# Patient Record
Sex: FEMALE | Race: WHITE | Employment: PART TIME | URBAN - METROPOLITAN AREA
[De-identification: names, ages, dates, MRNs, and addresses within clinical notes are randomized per-mention and may not be internally consistent; named-entity substitution may affect disease eponyms.]

---

## 2017-01-19 ENCOUNTER — TELEPHONE (OUTPATIENT)
Dept: CARDIOLOGY | Age: 42
End: 2017-01-19

## 2017-02-16 RX ORDER — ENALAPRIL MALEATE 2.5 MG/1
TABLET ORAL
Qty: 30 TABLET | Refills: 5 | Status: SHIPPED | OUTPATIENT
Start: 2017-02-16 | End: 2018-09-06 | Stop reason: SDUPTHER

## 2017-03-24 ENCOUNTER — TELEPHONE (OUTPATIENT)
Dept: CARDIOLOGY | Age: 42
End: 2017-03-24

## 2017-04-11 ENCOUNTER — OFFICE VISIT (OUTPATIENT)
Dept: CARDIOLOGY | Age: 42
End: 2017-04-11

## 2017-04-11 ENCOUNTER — HOSPITAL ENCOUNTER (OUTPATIENT)
Age: 42
Setting detail: OBSERVATION
Discharge: HOME | DRG: 141 | End: 2017-04-13
Attending: INTERNAL MEDICINE | Admitting: INTERNAL MEDICINE
Payer: MEDICAID

## 2017-04-11 ENCOUNTER — APPOINTMENT (OUTPATIENT)
Dept: GENERAL RADIOLOGY | Age: 42
DRG: 141 | End: 2017-04-11
Payer: MEDICAID

## 2017-04-11 ENCOUNTER — TELEPHONE (OUTPATIENT)
Dept: CARDIOLOGY | Age: 42
End: 2017-04-11

## 2017-04-11 VITALS
WEIGHT: 153.6 LBS | DIASTOLIC BLOOD PRESSURE: 80 MMHG | OXYGEN SATURATION: 98 % | BODY MASS INDEX: 24.68 KG/M2 | HEIGHT: 66 IN | SYSTOLIC BLOOD PRESSURE: 120 MMHG | HEART RATE: 82 BPM

## 2017-04-11 DIAGNOSIS — E11.65 TYPE 2 DIABETES MELLITUS WITH HYPERGLYCEMIA, UNSPECIFIED LONG TERM INSULIN USE STATUS: ICD-10-CM

## 2017-04-11 DIAGNOSIS — E78.2 MIXED HYPERLIPIDEMIA: ICD-10-CM

## 2017-04-11 DIAGNOSIS — I20.9 ANGINA, CLASS IV (HCC): ICD-10-CM

## 2017-04-11 DIAGNOSIS — R06.02 SHORTNESS OF BREATH: ICD-10-CM

## 2017-04-11 DIAGNOSIS — I25.118 CORONARY ARTERY DISEASE OF NATIVE ARTERY OF NATIVE HEART WITH STABLE ANGINA PECTORIS (HCC): Chronic | ICD-10-CM

## 2017-04-11 DIAGNOSIS — R07.2 PRECORDIAL PAIN: Primary | ICD-10-CM

## 2017-04-11 DIAGNOSIS — R07.9 CHEST PAIN, UNSPECIFIED TYPE: Primary | ICD-10-CM

## 2017-04-11 DIAGNOSIS — I10 ESSENTIAL HYPERTENSION: ICD-10-CM

## 2017-04-11 LAB
ALBUMIN SERPL-MCNC: 4.1 G/DL (ref 3.9–4.9)
ALP BLD-CCNC: 85 U/L (ref 40–130)
ALT SERPL-CCNC: 12 U/L (ref 0–33)
ANION GAP SERPL CALCULATED.3IONS-SCNC: 11 MEQ/L (ref 7–13)
AST SERPL-CCNC: 12 U/L (ref 0–35)
BASOPHILS ABSOLUTE: 0.1 K/UL (ref 0–0.2)
BASOPHILS RELATIVE PERCENT: 0.9 %
BILIRUB SERPL-MCNC: 0.1 MG/DL (ref 0–1.2)
BUN BLDV-MCNC: 8 MG/DL (ref 6–20)
CALCIUM SERPL-MCNC: 9.2 MG/DL (ref 8.6–10.2)
CHLORIDE BLD-SCNC: 97 MEQ/L (ref 98–107)
CO2: 25 MEQ/L (ref 22–29)
CREAT SERPL-MCNC: 0.51 MG/DL (ref 0.5–0.9)
EOSINOPHILS ABSOLUTE: 0.2 K/UL (ref 0–0.7)
EOSINOPHILS RELATIVE PERCENT: 3 %
GFR AFRICAN AMERICAN: >60
GFR NON-AFRICAN AMERICAN: >60
GLOBULIN: 2.7 G/DL (ref 2.3–3.5)
GLUCOSE BLD-MCNC: 148 MG/DL (ref 60–115)
GLUCOSE BLD-MCNC: 357 MG/DL (ref 74–109)
HCT VFR BLD CALC: 47.6 % (ref 37–47)
HEMOGLOBIN: 16.2 G/DL (ref 12–16)
LYMPHOCYTES ABSOLUTE: 2.6 K/UL (ref 1–4.8)
LYMPHOCYTES RELATIVE PERCENT: 43.6 %
MAGNESIUM: 2.1 MG/DL (ref 1.7–2.3)
MCH RBC QN AUTO: 32.1 PG (ref 27–31.3)
MCHC RBC AUTO-ENTMCNC: 34 % (ref 33–37)
MCV RBC AUTO: 94.6 FL (ref 82–100)
MONOCYTES ABSOLUTE: 0.3 K/UL (ref 0.2–0.8)
MONOCYTES RELATIVE PERCENT: 5.5 %
NEUTROPHILS ABSOLUTE: 2.8 K/UL (ref 1.4–6.5)
NEUTROPHILS RELATIVE PERCENT: 47 %
PDW BLD-RTO: 12.8 % (ref 11.5–14.5)
PERFORMED ON: ABNORMAL
PLATELET # BLD: 218 K/UL (ref 130–400)
POTASSIUM SERPL-SCNC: 3.6 MEQ/L (ref 3.5–5.1)
RBC # BLD: 5.03 M/UL (ref 4.2–5.4)
SODIUM BLD-SCNC: 133 MEQ/L (ref 132–144)
TOTAL CK: 120 U/L (ref 0–170)
TOTAL PROTEIN: 6.8 G/DL (ref 6.4–8.1)
TROPONIN: <0.01 NG/ML (ref 0–0.01)
TSH SERPL DL<=0.05 MIU/L-ACNC: 1.53 UIU/ML (ref 0.27–4.2)
WBC # BLD: 5.9 K/UL (ref 4.8–10.8)

## 2017-04-11 PROCEDURE — 36415 COLL VENOUS BLD VENIPUNCTURE: CPT

## 2017-04-11 PROCEDURE — 96376 TX/PRO/DX INJ SAME DRUG ADON: CPT

## 2017-04-11 PROCEDURE — 93005 ELECTROCARDIOGRAM TRACING: CPT

## 2017-04-11 PROCEDURE — 80053 COMPREHEN METABOLIC PANEL: CPT

## 2017-04-11 PROCEDURE — 2580000003 HC RX 258: Performed by: PHYSICIAN ASSISTANT

## 2017-04-11 PROCEDURE — 85025 COMPLETE CBC W/AUTO DIFF WBC: CPT

## 2017-04-11 PROCEDURE — 96375 TX/PRO/DX INJ NEW DRUG ADDON: CPT

## 2017-04-11 PROCEDURE — 84484 ASSAY OF TROPONIN QUANT: CPT

## 2017-04-11 PROCEDURE — 84443 ASSAY THYROID STIM HORMONE: CPT

## 2017-04-11 PROCEDURE — 6360000002 HC RX W HCPCS: Performed by: INTERNAL MEDICINE

## 2017-04-11 PROCEDURE — G0378 HOSPITAL OBSERVATION PER HR: HCPCS

## 2017-04-11 PROCEDURE — 99214 OFFICE O/P EST MOD 30 MIN: CPT | Performed by: INTERNAL MEDICINE

## 2017-04-11 PROCEDURE — 71020 XR CHEST STANDARD TWO VW: CPT

## 2017-04-11 PROCEDURE — 83735 ASSAY OF MAGNESIUM: CPT

## 2017-04-11 PROCEDURE — 6370000000 HC RX 637 (ALT 250 FOR IP): Performed by: INTERNAL MEDICINE

## 2017-04-11 PROCEDURE — 6360000002 HC RX W HCPCS: Performed by: PHYSICIAN ASSISTANT

## 2017-04-11 PROCEDURE — 96374 THER/PROPH/DIAG INJ IV PUSH: CPT

## 2017-04-11 PROCEDURE — 99285 EMERGENCY DEPT VISIT HI MDM: CPT

## 2017-04-11 PROCEDURE — 6370000000 HC RX 637 (ALT 250 FOR IP): Performed by: PHYSICIAN ASSISTANT

## 2017-04-11 PROCEDURE — 82550 ASSAY OF CK (CPK): CPT

## 2017-04-11 RX ORDER — 0.9 % SODIUM CHLORIDE 0.9 %
500 INTRAVENOUS SOLUTION INTRAVENOUS ONCE
Status: DISCONTINUED | OUTPATIENT
Start: 2017-04-11 | End: 2017-04-13 | Stop reason: HOSPADM

## 2017-04-11 RX ORDER — BUPROPION HYDROCHLORIDE 150 MG/1
150 TABLET, EXTENDED RELEASE ORAL 2 TIMES DAILY
Qty: 60 TABLET | Refills: 5 | Status: ON HOLD | OUTPATIENT
Start: 2017-04-11 | End: 2018-05-24 | Stop reason: HOSPADM

## 2017-04-11 RX ORDER — SODIUM CHLORIDE 0.9 % (FLUSH) 0.9 %
3 SYRINGE (ML) INJECTION EVERY 8 HOURS
Status: DISCONTINUED | OUTPATIENT
Start: 2017-04-11 | End: 2017-04-13

## 2017-04-11 RX ORDER — CLONAZEPAM 0.5 MG/1
0.5 TABLET ORAL 3 TIMES DAILY PRN
COMMUNITY
End: 2018-12-04 | Stop reason: ALTCHOICE

## 2017-04-11 RX ORDER — SODIUM CHLORIDE 0.9 % (FLUSH) 0.9 %
10 SYRINGE (ML) INJECTION EVERY 12 HOURS SCHEDULED
Status: DISCONTINUED | OUTPATIENT
Start: 2017-04-11 | End: 2017-04-13 | Stop reason: HOSPADM

## 2017-04-11 RX ORDER — ALPRAZOLAM 0.25 MG/1
0.25 TABLET ORAL 3 TIMES DAILY PRN
Status: DISCONTINUED | OUTPATIENT
Start: 2017-04-11 | End: 2017-04-13 | Stop reason: HOSPADM

## 2017-04-11 RX ORDER — DEXTROSE MONOHYDRATE 25 G/50ML
12.5 INJECTION, SOLUTION INTRAVENOUS PRN
Status: DISCONTINUED | OUTPATIENT
Start: 2017-04-11 | End: 2017-04-13 | Stop reason: HOSPADM

## 2017-04-11 RX ORDER — NITROGLYCERIN 0.4 MG/1
TABLET SUBLINGUAL
Qty: 25 TABLET | Refills: 2 | Status: SHIPPED | OUTPATIENT
Start: 2017-04-11 | End: 2018-09-06 | Stop reason: SDUPTHER

## 2017-04-11 RX ORDER — DEXTROSE MONOHYDRATE 50 MG/ML
100 INJECTION, SOLUTION INTRAVENOUS PRN
Status: DISCONTINUED | OUTPATIENT
Start: 2017-04-11 | End: 2017-04-13 | Stop reason: HOSPADM

## 2017-04-11 RX ORDER — ONDANSETRON 2 MG/ML
4 INJECTION INTRAMUSCULAR; INTRAVENOUS ONCE
Status: COMPLETED | OUTPATIENT
Start: 2017-04-11 | End: 2017-04-11

## 2017-04-11 RX ORDER — NICOTINE POLACRILEX 4 MG
15 LOZENGE BUCCAL PRN
Status: DISCONTINUED | OUTPATIENT
Start: 2017-04-11 | End: 2017-04-13 | Stop reason: HOSPADM

## 2017-04-11 RX ORDER — CARBAMAZEPINE 200 MG/1
200 TABLET ORAL 2 TIMES DAILY
COMMUNITY
End: 2017-09-20 | Stop reason: DRUGHIGH

## 2017-04-11 RX ORDER — BUSPIRONE HYDROCHLORIDE 10 MG/1
10 TABLET ORAL 2 TIMES DAILY
Status: ON HOLD | COMMUNITY
End: 2018-05-24 | Stop reason: HOSPADM

## 2017-04-11 RX ORDER — ACETAMINOPHEN 325 MG/1
650 TABLET ORAL EVERY 4 HOURS PRN
Status: DISCONTINUED | OUTPATIENT
Start: 2017-04-11 | End: 2017-04-13 | Stop reason: HOSPADM

## 2017-04-11 RX ORDER — MORPHINE SULFATE 2 MG/ML
2 INJECTION, SOLUTION INTRAMUSCULAR; INTRAVENOUS
Status: DISCONTINUED | OUTPATIENT
Start: 2017-04-11 | End: 2017-04-13 | Stop reason: HOSPADM

## 2017-04-11 RX ORDER — SODIUM CHLORIDE 0.9 % (FLUSH) 0.9 %
10 SYRINGE (ML) INJECTION PRN
Status: DISCONTINUED | OUTPATIENT
Start: 2017-04-11 | End: 2017-04-13 | Stop reason: HOSPADM

## 2017-04-11 RX ADMIN — INSULIN HUMAN 6 UNITS: 100 INJECTION, SOLUTION PARENTERAL at 20:18

## 2017-04-11 RX ADMIN — ALPRAZOLAM 0.25 MG: 0.25 TABLET ORAL at 22:33

## 2017-04-11 RX ADMIN — MORPHINE SULFATE 2 MG: 2 INJECTION, SOLUTION INTRAMUSCULAR; INTRAVENOUS at 22:33

## 2017-04-11 RX ADMIN — ONDANSETRON 4 MG: 2 INJECTION, SOLUTION INTRAMUSCULAR; INTRAVENOUS at 19:16

## 2017-04-11 RX ADMIN — MORPHINE SULFATE 2 MG: 2 INJECTION, SOLUTION INTRAMUSCULAR; INTRAVENOUS at 19:16

## 2017-04-11 RX ADMIN — SODIUM CHLORIDE, PRESERVATIVE FREE 10 ML: 5 INJECTION INTRAVENOUS at 19:20

## 2017-04-11 ASSESSMENT — ENCOUNTER SYMPTOMS
ABDOMINAL DISTENTION: 0
VOICE CHANGE: 0
APNEA: 0
NAUSEA: 0
VOMITING: 0
ANAL BLEEDING: 0
EYE DISCHARGE: 0
PHOTOPHOBIA: 0

## 2017-04-11 ASSESSMENT — PAIN DESCRIPTION - DESCRIPTORS: DESCRIPTORS: BURNING

## 2017-04-11 ASSESSMENT — PAIN SCALES - GENERAL
PAINLEVEL_OUTOF10: 7
PAINLEVEL_OUTOF10: 4
PAINLEVEL_OUTOF10: 7
PAINLEVEL_OUTOF10: 7

## 2017-04-11 ASSESSMENT — PAIN DESCRIPTION - PAIN TYPE: TYPE: CHRONIC PAIN

## 2017-04-12 LAB
GLUCOSE BLD-MCNC: 281 MG/DL (ref 60–115)
GLUCOSE BLD-MCNC: 305 MG/DL (ref 60–115)
GLUCOSE BLD-MCNC: 346 MG/DL (ref 60–115)
GLUCOSE BLD-MCNC: 367 MG/DL (ref 60–115)
PERFORMED ON: ABNORMAL
TROPONIN: <0.01 NG/ML (ref 0–0.01)

## 2017-04-12 PROCEDURE — 36415 COLL VENOUS BLD VENIPUNCTURE: CPT

## 2017-04-12 PROCEDURE — 84484 ASSAY OF TROPONIN QUANT: CPT

## 2017-04-12 PROCEDURE — G0378 HOSPITAL OBSERVATION PER HR: HCPCS

## 2017-04-12 PROCEDURE — 6370000000 HC RX 637 (ALT 250 FOR IP): Performed by: NURSE PRACTITIONER

## 2017-04-12 PROCEDURE — 94640 AIRWAY INHALATION TREATMENT: CPT

## 2017-04-12 PROCEDURE — 99233 SBSQ HOSP IP/OBS HIGH 50: CPT | Performed by: INTERNAL MEDICINE

## 2017-04-12 PROCEDURE — 6360000002 HC RX W HCPCS: Performed by: INTERNAL MEDICINE

## 2017-04-12 PROCEDURE — 6370000000 HC RX 637 (ALT 250 FOR IP): Performed by: INTERNAL MEDICINE

## 2017-04-12 PROCEDURE — 2060000000 HC ICU INTERMEDIATE R&B

## 2017-04-12 PROCEDURE — 6360000002 HC RX W HCPCS: Performed by: NURSE PRACTITIONER

## 2017-04-12 PROCEDURE — 94664 DEMO&/EVAL PT USE INHALER: CPT

## 2017-04-12 PROCEDURE — 2580000003 HC RX 258: Performed by: INTERNAL MEDICINE

## 2017-04-12 PROCEDURE — 96376 TX/PRO/DX INJ SAME DRUG ADON: CPT

## 2017-04-12 RX ORDER — CARBAMAZEPINE 200 MG/1
200 TABLET ORAL 2 TIMES DAILY
Status: DISCONTINUED | OUTPATIENT
Start: 2017-04-12 | End: 2017-04-13 | Stop reason: HOSPADM

## 2017-04-12 RX ORDER — GLIPIZIDE 10 MG/1
10 TABLET ORAL
Status: DISCONTINUED | OUTPATIENT
Start: 2017-04-13 | End: 2017-04-13 | Stop reason: HOSPADM

## 2017-04-12 RX ORDER — RANOLAZINE 500 MG/1
1000 TABLET, EXTENDED RELEASE ORAL 2 TIMES DAILY
Status: DISCONTINUED | OUTPATIENT
Start: 2017-04-12 | End: 2017-04-13 | Stop reason: HOSPADM

## 2017-04-12 RX ORDER — BUSPIRONE HYDROCHLORIDE 10 MG/1
10 TABLET ORAL 2 TIMES DAILY
Status: DISCONTINUED | OUTPATIENT
Start: 2017-04-12 | End: 2017-04-13 | Stop reason: HOSPADM

## 2017-04-12 RX ORDER — CARVEDILOL 6.25 MG/1
6.25 TABLET ORAL 2 TIMES DAILY
Status: DISCONTINUED | OUTPATIENT
Start: 2017-04-12 | End: 2017-04-13 | Stop reason: HOSPADM

## 2017-04-12 RX ORDER — METHYLPREDNISOLONE SODIUM SUCCINATE 125 MG/2ML
125 INJECTION, POWDER, LYOPHILIZED, FOR SOLUTION INTRAMUSCULAR; INTRAVENOUS ONCE
Status: COMPLETED | OUTPATIENT
Start: 2017-04-12 | End: 2017-04-12

## 2017-04-12 RX ORDER — IPRATROPIUM BROMIDE AND ALBUTEROL SULFATE 2.5; .5 MG/3ML; MG/3ML
1 SOLUTION RESPIRATORY (INHALATION) EVERY 4 HOURS PRN
Status: DISCONTINUED | OUTPATIENT
Start: 2017-04-12 | End: 2017-04-13 | Stop reason: HOSPADM

## 2017-04-12 RX ORDER — IPRATROPIUM BROMIDE AND ALBUTEROL SULFATE 2.5; .5 MG/3ML; MG/3ML
1 SOLUTION RESPIRATORY (INHALATION)
Status: DISCONTINUED | OUTPATIENT
Start: 2017-04-12 | End: 2017-04-12

## 2017-04-12 RX ORDER — PROMETHAZINE HYDROCHLORIDE AND CODEINE PHOSPHATE 6.25; 1 MG/5ML; MG/5ML
5 SYRUP ORAL EVERY 4 HOURS PRN
Status: DISCONTINUED | OUTPATIENT
Start: 2017-04-12 | End: 2017-04-13 | Stop reason: HOSPADM

## 2017-04-12 RX ORDER — CLONAZEPAM 0.5 MG/1
0.5 TABLET ORAL 3 TIMES DAILY PRN
Status: DISCONTINUED | OUTPATIENT
Start: 2017-04-12 | End: 2017-04-13 | Stop reason: HOSPADM

## 2017-04-12 RX ORDER — ENALAPRIL MALEATE 2.5 MG/1
2.5 TABLET ORAL DAILY
Status: DISCONTINUED | OUTPATIENT
Start: 2017-04-12 | End: 2017-04-13 | Stop reason: HOSPADM

## 2017-04-12 RX ORDER — PRASUGREL 10 MG/1
10 TABLET, FILM COATED ORAL DAILY
Status: DISCONTINUED | OUTPATIENT
Start: 2017-04-12 | End: 2017-04-13 | Stop reason: HOSPADM

## 2017-04-12 RX ORDER — BUPROPION HYDROCHLORIDE 150 MG/1
150 TABLET, EXTENDED RELEASE ORAL 2 TIMES DAILY
Status: DISCONTINUED | OUTPATIENT
Start: 2017-04-12 | End: 2017-04-13 | Stop reason: HOSPADM

## 2017-04-12 RX ORDER — ASPIRIN 81 MG/1
162 TABLET ORAL DAILY
Status: DISCONTINUED | OUTPATIENT
Start: 2017-04-12 | End: 2017-04-13 | Stop reason: HOSPADM

## 2017-04-12 RX ORDER — NITROGLYCERIN 0.4 MG/1
0.4 TABLET SUBLINGUAL EVERY 5 MIN PRN
Status: DISCONTINUED | OUTPATIENT
Start: 2017-04-12 | End: 2017-04-13 | Stop reason: HOSPADM

## 2017-04-12 RX ORDER — PRAVASTATIN SODIUM 40 MG
40 TABLET ORAL NIGHTLY
Status: DISCONTINUED | OUTPATIENT
Start: 2017-04-12 | End: 2017-04-13 | Stop reason: HOSPADM

## 2017-04-12 RX ADMIN — SODIUM CHLORIDE, PRESERVATIVE FREE 10 ML: 5 INJECTION INTRAVENOUS at 09:33

## 2017-04-12 RX ADMIN — MORPHINE SULFATE 2 MG: 2 INJECTION, SOLUTION INTRAMUSCULAR; INTRAVENOUS at 10:30

## 2017-04-12 RX ADMIN — CARVEDILOL 6.25 MG: 6.25 TABLET, FILM COATED ORAL at 21:54

## 2017-04-12 RX ADMIN — ASPIRIN 162 MG: 81 TABLET, COATED ORAL at 09:32

## 2017-04-12 RX ADMIN — INSULIN LISPRO 6 UNITS: 100 INJECTION, SOLUTION INTRAVENOUS; SUBCUTANEOUS at 17:44

## 2017-04-12 RX ADMIN — INSULIN LISPRO 8 UNITS: 100 INJECTION, SOLUTION INTRAVENOUS; SUBCUTANEOUS at 09:47

## 2017-04-12 RX ADMIN — INSULIN LISPRO 5 UNITS: 100 INJECTION, SOLUTION INTRAVENOUS; SUBCUTANEOUS at 21:55

## 2017-04-12 RX ADMIN — RANOLAZINE 1000 MG: 500 TABLET, FILM COATED, EXTENDED RELEASE ORAL at 09:32

## 2017-04-12 RX ADMIN — BUPROPION HYDROCHLORIDE 150 MG: 150 TABLET, EXTENDED RELEASE ORAL at 21:54

## 2017-04-12 RX ADMIN — ALPRAZOLAM 0.25 MG: 0.25 TABLET ORAL at 14:24

## 2017-04-12 RX ADMIN — MORPHINE SULFATE 2 MG: 2 INJECTION, SOLUTION INTRAMUSCULAR; INTRAVENOUS at 18:54

## 2017-04-12 RX ADMIN — ALPRAZOLAM 0.25 MG: 0.25 TABLET ORAL at 18:54

## 2017-04-12 RX ADMIN — CLONAZEPAM 0.5 MG: 0.5 TABLET ORAL at 14:24

## 2017-04-12 RX ADMIN — METHYLPREDNISOLONE SODIUM SUCCINATE 125 MG: 125 INJECTION, POWDER, FOR SOLUTION INTRAMUSCULAR; INTRAVENOUS at 09:33

## 2017-04-12 RX ADMIN — SODIUM CHLORIDE, PRESERVATIVE FREE 10 ML: 5 INJECTION INTRAVENOUS at 21:59

## 2017-04-12 RX ADMIN — CARBAMAZEPINE 200 MG: 200 TABLET ORAL at 21:54

## 2017-04-12 RX ADMIN — CLONAZEPAM 0.5 MG: 0.5 TABLET ORAL at 18:54

## 2017-04-12 RX ADMIN — PRAVASTATIN SODIUM 40 MG: 40 TABLET ORAL at 21:53

## 2017-04-12 RX ADMIN — PRASUGREL HYDROCHLORIDE 10 MG: 10 TABLET, FILM COATED ORAL at 09:33

## 2017-04-12 RX ADMIN — ENALAPRIL MALEATE 2.5 MG: 2.5 TABLET ORAL at 09:32

## 2017-04-12 RX ADMIN — ALPRAZOLAM 0.25 MG: 0.25 TABLET ORAL at 22:04

## 2017-04-12 RX ADMIN — MORPHINE SULFATE 2 MG: 2 INJECTION, SOLUTION INTRAMUSCULAR; INTRAVENOUS at 06:52

## 2017-04-12 RX ADMIN — MORPHINE SULFATE 2 MG: 2 INJECTION, SOLUTION INTRAMUSCULAR; INTRAVENOUS at 22:04

## 2017-04-12 RX ADMIN — INSULIN LISPRO 8 UNITS: 100 INJECTION, SOLUTION INTRAVENOUS; SUBCUTANEOUS at 13:39

## 2017-04-12 RX ADMIN — BUSPIRONE HYDROCHLORIDE 10 MG: 10 TABLET ORAL at 09:32

## 2017-04-12 RX ADMIN — MORPHINE SULFATE 2 MG: 2 INJECTION, SOLUTION INTRAMUSCULAR; INTRAVENOUS at 15:51

## 2017-04-12 RX ADMIN — BUSPIRONE HYDROCHLORIDE 10 MG: 10 TABLET ORAL at 21:53

## 2017-04-12 RX ADMIN — CARVEDILOL 6.25 MG: 6.25 TABLET, FILM COATED ORAL at 09:32

## 2017-04-12 RX ADMIN — CARBAMAZEPINE 200 MG: 200 TABLET ORAL at 09:45

## 2017-04-12 RX ADMIN — IPRATROPIUM BROMIDE AND ALBUTEROL SULFATE 1 AMPULE: 2.5; .5 SOLUTION RESPIRATORY (INHALATION) at 11:18

## 2017-04-12 RX ADMIN — RANOLAZINE 1000 MG: 500 TABLET, FILM COATED, EXTENDED RELEASE ORAL at 21:54

## 2017-04-12 ASSESSMENT — PAIN DESCRIPTION - DESCRIPTORS: DESCRIPTORS: BURNING

## 2017-04-12 ASSESSMENT — ENCOUNTER SYMPTOMS
COUGH: 1
EYES NEGATIVE: 1
WHEEZING: 1
SHORTNESS OF BREATH: 1
GASTROINTESTINAL NEGATIVE: 1
STRIDOR: 0
ALLERGIC/IMMUNOLOGIC NEGATIVE: 1
CHEST TIGHTNESS: 1
APNEA: 0

## 2017-04-12 ASSESSMENT — PAIN SCALES - GENERAL
PAINLEVEL_OUTOF10: 3
PAINLEVEL_OUTOF10: 4
PAINLEVEL_OUTOF10: 7
PAINLEVEL_OUTOF10: 7
PAINLEVEL_OUTOF10: 4
PAINLEVEL_OUTOF10: 8
PAINLEVEL_OUTOF10: 10
PAINLEVEL_OUTOF10: 4
PAINLEVEL_OUTOF10: 3
PAINLEVEL_OUTOF10: 7

## 2017-04-12 ASSESSMENT — PAIN DESCRIPTION - LOCATION
LOCATION: CHEST
LOCATION: CHEST;JAW

## 2017-04-12 ASSESSMENT — PAIN DESCRIPTION - FREQUENCY
FREQUENCY: INTERMITTENT
FREQUENCY: INTERMITTENT

## 2017-04-12 ASSESSMENT — PAIN DESCRIPTION - ONSET
ONSET: ON-GOING
ONSET: ON-GOING

## 2017-04-12 ASSESSMENT — PAIN DESCRIPTION - ORIENTATION
ORIENTATION: LEFT;MID
ORIENTATION: LEFT

## 2017-04-12 ASSESSMENT — PAIN DESCRIPTION - PAIN TYPE
TYPE: ACUTE PAIN
TYPE: ACUTE PAIN

## 2017-04-12 ASSESSMENT — PAIN DESCRIPTION - PROGRESSION
CLINICAL_PROGRESSION: GRADUALLY WORSENING
CLINICAL_PROGRESSION: GRADUALLY WORSENING

## 2017-04-13 VITALS
HEART RATE: 60 BPM | OXYGEN SATURATION: 99 % | TEMPERATURE: 98.1 F | SYSTOLIC BLOOD PRESSURE: 104 MMHG | WEIGHT: 154 LBS | DIASTOLIC BLOOD PRESSURE: 59 MMHG | HEIGHT: 66 IN | RESPIRATION RATE: 16 BRPM | BODY MASS INDEX: 24.75 KG/M2

## 2017-04-13 LAB
GLUCOSE BLD-MCNC: 200 MG/DL (ref 60–115)
PERFORMED ON: ABNORMAL
TROPONIN: <0.01 NG/ML (ref 0–0.01)
TROPONIN: <0.01 NG/ML (ref 0–0.01)

## 2017-04-13 PROCEDURE — 93005 ELECTROCARDIOGRAM TRACING: CPT

## 2017-04-13 PROCEDURE — 6370000000 HC RX 637 (ALT 250 FOR IP): Performed by: INTERNAL MEDICINE

## 2017-04-13 PROCEDURE — 2580000003 HC RX 258: Performed by: INTERNAL MEDICINE

## 2017-04-13 PROCEDURE — 99238 HOSP IP/OBS DSCHRG MGMT 30/<: CPT | Performed by: NURSE PRACTITIONER

## 2017-04-13 PROCEDURE — G0378 HOSPITAL OBSERVATION PER HR: HCPCS

## 2017-04-13 PROCEDURE — 6370000000 HC RX 637 (ALT 250 FOR IP): Performed by: NURSE PRACTITIONER

## 2017-04-13 PROCEDURE — 36415 COLL VENOUS BLD VENIPUNCTURE: CPT

## 2017-04-13 PROCEDURE — 94640 AIRWAY INHALATION TREATMENT: CPT

## 2017-04-13 PROCEDURE — 84484 ASSAY OF TROPONIN QUANT: CPT

## 2017-04-13 RX ORDER — SODIUM CHLORIDE 0.9 % (FLUSH) 0.9 %
10 SYRINGE (ML) INJECTION EVERY 12 HOURS SCHEDULED
Status: CANCELLED | OUTPATIENT
Start: 2017-04-13

## 2017-04-13 RX ORDER — SODIUM CHLORIDE 0.9 % (FLUSH) 0.9 %
10 SYRINGE (ML) INJECTION PRN
Status: CANCELLED | OUTPATIENT
Start: 2017-04-13

## 2017-04-13 RX ORDER — SODIUM CHLORIDE 9 MG/ML
INJECTION, SOLUTION INTRAVENOUS CONTINUOUS
Status: CANCELLED | OUTPATIENT
Start: 2017-04-14

## 2017-04-13 RX ORDER — NICOTINE 21 MG/24HR
1 PATCH, TRANSDERMAL 24 HOURS TRANSDERMAL DAILY
Status: DISCONTINUED | OUTPATIENT
Start: 2017-04-13 | End: 2017-04-13 | Stop reason: HOSPADM

## 2017-04-13 RX ADMIN — CARBAMAZEPINE 200 MG: 200 TABLET ORAL at 09:37

## 2017-04-13 RX ADMIN — RANOLAZINE 1000 MG: 500 TABLET, FILM COATED, EXTENDED RELEASE ORAL at 09:37

## 2017-04-13 RX ADMIN — ASPIRIN 162 MG: 81 TABLET, COATED ORAL at 09:37

## 2017-04-13 RX ADMIN — PRASUGREL HYDROCHLORIDE 10 MG: 10 TABLET, FILM COATED ORAL at 09:37

## 2017-04-13 RX ADMIN — GLIPIZIDE 10 MG: 10 TABLET ORAL at 06:42

## 2017-04-13 RX ADMIN — ENALAPRIL MALEATE 2.5 MG: 2.5 TABLET ORAL at 09:36

## 2017-04-13 RX ADMIN — IPRATROPIUM BROMIDE AND ALBUTEROL SULFATE 1 AMPULE: 2.5; .5 SOLUTION RESPIRATORY (INHALATION) at 08:18

## 2017-04-13 RX ADMIN — SODIUM CHLORIDE, PRESERVATIVE FREE 10 ML: 5 INJECTION INTRAVENOUS at 09:40

## 2017-04-13 RX ADMIN — BUSPIRONE HYDROCHLORIDE 10 MG: 10 TABLET ORAL at 09:37

## 2017-04-13 RX ADMIN — CARVEDILOL 6.25 MG: 6.25 TABLET, FILM COATED ORAL at 09:38

## 2017-04-13 ASSESSMENT — ENCOUNTER SYMPTOMS
ALLERGIC/IMMUNOLOGIC NEGATIVE: 1
SHORTNESS OF BREATH: 1
EYES NEGATIVE: 1
APNEA: 0
WHEEZING: 1
STRIDOR: 0
COUGH: 0
GASTROINTESTINAL NEGATIVE: 1
CHEST TIGHTNESS: 0

## 2017-04-13 ASSESSMENT — PAIN SCALES - GENERAL: PAINLEVEL_OUTOF10: 0

## 2017-04-17 LAB
EKG ATRIAL RATE: 70 BPM
EKG ATRIAL RATE: 82 BPM
EKG P AXIS: 49 DEGREES
EKG P AXIS: 64 DEGREES
EKG P-R INTERVAL: 178 MS
EKG P-R INTERVAL: 198 MS
EKG Q-T INTERVAL: 400 MS
EKG Q-T INTERVAL: 440 MS
EKG QRS DURATION: 100 MS
EKG QRS DURATION: 94 MS
EKG QTC CALCULATION (BAZETT): 467 MS
EKG QTC CALCULATION (BAZETT): 475 MS
EKG R AXIS: -11 DEGREES
EKG R AXIS: 20 DEGREES
EKG T AXIS: 42 DEGREES
EKG T AXIS: 46 DEGREES
EKG VENTRICULAR RATE: 70 BPM
EKG VENTRICULAR RATE: 82 BPM

## 2017-04-17 RX ORDER — PRAVASTATIN SODIUM 40 MG
TABLET ORAL
Qty: 90 TABLET | Refills: 3 | Status: ON HOLD | OUTPATIENT
Start: 2017-04-17 | End: 2018-05-24 | Stop reason: HOSPADM

## 2017-04-17 RX ORDER — CARVEDILOL 6.25 MG/1
TABLET ORAL
Qty: 180 TABLET | Refills: 3 | Status: ON HOLD | OUTPATIENT
Start: 2017-04-17 | End: 2018-05-24 | Stop reason: HOSPADM

## 2017-05-11 RX ORDER — PRASUGREL HCL 10 MG
TABLET ORAL
Qty: 30 TABLET | Refills: 5 | Status: SHIPPED | OUTPATIENT
Start: 2017-05-11 | End: 2018-09-06 | Stop reason: SDUPTHER

## 2017-08-01 DIAGNOSIS — E11.00 TYPE 2 DIABETES MELLITUS WITH HYPEROSMOLARITY WITHOUT COMA, WITHOUT LONG-TERM CURRENT USE OF INSULIN (HCC): ICD-10-CM

## 2017-08-01 RX ORDER — GLIPIZIDE 10 MG/1
TABLET ORAL
Qty: 60 TABLET | OUTPATIENT
Start: 2017-08-01

## 2017-09-15 LAB
CHOLESTEROL, TOTAL: 175 MG/DL
CHOLESTEROL/HDL RATIO: 3.8
HDLC SERPL-MCNC: 46 MG/DL (ref 35–70)
LDL CHOLESTEROL CALCULATED: 100 MG/DL (ref 0–160)
TRIGL SERPL-MCNC: 145 MG/DL
VLDLC SERPL CALC-MCNC: 29 MG/DL

## 2017-09-20 RX ORDER — CARBAMAZEPINE 300 MG/1
300 CAPSULE, EXTENDED RELEASE ORAL 2 TIMES DAILY
COMMUNITY
End: 2018-09-06 | Stop reason: SDUPTHER

## 2017-09-20 RX ORDER — ISOSORBIDE MONONITRATE 60 MG/1
60 TABLET, EXTENDED RELEASE ORAL DAILY
COMMUNITY
End: 2018-09-06 | Stop reason: SDUPTHER

## 2017-09-20 RX ORDER — MIRTAZAPINE 15 MG/1
15 TABLET, FILM COATED ORAL NIGHTLY
Status: ON HOLD | COMMUNITY
End: 2018-05-23

## 2018-05-22 LAB
TROPONIN: <0.02 NG/ML (ref 0–0.04)
TROPONIN: <0.02 NG/ML (ref 0–0.04)

## 2018-05-23 ENCOUNTER — APPOINTMENT (OUTPATIENT)
Dept: GENERAL RADIOLOGY | Age: 43
DRG: 175 | End: 2018-05-23
Payer: MEDICAID

## 2018-05-23 ENCOUNTER — APPOINTMENT (OUTPATIENT)
Dept: CARDIAC CATH/INVASIVE PROCEDURES | Age: 43
DRG: 175 | End: 2018-05-23
Payer: MEDICAID

## 2018-05-23 ENCOUNTER — HOSPITAL ENCOUNTER (INPATIENT)
Age: 43
LOS: 1 days | Discharge: HOME | DRG: 175 | End: 2018-05-24
Attending: EMERGENCY MEDICINE | Admitting: INTERNAL MEDICINE
Payer: MEDICAID

## 2018-05-23 DIAGNOSIS — I20.0 UNSTABLE ANGINA (HCC): Primary | ICD-10-CM

## 2018-05-23 LAB
ALBUMIN SERPL-MCNC: 4.2 G/DL (ref 3.9–4.9)
ALP BLD-CCNC: 71 U/L (ref 40–130)
ALT SERPL-CCNC: 18 U/L (ref 0–33)
ANION GAP SERPL CALCULATED.3IONS-SCNC: 14 MEQ/L (ref 7–13)
AST SERPL-CCNC: 18 U/L (ref 0–35)
BASOPHILS ABSOLUTE: 0.1 K/UL (ref 0–0.2)
BASOPHILS RELATIVE PERCENT: 0.8 %
BILIRUB SERPL-MCNC: 0.4 MG/DL (ref 0–1.2)
BUN BLDV-MCNC: 9 MG/DL (ref 6–20)
CALCIUM SERPL-MCNC: 9.3 MG/DL (ref 8.6–10.2)
CHLORIDE BLD-SCNC: 98 MEQ/L (ref 98–107)
CHOLESTEROL, TOTAL: 161 MG/DL (ref 0–199)
CO2: 24 MEQ/L (ref 22–29)
CREAT SERPL-MCNC: 0.5 MG/DL (ref 0.5–0.9)
EOSINOPHILS ABSOLUTE: 0.1 K/UL (ref 0–0.7)
EOSINOPHILS RELATIVE PERCENT: 1.7 %
GFR AFRICAN AMERICAN: >60
GFR NON-AFRICAN AMERICAN: >60
GLOBULIN: 2 G/DL (ref 2.3–3.5)
GLUCOSE BLD-MCNC: 293 MG/DL (ref 74–109)
HCT VFR BLD CALC: 44.7 % (ref 37–47)
HCT VFR BLD CALC: 48.1 % (ref 37–47)
HDLC SERPL-MCNC: 59 MG/DL (ref 40–59)
HEMOGLOBIN: 15.4 G/DL (ref 12–16)
HEMOGLOBIN: 16.4 G/DL (ref 12–16)
INR BLD: 1
INR BLD: 1
LDL CHOLESTEROL CALCULATED: 83 MG/DL (ref 0–129)
LYMPHOCYTES ABSOLUTE: 1.6 K/UL (ref 1–4.8)
LYMPHOCYTES RELATIVE PERCENT: 23 %
MAGNESIUM: 2.1 MG/DL (ref 1.7–2.3)
MCH RBC QN AUTO: 32.6 PG (ref 27–31.3)
MCH RBC QN AUTO: 32.8 PG (ref 27–31.3)
MCHC RBC AUTO-ENTMCNC: 34.1 % (ref 33–37)
MCHC RBC AUTO-ENTMCNC: 34.4 % (ref 33–37)
MCV RBC AUTO: 95.3 FL (ref 82–100)
MCV RBC AUTO: 95.7 FL (ref 82–100)
MONOCYTES ABSOLUTE: 0.5 K/UL (ref 0.2–0.8)
MONOCYTES RELATIVE PERCENT: 7.6 %
NEUTROPHILS ABSOLUTE: 4.6 K/UL (ref 1.4–6.5)
NEUTROPHILS RELATIVE PERCENT: 66.9 %
PDW BLD-RTO: 12.6 % (ref 11.5–14.5)
PDW BLD-RTO: 12.8 % (ref 11.5–14.5)
PLATELET # BLD: 223 K/UL (ref 130–400)
PLATELET # BLD: 236 K/UL (ref 130–400)
POTASSIUM SERPL-SCNC: 4.4 MEQ/L (ref 3.5–5.1)
PRO-BNP: 68 PG/ML
PROTHROMBIN TIME: 10.4 SEC (ref 9.6–12.3)
PROTHROMBIN TIME: 10.7 SEC (ref 9.6–12.3)
RBC # BLD: 4.69 M/UL (ref 4.2–5.4)
RBC # BLD: 5.03 M/UL (ref 4.2–5.4)
SODIUM BLD-SCNC: 136 MEQ/L (ref 132–144)
TOTAL PROTEIN: 6.2 G/DL (ref 6.4–8.1)
TRIGL SERPL-MCNC: 94 MG/DL (ref 0–200)
TROPONIN: <0.01 NG/ML (ref 0–0.01)
TROPONIN: <0.01 NG/ML (ref 0–0.01)
WBC # BLD: 6.9 K/UL (ref 4.8–10.8)
WBC # BLD: 7.9 K/UL (ref 4.8–10.8)

## 2018-05-23 PROCEDURE — 92920 PRQ TRLUML C ANGIOP 1ART&/BR: CPT | Performed by: INTERNAL MEDICINE

## 2018-05-23 PROCEDURE — 2580000003 HC RX 258: Performed by: INTERNAL MEDICINE

## 2018-05-23 PROCEDURE — 99285 EMERGENCY DEPT VISIT HI MDM: CPT

## 2018-05-23 PROCEDURE — 84484 ASSAY OF TROPONIN QUANT: CPT

## 2018-05-23 PROCEDURE — 6370000000 HC RX 637 (ALT 250 FOR IP): Performed by: INTERNAL MEDICINE

## 2018-05-23 PROCEDURE — C1769 GUIDE WIRE: HCPCS

## 2018-05-23 PROCEDURE — 4A023N7 MEASUREMENT OF CARDIAC SAMPLING AND PRESSURE, LEFT HEART, PERCUTANEOUS APPROACH: ICD-10-PCS | Performed by: INTERNAL MEDICINE

## 2018-05-23 PROCEDURE — C1887 CATHETER, GUIDING: HCPCS

## 2018-05-23 PROCEDURE — 36415 COLL VENOUS BLD VENIPUNCTURE: CPT

## 2018-05-23 PROCEDURE — 2709999900 HC NON-CHARGEABLE SUPPLY

## 2018-05-23 PROCEDURE — 96374 THER/PROPH/DIAG INJ IV PUSH: CPT

## 2018-05-23 PROCEDURE — 2500000003 HC RX 250 WO HCPCS

## 2018-05-23 PROCEDURE — 2580000003 HC RX 258: Performed by: EMERGENCY MEDICINE

## 2018-05-23 PROCEDURE — B2111ZZ FLUOROSCOPY OF MULTIPLE CORONARY ARTERIES USING LOW OSMOLAR CONTRAST: ICD-10-PCS | Performed by: INTERNAL MEDICINE

## 2018-05-23 PROCEDURE — 80053 COMPREHEN METABOLIC PANEL: CPT

## 2018-05-23 PROCEDURE — 2580000003 HC RX 258

## 2018-05-23 PROCEDURE — 93458 L HRT ARTERY/VENTRICLE ANGIO: CPT | Performed by: INTERNAL MEDICINE

## 2018-05-23 PROCEDURE — 93005 ELECTROCARDIOGRAM TRACING: CPT

## 2018-05-23 PROCEDURE — 6360000002 HC RX W HCPCS: Performed by: INTERNAL MEDICINE

## 2018-05-23 PROCEDURE — C1894 INTRO/SHEATH, NON-LASER: HCPCS

## 2018-05-23 PROCEDURE — 80061 LIPID PANEL: CPT

## 2018-05-23 PROCEDURE — 83880 ASSAY OF NATRIURETIC PEPTIDE: CPT

## 2018-05-23 PROCEDURE — G0378 HOSPITAL OBSERVATION PER HR: HCPCS

## 2018-05-23 PROCEDURE — 99223 1ST HOSP IP/OBS HIGH 75: CPT | Performed by: INTERNAL MEDICINE

## 2018-05-23 PROCEDURE — 85027 COMPLETE CBC AUTOMATED: CPT

## 2018-05-23 PROCEDURE — C1760 CLOSURE DEV, VASC: HCPCS

## 2018-05-23 PROCEDURE — 2060000000 HC ICU INTERMEDIATE R&B

## 2018-05-23 PROCEDURE — 02703ZZ DILATION OF CORONARY ARTERY, ONE ARTERY, PERCUTANEOUS APPROACH: ICD-10-PCS | Performed by: INTERNAL MEDICINE

## 2018-05-23 PROCEDURE — 6370000000 HC RX 637 (ALT 250 FOR IP): Performed by: EMERGENCY MEDICINE

## 2018-05-23 PROCEDURE — 71045 X-RAY EXAM CHEST 1 VIEW: CPT

## 2018-05-23 PROCEDURE — 2720000001 HC MISC SURG SUPPLY STERILE $51-500

## 2018-05-23 PROCEDURE — 85610 PROTHROMBIN TIME: CPT

## 2018-05-23 PROCEDURE — B2151ZZ FLUOROSCOPY OF LEFT HEART USING LOW OSMOLAR CONTRAST: ICD-10-PCS | Performed by: INTERNAL MEDICINE

## 2018-05-23 PROCEDURE — C1725 CATH, TRANSLUMIN NON-LASER: HCPCS

## 2018-05-23 PROCEDURE — 83735 ASSAY OF MAGNESIUM: CPT

## 2018-05-23 PROCEDURE — 85025 COMPLETE CBC W/AUTO DIFF WBC: CPT

## 2018-05-23 PROCEDURE — 6360000002 HC RX W HCPCS

## 2018-05-23 RX ORDER — SODIUM CHLORIDE 9 MG/ML
INJECTION, SOLUTION INTRAVENOUS CONTINUOUS
Status: DISCONTINUED | OUTPATIENT
Start: 2018-05-23 | End: 2018-05-24

## 2018-05-23 RX ORDER — CARVEDILOL 6.25 MG/1
6.25 TABLET ORAL ONCE
Status: COMPLETED | OUTPATIENT
Start: 2018-05-23 | End: 2018-05-23

## 2018-05-23 RX ORDER — ASPIRIN 81 MG/1
81 TABLET, CHEWABLE ORAL DAILY
Status: DISCONTINUED | OUTPATIENT
Start: 2018-05-24 | End: 2018-05-24 | Stop reason: HOSPADM

## 2018-05-23 RX ORDER — ACETAMINOPHEN 325 MG/1
650 TABLET ORAL EVERY 4 HOURS PRN
Status: DISCONTINUED | OUTPATIENT
Start: 2018-05-23 | End: 2018-05-24 | Stop reason: HOSPADM

## 2018-05-23 RX ORDER — ACETAMINOPHEN 325 MG/1
650 TABLET ORAL EVERY 4 HOURS PRN
Status: DISCONTINUED | OUTPATIENT
Start: 2018-05-23 | End: 2018-05-23 | Stop reason: SDUPTHER

## 2018-05-23 RX ORDER — ALPRAZOLAM 0.5 MG/1
0.5 TABLET ORAL
Status: COMPLETED | OUTPATIENT
Start: 2018-05-23 | End: 2018-05-23

## 2018-05-23 RX ORDER — SODIUM CHLORIDE 0.9 % (FLUSH) 0.9 %
10 SYRINGE (ML) INJECTION EVERY 12 HOURS SCHEDULED
Status: DISCONTINUED | OUTPATIENT
Start: 2018-05-23 | End: 2018-05-23 | Stop reason: SDUPTHER

## 2018-05-23 RX ORDER — NITROGLYCERIN 0.4 MG/1
0.4 TABLET SUBLINGUAL EVERY 5 MIN PRN
Status: DISCONTINUED | OUTPATIENT
Start: 2018-05-23 | End: 2018-05-24 | Stop reason: HOSPADM

## 2018-05-23 RX ORDER — ASPIRIN 81 MG/1
81 TABLET, CHEWABLE ORAL DAILY
Status: DISCONTINUED | OUTPATIENT
Start: 2018-05-24 | End: 2018-05-23 | Stop reason: SDUPTHER

## 2018-05-23 RX ORDER — SODIUM CHLORIDE 0.9 % (FLUSH) 0.9 %
10 SYRINGE (ML) INJECTION EVERY 12 HOURS SCHEDULED
Status: DISCONTINUED | OUTPATIENT
Start: 2018-05-23 | End: 2018-05-24

## 2018-05-23 RX ORDER — ATORVASTATIN CALCIUM 40 MG/1
20 TABLET, FILM COATED ORAL NIGHTLY
Status: DISCONTINUED | OUTPATIENT
Start: 2018-05-23 | End: 2018-05-24

## 2018-05-23 RX ORDER — NICOTINE 21 MG/24HR
1 PATCH, TRANSDERMAL 24 HOURS TRANSDERMAL DAILY
Status: DISCONTINUED | OUTPATIENT
Start: 2018-05-23 | End: 2018-05-24 | Stop reason: HOSPADM

## 2018-05-23 RX ORDER — SODIUM CHLORIDE 0.9 % (FLUSH) 0.9 %
10 SYRINGE (ML) INJECTION PRN
Status: DISCONTINUED | OUTPATIENT
Start: 2018-05-23 | End: 2018-05-24

## 2018-05-23 RX ORDER — DIPHENHYDRAMINE HCL 25 MG
50 TABLET ORAL ONCE
Status: DISCONTINUED | OUTPATIENT
Start: 2018-05-23 | End: 2018-05-24 | Stop reason: HOSPADM

## 2018-05-23 RX ORDER — ONDANSETRON 2 MG/ML
4 INJECTION INTRAMUSCULAR; INTRAVENOUS EVERY 6 HOURS PRN
Status: DISCONTINUED | OUTPATIENT
Start: 2018-05-23 | End: 2018-05-24 | Stop reason: HOSPADM

## 2018-05-23 RX ORDER — PRASUGREL 10 MG/1
10 TABLET, FILM COATED ORAL ONCE
Status: COMPLETED | OUTPATIENT
Start: 2018-05-23 | End: 2018-05-23

## 2018-05-23 RX ORDER — ISOSORBIDE MONONITRATE 60 MG/1
60 TABLET, EXTENDED RELEASE ORAL DAILY
Status: DISCONTINUED | OUTPATIENT
Start: 2018-05-23 | End: 2018-05-24 | Stop reason: HOSPADM

## 2018-05-23 RX ORDER — CLOPIDOGREL BISULFATE 75 MG/1
75 TABLET ORAL DAILY
Status: DISCONTINUED | OUTPATIENT
Start: 2018-05-23 | End: 2018-05-23

## 2018-05-23 RX ORDER — NITROGLYCERIN 0.4 MG/1
0.4 TABLET SUBLINGUAL EVERY 5 MIN PRN
Status: DISCONTINUED | OUTPATIENT
Start: 2018-05-23 | End: 2018-05-24 | Stop reason: SDUPTHER

## 2018-05-23 RX ORDER — SODIUM CHLORIDE 0.9 % (FLUSH) 0.9 %
10 SYRINGE (ML) INJECTION PRN
Status: DISCONTINUED | OUTPATIENT
Start: 2018-05-23 | End: 2018-05-23 | Stop reason: SDUPTHER

## 2018-05-23 RX ORDER — ASPIRIN 81 MG/1
324 TABLET, CHEWABLE ORAL ONCE
Status: COMPLETED | OUTPATIENT
Start: 2018-05-23 | End: 2018-05-23

## 2018-05-23 RX ADMIN — PRASUGREL HYDROCHLORIDE 10 MG: 10 TABLET, FILM COATED ORAL at 08:27

## 2018-05-23 RX ADMIN — Medication 10 ML: at 08:20

## 2018-05-23 RX ADMIN — CARVEDILOL 6.25 MG: 6.25 TABLET, FILM COATED ORAL at 08:27

## 2018-05-23 RX ADMIN — ASPIRIN 81 MG 324 MG: 81 TABLET ORAL at 08:27

## 2018-05-23 RX ADMIN — HYDROMORPHONE HYDROCHLORIDE 0.5 MG: 1 INJECTION, SOLUTION INTRAMUSCULAR; INTRAVENOUS; SUBCUTANEOUS at 21:57

## 2018-05-23 RX ADMIN — NITROGLYCERIN 0.4 MG: 0.4 TABLET SUBLINGUAL at 08:31

## 2018-05-23 RX ADMIN — ISOSORBIDE MONONITRATE 60 MG: 60 TABLET, EXTENDED RELEASE ORAL at 08:27

## 2018-05-23 RX ADMIN — SODIUM CHLORIDE: 9 INJECTION, SOLUTION INTRAVENOUS at 19:15

## 2018-05-23 RX ADMIN — ALPRAZOLAM 0.5 MG: 0.5 TABLET ORAL at 15:31

## 2018-05-23 RX ADMIN — ALPRAZOLAM 0.5 MG: 0.5 TABLET ORAL at 20:03

## 2018-05-23 RX ADMIN — NITROGLYCERIN 0.4 MG: 0.4 TABLET SUBLINGUAL at 08:10

## 2018-05-23 RX ADMIN — SODIUM CHLORIDE: 9 INJECTION, SOLUTION INTRAVENOUS at 14:56

## 2018-05-23 ASSESSMENT — PAIN SCALES - GENERAL
PAINLEVEL_OUTOF10: 0
PAINLEVEL_OUTOF10: 5
PAINLEVEL_OUTOF10: 7
PAINLEVEL_OUTOF10: 8

## 2018-05-23 ASSESSMENT — ENCOUNTER SYMPTOMS
CHEST TIGHTNESS: 0
NAUSEA: 0
VOMITING: 0
ABDOMINAL PAIN: 0
SORE THROAT: 0
WHEEZING: 0
EYE PAIN: 0
HEMOPTYSIS: 0
GASTROINTESTINAL NEGATIVE: 1
EYES NEGATIVE: 1
SHORTNESS OF BREATH: 0
SHORTNESS OF BREATH: 1
ORTHOPNEA: 0

## 2018-05-23 ASSESSMENT — PAIN DESCRIPTION - ORIENTATION
ORIENTATION: MID
ORIENTATION: MID

## 2018-05-23 ASSESSMENT — PAIN DESCRIPTION - DIRECTION: RADIATING_TOWARDS: LUE

## 2018-05-23 ASSESSMENT — PAIN DESCRIPTION - PAIN TYPE
TYPE: ACUTE PAIN
TYPE: ACUTE PAIN

## 2018-05-23 ASSESSMENT — PAIN DESCRIPTION - LOCATION
LOCATION: CHEST
LOCATION: CHEST

## 2018-05-23 ASSESSMENT — PAIN DESCRIPTION - DESCRIPTORS
DESCRIPTORS: BURNING;SHARP;STABBING
DESCRIPTORS: BURNING;STABBING;SHARP

## 2018-05-24 VITALS
DIASTOLIC BLOOD PRESSURE: 92 MMHG | HEIGHT: 66 IN | BODY MASS INDEX: 22.18 KG/M2 | TEMPERATURE: 97.3 F | HEART RATE: 80 BPM | OXYGEN SATURATION: 97 % | WEIGHT: 138 LBS | RESPIRATION RATE: 16 BRPM | SYSTOLIC BLOOD PRESSURE: 138 MMHG

## 2018-05-24 LAB
ANION GAP SERPL CALCULATED.3IONS-SCNC: 13 MEQ/L (ref 7–13)
BUN BLDV-MCNC: 7 MG/DL (ref 6–20)
CALCIUM SERPL-MCNC: 8.8 MG/DL (ref 8.6–10.2)
CHLORIDE BLD-SCNC: 103 MEQ/L (ref 98–107)
CHOLESTEROL, TOTAL: 155 MG/DL (ref 0–199)
CO2: 22 MEQ/L (ref 22–29)
CREAT SERPL-MCNC: 0.36 MG/DL (ref 0.5–0.9)
EKG ATRIAL RATE: 77 BPM
EKG P AXIS: 68 DEGREES
EKG P-R INTERVAL: 176 MS
EKG Q-T INTERVAL: 408 MS
EKG QRS DURATION: 102 MS
EKG QTC CALCULATION (BAZETT): 461 MS
EKG R AXIS: 14 DEGREES
EKG T AXIS: 49 DEGREES
EKG VENTRICULAR RATE: 77 BPM
GFR AFRICAN AMERICAN: >60
GFR NON-AFRICAN AMERICAN: >60
GLUCOSE BLD-MCNC: 239 MG/DL (ref 74–109)
HCT VFR BLD CALC: 45.7 % (ref 37–47)
HDLC SERPL-MCNC: 48 MG/DL (ref 40–59)
HEMOGLOBIN: 15.6 G/DL (ref 12–16)
INR BLD: 1
LDL CHOLESTEROL CALCULATED: 77 MG/DL (ref 0–129)
MAGNESIUM: 1.9 MG/DL (ref 1.7–2.3)
MCH RBC QN AUTO: 32.5 PG (ref 27–31.3)
MCHC RBC AUTO-ENTMCNC: 34.1 % (ref 33–37)
MCV RBC AUTO: 95.2 FL (ref 82–100)
PDW BLD-RTO: 12.8 % (ref 11.5–14.5)
PLATELET # BLD: 229 K/UL (ref 130–400)
POTASSIUM REFLEX MAGNESIUM: 4.2 MEQ/L (ref 3.5–5.1)
PROTHROMBIN TIME: 10.9 SEC (ref 9.6–12.3)
RBC # BLD: 4.8 M/UL (ref 4.2–5.4)
SODIUM BLD-SCNC: 138 MEQ/L (ref 132–144)
TRIGL SERPL-MCNC: 150 MG/DL (ref 0–200)
WBC # BLD: 6.9 K/UL (ref 4.8–10.8)

## 2018-05-24 PROCEDURE — G0378 HOSPITAL OBSERVATION PER HR: HCPCS

## 2018-05-24 PROCEDURE — 80048 BASIC METABOLIC PNL TOTAL CA: CPT

## 2018-05-24 PROCEDURE — 80061 LIPID PANEL: CPT

## 2018-05-24 PROCEDURE — 36415 COLL VENOUS BLD VENIPUNCTURE: CPT

## 2018-05-24 PROCEDURE — 6370000000 HC RX 637 (ALT 250 FOR IP): Performed by: EMERGENCY MEDICINE

## 2018-05-24 PROCEDURE — 83735 ASSAY OF MAGNESIUM: CPT

## 2018-05-24 PROCEDURE — 85027 COMPLETE CBC AUTOMATED: CPT

## 2018-05-24 PROCEDURE — 96376 TX/PRO/DX INJ SAME DRUG ADON: CPT

## 2018-05-24 PROCEDURE — 6360000002 HC RX W HCPCS

## 2018-05-24 PROCEDURE — 99238 HOSP IP/OBS DSCHRG MGMT 30/<: CPT | Performed by: INTERNAL MEDICINE

## 2018-05-24 PROCEDURE — 85610 PROTHROMBIN TIME: CPT

## 2018-05-24 PROCEDURE — 93005 ELECTROCARDIOGRAM TRACING: CPT

## 2018-05-24 PROCEDURE — 6370000000 HC RX 637 (ALT 250 FOR IP): Performed by: INTERNAL MEDICINE

## 2018-05-24 RX ORDER — LABETALOL HYDROCHLORIDE 5 MG/ML
10 INJECTION, SOLUTION INTRAVENOUS EVERY 30 MIN PRN
Status: DISCONTINUED | OUTPATIENT
Start: 2018-05-24 | End: 2018-05-24 | Stop reason: HOSPADM

## 2018-05-24 RX ORDER — NITROGLYCERIN 0.4 MG/1
0.4 TABLET SUBLINGUAL EVERY 5 MIN PRN
Status: DISCONTINUED | OUTPATIENT
Start: 2018-05-24 | End: 2018-05-24 | Stop reason: SDUPTHER

## 2018-05-24 RX ORDER — SODIUM CHLORIDE 9 MG/ML
INJECTION, SOLUTION INTRAVENOUS CONTINUOUS
Status: DISCONTINUED | OUTPATIENT
Start: 2018-05-24 | End: 2018-05-24 | Stop reason: ALTCHOICE

## 2018-05-24 RX ORDER — PRASUGREL 10 MG/1
10 TABLET, FILM COATED ORAL DAILY
Status: DISCONTINUED | OUTPATIENT
Start: 2018-05-24 | End: 2018-05-24 | Stop reason: SDUPTHER

## 2018-05-24 RX ORDER — GLIPIZIDE 10 MG/1
10 TABLET ORAL
Status: DISCONTINUED | OUTPATIENT
Start: 2018-05-24 | End: 2018-05-24 | Stop reason: HOSPADM

## 2018-05-24 RX ORDER — ATORVASTATIN CALCIUM 40 MG/1
40 TABLET, FILM COATED ORAL NIGHTLY
Status: DISCONTINUED | OUTPATIENT
Start: 2018-05-24 | End: 2018-05-24 | Stop reason: HOSPADM

## 2018-05-24 RX ORDER — ONDANSETRON 2 MG/ML
4 INJECTION INTRAMUSCULAR; INTRAVENOUS EVERY 6 HOURS PRN
Status: DISCONTINUED | OUTPATIENT
Start: 2018-05-24 | End: 2018-05-24 | Stop reason: SDUPTHER

## 2018-05-24 RX ORDER — ENALAPRIL MALEATE 2.5 MG/1
2.5 TABLET ORAL DAILY
Status: DISCONTINUED | OUTPATIENT
Start: 2018-05-24 | End: 2018-05-24 | Stop reason: HOSPADM

## 2018-05-24 RX ORDER — BUPROPION HYDROCHLORIDE 150 MG/1
150 TABLET, EXTENDED RELEASE ORAL 2 TIMES DAILY
Status: DISCONTINUED | OUTPATIENT
Start: 2018-05-24 | End: 2018-05-24 | Stop reason: HOSPADM

## 2018-05-24 RX ORDER — BUSPIRONE HYDROCHLORIDE 10 MG/1
10 TABLET ORAL 2 TIMES DAILY
Status: DISCONTINUED | OUTPATIENT
Start: 2018-05-24 | End: 2018-05-24 | Stop reason: HOSPADM

## 2018-05-24 RX ORDER — CARVEDILOL 6.25 MG/1
6.25 TABLET ORAL 2 TIMES DAILY
Status: DISCONTINUED | OUTPATIENT
Start: 2018-05-24 | End: 2018-05-24 | Stop reason: HOSPADM

## 2018-05-24 RX ORDER — HYDRALAZINE HYDROCHLORIDE 20 MG/ML
10 INJECTION INTRAMUSCULAR; INTRAVENOUS EVERY 10 MIN PRN
Status: DISCONTINUED | OUTPATIENT
Start: 2018-05-24 | End: 2018-05-24 | Stop reason: HOSPADM

## 2018-05-24 RX ORDER — CLONAZEPAM 0.5 MG/1
0.5 TABLET ORAL 3 TIMES DAILY
Status: DISCONTINUED | OUTPATIENT
Start: 2018-05-24 | End: 2018-05-24 | Stop reason: HOSPADM

## 2018-05-24 RX ORDER — PRASUGREL 10 MG/1
10 TABLET, FILM COATED ORAL DAILY
Status: DISCONTINUED | OUTPATIENT
Start: 2018-05-24 | End: 2018-05-24 | Stop reason: HOSPADM

## 2018-05-24 RX ORDER — CARBAMAZEPINE 300 MG/1
300 CAPSULE, EXTENDED RELEASE ORAL 2 TIMES DAILY
Status: DISCONTINUED | OUTPATIENT
Start: 2018-05-24 | End: 2018-05-24 | Stop reason: HOSPADM

## 2018-05-24 RX ORDER — ATORVASTATIN CALCIUM 40 MG/1
40 TABLET, FILM COATED ORAL NIGHTLY
Qty: 30 TABLET | Refills: 3 | Status: SHIPPED | OUTPATIENT
Start: 2018-05-24 | End: 2018-09-06 | Stop reason: SDUPTHER

## 2018-05-24 RX ORDER — ACETAMINOPHEN 325 MG/1
650 TABLET ORAL EVERY 4 HOURS PRN
Status: DISCONTINUED | OUTPATIENT
Start: 2018-05-24 | End: 2018-05-24 | Stop reason: SDUPTHER

## 2018-05-24 RX ORDER — RANOLAZINE 500 MG/1
1000 TABLET, EXTENDED RELEASE ORAL 2 TIMES DAILY
Status: DISCONTINUED | OUTPATIENT
Start: 2018-05-24 | End: 2018-05-24 | Stop reason: HOSPADM

## 2018-05-24 RX ORDER — ASPIRIN 81 MG/1
162 TABLET ORAL DAILY
Status: DISCONTINUED | OUTPATIENT
Start: 2018-05-24 | End: 2018-05-24 | Stop reason: HOSPADM

## 2018-05-24 RX ADMIN — ENALAPRIL MALEATE 2.5 MG: 2.5 TABLET ORAL at 08:32

## 2018-05-24 RX ADMIN — CARVEDILOL 6.25 MG: 6.25 TABLET, FILM COATED ORAL at 08:32

## 2018-05-24 RX ADMIN — PRASUGREL HYDROCHLORIDE 10 MG: 10 TABLET, FILM COATED ORAL at 08:32

## 2018-05-24 RX ADMIN — HYDROMORPHONE HYDROCHLORIDE 0.5 MG: 1 INJECTION, SOLUTION INTRAMUSCULAR; INTRAVENOUS; SUBCUTANEOUS at 06:50

## 2018-05-24 RX ADMIN — ISOSORBIDE MONONITRATE 60 MG: 60 TABLET, EXTENDED RELEASE ORAL at 08:32

## 2018-05-24 RX ADMIN — ASPIRIN 81 MG 81 MG: 81 TABLET ORAL at 08:32

## 2018-05-24 RX ADMIN — Medication 0.5 MG: at 06:50

## 2018-05-24 ASSESSMENT — PAIN SCALES - GENERAL
PAINLEVEL_OUTOF10: 8
PAINLEVEL_OUTOF10: 0
PAINLEVEL_OUTOF10: 8

## 2018-05-24 ASSESSMENT — PAIN DESCRIPTION - PAIN TYPE: TYPE: SURGICAL PAIN

## 2018-05-24 ASSESSMENT — PAIN DESCRIPTION - LOCATION: LOCATION: GROIN

## 2018-05-25 ENCOUNTER — CARE COORDINATION (OUTPATIENT)
Dept: CASE MANAGEMENT | Age: 43
End: 2018-05-25

## 2018-09-06 ENCOUNTER — OFFICE VISIT (OUTPATIENT)
Dept: CARDIOLOGY CLINIC | Age: 43
End: 2018-09-06
Payer: MEDICAID

## 2018-09-06 VITALS
BODY MASS INDEX: 23.91 KG/M2 | TEMPERATURE: 97.3 F | HEART RATE: 87 BPM | OXYGEN SATURATION: 96 % | HEIGHT: 66 IN | RESPIRATION RATE: 22 BRPM | SYSTOLIC BLOOD PRESSURE: 126 MMHG | WEIGHT: 148.8 LBS | DIASTOLIC BLOOD PRESSURE: 82 MMHG

## 2018-09-06 DIAGNOSIS — R07.2 PRECORDIAL PAIN: ICD-10-CM

## 2018-09-06 DIAGNOSIS — I25.10 CORONARY ARTERY DISEASE INVOLVING NATIVE CORONARY ARTERY OF NATIVE HEART WITHOUT ANGINA PECTORIS: Primary | Chronic | ICD-10-CM

## 2018-09-06 DIAGNOSIS — Z86.39 HISTORY OF DIABETES MELLITUS: ICD-10-CM

## 2018-09-06 DIAGNOSIS — I10 ESSENTIAL HYPERTENSION: ICD-10-CM

## 2018-09-06 DIAGNOSIS — E78.2 MIXED HYPERLIPIDEMIA: ICD-10-CM

## 2018-09-06 PROCEDURE — 99214 OFFICE O/P EST MOD 30 MIN: CPT | Performed by: INTERNAL MEDICINE

## 2018-09-06 RX ORDER — PRASUGREL 10 MG/1
TABLET, FILM COATED ORAL
Qty: 30 TABLET | Refills: 5 | Status: SHIPPED | OUTPATIENT
Start: 2018-09-06 | End: 2018-10-30 | Stop reason: SDUPTHER

## 2018-09-06 RX ORDER — CLONAZEPAM 0.5 MG/1
0.5 TABLET ORAL 3 TIMES DAILY PRN
Qty: 60 TABLET | Status: CANCELLED | OUTPATIENT
Start: 2018-09-06

## 2018-09-06 RX ORDER — ENALAPRIL MALEATE 2.5 MG/1
TABLET ORAL
Qty: 30 TABLET | Refills: 5 | Status: ON HOLD | OUTPATIENT
Start: 2018-09-06 | End: 2018-12-10

## 2018-09-06 RX ORDER — CARBAMAZEPINE 300 MG/1
300 CAPSULE, EXTENDED RELEASE ORAL 2 TIMES DAILY
Qty: 60 CAPSULE | Refills: 5 | Status: SHIPPED | OUTPATIENT
Start: 2018-09-06 | End: 2021-02-23 | Stop reason: SDUPTHER

## 2018-09-06 RX ORDER — CARVEDILOL 6.25 MG/1
TABLET ORAL
Qty: 180 TABLET | Refills: 1 | Status: SHIPPED | OUTPATIENT
Start: 2018-09-06 | End: 2019-12-05 | Stop reason: SDUPTHER

## 2018-09-06 RX ORDER — GLIPIZIDE 10 MG/1
TABLET ORAL
Qty: 60 TABLET | Refills: 6 | Status: SHIPPED | OUTPATIENT
Start: 2018-09-06 | End: 2022-08-11 | Stop reason: ALTCHOICE

## 2018-09-06 RX ORDER — ISOSORBIDE MONONITRATE 60 MG/1
60 TABLET, EXTENDED RELEASE ORAL DAILY
Qty: 30 TABLET | Refills: 5 | Status: SHIPPED | OUTPATIENT
Start: 2018-09-06 | End: 2019-12-05 | Stop reason: SDUPTHER

## 2018-09-06 RX ORDER — RANOLAZINE 1000 MG/1
1000 TABLET, EXTENDED RELEASE ORAL 2 TIMES DAILY
Qty: 60 TABLET | Refills: 5 | Status: SHIPPED | OUTPATIENT
Start: 2018-09-06 | End: 2019-12-05 | Stop reason: SDUPTHER

## 2018-09-06 RX ORDER — NITROGLYCERIN 0.4 MG/1
TABLET SUBLINGUAL
Qty: 25 TABLET | Refills: 2 | Status: SHIPPED | OUTPATIENT
Start: 2018-09-06 | End: 2020-11-06 | Stop reason: SDUPTHER

## 2018-09-06 RX ORDER — ATORVASTATIN CALCIUM 40 MG/1
40 TABLET, FILM COATED ORAL NIGHTLY
Qty: 30 TABLET | Refills: 3 | Status: SHIPPED | OUTPATIENT
Start: 2018-09-06 | End: 2019-12-05 | Stop reason: SDUPTHER

## 2018-09-06 NOTE — PROGRESS NOTES
claudication. Still has occasional CP/discomfort. No bleeding issues. Has not used SL nitro since discharged. soemtimes takes 5 SL nitros a day. Patient on previous cardiac cath with significant PLVB lesion nearing need for PCI. 5-13-14: as above, s/p LHC with moderate distal RCA instent restenosis of 50%, 50% PLVB, 30-40% mid RCA stenosis, normal LVF. Had to take one nitro this morning which helped, but doing better overall. Compliant with meds. Pt denies chest pain, dyspnea, dyspnea on exertion, change in exercise capacity, fatigue,  nausea, vomiting, diarrhea, constipation, motor weakness, insomnia, weight loss, syncope, dizziness, lightheadedness, palpitations, PND, orthopnea, or claudication. Statins increased by Dr. Curt Vizcaino. Was smoking 2 ppd now down to a PPD. 1-6-15: as above, states she's having worsening angina over the past couple of weeks. States she's been eating nitro like its candy. Went through a whole bottle of nitro in one week. Nitro completely takes her pain away immeditely. She stearns get tired, LH and dizzy with episodes. Denies N/V or SOB. Symptoms are worse with activity and better with rest. complian with meds, continues to smoke 1ppd. Has had multiple previous hospitalizations since last visit. Feels like there is something is wrong and having similar symptoms to previous PCI. 6-11-15: as above, s/p LHC with patent LAD and RCA stents, normal LVF. S/p recurrent hospitalization for CP. C.E were negative. Did take nitro which relieved her CP immediately. States CP went into her jaw, got diaphoretic. States symptoms occurred at rest. Continues to smoke 0.5ppd. Had run out of her plavix for 3 days but resumed now. Did have a few more episodes since being discharged from hospital.       4-12-16: as above, s/p hospitalization for angina, was treated medically. Does have URI type symptoms.  Pt denies  dyspnea, dyspnea on exertion, change in exercise capacity, fatigue,  nausea, vomiting, normal, no pedal edema, no clubbing or cyanosis  Skin - normal coloration and turgor, no rashes, no suspicious skin lesions noted    EKG: NSR, poor R wave progression. No ST elevation or depression. ASSESSMENT:     Diagnosis Orders   1. Coronary artery disease involving native coronary artery of native heart without angina pectoris     2. Precordial pain     3. Essential hypertension     4. Mixed hyperlipidemia     5. History of diabetes mellitus  Ambulatory referral to Endocrinology         PLAN:     Patient will need to continue to follow up with you for their general medical care and return to see me in the office in 6 months    As always, aggressive risk factor modification is strongly recommended. We should adhere to the [address] guidelines for HTN management and the NCEP ATP III guidelines for LDL-C management. The nature of cardiac risk has been fully discussed with this patient. I have made her aware of her LDL target goal given her cardiovascular risk analysis. I have discussed the appropriate diet. The need for lifelong compliance in order to reduce risk is stressed. A regular exercise program is recommended to help achieve and maintain normal body weight, fitness and improve lipid balance. Continue medications at current doses. Follow up with PCP. Smoking cessation was strongly recommended    Patient was advised and encouraged to check blood pressure at home or at a pharmacy, maintain a logbook, and also call us back if blood pressure are above the target ranges or if it is low. Patient clearly understands and agrees to the instructions. We will need to continue to monitor muscle and liver enzymes, BUN, CR, and electrolytes. The proper use and anticipated side effects of nitroglycerine has been carefully explained.   If a single episode of chest pain is not relieved by one tablet, the patient will try another within 5 minutes; and if this doesn't relieve the pain, the patient is with associted SOB, Nausea, diaphoresis, and ashy looking per friend. Had a look of impending doom. Has been using numerous SL nitro's with relief. + smoking. Patient complains of SARA jalloh. 9-23-13: as above, was in hospital last week for CP. Patient had negative EKG and negative C.E. Continues to smoke, chantix not working per patient. Compliant with meds. BS are still not under very good control. Pt denies dyspnea, dyspnea on exertion, change in exercise capacity, fatigue,  nausea, vomiting, diarrhea, constipation, motor weakness, insomnia, weight loss, syncope, dizziness, lightheadedness, palpitations, PND, orthopnea, or claudication. Still has occasional CP/discomfort. No bleeding issues. Has not used SL nitro since discharged. soemtimes takes 5 SL nitros a day. Patient on previous cardiac cath with significant PLVB lesion nearing need for PCI. 5-13-14: as above, s/p LHC with moderate distal RCA instent restenosis of 50%, 50% PLVB, 30-40% mid RCA stenosis, normal LVF. Had to take one nitro this morning which helped, but doing better overall. Compliant with meds. Pt denies chest pain, dyspnea, dyspnea on exertion, change in exercise capacity, fatigue,  nausea, vomiting, diarrhea, constipation, motor weakness, insomnia, weight loss, syncope, dizziness, lightheadedness, palpitations, PND, orthopnea, or claudication. Statins increased by Dr. Vero Olivarez. Was smoking 2 ppd now down to a PPD. 1-6-15: as above, states she's having worsening angina over the past couple of weeks. States she's been eating nitro like its candy. Went through a whole bottle of nitro in one week. Nitro completely takes her pain away immeditely. She stearns get tired, LH and dizzy with episodes. Denies N/V or SOB. Symptoms are worse with activity and better with rest. complian with meds, continues to smoke 1ppd. Has had multiple previous hospitalizations since last visit.  Feels like there is something is wrong and having similar symptoms to meds. BP is good. She feels great today. No bleeding issues. EKG was ok in ER. Pt denies chest pain, dyspnea, dyspnea on exertion, change in exercise capacity, fatigue,  nausea, vomiting, diarrhea, constipation, motor weakness, insomnia, weight loss, syncope, dizziness, lightheadedness, palpitations, PND, orthopnea. No smoking. 10-18-16: continues to have chest pressure and heaviness. Has been utilizing nitro almost on a daily basis. States its horrible and almost went to hospital a few times over the past few weeks. It did radiate to left arm pit a few times, got nauseous and left finger tingling. Does get SOB with mild activity, just with sweeping the house sometimes. Wakes her up at night, occures at rest. Symptoms are accelerating in nature. Compliant with meds. Smokes 3 cig a day. No bleeding issues. Pt denies   vomiting, diarrhea, constipation, motor weakness, insomnia, weight loss, syncope, dizziness, lightheadedness, palpitations, PND, orthopnea, or claudication. BP and HR are good. BS continues to be uncontrolled. In the 200's.     12-8-16: s/p s/p PCI of PDA with ZACHARY, patent mid LAD stent with 30-40% restenosis, patent ostial OM1 stent, 50% mid RCA, normal LVF EF of 55%. Right LE angio was normal to knees. Pt denies chest pain, dyspnea, dyspnea on exertion, change in exercise capacity, fatigue,  nausea, vomiting, diarrhea, constipation, motor weakness, insomnia, weight loss, syncope, dizziness, lightheadedness, palpitations, PND, orthopnea. Did have a right UE venous dupplex US with thrombosis of cephalic vein. Continues to smoke. Taking all her meds. No nitro use. BP and hr are good. CAD is stable. No LE discoloration or ulcers. No LE edema. No CHF type symptoms.  Lipid profile is normal.       Patient Active Problem List   Diagnosis    Asthma    DM (diabetes mellitus) ([address]    COPD (chronic obstructive pulmonary disease) (HCC)    Dyspnea    TMJ (dislocation of temporomandibular joint)    management. The nature of cardiac risk has been fully discussed with this patient. I have made her aware of her LDL target goal given her cardiovascular risk analysis. I have discussed the appropriate diet. The need for lifelong compliance in order to reduce risk is stressed. A regular exercise program is recommended to help achieve and maintain normal body weight, fitness and improve lipid balance. Continue medications at current doses. DAPT for at least 6 more months. Follow up with PCP. Refer to Dr. Man Buchanan for DM. Med refill. Check EKG next office visit. Smoking cessation was strongly recommended      Patient was advised and encouraged to check blood pressure at home or at a pharmacy, maintain a logbook, and also call us back if blood pressure are above the target ranges or if it is low. Patient clearly understands and agrees to the instructions. We will need to continue to monitor muscle and liver enzymes, BUN, CR, and electrolytes. The proper use and anticipated side effects of nitroglycerine has been carefully explained. If a single episode of chest pain is not relieved by one tablet, the patient will try another within 5 minutes; and if this doesn't relieve the pain, the patient is instructed to call 911 for transportation to an emergency department. Details of medical condition explained and patient was warned about adverse consequences of uncontrolled medical conditions and possible side effects of prescribed medications. Patient was advised to go to the ER if he starts experiencing adverse effects of the medications. patient was instructed to call us back or go to nearby emergency room immediately if symptoms get worse or do not improve. Thank you for allowing me to participate in the care of your patient, please don't hesitate to contact me if you have any further questions.

## 2018-09-11 ENCOUNTER — TELEPHONE (OUTPATIENT)
Dept: CARDIOLOGY CLINIC | Age: 43
End: 2018-09-11

## 2018-09-17 ENCOUNTER — CLINICAL DOCUMENTATION (OUTPATIENT)
Dept: CARDIOLOGY CLINIC | Age: 43
End: 2018-09-17

## 2018-09-25 ENCOUNTER — CLINICAL DOCUMENTATION (OUTPATIENT)
Dept: CARDIOLOGY CLINIC | Age: 43
End: 2018-09-25

## 2018-10-05 ENCOUNTER — TELEPHONE (OUTPATIENT)
Dept: CARDIOLOGY CLINIC | Age: 43
End: 2018-10-05

## 2018-10-17 ENCOUNTER — TELEPHONE (OUTPATIENT)
Dept: CARDIOLOGY CLINIC | Age: 43
End: 2018-10-17

## 2018-10-29 ENCOUNTER — CLINICAL DOCUMENTATION (OUTPATIENT)
Dept: CARDIOLOGY CLINIC | Age: 43
End: 2018-10-29

## 2018-10-30 DIAGNOSIS — Z82.49 FAMILY HISTORY OF PREMATURE CAD: Chronic | ICD-10-CM

## 2018-10-30 DIAGNOSIS — I25.10 CORONARY ARTERY DISEASE INVOLVING NATIVE CORONARY ARTERY OF NATIVE HEART WITHOUT ANGINA PECTORIS: Primary | Chronic | ICD-10-CM

## 2018-10-30 RX ORDER — PRASUGREL 10 MG/1
TABLET, FILM COATED ORAL
Qty: 30 TABLET | Refills: 5 | Status: SHIPPED | OUTPATIENT
Start: 2018-10-30 | End: 2019-12-05 | Stop reason: ALTCHOICE

## 2018-12-04 ENCOUNTER — OFFICE VISIT (OUTPATIENT)
Dept: CARDIOLOGY CLINIC | Age: 43
End: 2018-12-04
Payer: MEDICAID

## 2018-12-04 VITALS
RESPIRATION RATE: 16 BRPM | DIASTOLIC BLOOD PRESSURE: 84 MMHG | OXYGEN SATURATION: 99 % | BODY MASS INDEX: 23.91 KG/M2 | WEIGHT: 148.8 LBS | HEART RATE: 69 BPM | SYSTOLIC BLOOD PRESSURE: 128 MMHG | HEIGHT: 66 IN

## 2018-12-04 DIAGNOSIS — I20.9 ANGINA, CLASS IV (HCC): Primary | ICD-10-CM

## 2018-12-04 PROCEDURE — 99214 OFFICE O/P EST MOD 30 MIN: CPT | Performed by: INTERNAL MEDICINE

## 2018-12-04 PROCEDURE — G8427 DOCREV CUR MEDS BY ELIG CLIN: HCPCS | Performed by: INTERNAL MEDICINE

## 2018-12-04 PROCEDURE — 4004F PT TOBACCO SCREEN RCVD TLK: CPT | Performed by: INTERNAL MEDICINE

## 2018-12-04 PROCEDURE — 93000 ELECTROCARDIOGRAM COMPLETE: CPT | Performed by: INTERNAL MEDICINE

## 2018-12-04 PROCEDURE — G8484 FLU IMMUNIZE NO ADMIN: HCPCS | Performed by: INTERNAL MEDICINE

## 2018-12-04 PROCEDURE — G8598 ASA/ANTIPLAT THER USED: HCPCS | Performed by: INTERNAL MEDICINE

## 2018-12-04 PROCEDURE — G8420 CALC BMI NORM PARAMETERS: HCPCS | Performed by: INTERNAL MEDICINE

## 2018-12-04 NOTE — PROGRESS NOTES
Chief Complaint   Patient presents with    3 Month Follow-Up    Chest Pain     RADIATING TO LEFT AXILLARY-RELIEVED WITH NITRO X 1     Patient presents for follow up medical evaluation. Patient is s/p negative 2Decho and treadmill stress test. Pt denies change in exercise capacity, fatigue, nausea, vomiting, diarrhea, constipation, motor weakness, insomnia, weight loss, syncope, dizziness, lightheadedness, palpitations, PND, orthopnea, or claudication. Patient with some episodes of chest pain, dyspnea, dyspnea on exertion still. Continues to   Smoke, diet could be better controlled. Compliant with meds. 13: as above, patient presents with worsening midsternal CP and SOB. States her CP lasts more than 20 minutes at rest, worse with exertion. Pain radiates to left shoulder. Does not feel like GERD type symptoms. Almost took one of her dad's nitro. Both Mother and father recently  from large MI and CVA. Father with hx of 34 stents with [facility]. Continues to smoke. Under a lot of stress and anxiety. Worried that she may have a heart and die. Patient fatigues very easily. 13: as above, s/p cardiac cath and S/P ZACHARY to mid LAD and distal RCA. Has not been compliant with ASA but takes plavix daily. Has been having numerous midsternal and midepigastric chest pains since cath. Had an episode of sever CP a week ago with associted SOB, Nausea, diaphoresis, and ashy looking per friend. Had a look of impending doom. Has been using numerous SL nitro's with relief. + smoking. Patient complains of LE dicomfort. 13: as above, was in hospital last week for CP. Patient had negative EKG and negative C.E. Continues to smoke, chantix not working per patient. Compliant with meds. BS are still not under very good control.  Pt denies dyspnea, dyspnea on exertion, change in exercise capacity, fatigue,  nausea, vomiting, diarrhea, constipation, motor weakness, insomnia, weight loss, syncope, dizziness, lightheadedness, palpitations, PND, orthopnea, or claudication. Still has occasional CP/discomfort. No bleeding issues. Has not used SL nitro since discharged. soemtimes takes 5 SL nitros a day. Patient on previous cardiac cath with significant PLVB lesion nearing need for PCI. 5-13-14: as above, s/p LHC with moderate distal RCA instent restenosis of 50%, 50% PLVB, 30-40% mid RCA stenosis, normal LVF. Had to take one nitro this morning which helped, but doing better overall. Compliant with meds. Pt denies chest pain, dyspnea, dyspnea on exertion, change in exercise capacity, fatigue,  nausea, vomiting, diarrhea, constipation, motor weakness, insomnia, weight loss, syncope, dizziness, lightheadedness, palpitations, PND, orthopnea, or claudication. Statins increased by Dr. Rema Martini. Was smoking 2 ppd now down to a PPD. 1-6-15: as above, states she's having worsening angina over the past couple of weeks. States she's been eating nitro like its candy. Went through a whole bottle of nitro in one week. Nitro completely takes her pain away immeditely. She stearns get tired, LH and dizzy with episodes. Denies N/V or SOB. Symptoms are worse with activity and better with rest. complian with meds, continues to smoke 1ppd. Has had multiple previous hospitalizations since last visit. Feels like there is something is wrong and having similar symptoms to previous PCI. 6-11-15: as above, s/p LHC with patent LAD and RCA stents, normal LVF. S/p recurrent hospitalization for CP. C.E were negative. Did take nitro which relieved her CP immediately. States CP went into her jaw, got diaphoretic. States symptoms occurred at rest. Continues to smoke 0.5ppd. Had run out of her plavix for 3 days but resumed now. Did have a few more episodes since being discharged from hospital.       4-12-16: as above, s/p hospitalization for angina, was treated medically. Does have URI type symptoms.  Pt denies  dyspnea, dyspnea on exertion, change in exercise capacity, [ketorolac tromethamine] and Tramadol    Review of Systems:  General ROS: negative  Psychological ROS: negative  Hematological and Lymphatic ROS: No history of blood clots or bleeding disorder. Respiratory ROS: no cough, shortness of breath, or wheezing  Cardiovascular ROS: no chest pain or dyspnea on exertion  Gastrointestinal ROS: no abdominal pain, change in bowel habits, or black or bloody stools  Genito-Urinary ROS: no dysuria, trouble voiding, or hematuria  Musculoskeletal ROS: negative  Neurological ROS: no TIA or stroke symptoms  Dermatological ROS: negative    VITALS:  Blood pressure 128/84, pulse 69, resp. rate 16, height 5' 6\" (1.676 m), weight 148 lb 12.8 oz (67.5 kg), SpO2 99 %, not currently breastfeeding. Body mass index is 24.02 kg/m². Physical Examination:  General appearance - alert, well appearing, and in no distress  Mental status - alert, oriented to person, place, and time  Neck - Neck is supple, no JVD or carotid bruits. No thyromegaly or adenopathy. Chest - clear to auscultation, no wheezes, rales or rhonchi, symmetric air entry  Heart - normal rate, regular rhythm, normal S1, S2, no murmurs, rubs, clicks or gallops  Abdomen - soft, nontender, nondistended, no masses or organomegaly  Neurological - alert, oriented, normal speech, no focal findings or movement disorder noted  Extremities - peripheral pulses normal, no pedal edema, no clubbing or cyanosis  Skin - normal coloration and turgor, no rashes, no suspicious skin lesions noted    EKG: NSR, poor R wave progression. No ST elevation or depression. ASSESSMENT:     Diagnosis Orders   1. Angina, class IV ([address]  EKG 12 lead         PLAN:     Patient will need to continue to follow up with you for their general medical care and return to see me in the office in 6 months    As always, aggressive risk factor modification is strongly recommended.  We should adhere to the [address] VII guidelines for HTN management and the NCEP ATP III

## 2018-12-10 ENCOUNTER — HOSPITAL ENCOUNTER (OUTPATIENT)
Dept: CARDIAC CATH/INVASIVE PROCEDURES | Age: 43
Discharge: HOME | End: 2018-12-10
Attending: INTERNAL MEDICINE | Admitting: INTERNAL MEDICINE
Payer: MEDICAID

## 2018-12-10 VITALS
SYSTOLIC BLOOD PRESSURE: 112 MMHG | DIASTOLIC BLOOD PRESSURE: 69 MMHG | HEIGHT: 66 IN | HEART RATE: 86 BPM | RESPIRATION RATE: 15 BRPM | TEMPERATURE: 97.6 F | BODY MASS INDEX: 23.78 KG/M2 | OXYGEN SATURATION: 93 % | WEIGHT: 148 LBS

## 2018-12-10 LAB
ALBUMIN SERPL-MCNC: 4.6 G/DL (ref 3.9–4.9)
ALP BLD-CCNC: 73 U/L (ref 40–130)
ALT SERPL-CCNC: 12 U/L (ref 0–33)
ANION GAP SERPL CALCULATED.3IONS-SCNC: 12 MEQ/L (ref 7–13)
APTT: 24.5 SEC (ref 21.6–35.4)
AST SERPL-CCNC: 13 U/L (ref 0–35)
BILIRUB SERPL-MCNC: 0.3 MG/DL (ref 0–1.2)
BUN BLDV-MCNC: 9 MG/DL (ref 6–20)
CALCIUM SERPL-MCNC: 9.2 MG/DL (ref 8.6–10.2)
CHLORIDE BLD-SCNC: 101 MEQ/L (ref 98–107)
CO2: 25 MEQ/L (ref 22–29)
CREAT SERPL-MCNC: 0.47 MG/DL (ref 0.5–0.9)
GFR AFRICAN AMERICAN: >60
GFR NON-AFRICAN AMERICAN: >60
GLOBULIN: 3.1 G/DL (ref 2.3–3.5)
GLUCOSE BLD-MCNC: 174 MG/DL (ref 74–109)
HCT VFR BLD CALC: 49.3 % (ref 37–47)
HEMOGLOBIN: 17 G/DL (ref 12–16)
INR BLD: 1
MCH RBC QN AUTO: 33.1 PG (ref 27–31.3)
MCHC RBC AUTO-ENTMCNC: 34.6 % (ref 33–37)
MCV RBC AUTO: 95.7 FL (ref 82–100)
PDW BLD-RTO: 13.1 % (ref 11.5–14.5)
PERFORMED ON: ABNORMAL
PLATELET # BLD: 257 K/UL (ref 130–400)
POC ACTIVATED CLOTTING TIME KAOLIN: 230 SEC (ref 82–152)
POC SAMPLE TYPE: ABNORMAL
POTASSIUM SERPL-SCNC: 3.9 MEQ/L (ref 3.5–5.1)
PROTHROMBIN TIME: 10.2 SEC (ref 9–11.5)
RBC # BLD: 5.15 M/UL (ref 4.2–5.4)
SODIUM BLD-SCNC: 138 MEQ/L (ref 132–144)
TOTAL PROTEIN: 7.7 G/DL (ref 6.4–8.1)
WBC # BLD: 6.2 K/UL (ref 4.8–10.8)

## 2018-12-10 PROCEDURE — 85027 COMPLETE CBC AUTOMATED: CPT

## 2018-12-10 PROCEDURE — C1725 CATH, TRANSLUMIN NON-LASER: HCPCS

## 2018-12-10 PROCEDURE — 92928 PRQ TCAT PLMT NTRAC ST 1 LES: CPT | Performed by: INTERNAL MEDICINE

## 2018-12-10 PROCEDURE — C1894 INTRO/SHEATH, NON-LASER: HCPCS

## 2018-12-10 PROCEDURE — 6360000002 HC RX W HCPCS

## 2018-12-10 PROCEDURE — 2709999900 HC NON-CHARGEABLE SUPPLY

## 2018-12-10 PROCEDURE — 2580000003 HC RX 258

## 2018-12-10 PROCEDURE — 85347 COAGULATION TIME ACTIVATED: CPT

## 2018-12-10 PROCEDURE — 85730 THROMBOPLASTIN TIME PARTIAL: CPT

## 2018-12-10 PROCEDURE — 85610 PROTHROMBIN TIME: CPT

## 2018-12-10 PROCEDURE — 92920 PRQ TRLUML C ANGIOP 1ART&/BR: CPT | Performed by: INTERNAL MEDICINE

## 2018-12-10 PROCEDURE — 2580000003 HC RX 258: Performed by: INTERNAL MEDICINE

## 2018-12-10 PROCEDURE — 93458 L HRT ARTERY/VENTRICLE ANGIO: CPT | Performed by: INTERNAL MEDICINE

## 2018-12-10 PROCEDURE — C1887 CATHETER, GUIDING: HCPCS

## 2018-12-10 PROCEDURE — C1769 GUIDE WIRE: HCPCS

## 2018-12-10 PROCEDURE — 80053 COMPREHEN METABOLIC PANEL: CPT

## 2018-12-10 PROCEDURE — 2500000003 HC RX 250 WO HCPCS

## 2018-12-10 PROCEDURE — C1760 CLOSURE DEV, VASC: HCPCS

## 2018-12-10 PROCEDURE — 6370000000 HC RX 637 (ALT 250 FOR IP): Performed by: INTERNAL MEDICINE

## 2018-12-10 RX ORDER — ALPRAZOLAM 0.5 MG/1
0.5 TABLET ORAL
Status: DISCONTINUED | OUTPATIENT
Start: 2018-12-10 | End: 2018-12-10 | Stop reason: HOSPADM

## 2018-12-10 RX ORDER — SODIUM CHLORIDE 9 MG/ML
INJECTION, SOLUTION INTRAVENOUS CONTINUOUS
Status: DISCONTINUED | OUTPATIENT
Start: 2018-12-10 | End: 2018-12-10 | Stop reason: HOSPADM

## 2018-12-10 RX ORDER — ACETAMINOPHEN 325 MG/1
650 TABLET ORAL EVERY 4 HOURS PRN
Status: CANCELLED | OUTPATIENT
Start: 2018-12-10

## 2018-12-10 RX ORDER — HYDRALAZINE HYDROCHLORIDE 20 MG/ML
10 INJECTION INTRAMUSCULAR; INTRAVENOUS EVERY 10 MIN PRN
Status: CANCELLED | OUTPATIENT
Start: 2018-12-10

## 2018-12-10 RX ORDER — ONDANSETRON 2 MG/ML
4 INJECTION INTRAMUSCULAR; INTRAVENOUS EVERY 6 HOURS PRN
Status: CANCELLED | OUTPATIENT
Start: 2018-12-10

## 2018-12-10 RX ORDER — DIAZEPAM 5 MG/1
5 TABLET ORAL
Status: COMPLETED | OUTPATIENT
Start: 2018-12-10 | End: 2018-12-10

## 2018-12-10 RX ORDER — SODIUM CHLORIDE 0.9 % (FLUSH) 0.9 %
10 SYRINGE (ML) INJECTION EVERY 12 HOURS SCHEDULED
Status: DISCONTINUED | OUTPATIENT
Start: 2018-12-10 | End: 2018-12-10 | Stop reason: HOSPADM

## 2018-12-10 RX ORDER — LABETALOL HYDROCHLORIDE 5 MG/ML
10 INJECTION, SOLUTION INTRAVENOUS EVERY 30 MIN PRN
Status: CANCELLED | OUTPATIENT
Start: 2018-12-10

## 2018-12-10 RX ORDER — SODIUM CHLORIDE 0.9 % (FLUSH) 0.9 %
10 SYRINGE (ML) INJECTION PRN
Status: DISCONTINUED | OUTPATIENT
Start: 2018-12-10 | End: 2018-12-10 | Stop reason: HOSPADM

## 2018-12-10 RX ORDER — ASPIRIN 81 MG/1
81 TABLET ORAL ONCE
Status: DISCONTINUED | OUTPATIENT
Start: 2018-12-10 | End: 2018-12-10 | Stop reason: HOSPADM

## 2018-12-10 RX ORDER — SODIUM CHLORIDE 9 MG/ML
INJECTION, SOLUTION INTRAVENOUS CONTINUOUS
Status: CANCELLED | OUTPATIENT
Start: 2018-12-10

## 2018-12-10 RX ORDER — ONDANSETRON 2 MG/ML
4 INJECTION INTRAMUSCULAR; INTRAVENOUS EVERY 6 HOURS PRN
Status: DISCONTINUED | OUTPATIENT
Start: 2018-12-10 | End: 2018-12-10 | Stop reason: HOSPADM

## 2018-12-10 RX ADMIN — SODIUM CHLORIDE: 9 INJECTION, SOLUTION INTRAVENOUS at 10:57

## 2018-12-10 RX ADMIN — DIAZEPAM 5 MG: 5 TABLET ORAL at 10:58

## 2018-12-10 NOTE — PROGRESS NOTES
Pt arrived to cath lab holding area at this time rt deandra no bleeding or hematoma noted dressing dry and intact

## 2019-12-05 ENCOUNTER — OFFICE VISIT (OUTPATIENT)
Dept: CARDIOLOGY CLINIC | Age: 44
End: 2019-12-05
Payer: COMMERCIAL

## 2019-12-05 VITALS
SYSTOLIC BLOOD PRESSURE: 120 MMHG | DIASTOLIC BLOOD PRESSURE: 70 MMHG | HEART RATE: 94 BPM | BODY MASS INDEX: 24.08 KG/M2 | WEIGHT: 149.2 LBS

## 2019-12-05 DIAGNOSIS — I25.10 CORONARY ARTERY DISEASE INVOLVING NATIVE CORONARY ARTERY OF NATIVE HEART WITHOUT ANGINA PECTORIS: Primary | ICD-10-CM

## 2019-12-05 DIAGNOSIS — E78.2 MIXED HYPERLIPIDEMIA: ICD-10-CM

## 2019-12-05 DIAGNOSIS — I20.9 ANGINA, CLASS III (HCC): ICD-10-CM

## 2019-12-05 DIAGNOSIS — R07.2 PRECORDIAL PAIN: ICD-10-CM

## 2019-12-05 DIAGNOSIS — I10 ESSENTIAL HYPERTENSION: ICD-10-CM

## 2019-12-05 DIAGNOSIS — Z86.39 HISTORY OF DIABETES MELLITUS: ICD-10-CM

## 2019-12-05 PROCEDURE — G8484 FLU IMMUNIZE NO ADMIN: HCPCS | Performed by: INTERNAL MEDICINE

## 2019-12-05 PROCEDURE — G8598 ASA/ANTIPLAT THER USED: HCPCS | Performed by: INTERNAL MEDICINE

## 2019-12-05 PROCEDURE — G8427 DOCREV CUR MEDS BY ELIG CLIN: HCPCS | Performed by: INTERNAL MEDICINE

## 2019-12-05 PROCEDURE — G8420 CALC BMI NORM PARAMETERS: HCPCS | Performed by: INTERNAL MEDICINE

## 2019-12-05 PROCEDURE — 93000 ELECTROCARDIOGRAM COMPLETE: CPT | Performed by: INTERNAL MEDICINE

## 2019-12-05 PROCEDURE — 4004F PT TOBACCO SCREEN RCVD TLK: CPT | Performed by: INTERNAL MEDICINE

## 2019-12-05 PROCEDURE — 99214 OFFICE O/P EST MOD 30 MIN: CPT | Performed by: INTERNAL MEDICINE

## 2019-12-05 RX ORDER — CARVEDILOL 6.25 MG/1
TABLET ORAL
Qty: 180 TABLET | Refills: 1 | Status: SHIPPED | OUTPATIENT
Start: 2019-12-05 | End: 2019-12-16 | Stop reason: SDUPTHER

## 2019-12-05 RX ORDER — RANOLAZINE 1000 MG/1
1000 TABLET, EXTENDED RELEASE ORAL 2 TIMES DAILY
Qty: 60 TABLET | Refills: 5 | Status: SHIPPED | OUTPATIENT
Start: 2019-12-05 | End: 2019-12-16 | Stop reason: SDUPTHER

## 2019-12-05 RX ORDER — ATORVASTATIN CALCIUM 40 MG/1
40 TABLET, FILM COATED ORAL NIGHTLY
Qty: 30 TABLET | Refills: 3 | Status: SHIPPED | OUTPATIENT
Start: 2019-12-05 | End: 2019-12-16 | Stop reason: SDUPTHER

## 2019-12-05 RX ORDER — ISOSORBIDE MONONITRATE 60 MG/1
60 TABLET, EXTENDED RELEASE ORAL DAILY
Qty: 30 TABLET | Refills: 5 | Status: SHIPPED | OUTPATIENT
Start: 2019-12-05 | End: 2019-12-16 | Stop reason: SDUPTHER

## 2019-12-09 ENCOUNTER — HOSPITAL ENCOUNTER (OUTPATIENT)
Dept: CARDIAC CATH/INVASIVE PROCEDURES | Age: 44
Discharge: HOME | End: 2019-12-09
Attending: INTERNAL MEDICINE | Admitting: INTERNAL MEDICINE
Payer: COMMERCIAL

## 2019-12-09 LAB
ANION GAP SERPL CALCULATED.3IONS-SCNC: 15 MEQ/L (ref 9–15)
BUN BLDV-MCNC: 10 MG/DL (ref 6–20)
CALCIUM SERPL-MCNC: 7.2 MG/DL (ref 8.5–9.9)
CHLORIDE BLD-SCNC: 109 MEQ/L (ref 95–107)
CO2: 18 MEQ/L (ref 20–31)
CREAT SERPL-MCNC: 0.34 MG/DL (ref 0.5–0.9)
GFR AFRICAN AMERICAN: >60
GFR NON-AFRICAN AMERICAN: >60
GLUCOSE BLD-MCNC: 269 MG/DL (ref 70–99)
HCT VFR BLD CALC: 49.5 % (ref 37–47)
HEMOGLOBIN: 16.7 G/DL (ref 12–16)
INR BLD: 1
MCH RBC QN AUTO: 32.4 PG (ref 27–31.3)
MCHC RBC AUTO-ENTMCNC: 33.6 % (ref 33–37)
MCV RBC AUTO: 96.3 FL (ref 82–100)
PDW BLD-RTO: 13 % (ref 11.5–14.5)
PLATELET # BLD: 243 K/UL (ref 130–400)
POTASSIUM SERPL-SCNC: 3.6 MEQ/L (ref 3.4–4.9)
PROTHROMBIN TIME: 13.1 SEC (ref 12.3–14.9)
RBC # BLD: 5.14 M/UL (ref 4.2–5.4)
REASON FOR REJECTION: NORMAL
REJECTED TEST: NORMAL
SODIUM BLD-SCNC: 142 MEQ/L (ref 135–144)
WBC # BLD: 5.5 K/UL (ref 4.8–10.8)

## 2019-12-09 PROCEDURE — 85027 COMPLETE CBC AUTOMATED: CPT

## 2019-12-09 PROCEDURE — 2500000003 HC RX 250 WO HCPCS

## 2019-12-09 PROCEDURE — 2709999900 HC NON-CHARGEABLE SUPPLY

## 2019-12-09 PROCEDURE — 36415 COLL VENOUS BLD VENIPUNCTURE: CPT

## 2019-12-09 PROCEDURE — 93458 L HRT ARTERY/VENTRICLE ANGIO: CPT | Performed by: INTERNAL MEDICINE

## 2019-12-09 PROCEDURE — C1769 GUIDE WIRE: HCPCS

## 2019-12-09 PROCEDURE — C1894 INTRO/SHEATH, NON-LASER: HCPCS

## 2019-12-09 PROCEDURE — 80048 BASIC METABOLIC PNL TOTAL CA: CPT

## 2019-12-09 PROCEDURE — 2580000003 HC RX 258

## 2019-12-09 PROCEDURE — 6360000002 HC RX W HCPCS

## 2019-12-09 PROCEDURE — 85610 PROTHROMBIN TIME: CPT

## 2019-12-09 PROCEDURE — 6360000004 HC RX CONTRAST MEDICATION: Performed by: INTERNAL MEDICINE

## 2019-12-09 RX ORDER — DIAZEPAM 5 MG/1
5 TABLET ORAL EVERY 6 HOURS PRN
Status: DISCONTINUED | OUTPATIENT
Start: 2019-12-09 | End: 2019-12-09 | Stop reason: HOSPADM

## 2019-12-09 RX ORDER — SODIUM CHLORIDE 0.9 % (FLUSH) 0.9 %
10 SYRINGE (ML) INJECTION EVERY 12 HOURS SCHEDULED
Status: DISCONTINUED | OUTPATIENT
Start: 2019-12-09 | End: 2019-12-09 | Stop reason: HOSPADM

## 2019-12-09 RX ORDER — SODIUM CHLORIDE 9 MG/ML
INJECTION, SOLUTION INTRAVENOUS CONTINUOUS
Status: DISCONTINUED | OUTPATIENT
Start: 2019-12-09 | End: 2019-12-09 | Stop reason: HOSPADM

## 2019-12-09 RX ORDER — FENTANYL CITRATE 50 UG/ML
25 INJECTION, SOLUTION INTRAMUSCULAR; INTRAVENOUS
Status: DISCONTINUED | OUTPATIENT
Start: 2019-12-09 | End: 2019-12-09 | Stop reason: HOSPADM

## 2019-12-09 RX ORDER — ONDANSETRON 2 MG/ML
4 INJECTION INTRAMUSCULAR; INTRAVENOUS EVERY 6 HOURS PRN
Status: DISCONTINUED | OUTPATIENT
Start: 2019-12-09 | End: 2019-12-09 | Stop reason: HOSPADM

## 2019-12-09 RX ORDER — LABETALOL 20 MG/4 ML (5 MG/ML) INTRAVENOUS SYRINGE
10 EVERY 30 MIN PRN
Status: DISCONTINUED | OUTPATIENT
Start: 2019-12-09 | End: 2019-12-09 | Stop reason: HOSPADM

## 2019-12-09 RX ORDER — ACETAMINOPHEN 325 MG/1
650 TABLET ORAL EVERY 4 HOURS PRN
Status: DISCONTINUED | OUTPATIENT
Start: 2019-12-09 | End: 2019-12-09 | Stop reason: HOSPADM

## 2019-12-09 RX ORDER — SODIUM CHLORIDE 0.9 % (FLUSH) 0.9 %
10 SYRINGE (ML) INJECTION PRN
Status: DISCONTINUED | OUTPATIENT
Start: 2019-12-09 | End: 2019-12-09 | Stop reason: HOSPADM

## 2019-12-09 RX ORDER — HYDRALAZINE HYDROCHLORIDE 20 MG/ML
10 INJECTION INTRAMUSCULAR; INTRAVENOUS EVERY 10 MIN PRN
Status: DISCONTINUED | OUTPATIENT
Start: 2019-12-09 | End: 2019-12-09 | Stop reason: HOSPADM

## 2019-12-09 RX ADMIN — IOPAMIDOL 42 ML: 612 INJECTION, SOLUTION INTRAVENOUS at 11:27

## 2019-12-16 RX ORDER — ATORVASTATIN CALCIUM 40 MG/1
40 TABLET, FILM COATED ORAL NIGHTLY
Qty: 90 TABLET | Refills: 3 | Status: SHIPPED | OUTPATIENT
Start: 2019-12-16 | End: 2022-10-25 | Stop reason: SDUPTHER

## 2019-12-16 RX ORDER — RANOLAZINE 1000 MG/1
1000 TABLET, EXTENDED RELEASE ORAL 2 TIMES DAILY
Qty: 180 TABLET | Refills: 3 | Status: SHIPPED | OUTPATIENT
Start: 2019-12-16 | End: 2022-05-25 | Stop reason: SDUPTHER

## 2019-12-16 RX ORDER — CARVEDILOL 6.25 MG/1
TABLET ORAL
Qty: 180 TABLET | Refills: 3 | Status: SHIPPED | OUTPATIENT
Start: 2019-12-16 | End: 2022-05-25 | Stop reason: SDUPTHER

## 2019-12-16 RX ORDER — ISOSORBIDE MONONITRATE 60 MG/1
60 TABLET, EXTENDED RELEASE ORAL DAILY
Qty: 90 TABLET | Refills: 3 | Status: SHIPPED | OUTPATIENT
Start: 2019-12-16 | End: 2022-05-25 | Stop reason: SDUPTHER

## 2019-12-26 ENCOUNTER — OFFICE VISIT (OUTPATIENT)
Dept: CARDIOLOGY CLINIC | Age: 44
End: 2019-12-26
Payer: COMMERCIAL

## 2019-12-26 VITALS
DIASTOLIC BLOOD PRESSURE: 80 MMHG | BODY MASS INDEX: 23.73 KG/M2 | HEART RATE: 111 BPM | WEIGHT: 147 LBS | SYSTOLIC BLOOD PRESSURE: 110 MMHG | OXYGEN SATURATION: 98 %

## 2019-12-26 DIAGNOSIS — I25.118 CORONARY ARTERY DISEASE OF NATIVE ARTERY OF NATIVE HEART WITH STABLE ANGINA PECTORIS (HCC): ICD-10-CM

## 2019-12-26 DIAGNOSIS — E78.2 MIXED HYPERLIPIDEMIA: ICD-10-CM

## 2019-12-26 DIAGNOSIS — R06.02 SHORTNESS OF BREATH: ICD-10-CM

## 2019-12-26 DIAGNOSIS — I20.9 ANGINA, CLASS IV (HCC): ICD-10-CM

## 2019-12-26 DIAGNOSIS — I10 ESSENTIAL HYPERTENSION: ICD-10-CM

## 2019-12-26 DIAGNOSIS — R07.2 PRECORDIAL PAIN: ICD-10-CM

## 2019-12-26 DIAGNOSIS — I25.10 CORONARY ARTERY DISEASE INVOLVING NATIVE CORONARY ARTERY OF NATIVE HEART WITHOUT ANGINA PECTORIS: Primary | Chronic | ICD-10-CM

## 2019-12-26 DIAGNOSIS — I20.9 ANGINA, CLASS III (HCC): ICD-10-CM

## 2019-12-26 PROCEDURE — 4004F PT TOBACCO SCREEN RCVD TLK: CPT | Performed by: INTERNAL MEDICINE

## 2019-12-26 PROCEDURE — G8420 CALC BMI NORM PARAMETERS: HCPCS | Performed by: INTERNAL MEDICINE

## 2019-12-26 PROCEDURE — G8427 DOCREV CUR MEDS BY ELIG CLIN: HCPCS | Performed by: INTERNAL MEDICINE

## 2019-12-26 PROCEDURE — 99214 OFFICE O/P EST MOD 30 MIN: CPT | Performed by: INTERNAL MEDICINE

## 2019-12-26 PROCEDURE — G8598 ASA/ANTIPLAT THER USED: HCPCS | Performed by: INTERNAL MEDICINE

## 2019-12-26 PROCEDURE — G8484 FLU IMMUNIZE NO ADMIN: HCPCS | Performed by: INTERNAL MEDICINE

## 2019-12-26 RX ORDER — ISOSORBIDE MONONITRATE 30 MG/1
30 TABLET, EXTENDED RELEASE ORAL NIGHTLY
Qty: 30 TABLET | Refills: 5 | Status: ON HOLD | OUTPATIENT
Start: 2019-12-26 | End: 2022-02-07

## 2019-12-26 RX ORDER — MULTIVIT-MIN/IRON/FOLIC ACID/K 18-600-40
4000 CAPSULE ORAL DAILY
COMMUNITY
Start: 2019-12-18

## 2020-03-31 ENCOUNTER — TELEMEDICINE (OUTPATIENT)
Dept: CARDIOLOGY CLINIC | Age: 45
End: 2020-03-31
Payer: COMMERCIAL

## 2020-03-31 PROBLEM — I20.0 UNSTABLE ANGINA (HCC): Status: RESOLVED | Noted: 2018-05-23 | Resolved: 2020-03-31

## 2020-03-31 PROBLEM — Z95.1 HX OF CABG: Status: ACTIVE | Noted: 2020-03-31

## 2020-03-31 PROCEDURE — 4004F PT TOBACCO SCREEN RCVD TLK: CPT | Performed by: INTERNAL MEDICINE

## 2020-03-31 PROCEDURE — G8420 CALC BMI NORM PARAMETERS: HCPCS | Performed by: INTERNAL MEDICINE

## 2020-03-31 PROCEDURE — 99213 OFFICE O/P EST LOW 20 MIN: CPT | Performed by: INTERNAL MEDICINE

## 2020-03-31 PROCEDURE — G8484 FLU IMMUNIZE NO ADMIN: HCPCS | Performed by: INTERNAL MEDICINE

## 2020-03-31 PROCEDURE — G8428 CUR MEDS NOT DOCUMENT: HCPCS | Performed by: INTERNAL MEDICINE

## 2020-03-31 ASSESSMENT — ENCOUNTER SYMPTOMS
SHORTNESS OF BREATH: 0
NAUSEA: 0
ABDOMINAL PAIN: 0
ALLERGIC/IMMUNOLOGIC NEGATIVE: 1
VOMITING: 0
EYES NEGATIVE: 1
WHEEZING: 0
GASTROINTESTINAL NEGATIVE: 1

## 2020-03-31 NOTE — PROGRESS NOTES
3/31/2020    TELEHEALTH EVALUATION -- Audio/Visual (During EPRWG-72 public health emergency)    Due to [place] outbreak, patient's office visit was converted to a virtual visit. Patient was contacted and agreed to proceed with a virtual visit via Doxy. me  The risks and benefits of converting to a virtual visit were discussed in light of the current infectious disease epidemic. Patient also understood that insurance coverage and co-pays are up to their individual insurance plans. HPI:    Kwaku Blood (:  1975) has requested an audio/video evaluation for the following concern(s): s/p CABG, hx of CAD    S/p CABGx3  at [facility] with DR. Sahni. Did have some recurrent CP 2 weeks ago. Took nitro which helped. Felt similar to previous CP episodes. Was cleared by CVTS. Sternotomy healing well but sore. Pt denies  dyspnea, dyspnea on exertion, change in exercise capacity, fatigue,  nausea, vomiting, diarrhea, constipation, motor weakness, insomnia, weight loss, syncope, dizziness, lightheadedness, palpitations, PND, orthopnea, or claudication. She quit smoking. BS is better, following with dr. Alan Sevilla. Compliant with meds. On DAPT, no bleeding issues. No LE edema. Review of Systems   Constitutional: Negative. Negative for chills and fever. HENT: Negative. Eyes: Negative. Respiratory: Negative for shortness of breath and wheezing. Cardiovascular: Positive for chest pain. Negative for palpitations and leg swelling. Gastrointestinal: Negative. Negative for abdominal pain, nausea and vomiting. Endocrine: Negative. Genitourinary: Negative. Musculoskeletal: Negative. Skin: Negative. Negative for rash. Allergic/Immunologic: Negative. Neurological: Negative for dizziness, weakness and headaches. Hematological: Negative. Psychiatric/Behavioral: Negative. Prior to Visit Medications    Medication Sig Taking?  Authorizing Provider   SITagliptin (JANUVIA) 100 MG tablet Take 100 mg by mouth daily  Historical Provider, MD   Cholecalciferol (VITAMIN D) 50 MCG (2000 UT) CAPS capsule Take 4,000 Units by mouth daily  Historical Provider, MD   isosorbide mononitrate (IMDUR) 30 MG extended release tablet Take 1 tablet by mouth nightly  Francisco New DO   atorvastatin (LIPITOR) 40 MG tablet Take 1 tablet by mouth nightly  Francisco New DO   carvedilol (COREG) 6.25 MG tablet TAKE 1 TABLET BY MOUTH 2 TIMES DAILY  Francisco New DO   isosorbide mononitrate (IMDUR) 60 MG extended release tablet Take 1 tablet by mouth daily  Francisco New DO   ranolazine (RANEXA) 1000 MG extended release tablet Take 1 tablet by mouth 2 times daily  Francisco New DO   ticagrelor (BRILINTA) 90 MG TABS tablet Take 1 tablet by mouth 2 times daily  Francisco New DO   nitroGLYCERIN (NITROSTAT) 0.4 MG SL tablet PLACE 1 TAB UNDER THE TONGUE EVERY 5MIN IF NEEDED FOR CHEST PAIN (MAY REPEAT TWICE)  Francisco New DO   metFORMIN (GLUCOPHAGE) 500 MG tablet Take 1 tablet by mouth daily (with breakfast)  Francisco New DO   glipiZIDE (GLUCOTROL) 10 MG tablet TAKE 1 TABLET BY MOUTH 2 TIMES DAILY (BEFORE MEALS)  Francisco New DO   carBAMazepine (CARBATROL) 300 MG extended release capsule Take 1 capsule by mouth 2 times daily  Francisco New DO   aspirin 81 MG EC tablet Take 162 mg by mouth daily  Historical Provider, MD       Social History     Tobacco Use    Smoking status: Light Tobacco Smoker     Packs/day: 1.00     Types: Cigarettes     Start date: 1/1/1994    Smokeless tobacco: Never Used   Substance Use Topics    Alcohol use: No    Drug use: No        Allergies   Allergen Reactions    Toradol [Ketorolac Tromethamine]     Tramadol    ,   Past Medical History:   Diagnosis Date    Asthma     Bipolar disease, manic ([facility]     CAD (coronary artery disease)     [facility]     Depression     Diabetes ([facility]     DM (diabetes mellitus) ([facility]     Dyspnea     Hx of CABG 3/31/2020  Hyperlipidemia     Hypertension     Suicide attempt by multiple drug overdose 2009    TMJ (dislocation of temporomandibular joint)    ,   Past Surgical History:   Procedure Laterality Date    APPENDECTOMY  2010    CARDIAC CATHETERIZATION  7/11/2013    CARDIAC CATHETERIZATION  10/16/2013    DR EDGAR James CORONARY ARTERY BYPASS GRAFT  01/2020    TUBAL LIGATION     ,   Family History   Problem Relation Age of Onset    Heart Disease Mother     High Blood Pressure Mother     Diabetes Mother     High Blood Pressure Father     Heart Disease Father     Kidney Disease Father        PHYSICAL EXAMINATION:  [ INSTRUCTIONS:  \"[x]\" Indicates a positive item  \"[]\" Indicates a negative item  -- DELETE ALL ITEMS NOT EXAMINED]  [x] Alert  [x] Oriented to person/place/time    [x] No apparent distress  [] Toxic appearing    [] Face flushed appearing [x] Sclera clear  [] Lips are cyanotic      [x] Breathing appears normal  [] Appears tachypneic      [] Rash on visible skin    [] Cranial Nerves II-XII grossly intact    [] Motor grossly intact in visible upper extremities    [] Motor grossly intact in visible lower extremities    [x] Normal Mood  [] Anxious appearing    [] Depressed appearing  [] Confused appearing      [] Poor short term memory  [] Poor long term memory    [] OTHER:      Due to this being a TeleHealth encounter, evaluation of the following organ systems is limited: Vitals/Constitutional/EENT/Resp/CV/GI//MS/Neuro/Skin/Heme-Lymph-Imm. ASSESSMENT:        Diagnosis Orders   1. Coronary artery disease involving native coronary artery of native heart without angina pectoris     2. Essential hypertension     3. Hx of CABG     4. PLAN:    Patient was advised and encouraged to check blood pressure at home or at a pharmacy, maintain a logbook, and also call us back if blood pressure are above the target ranges or if it is low. Patient clearly understands and agrees to the instructions.      We will need to continue to monitor muscle and liver enzymes, BUN, CR, and electrolytes. The nature of cardiac risk has been fully discussed with this patient. I have made her aware of her LDL target goal given her cardiovascular risk analysis. I have discussed the appropriate diet. The need for lifelong compliance in order to reduce risk is stressed. A regular exercise program is recommended to help achieve and maintain normal body weight, fitness and improve lipid balance. Details of medical condition explained and patient was warned about adverse consequences of uncontrolled medical conditions and possible side effects of prescribed medications. Cont with DAPT    Strict BS control    Cont with current cardiac meds    ? Cardiac rehab eventually. No follow-ups on file. An  electronic signature was used to authenticate this note. --Nolan Leija DO on 3/31/2020 at 2:58 PM        Pursuant to the emergency declaration under the [facility] waiver authority and the [employer] and [employer] Act, this Virtual  Visit was conducted, with patient's consent, to reduce the patient's risk of exposure to COVID-19 and provide continuity of care for an established patient. Services were provided through a video synchronous discussion virtually to substitute for in-person clinic visit. Total visit time: 15 min.

## 2020-06-03 RX ORDER — ASPIRIN 81 MG/1
81 TABLET ORAL DAILY
COMMUNITY
End: 2020-06-03 | Stop reason: SDUPTHER

## 2020-06-04 ENCOUNTER — VIRTUAL VISIT (OUTPATIENT)
Dept: CARDIOLOGY CLINIC | Age: 45
End: 2020-06-04
Payer: COMMERCIAL

## 2020-06-04 PROCEDURE — G8428 CUR MEDS NOT DOCUMENT: HCPCS | Performed by: INTERNAL MEDICINE

## 2020-06-04 PROCEDURE — 4004F PT TOBACCO SCREEN RCVD TLK: CPT | Performed by: INTERNAL MEDICINE

## 2020-06-04 PROCEDURE — G8420 CALC BMI NORM PARAMETERS: HCPCS | Performed by: INTERNAL MEDICINE

## 2020-06-04 PROCEDURE — 99213 OFFICE O/P EST LOW 20 MIN: CPT | Performed by: INTERNAL MEDICINE

## 2020-06-04 ASSESSMENT — ENCOUNTER SYMPTOMS
NAUSEA: 0
ABDOMINAL PAIN: 0
VOMITING: 0
WHEEZING: 0
SHORTNESS OF BREATH: 0
GASTROINTESTINAL NEGATIVE: 1
EYES NEGATIVE: 1

## 2020-06-04 NOTE — PROGRESS NOTES
nausea, vomiting, diarrhea, constipation, motor weakness, insomnia, weight loss, syncope, dizziness, lightheadedness, palpitations, PND, orthopnea, or claudication.      9-6-18: doing well overall. Getting . Need refills on meds. Her stress level is much better. Pt denies chest pain, dyspnea, dyspnea on exertion, change in exercise capacity, fatigue,  nausea, vomiting, diarrhea, constipation, motor weakness, insomnia, weight loss, syncope, dizziness, lightheadedness, palpitations, PND, orthopnea, or claudication. No nitro use. BP and hr are good. CAD is stable. No LE discoloration or ulcers. No LE edema. No CHF type symptoms. Lipid profile is normal. No recent hospitalization. No change in meds. Continues to smoke. On ASA and Effient. No bleeding issues.      S/p LHC in 5/18   Conclusions  Procedure Summary  95% mid PDA ISR s/p PTCA with 0% residual and MARY III flow  99% distal PLVB stenosis s/p PTCA with 0% residual and MARY III flow  distal LAD ISR of 40-50%  100% mid CX   50% mid RCA  normal LVF     12-5-18: FEELS LIKE something is wrong again. Having chest pressure/squeeze and radiation to left arm. Completely resolved with one nitro. Using more nitro lately the last 2 weeks after going months without it. She ran out of effient for a month she states due to insurance reasons. Continues to smoke. Compliant with meds. BS is fairly controlled per patient. She is seeing dr. Crissy Jackson. Pt denies nausea, vomiting, diarrhea, constipation, motor weakness, insomnia, weight loss, syncope, dizziness, lightheadedness, palpitations, PND, orthopnea, or claudication. BP and hr are good. No LE discoloration or ulcers. No LE edema. No CHF type symptoms. Lipid profile is normal. No recent hospitalization. No change in meds.    12-5-19: Lost her insurance and has been out of meds for a while.  But has been feeling ok for the past year  But the last 2 weeks have been rough with CP, has been using nitro which swelling. Gastrointestinal: Negative. Negative for abdominal pain, nausea and vomiting. Skin: Negative. Negative for rash. Neurological: Negative for dizziness, weakness and headaches. Prior to Visit Medications    Medication Sig Taking?  Authorizing Provider   SITagliptin (JANUVIA) 100 MG tablet Take 100 mg by mouth daily  Historical Provider, MD   Cholecalciferol (VITAMIN D) 50 MCG (2000 UT) CAPS capsule Take 4,000 Units by mouth daily  Historical Provider, MD   isosorbide mononitrate (IMDUR) 30 MG extended release tablet Take 1 tablet by mouth nightly  Francisco New DO   atorvastatin (LIPITOR) 40 MG tablet Take 1 tablet by mouth nightly  Francisco New DO   carvedilol (COREG) 6.25 MG tablet TAKE 1 TABLET BY MOUTH 2 TIMES DAILY  Francisco New DO   isosorbide mononitrate (IMDUR) 60 MG extended release tablet Take 1 tablet by mouth daily  Francisco New DO   ranolazine (RANEXA) 1000 MG extended release tablet Take 1 tablet by mouth 2 times daily  Francisco New DO   ticagrelor (BRILINTA) 90 MG TABS tablet Take 1 tablet by mouth 2 times daily  Francisco New DO   nitroGLYCERIN (NITROSTAT) 0.4 MG SL tablet PLACE 1 TAB UNDER THE TONGUE EVERY 5MIN IF NEEDED FOR CHEST PAIN (MAY REPEAT TWICE)  Francisco New DO   metFORMIN (GLUCOPHAGE) 500 MG tablet Take 1 tablet by mouth daily (with breakfast)  Francisco New DO   glipiZIDE (GLUCOTROL) 10 MG tablet TAKE 1 TABLET BY MOUTH 2 TIMES DAILY (BEFORE MEALS)  Francisco New DO   carBAMazepine (CARBATROL) 300 MG extended release capsule Take 1 capsule by mouth 2 times daily  Francisco New DO   aspirin 81 MG EC tablet Take 162 mg by mouth daily  Historical Provider, MD       Social History     Tobacco Use    Smoking status: Light Tobacco Smoker     Packs/day: 1.00     Types: Cigarettes     Start date: 1/1/1994    Smokeless tobacco: Never Used   Substance Use Topics    Alcohol use: No    Drug use: No        Allergies   Allergen Reactions    Toradol [Ketorolac pectoris     3. Hx of CABG     4. Mixed hyperlipidemia     5. Precordial pain     6. Non compliance with medical treatment         PLAN:  Patient will need to continue to follow up with you for their general medical care and return to see me in the office in 6 months     As always, aggressive risk factor modification is strongly recommended. We should adhere to the [address] VII guidelines for HTN management and the NCEP ATP III guidelines for LDL-C management.     The nature of cardiac risk has been fully discussed with this patient. I have made her aware of her LDL target goal given her cardiovascular risk analysis. I have discussed the appropriate diet. The need for lifelong compliance in order to reduce risk is stressed. A regular exercise program is recommended to help achieve and maintain normal body weight, fitness and improve lipid balance.      Continue medications at current doses.      DAPT      Follow up with PCP.      Check EKG next OV     Smoking cessation was strongly recommended     Patient was advised and encouraged to check blood pressure at home or at a pharmacy, maintain a logbook, and also call us back if blood pressure are above the target ranges or if it is low. Patient clearly understands and agrees to the instructions.      We will need to continue to monitor muscle and liver enzymes, BUN, CR, and electrolytes.     The proper use and anticipated side effects of nitroglycerine has been carefully explained. If a single episode of chest pain is not relieved by one tablet, the patient will try another within 5 minutes; and if this doesn't relieve the pain, the patient is instructed to call 911 for transportation to an emergency department.     Details of medical condition explained and patient was warned about adverse consequences of uncontrolled medical conditions and possible side effects of prescribed medications.      Patient was advised to go to the ER if he starts experiencing adverse effects of

## 2020-11-06 RX ORDER — NITROGLYCERIN 0.4 MG/1
TABLET SUBLINGUAL
Qty: 25 TABLET | Refills: 2 | Status: SHIPPED | OUTPATIENT
Start: 2020-11-06 | End: 2021-10-18 | Stop reason: SDUPTHER

## 2021-02-23 DIAGNOSIS — I25.10 CORONARY ARTERY DISEASE INVOLVING NATIVE CORONARY ARTERY OF NATIVE HEART WITHOUT ANGINA PECTORIS: ICD-10-CM

## 2021-02-23 RX ORDER — CARBAMAZEPINE 300 MG/1
300 CAPSULE, EXTENDED RELEASE ORAL 2 TIMES DAILY
Qty: 60 CAPSULE | Refills: 5 | Status: SHIPPED | OUTPATIENT
Start: 2021-02-23 | End: 2021-10-18 | Stop reason: SDUPTHER

## 2021-08-12 ENCOUNTER — TELEPHONE (OUTPATIENT)
Dept: CARDIOLOGY CLINIC | Age: 46
End: 2021-08-12

## 2021-08-12 NOTE — TELEPHONE ENCOUNTER
DR New patient. . patient is having dental procedure 8/16/2021. Office is calling to see if ok for patient to hold Brilinta and for how long.  Please advise    Letter will need faxed

## 2021-08-12 NOTE — LETTER
[facility]  [address] [place] [address] [facility] [address]  Phone: [phone]  Fax: [phone]    Chanda Collado      August 13, 2021      To Whom It May Concern:    Re: Davon Arellano, she  is considered to be low to intermediate risk for perioperative cardiovascular events from a cardiac standpoint. She may proceed with the scheduled dental procedure. She may stop Brilinta 3 days prior to procedure, then restart immediately after. If you have any questions or concerns, please call my office. Thank you.       Sincerely,          Sincerely,          Roderick Sanchez MD

## 2021-08-17 RX ORDER — NITROGLYCERIN 0.4 MG/1
TABLET SUBLINGUAL
Qty: 25 TABLET | Refills: 2 | OUTPATIENT
Start: 2021-08-17

## 2021-10-18 RX ORDER — NITROGLYCERIN 0.4 MG/1
TABLET SUBLINGUAL
Qty: 25 TABLET | Refills: 2 | Status: SHIPPED | OUTPATIENT
Start: 2021-10-18 | End: 2022-05-25 | Stop reason: SDUPTHER

## 2021-10-18 RX ORDER — CARBAMAZEPINE 300 MG/1
300 CAPSULE, EXTENDED RELEASE ORAL 2 TIMES DAILY
Qty: 60 CAPSULE | Refills: 5 | Status: SHIPPED | OUTPATIENT
Start: 2021-10-18

## 2021-10-18 NOTE — TELEPHONE ENCOUNTER
Requesting medication refill. Please approve or deny this request.    Rx requested:  Requested Prescriptions     Pending Prescriptions Disp Refills    nitroGLYCERIN (NITROSTAT) 0.4 MG SL tablet 25 tablet 2     Sig: PLACE 1 TAB UNDER THE TONGUE EVERY 5MIN IF NEEDED FOR CHEST PAIN (MAY REPEAT TWICE)    carBAMazepine (CARBATROL) 300 MG extended release capsule 60 capsule 5     Sig: Take 1 capsule by mouth 2 times daily         Last Office Visit:   6/4/2020      Next Visit Date:  No future appointments. Last refill 2/23/21. Please approve or deny.

## 2021-10-21 ENCOUNTER — TELEPHONE (OUTPATIENT)
Dept: CARDIOLOGY CLINIC | Age: 46
End: 2021-10-21

## 2021-10-21 NOTE — TELEPHONE ENCOUNTER
Kymberly Garcia from Dr. Ericka Mock, [address] [facility] office calling as patient is in severe pain and is having a tooth extracted Monday. She needs to be cleared to have a local and use nitrous oxide. She is on Brilinta and Low dose aspirin. Does she need to hold any medication and is she cleared to proceed.       Please fax clearance letter to [phone]

## 2022-02-01 ENCOUNTER — OFFICE VISIT (OUTPATIENT)
Dept: CARDIOLOGY CLINIC | Age: 47
End: 2022-02-01
Payer: COMMERCIAL

## 2022-02-01 ENCOUNTER — PREP FOR PROCEDURE (OUTPATIENT)
Dept: CARDIOLOGY CLINIC | Age: 47
End: 2022-02-01

## 2022-02-01 VITALS
SYSTOLIC BLOOD PRESSURE: 132 MMHG | HEART RATE: 74 BPM | WEIGHT: 138 LBS | DIASTOLIC BLOOD PRESSURE: 76 MMHG | BODY MASS INDEX: 22.18 KG/M2 | HEIGHT: 66 IN | OXYGEN SATURATION: 99 %

## 2022-02-01 DIAGNOSIS — I20.8 ANGINA AT REST (HCC): ICD-10-CM

## 2022-02-01 DIAGNOSIS — I25.10 CORONARY ARTERY DISEASE INVOLVING NATIVE CORONARY ARTERY OF NATIVE HEART WITHOUT ANGINA PECTORIS: ICD-10-CM

## 2022-02-01 DIAGNOSIS — I10 PRIMARY HYPERTENSION: ICD-10-CM

## 2022-02-01 DIAGNOSIS — E78.2 MIXED HYPERLIPIDEMIA: Primary | ICD-10-CM

## 2022-02-01 PROCEDURE — 93000 ELECTROCARDIOGRAM COMPLETE: CPT | Performed by: INTERNAL MEDICINE

## 2022-02-01 PROCEDURE — G8484 FLU IMMUNIZE NO ADMIN: HCPCS | Performed by: INTERNAL MEDICINE

## 2022-02-01 PROCEDURE — 99214 OFFICE O/P EST MOD 30 MIN: CPT | Performed by: INTERNAL MEDICINE

## 2022-02-01 PROCEDURE — G8420 CALC BMI NORM PARAMETERS: HCPCS | Performed by: INTERNAL MEDICINE

## 2022-02-01 PROCEDURE — G8427 DOCREV CUR MEDS BY ELIG CLIN: HCPCS | Performed by: INTERNAL MEDICINE

## 2022-02-01 PROCEDURE — 4004F PT TOBACCO SCREEN RCVD TLK: CPT | Performed by: INTERNAL MEDICINE

## 2022-02-01 RX ORDER — LOSARTAN POTASSIUM 25 MG/1
25 TABLET ORAL DAILY
COMMUNITY
Start: 2021-02-26 | End: 2022-08-11 | Stop reason: SDUPTHER

## 2022-02-01 RX ORDER — SODIUM CHLORIDE 9 MG/ML
INJECTION, SOLUTION INTRAVENOUS CONTINUOUS
Status: CANCELLED | OUTPATIENT
Start: 2022-02-01

## 2022-02-01 RX ORDER — ONDANSETRON 2 MG/ML
4 INJECTION INTRAMUSCULAR; INTRAVENOUS EVERY 6 HOURS PRN
Status: CANCELLED | OUTPATIENT
Start: 2022-02-01

## 2022-02-01 RX ORDER — SODIUM CHLORIDE 0.9 % (FLUSH) 0.9 %
5-40 SYRINGE (ML) INJECTION PRN
Status: CANCELLED | OUTPATIENT
Start: 2022-02-01

## 2022-02-01 RX ORDER — SODIUM CHLORIDE 0.9 % (FLUSH) 0.9 %
5-40 SYRINGE (ML) INJECTION EVERY 12 HOURS SCHEDULED
Status: CANCELLED | OUTPATIENT
Start: 2022-02-01

## 2022-02-01 RX ORDER — PREDNISONE 1 MG/1
50 TABLET ORAL ONCE
Status: CANCELLED | OUTPATIENT
Start: 2022-02-01 | End: 2022-02-01

## 2022-02-01 RX ORDER — DIPHENHYDRAMINE HCL 25 MG
50 TABLET ORAL ONCE
Status: CANCELLED | OUTPATIENT
Start: 2022-02-01 | End: 2022-02-01

## 2022-02-01 RX ORDER — NITROGLYCERIN 0.4 MG/1
0.4 TABLET SUBLINGUAL EVERY 5 MIN PRN
Status: CANCELLED | OUTPATIENT
Start: 2022-02-01

## 2022-02-01 RX ORDER — SODIUM CHLORIDE 9 MG/ML
25 INJECTION, SOLUTION INTRAVENOUS PRN
Status: CANCELLED | OUTPATIENT
Start: 2022-02-01

## 2022-02-01 ASSESSMENT — ENCOUNTER SYMPTOMS
EYES NEGATIVE: 1
ABDOMINAL PAIN: 0
NAUSEA: 0
SHORTNESS OF BREATH: 0
GASTROINTESTINAL NEGATIVE: 1
WHEEZING: 0
VOMITING: 0

## 2022-02-01 NOTE — PROGRESS NOTES
2022        HPI:  Patient presents for follow up medical evaluation. Patient is s/p negative 2Decho and treadmill stress test. Pt denies change in exercise capacity, fatigue, nausea, vomiting, diarrhea, constipation, motor weakness, insomnia, weight loss, syncope, dizziness, lightheadedness, palpitations, PND, orthopnea, or claudication. Patient with some episodes of chest pain, dyspnea, dyspnea on exertion still. Continues to   Smoke, diet could be better controlled. Compliant with meds. 13: as above, patient presents with worsening midsternal CP and SOB. States her CP lasts more than 20 minutes at rest, worse with exertion. Pain radiates to left shoulder. Does not feel like GERD type symptoms. Almost took one of her dad's nitro. Both Mother and father recently  from large MI and CVA. Father with hx of 34 stents with [facility]. Continues to smoke. Under a lot of stress and anxiety. Worried that she may have a heart and die. Patient fatigues very easily.      13: as above, s/p cardiac cath and S/P ZACHARY to mid LAD and distal RCA. Has not been compliant with ASA but takes plavix daily. Has been having numerous midsternal and midepigastric chest pains since cath. Had an episode of sever CP a week ago with associted SOB, Nausea, diaphoresis, and ashy looking per friend. Had a look of impending doom. Has been using numerous SL nitro's with relief. + smoking. Patient complains of LE dicomfort.     13: as above, was in hospital last week for CP. Patient had negative EKG and negative C.E. Continues to smoke, chantix not working per patient. Compliant with meds. BS are still not under very good control. Pt denies dyspnea, dyspnea on exertion, change in exercise capacity, fatigue,  nausea, vomiting, diarrhea, constipation, motor weakness, insomnia, weight loss, syncope, dizziness, lightheadedness, palpitations, PND, orthopnea, or claudication. Still has occasional CP/discomfort. No bleeding issues.  Has not used SL nitro since discharged. soemtimes takes 5 SL nitros a day. Patient on previous cardiac cath with significant PLVB lesion nearing need for PCI.     5-13-14: as above, s/p LHC with moderate distal RCA instent restenosis of 50%, 50% PLVB, 30-40% mid RCA stenosis, normal LVF. Had to take one nitro this morning which helped, but doing better overall. Compliant with meds. Pt denies chest pain, dyspnea, dyspnea on exertion, change in exercise capacity, fatigue,  nausea, vomiting, diarrhea, constipation, motor weakness, insomnia, weight loss, syncope, dizziness, lightheadedness, palpitations, PND, orthopnea, or claudication. Statins increased by Dr. Leandro Gilmore. Was smoking 2 ppd now down to a PPD.     1-6-15: as above, states she's having worsening angina over the past couple of weeks. States she's been eating nitro like its candy. Went through a whole bottle of nitro in one week. Nitro completely takes her pain away immeditely. She stearns get tired, LH and dizzy with episodes. Denies N/V or SOB. Symptoms are worse with activity and better with rest. complian with meds, continues to smoke 1ppd. Has had multiple previous hospitalizations since last visit. Feels like there is something is wrong and having similar symptoms to previous PCI.      6-11-15: as above, s/p LHC with patent LAD and RCA stents, normal LVF. S/p recurrent hospitalization for CP. C.E were negative. Did take nitro which relieved her CP immediately. States CP went into her jaw, got diaphoretic. States symptoms occurred at rest. Continues to smoke 0.5ppd. Had run out of her plavix for 3 days but resumed now. Did have a few more episodes since being discharged from hospital.         4-12-16: as above, s/p hospitalization for angina, was treated medically. Does have URI type symptoms.  Pt denies  dyspnea, dyspnea on exertion, change in exercise capacity, fatigue,  nausea, vomiting, diarrhea, constipation, motor weakness, insomnia, weight loss, syncope, dizziness, lightheadedness, palpitations, PND, orthopnea. BP is good. Does have episodes of CP that are on and off, states shes used to it now and does not feel like a heart attack. Has quit smoking for about 2 months. Compliant with meds.      6-2-16: as above, having sig. CP along with nauseas. States its constant in nature, had to get vicodin off of a friend for pain. Going through a bottle of nitro a month. Occurs at rest, radiated to shoulder blades, + diaphoresis, lasting more than 10 min. S/p hospitalization a few days ago for CP, C.E were negative. S/p ECHO  With normal LVF. Continues to smoke. Her ranexa was increased to a 1000mcg daily. Compliant with other meds. BP and HR are good. Pt denies diarrhea, constipation, motor weakness, insomnia, weight loss, syncope, dizziness, lightheadedness, palpitations, PND, orthopnea, or claudication. Does have SOB and worse with walking.      7-7-16: as above, s/p LHC with PTCA of PDA branch, patent OM1 and LAD stents,100% mid CX with right to left collaterals, EF of 60%. S/p CP/abd pain episode yesterady where she felt like she got punched in the gut, got nauseated and diaphoretic, took nitro without any sig. Improvement, she kept throwing up. C.E were negative. Compliant with meds. BP is good. She feels great today. No bleeding issues. EKG was ok in ER. Pt denies chest pain, dyspnea, dyspnea on exertion, change in exercise capacity, fatigue,  nausea, vomiting, diarrhea, constipation, motor weakness, insomnia, weight loss, syncope, dizziness, lightheadedness, palpitations, PND, orthopnea. No smoking.      10-18-16: continues to have chest pressure and heaviness. Has been utilizing nitro almost on a daily basis. States its horrible and almost went to hospital a few times over the past few weeks. It did radiate to left arm pit a few times, got nauseous and left finger tingling. Does get SOB with mild activity, just with sweeping the house sometimes.  Wakes her up at night, occures at rest. Symptoms are accelerating in nature. Compliant with meds. Smokes 3 cig a day. No bleeding issues. Pt denies   vomiting, diarrhea, constipation, motor weakness, insomnia, weight loss, syncope, dizziness, lightheadedness, palpitations, PND, orthopnea, or claudication. BP and HR are good. BS continues to be uncontrolled. In the 200's.      12-8-16: s/p s/p PCI of PDA with ZACHARY, patent mid LAD stent with 30-40% restenosis, patent ostial OM1 stent, 50% mid RCA, normal LVF EF of 55%. Right LE angio was normal to knees. Pt denies chest pain, dyspnea, dyspnea on exertion, change in exercise capacity, fatigue,  nausea, vomiting, diarrhea, constipation, motor weakness, insomnia, weight loss, syncope, dizziness, lightheadedness, palpitations, PND, orthopnea. Did have a right UE venous dupplex US with thrombosis of cephalic vein. Continues to smoke. Taking all her meds. No nitro use. BP and hr are good. CAD is stable. No LE discoloration or ulcers. No LE edema. No CHF type symptoms. Lipid profile is normal.      4-11-17: s/p hospitalization for CP 2 weeks ago. Lab work was ok, C.E is negative. States she is getting sporadic pains now, gets a spasm in her left arm. Has been going to the gym, doing stairmasters and treadmill without any major pains. Has lost 18 pounds. Has needed to take nitro last night. States she feels like she is gonna die sometimes. Pt denies chest pain, dyspnea, dyspnea on exertion, change in exercise capacity, fatigue,  nausea, vomiting, diarrhea, constipation, motor weakness, insomnia, weight loss, syncope, dizziness, lightheadedness, palpitations, PND, orthopnea, or claudication.      9-6-18: doing well overall. Getting . Need refills on meds. Her stress level is much better.  Pt denies chest pain, dyspnea, dyspnea on exertion, change in exercise capacity, fatigue,  nausea, vomiting, diarrhea, constipation, motor weakness, insomnia, weight loss, syncope, dizziness, lightheadedness, palpitations, PND, orthopnea, or claudication. No nitro use. BP and hr are good. CAD is stable. No LE discoloration or ulcers. No LE edema. No CHF type symptoms. Lipid profile is normal. No recent hospitalization. No change in meds. Continues to smoke. On ASA and Effient. No bleeding issues.      S/p LHC in 5/18   Conclusions  Procedure Summary  95% mid PDA ISR s/p PTCA with 0% residual and MARY III flow  99% distal PLVB stenosis s/p PTCA with 0% residual and MARY III flow  distal LAD ISR of 40-50%  100% mid CX   50% mid RCA  normal LVF     12-5-18: FEELS LIKE something is wrong again. Having chest pressure/squeeze and radiation to left arm. Completely resolved with one nitro. Using more nitro lately the last 2 weeks after going months without it. She ran out of effient for a month she states due to insurance reasons. Continues to smoke. Compliant with meds. BS is fairly controlled per patient. She is seeing dr. Alesha Moon. Pt denies nausea, vomiting, diarrhea, constipation, motor weakness, insomnia, weight loss, syncope, dizziness, lightheadedness, palpitations, PND, orthopnea, or claudication. BP and hr are good. No LE discoloration or ulcers. No LE edema. No CHF type symptoms. Lipid profile is normal. No recent hospitalization. No change in meds.    12-5-19: Lost her insurance and has been out of meds for a while. But has been feeling ok for the past year  But the last 2 weeks have been rough with CP, has been using nitro which provides relief. Feels like she has another blockage. CP can occur at rest, wakes her up from sleep at times. Continues to smoke. Pt denies dyspnea, dyspnea on exertion, change in exercise capacity, fatigue,  nausea, vomiting, diarrhea, constipation, motor weakness, insomnia, weight loss, syncope, dizziness, lightheadedness, palpitations, PND, orthopnea, or claudication. She got  and going on a honeymoon and wants to make sure she's gonna be fine.    19: s/p LHC with normal LVF, EF of 55%, normal LM, 50-60% mid LAD ISR, 100% mid CX , very small OM1 with 80% stenosis, RCA with 30% prox, 50-60% mid, PDA is small with 80% ISR. All vessels are small with diffuse disease. Was felt not to be a good PCI candidate due to small vessels and hx of in stent restenosis. Continues to have angina type symptoms. Taking SL nitro 4-5x/week. Her HR is elevated today at 111 but states she is sick. BP is good. On Imdur 60mg daily, Coreg 6.25mg BID, Ranexa 1000mg BID, on DAPT. Continues to smoke, but trying to quit. Following with dr. Love Marie. 20: Madhu Thomas (:  1975) has requested an audio/video evaluation for the following concern(s):    History of CAD status post remote PCI as well as CABG recently. States she is having CP again but not as bad as it was. Midsternal, feels like indigestion. Worse with palpation. States she is SOB. She has quit smoking since . Pt denies  nausea, vomiting, diarrhea, constipation, motor weakness, insomnia, weight loss, syncope, dizziness, lightheadedness, palpitations, PND, orthopnea, or claudication. Positive history of diabetes. On DAPT. On Imdur and Ranexa. Following with Endo and PCP for DM. BS is under control now. 22: Patient states she is experiencing anginal symptoms again that felt very similar to her previous anginal symptoms status post PCI. Patient with history of coronary disease status post remote PCI as well as CABG x4 at [facility]. Patient remains on dual antiplatelet therapy. She is compliant with her medications. She does have stress in her life and she is smoking but not as much as previous. States her blood sugars are under control. CP occurs at rest. Taking nitro. + SOB. Review of Systems   Constitutional: Negative. Negative for chills and fever. Eyes: Negative. Respiratory: Negative for shortness of breath and wheezing.     Cardiovascular: Negative for chest pain, palpitations and leg swelling. Gastrointestinal: Negative. Negative for abdominal pain, nausea and vomiting. Skin: Negative. Negative for rash. Neurological: Negative for dizziness, weakness and headaches. Prior to Visit Medications    Medication Sig Taking? Authorizing Provider   insulin aspart (NOVOLOG) 100 UNIT/ML injection pen Use sliding scale as recommended by Endo with meals;  Max 40u/day (replaces humalog) Yes Historical Provider, MD   losartan (COZAAR) 25 MG tablet Take 25 mg by mouth daily Yes Historical Provider, MD   nitroGLYCERIN (NITROSTAT) 0.4 MG SL tablet PLACE 1 TAB UNDER THE TONGUE EVERY 5MIN IF NEEDED FOR CHEST PAIN (MAY REPEAT TWICE) Yes Francisco New, DO   carBAMazepine (CARBATROL) 300 MG extended release capsule Take 1 capsule by mouth 2 times daily Yes Francisco New, DO   ticagrelor (BRILINTA) 90 MG TABS tablet Take 1 tablet by mouth 2 times daily Yes Francisco New, DO   SITagliptin (JANUVIA) 100 MG tablet Take 100 mg by mouth daily Yes Historical Provider, MD   Cholecalciferol (VITAMIN D) 50 MCG (2000 UT) CAPS capsule Take 4,000 Units by mouth daily Yes Historical Provider, MD   isosorbide mononitrate (IMDUR) 30 MG extended release tablet Take 1 tablet by mouth nightly Yes Francisco New DO   atorvastatin (LIPITOR) 40 MG tablet Take 1 tablet by mouth nightly Yes Francisco New, DO   carvedilol (COREG) 6.25 MG tablet TAKE 1 TABLET BY MOUTH 2 TIMES DAILY Yes Francisco New, DO   isosorbide mononitrate (IMDUR) 60 MG extended release tablet Take 1 tablet by mouth daily Yes Francisco New, DO   ranolazine (RANEXA) 1000 MG extended release tablet Take 1 tablet by mouth 2 times daily Yes Francisco New, DO   metFORMIN (GLUCOPHAGE) 500 MG tablet Take 1 tablet by mouth daily (with breakfast) Yes Francisco New, DO   glipiZIDE (GLUCOTROL) 10 MG tablet TAKE 1 TABLET BY MOUTH 2 TIMES DAILY (BEFORE MEALS) Yes Francisco New, DO   aspirin 81 MG EC tablet Take 162 mg by mouth daily Yes Historical Provider, MD       Social History     Tobacco Use    Smoking status: Light Tobacco Smoker     Packs/day: 1.00     Types: Cigarettes     Start date: 1/1/1994    Smokeless tobacco: Never Used   Substance Use Topics    Alcohol use: No    Drug use: No        Allergies   Allergen Reactions    Toradol [Ketorolac Tromethamine]     Tramadol    ,   Past Medical History:   Diagnosis Date    Asthma     [facility]     CAD (coronary artery disease)     COPD (chronic obstructive pulmonary disease) [facility]     Depression     Diabetes [facility]     DM (diabetes mellitus) ([facility]     Dyspnea     Hx of CABG 3/31/2020    Hyperlipidemia     Hypertension     [facility] 2009    TMJ (dislocation of temporomandibular joint)    ,   Past Surgical History:   Procedure Laterality Date    APPENDECTOMY  2010    CARDIAC CATHETERIZATION  7/11/2013    CARDIAC CATHETERIZATION  10/16/2013    DR EDGAR [place] CORONARY ARTERY BYPASS GRAFT  01/2020    TUBAL LIGATION     ,   Family History   Problem Relation Age of Onset    Heart Disease Mother     High Blood Pressure Mother     Diabetes Mother     High Blood Pressure Father     Heart Disease Father     Kidney Disease Father        Physical Examination:  General appearance - alert, well appearing, and in no distress  Mental status - alert, oriented to person, place, and time  Neck - Neck is supple, no JVD or carotid bruits. No thyromegaly or adenopathy.    Chest - clear to auscultation, no wheezes, rales or rhonchi, symmetric air entry  Heart - normal rate, regular rhythm, normal S1, S2, no murmurs, rubs, clicks or gallops  Abdomen - soft, nontender, nondistended, no masses or organomegaly  Neurological - alert, oriented, normal speech, no focal findings or movement disorder noted  Extremities - peripheral pulses normal, no pedal edema, no clubbing or cyanosis  Skin - normal coloration and turgor, no rashes, no suspicious skin lesions noted    EKG with normal sinus rhythm left bundle branch block. ASSESSMENT:        Diagnosis Orders   1. Mixed hyperlipidemia     2. Primary hypertension  EKG 12 lead   3. Coronary artery disease involving native coronary artery of native heart without angina pectoris     4. Angina at rest [facility])  LEFT HEART CATH    ECHO Complete 2D W Doppler W Color       PLAN:  Patient will need to continue to follow up with you for their general medical care and return to see me in the office in 6 months     As always, aggressive risk factor modification is strongly recommended. We should adhere to the [address] VII guidelines for HTN management and the NCEP ATP III guidelines for LDL-C management.     The nature of cardiac risk has been fully discussed with this patient. I have made her aware of her LDL target goal given her cardiovascular risk analysis. I have discussed the appropriate diet. The need for lifelong compliance in order to reduce risk is stressed. A regular exercise program is recommended to help achieve and maintain normal body weight, fitness and improve lipid balance.      Continue medications at current doses. Had a long talk with the patient regarding their symptoms, test results and the different treatment options available which include cardiac cath, alternate imaging modality and medical therapy. Patient would like to proceed with cardiac catheterization and understands the risks and benefits of the procedure. CHECK ECHO.      DAPT      Follow up with PCP.      Check EKG     Smoking cessation was strongly recommended     Patient was advised and encouraged to check blood pressure at home or at a pharmacy, maintain a logbook, and also call us back if blood pressure are above the target ranges or if it is low.  Patient clearly understands and agrees to the instructions.      We will need to continue to monitor muscle and liver enzymes, BUN, CR, and electrolytes.     The proper use and anticipated side effects of nitroglycerine has been carefully explained. If a single episode of chest pain is not relieved by one tablet, the patient will try another within 5 minutes; and if this doesn't relieve the pain, the patient is instructed to call 911 for transportation to an emergency department.     Details of medical condition explained and patient was warned about adverse consequences of uncontrolled medical conditions and possible side effects of prescribed medications. Patient was advised to go to the ER if he starts experiencing adverse effects of the medications. patient was instructed to call us back or go to nearby emergency room immediately if symptoms get worse or do not improve.     Thank you for allowing me to participate in the care of your patient, please don't hesitate to contact me if you have any further questions. No follow-ups on file. An  electronic signature was used to authenticate this note.     --Zacarias Harden DO on 2/1/2022 at 9:16 AM  9}

## 2022-02-07 ENCOUNTER — HOSPITAL ENCOUNTER (OUTPATIENT)
Dept: CARDIAC CATH/INVASIVE PROCEDURES | Age: 47
Discharge: HOME | End: 2022-02-07
Attending: INTERNAL MEDICINE | Admitting: INTERNAL MEDICINE
Payer: COMMERCIAL

## 2022-02-07 VITALS
TEMPERATURE: 96.4 F | DIASTOLIC BLOOD PRESSURE: 87 MMHG | HEART RATE: 93 BPM | SYSTOLIC BLOOD PRESSURE: 131 MMHG | RESPIRATION RATE: 18 BRPM

## 2022-02-07 DIAGNOSIS — I20.8 ANGINA AT REST (HCC): ICD-10-CM

## 2022-02-07 LAB
ANION GAP SERPL CALCULATED.3IONS-SCNC: 11 MEQ/L (ref 9–15)
BUN BLDV-MCNC: 10 MG/DL (ref 6–20)
CALCIUM SERPL-MCNC: 8.4 MG/DL (ref 8.5–9.9)
CHLORIDE BLD-SCNC: 101 MEQ/L (ref 95–107)
CO2: 22 MEQ/L (ref 20–31)
CREAT SERPL-MCNC: 0.59 MG/DL (ref 0.5–0.9)
GFR AFRICAN AMERICAN: >60
GFR NON-AFRICAN AMERICAN: >60
GLUCOSE BLD-MCNC: 386 MG/DL (ref 70–99)
HCT VFR BLD CALC: 40.8 % (ref 37–47)
HEMOGLOBIN: 14 G/DL (ref 12–16)
INR BLD: 1.1
LV EF: 55 %
LV EF: 60 %
LVEF MODALITY: NORMAL
LVEF MODALITY: NORMAL
MCH RBC QN AUTO: 32.7 PG (ref 27–31.3)
MCHC RBC AUTO-ENTMCNC: 34.2 % (ref 33–37)
MCV RBC AUTO: 95.5 FL (ref 82–100)
PDW BLD-RTO: 12.5 % (ref 11.5–14.5)
PLATELET # BLD: 192 K/UL (ref 130–400)
POTASSIUM SERPL-SCNC: 4.7 MEQ/L (ref 3.4–4.9)
PROTHROMBIN TIME: 13.7 SEC (ref 12.3–14.9)
RBC # BLD: 4.27 M/UL (ref 4.2–5.4)
SODIUM BLD-SCNC: 134 MEQ/L (ref 135–144)
WBC # BLD: 5.8 K/UL (ref 4.8–10.8)

## 2022-02-07 PROCEDURE — 6360000002 HC RX W HCPCS

## 2022-02-07 PROCEDURE — C1769 GUIDE WIRE: HCPCS

## 2022-02-07 PROCEDURE — C1887 CATHETER, GUIDING: HCPCS

## 2022-02-07 PROCEDURE — 80048 BASIC METABOLIC PNL TOTAL CA: CPT

## 2022-02-07 PROCEDURE — 93459 L HRT ART/GRFT ANGIO: CPT | Performed by: INTERNAL MEDICINE

## 2022-02-07 PROCEDURE — 92928 PRQ TCAT PLMT NTRAC ST 1 LES: CPT

## 2022-02-07 PROCEDURE — 93306 TTE W/DOPPLER COMPLETE: CPT

## 2022-02-07 PROCEDURE — 93459 L HRT ART/GRFT ANGIO: CPT

## 2022-02-07 PROCEDURE — 6360000004 HC RX CONTRAST MEDICATION: Performed by: INTERNAL MEDICINE

## 2022-02-07 PROCEDURE — 85027 COMPLETE CBC AUTOMATED: CPT

## 2022-02-07 PROCEDURE — 99153 MOD SED SAME PHYS/QHP EA: CPT

## 2022-02-07 PROCEDURE — 2500000003 HC RX 250 WO HCPCS

## 2022-02-07 PROCEDURE — 92928 PRQ TCAT PLMT NTRAC ST 1 LES: CPT | Performed by: INTERNAL MEDICINE

## 2022-02-07 PROCEDURE — 2709999900 HC NON-CHARGEABLE SUPPLY

## 2022-02-07 PROCEDURE — 85610 PROTHROMBIN TIME: CPT

## 2022-02-07 PROCEDURE — 99152 MOD SED SAME PHYS/QHP 5/>YRS: CPT

## 2022-02-07 PROCEDURE — 6370000000 HC RX 637 (ALT 250 FOR IP): Performed by: INTERNAL MEDICINE

## 2022-02-07 PROCEDURE — C1725 CATH, TRANSLUMIN NON-LASER: HCPCS

## 2022-02-07 PROCEDURE — C1894 INTRO/SHEATH, NON-LASER: HCPCS

## 2022-02-07 PROCEDURE — 2580000003 HC RX 258

## 2022-02-07 PROCEDURE — 85347 COAGULATION TIME ACTIVATED: CPT

## 2022-02-07 PROCEDURE — C1874 STENT, COATED/COV W/DEL SYS: HCPCS

## 2022-02-07 PROCEDURE — C1760 CLOSURE DEV, VASC: HCPCS

## 2022-02-07 RX ORDER — SODIUM CHLORIDE 9 MG/ML
INJECTION, SOLUTION INTRAVENOUS CONTINUOUS
Status: DISCONTINUED | OUTPATIENT
Start: 2022-02-07 | End: 2022-02-07 | Stop reason: HOSPADM

## 2022-02-07 RX ORDER — ASPIRIN 81 MG/1
81 TABLET ORAL ONCE
Status: COMPLETED | OUTPATIENT
Start: 2022-02-07 | End: 2022-02-07

## 2022-02-07 RX ORDER — MIDAZOLAM HYDROCHLORIDE 2 MG/2ML
2 INJECTION, SOLUTION INTRAMUSCULAR; INTRAVENOUS
Status: DISCONTINUED | OUTPATIENT
Start: 2022-02-07 | End: 2022-02-07 | Stop reason: HOSPADM

## 2022-02-07 RX ORDER — NITROGLYCERIN 0.4 MG/1
0.4 TABLET SUBLINGUAL EVERY 5 MIN PRN
Status: DISCONTINUED | OUTPATIENT
Start: 2022-02-07 | End: 2022-02-07 | Stop reason: HOSPADM

## 2022-02-07 RX ORDER — ACETAMINOPHEN 325 MG/1
650 TABLET ORAL EVERY 4 HOURS PRN
Status: DISCONTINUED | OUTPATIENT
Start: 2022-02-07 | End: 2022-02-07 | Stop reason: HOSPADM

## 2022-02-07 RX ORDER — PREDNISONE 50 MG/1
50 TABLET ORAL ONCE
Status: DISCONTINUED | OUTPATIENT
Start: 2022-02-07 | End: 2022-02-07 | Stop reason: HOSPADM

## 2022-02-07 RX ORDER — ONDANSETRON 2 MG/ML
4 INJECTION INTRAMUSCULAR; INTRAVENOUS EVERY 6 HOURS PRN
Status: DISCONTINUED | OUTPATIENT
Start: 2022-02-07 | End: 2022-02-07 | Stop reason: HOSPADM

## 2022-02-07 RX ORDER — DIPHENHYDRAMINE HCL 25 MG
50 TABLET ORAL ONCE
Status: DISCONTINUED | OUTPATIENT
Start: 2022-02-07 | End: 2022-02-07 | Stop reason: HOSPADM

## 2022-02-07 RX ORDER — SODIUM CHLORIDE 0.9 % (FLUSH) 0.9 %
5-40 SYRINGE (ML) INJECTION PRN
Status: DISCONTINUED | OUTPATIENT
Start: 2022-02-07 | End: 2022-02-07 | Stop reason: HOSPADM

## 2022-02-07 RX ORDER — LABETALOL HYDROCHLORIDE 5 MG/ML
10 INJECTION, SOLUTION INTRAVENOUS EVERY 30 MIN PRN
Status: DISCONTINUED | OUTPATIENT
Start: 2022-02-07 | End: 2022-02-07 | Stop reason: HOSPADM

## 2022-02-07 RX ORDER — SODIUM CHLORIDE 9 MG/ML
25 INJECTION, SOLUTION INTRAVENOUS PRN
Status: DISCONTINUED | OUTPATIENT
Start: 2022-02-07 | End: 2022-02-07 | Stop reason: HOSPADM

## 2022-02-07 RX ORDER — FENTANYL CITRATE 50 UG/ML
25 INJECTION, SOLUTION INTRAMUSCULAR; INTRAVENOUS
Status: DISCONTINUED | OUTPATIENT
Start: 2022-02-07 | End: 2022-02-07 | Stop reason: HOSPADM

## 2022-02-07 RX ORDER — HYDRALAZINE HYDROCHLORIDE 20 MG/ML
10 INJECTION INTRAMUSCULAR; INTRAVENOUS EVERY 10 MIN PRN
Status: DISCONTINUED | OUTPATIENT
Start: 2022-02-07 | End: 2022-02-07 | Stop reason: HOSPADM

## 2022-02-07 RX ORDER — SODIUM CHLORIDE 0.9 % (FLUSH) 0.9 %
5-40 SYRINGE (ML) INJECTION EVERY 12 HOURS SCHEDULED
Status: DISCONTINUED | OUTPATIENT
Start: 2022-02-07 | End: 2022-02-07 | Stop reason: HOSPADM

## 2022-02-07 RX ADMIN — ASPIRIN 81 MG: 81 TABLET, COATED ORAL at 10:06

## 2022-02-07 RX ADMIN — IOPAMIDOL 158 ML: 612 INJECTION, SOLUTION INTRAVENOUS at 11:36

## 2022-02-07 RX ADMIN — TICAGRELOR 90 MG: 90 TABLET ORAL at 10:06

## 2022-02-07 NOTE — PROGRESS NOTES
Discharge instructions given and patient verbalizes understanding.   Discharged to care of family via wheelchair

## 2022-02-07 NOTE — CONSULTS
Cardiac Rehab Consult Note  Name: Shaji Cedillo  Age: 42 y.o. Gender: female    Chief Complaint:No chief complaint on file. Primary Care Provider: Мария Alexander MD  InpatientTreatment Team: Treatment Team: Attending Provider: Kenn Wallis DO  Admission Date: 2/7/2022    Consult Received from Dr. Eddie Workman. Reason for consult PCI. Patient visited at bedside. Program and benefits introduced. Brochures given. Patient's response is interested. All questions answered at this time. Active Problems:    Angina at rest [facility])  Resolved Problems:    * No resolved hospital problems. *       Plan: Will follow up at discharge. All of pt questions were answered. Case will be discussed with physician when appropriate.      Electronically signed by Ene Montgomery RD, LD on 2/7/2022 at 1:26 PM

## 2022-02-07 NOTE — PROGRESS NOTES
Arrived to pre/post from the cath lab and report from [facility]. Right groin is stable no bleeding or hematoma. Attached to monitor and vitals are stable.   Will continue to monitor

## 2022-02-07 NOTE — BRIEF OP NOTE
Section of Cardiology  Adult Brief Cardiac Cath Procedure Note        Procedure(s):  LHC, b/l coronary angio, PCI of Om1 and D1    Pre-operative Diagnosis:  angina    H&P Status: Completed and reviewed. Post-operative Diagnosis:      LV EF of 50%  Lm normal   % mid ISR  cx 100% mid post Om1.  Om1 with 80% mid  % mid with left to right filling    FLETCHER to LAD patent  All other SVG's occluded      Findings:  See full report    Complications:  none    Primary Proceduralist:   Dr.Wes New DO    Plan    DAPT  RFM  Max cardiac meds        Full procedure note to follow

## 2022-02-07 NOTE — PROGRESS NOTES
Patient up and ambulated to the restroom without difficulty. Right groin is stable no bleeding or hematoma. IV dc'd intact. Cardiac rehab at bedside.   Patient is ready for discharge

## 2022-02-09 LAB
PERFORMED ON: ABNORMAL
POC ACTIVATED CLOTTING TIME KAOLIN: 238 SEC (ref 82–152)
POC SAMPLE TYPE: ABNORMAL

## 2022-02-15 LAB — VITAMIN D 25-HYDROXY: 13 NG/ML

## 2022-02-16 LAB
FOLATE: 15 NG/ML
VITAMIN B-12: 818 PG/ML (ref 211–911)

## 2022-05-25 DIAGNOSIS — I25.10 CORONARY ARTERY DISEASE INVOLVING NATIVE CORONARY ARTERY OF NATIVE HEART WITHOUT ANGINA PECTORIS: ICD-10-CM

## 2022-05-25 RX ORDER — CARVEDILOL 6.25 MG/1
TABLET ORAL
Qty: 180 TABLET | Refills: 0 | Status: SHIPPED | OUTPATIENT
Start: 2022-05-25 | End: 2022-10-25 | Stop reason: SDUPTHER

## 2022-05-25 RX ORDER — NITROGLYCERIN 0.4 MG/1
0.4 TABLET SUBLINGUAL EVERY 5 MIN PRN
Qty: 25 TABLET | Refills: 3 | Status: SHIPPED | OUTPATIENT
Start: 2022-05-25 | End: 2022-10-25 | Stop reason: SDUPTHER

## 2022-05-25 RX ORDER — ISOSORBIDE MONONITRATE 60 MG/1
60 TABLET, EXTENDED RELEASE ORAL DAILY
Qty: 90 TABLET | Refills: 0 | Status: SHIPPED | OUTPATIENT
Start: 2022-05-25 | End: 2022-08-03 | Stop reason: SDUPTHER

## 2022-05-25 RX ORDER — RANOLAZINE 1000 MG/1
1000 TABLET, EXTENDED RELEASE ORAL 2 TIMES DAILY
Qty: 180 TABLET | Refills: 0 | Status: SHIPPED | OUTPATIENT
Start: 2022-05-25 | End: 2022-10-31 | Stop reason: SDUPTHER

## 2022-05-25 NOTE — TELEPHONE ENCOUNTER
Patient is requesting plavix refilled, however it is not on her list. List does show brilinta. .. Patient has not been taking any medications and recently had a massive heart attack and is wanting to take her health more seriously.       Please advise

## 2022-05-25 NOTE — TELEPHONE ENCOUNTER
Please approve or deny this refill request. The order is pended. Thank you.     LOV 02/01/2022    Next Visit Date:  Future Appointments   Date Time Provider Yana Kruger   6/14/2022  9:00 AM Mary Kaplan [phone] Jonathon Fitzpatrick

## 2022-07-25 ENCOUNTER — TELEPHONE (OUTPATIENT)
Dept: CARDIOLOGY CLINIC | Age: 47
End: 2022-07-25

## 2022-08-03 RX ORDER — ISOSORBIDE MONONITRATE 60 MG/1
60 TABLET, EXTENDED RELEASE ORAL DAILY
Qty: 90 TABLET | Refills: 0 | Status: SHIPPED | OUTPATIENT
Start: 2022-08-03 | End: 2022-08-11 | Stop reason: SDUPTHER

## 2022-08-11 ENCOUNTER — OFFICE VISIT (OUTPATIENT)
Dept: CARDIOLOGY CLINIC | Age: 47
End: 2022-08-11
Payer: COMMERCIAL

## 2022-08-11 VITALS
RESPIRATION RATE: 12 BRPM | SYSTOLIC BLOOD PRESSURE: 124 MMHG | WEIGHT: 160 LBS | HEART RATE: 114 BPM | OXYGEN SATURATION: 98 % | DIASTOLIC BLOOD PRESSURE: 80 MMHG | BODY MASS INDEX: 25.82 KG/M2

## 2022-08-11 DIAGNOSIS — Z09 HOSPITAL DISCHARGE FOLLOW-UP: ICD-10-CM

## 2022-08-11 DIAGNOSIS — I20.8 ANGINA AT REST (HCC): Primary | ICD-10-CM

## 2022-08-11 DIAGNOSIS — Z43.0 TRACHEOSTOMY CARE (HCC): ICD-10-CM

## 2022-08-11 DIAGNOSIS — R07.2 PRECORDIAL PAIN: ICD-10-CM

## 2022-08-11 DIAGNOSIS — I25.10 CORONARY ARTERY DISEASE INVOLVING NATIVE CORONARY ARTERY OF NATIVE HEART WITHOUT ANGINA PECTORIS: Chronic | ICD-10-CM

## 2022-08-11 PROCEDURE — 4004F PT TOBACCO SCREEN RCVD TLK: CPT | Performed by: NURSE PRACTITIONER

## 2022-08-11 PROCEDURE — G8427 DOCREV CUR MEDS BY ELIG CLIN: HCPCS | Performed by: NURSE PRACTITIONER

## 2022-08-11 PROCEDURE — G8419 CALC BMI OUT NRM PARAM NOF/U: HCPCS | Performed by: NURSE PRACTITIONER

## 2022-08-11 PROCEDURE — 1111F DSCHRG MED/CURRENT MED MERGE: CPT | Performed by: NURSE PRACTITIONER

## 2022-08-11 PROCEDURE — 99214 OFFICE O/P EST MOD 30 MIN: CPT | Performed by: NURSE PRACTITIONER

## 2022-08-11 RX ORDER — PAROXETINE 10 MG/1
10 TABLET, FILM COATED ORAL EVERY MORNING
COMMUNITY
End: 2022-10-31 | Stop reason: SDUPTHER

## 2022-08-11 RX ORDER — LOSARTAN POTASSIUM 25 MG/1
25 TABLET ORAL DAILY
Qty: 90 TABLET | Refills: 2 | Status: SHIPPED | OUTPATIENT
Start: 2022-08-11 | End: 2022-10-25 | Stop reason: SDUPTHER

## 2022-08-11 RX ORDER — TRAZODONE HYDROCHLORIDE 150 MG/1
150 TABLET ORAL 2 TIMES DAILY
COMMUNITY

## 2022-08-11 RX ORDER — QUETIAPINE FUMARATE 100 MG/1
100 TABLET, FILM COATED ORAL NIGHTLY
COMMUNITY

## 2022-08-11 RX ORDER — ISOSORBIDE MONONITRATE 60 MG/1
60 TABLET, EXTENDED RELEASE ORAL DAILY
Qty: 90 TABLET | Refills: 2 | Status: SHIPPED | OUTPATIENT
Start: 2022-08-11 | End: 2022-10-25 | Stop reason: SDUPTHER

## 2022-09-19 ASSESSMENT — ENCOUNTER SYMPTOMS
NAUSEA: 0
ABDOMINAL PAIN: 0
VOMITING: 0
GASTROINTESTINAL NEGATIVE: 1
EYES NEGATIVE: 1
SHORTNESS OF BREATH: 0
WHEEZING: 0

## 2022-09-19 NOTE — PROGRESS NOTES
HPI:  Patient presents for follow up medical evaluation. Patient is s/p negative 2Decho and treadmill stress test. Pt denies change in exercise capacity, fatigue, nausea, vomiting, diarrhea, constipation, motor weakness, insomnia, weight loss, syncope, dizziness, lightheadedness, palpitations, PND, orthopnea, or claudication. Patient with some episodes of chest pain, dyspnea, dyspnea on exertion still. Continues to   Smoke, diet could be better controlled. Compliant with meds. 13: as above, patient presents with worsening midsternal CP and SOB. States her CP lasts more than 20 minutes at rest, worse with exertion. Pain radiates to left shoulder. Does not feel like GERD type symptoms. Almost took one of her dad's nitro. Both Mother and father recently  from large MI and CVA. Father with hx of 34 stents with [facility]. Continues to smoke. Under a lot of stress and anxiety. Worried that she may have a heart and die. Patient fatigues very easily. 13: as above, s/p cardiac cath and S/P ZACHARY to mid LAD and distal RCA. Has not been compliant with ASA but takes plavix daily. Has been having numerous midsternal and midepigastric chest pains since cath. Had an episode of sever CP a week ago with associted SOB, Nausea, diaphoresis, and ashy looking per friend. Had a look of impending doom. Has been using numerous SL nitro's with relief. + smoking. Patient complains of LE dicomfort. 13: as above, was in hospital last week for CP. Patient had negative EKG and negative C.E. Continues to smoke, chantix not working per patient. Compliant with meds. BS are still not under very good control. Pt denies dyspnea, dyspnea on exertion, change in exercise capacity, fatigue,  nausea, vomiting, diarrhea, constipation, motor weakness, insomnia, weight loss, syncope, dizziness, lightheadedness, palpitations, PND, orthopnea, or claudication. Still has occasional CP/discomfort. No bleeding issues.  Has not used SL nitro since discharged. soemtimes takes 5 SL nitros a day. Patient on previous cardiac cath with significant PLVB lesion nearing need for PCI. 5-13-14: as above, s/p LHC with moderate distal RCA instent restenosis of 50%, 50% PLVB, 30-40% mid RCA stenosis, normal LVF. Had to take one nitro this morning which helped, but doing better overall. Compliant with meds. Pt denies chest pain, dyspnea, dyspnea on exertion, change in exercise capacity, fatigue,  nausea, vomiting, diarrhea, constipation, motor weakness, insomnia, weight loss, syncope, dizziness, lightheadedness, palpitations, PND, orthopnea, or claudication. Statins increased by Dr. Violet Cooper. Was smoking 2 ppd now down to a PPD. 1-6-15: as above, states she's having worsening angina over the past couple of weeks. States she's been eating nitro like its candy. Went through a whole bottle of nitro in one week. Nitro completely takes her pain away immeditely. She stearns get tired, LH and dizzy with episodes. Denies N/V or SOB. Symptoms are worse with activity and better with rest. complian with meds, continues to smoke 1ppd. Has had multiple previous hospitalizations since last visit. Feels like there is something is wrong and having similar symptoms to previous PCI. 6-11-15: as above, s/p LHC with patent LAD and RCA stents, normal LVF. S/p recurrent hospitalization for CP. C.E were negative. Did take nitro which relieved her CP immediately. States CP went into her jaw, got diaphoretic. States symptoms occurred at rest. Continues to smoke 0.5ppd. Had run out of her plavix for 3 days but resumed now. Did have a few more episodes since being discharged from hospital.         4-12-16: as above, s/p hospitalization for angina, was treated medically. Does have URI type symptoms.  Pt denies  dyspnea, dyspnea on exertion, change in exercise capacity, fatigue,  nausea, vomiting, diarrhea, constipation, motor weakness, insomnia, weight loss, syncope, dizziness, lightheadedness, palpitations, PND, orthopnea. BP is good. Does have episodes of CP that are on and off, states shes used to it now and does not feel like a heart attack. Has quit smoking for about 2 months. Compliant with meds. 6-2-16: as above, having sig. CP along with nauseas. States its constant in nature, had to get vicodin off of a friend for pain. Going through a bottle of nitro a month. Occurs at rest, radiated to shoulder blades, + diaphoresis, lasting more than 10 min. S/p hospitalization a few days ago for CP, C.E were negative. S/p ECHO  With normal LVF. Continues to smoke. Her ranexa was increased to a 1000mcg daily. Compliant with other meds. BP and HR are good. Pt denies diarrhea, constipation, motor weakness, insomnia, weight loss, syncope, dizziness, lightheadedness, palpitations, PND, orthopnea, or claudication. Does have SOB and worse with walking. 7-7-16: as above, s/p LHC with PTCA of PDA branch, patent OM1 and LAD stents,100% mid CX with right to left collaterals, EF of 60%. S/p CP/abd pain episode yesterady where she felt like she got punched in the gut, got nauseated and diaphoretic, took nitro without any sig. Improvement, she kept throwing up. C.E were negative. Compliant with meds. BP is good. She feels great today. No bleeding issues. EKG was ok in ER. Pt denies chest pain, dyspnea, dyspnea on exertion, change in exercise capacity, fatigue,  nausea, vomiting, diarrhea, constipation, motor weakness, insomnia, weight loss, syncope, dizziness, lightheadedness, palpitations, PND, orthopnea. No smoking. 10-18-16: continues to have chest pressure and heaviness. Has been utilizing nitro almost on a daily basis. States its horrible and almost went to hospital a few times over the past few weeks. It did radiate to left arm pit a few times, got nauseous and left finger tingling. Does get SOB with mild activity, just with sweeping the house sometimes.  Wakes her up at night, occures at rest. Symptoms are accelerating in nature. Compliant with meds. Smokes 3 cig a day. No bleeding issues. Pt denies   vomiting, diarrhea, constipation, motor weakness, insomnia, weight loss, syncope, dizziness, lightheadedness, palpitations, PND, orthopnea, or claudication. BP and HR are good. BS continues to be uncontrolled. In the 200's.      12-8-16: s/p s/p PCI of PDA with ZACHARY, patent mid LAD stent with 30-40% restenosis, patent ostial OM1 stent, 50% mid RCA, normal LVF EF of 55%. Right LE angio was normal to knees. Pt denies chest pain, dyspnea, dyspnea on exertion, change in exercise capacity, fatigue,  nausea, vomiting, diarrhea, constipation, motor weakness, insomnia, weight loss, syncope, dizziness, lightheadedness, palpitations, PND, orthopnea. Did have a right UE venous dupplex US with thrombosis of cephalic vein. Continues to smoke. Taking all her meds. No nitro use. BP and hr are good. CAD is stable. No LE discoloration or ulcers. No LE edema. No CHF type symptoms. Lipid profile is normal.      4-11-17: s/p hospitalization for CP 2 weeks ago. Lab work was ok, C.E is negative. States she is getting sporadic pains now, gets a spasm in her left arm. Has been going to the gym, doing stairmasters and treadmill without any major pains. Has lost 18 pounds. Has needed to take nitro last night. States she feels like she is gonna die sometimes. Pt denies chest pain, dyspnea, dyspnea on exertion, change in exercise capacity, fatigue,  nausea, vomiting, diarrhea, constipation, motor weakness, insomnia, weight loss, syncope, dizziness, lightheadedness, palpitations, PND, orthopnea, or claudication. 9-6-18: doing well overall. Getting . Need refills on meds. Her stress level is much better.  Pt denies chest pain, dyspnea, dyspnea on exertion, change in exercise capacity, fatigue,  nausea, vomiting, diarrhea, constipation, motor weakness, insomnia, weight loss, syncope, dizziness, lightheadedness, palpitations, PND, orthopnea, or claudication. No nitro use. BP and hr are good. CAD is stable. No LE discoloration or ulcers. No LE edema. No CHF type symptoms. Lipid profile is normal. No recent hospitalization. No change in meds. Continues to smoke. On ASA and Effient. No bleeding issues. S/p [facility] in 5/18   Conclusions  Procedure Summary  95% mid PDA ISR s/p PTCA with 0% residual and MARY III flow  99% distal PLVB stenosis s/p PTCA with 0% residual and MARY III flow  distal LAD ISR of 40-50%  100% mid CX   50% mid RCA  normal LVF     12-5-18: FEELS LIKE something is wrong again. Having chest pressure/squeeze and radiation to left arm. Completely resolved with one nitro. Using more nitro lately the last 2 weeks after going months without it. She ran out of effient for a month she states due to insurance reasons. Continues to smoke. Compliant with meds. BS is fairly controlled per patient. She is seeing dr. Sukhdeep Kirby. Pt denies nausea, vomiting, diarrhea, constipation, motor weakness, insomnia, weight loss, syncope, dizziness, lightheadedness, palpitations, PND, orthopnea, or claudication. BP and hr are good. No LE discoloration or ulcers. No LE edema. No CHF type symptoms. Lipid profile is normal. No recent hospitalization. No change in meds. 12-5-19: Lost her insurance and has been out of meds for a while. But has been feeling ok for the past year  But the last 2 weeks have been rough with CP, has been using nitro which provides relief. Feels like she has another blockage. CP can occur at rest, wakes her up from sleep at times. Continues to smoke. Pt denies dyspnea, dyspnea on exertion, change in exercise capacity, fatigue,  nausea, vomiting, diarrhea, constipation, motor weakness, insomnia, weight loss, syncope, dizziness, lightheadedness, palpitations, PND, orthopnea, or claudication. She got  and going on a honeymoon and wants to make sure she's gonna be fine.       12-26-19: s/p [facility] with normal LVF, EF of 55%, normal LM, 50-60% mid LAD ISR, 100% mid CX , very small OM1 with 80% stenosis, RCA with 30% prox, 50-60% mid, PDA is small with 80% ISR. All vessels are small with diffuse disease. Was felt not to be a good PCI candidate due to small vessels and hx of in stent restenosis. Continues to have angina type symptoms. Taking SL nitro 4-5x/week. Her HR is elevated today at 111 but states she is sick. BP is good. On Imdur 60mg daily, Coreg 6.25mg BID, Ranexa 1000mg BID, on DAPT. Continues to smoke, but trying to quit. Following with dr. Wili Jay. 20: Camille Cavazos (:  1975) has requested an audio/video evaluation for the following concern(s):    History of CAD status post remote PCI as well as CABG recently. States she is having CP again but not as bad as it was. Midsternal, feels like indigestion. Worse with palpation. States she is SOB. She has quit smoking since . Pt denies  nausea, vomiting, diarrhea, constipation, motor weakness, insomnia, weight loss, syncope, dizziness, lightheadedness, palpitations, PND, orthopnea, or claudication. Positive history of diabetes. On DAPT. On Imdur and Ranexa. Following with Endo and PCP for DM. BS is under control now. 22: Patient states she is experiencing anginal symptoms again that felt very similar to her previous anginal symptoms status post PCI. Patient with history of coronary disease status post remote PCI as well as CABG x4 at [facility]. Patient remains on dual antiplatelet therapy. She is compliant with her medications. She does have stress in her life and she is smoking but not as much as previous. States her blood sugars are under control. CP occurs at rest. Taking nitro. + SOB.     2022: Patient admitted to [facility] in  status post V. fib arrest, pneumonia requiring intubation and then trach. She has been 2 months in rehab.   Patient with history of coronary disease status post remote TABS tablet Take 1 tablet by mouth 2 times daily Yes Francisco New, DO   carvedilol (COREG) 6.25 MG tablet TAKE 1 TABLET BY MOUTH 2 TIMES DAILY Yes Francisco New DO   ranolazine (RANEXA) 1000 MG extended release tablet Take 1 tablet by mouth 2 times daily Yes Francisco New, DO   insulin aspart (NOVOLOG) 100 UNIT/ML injection pen Inject into the skin 3 times daily (before meals) Sliding scale Yes Historical Provider, MD   carBAMazepine (CARBATROL) 300 MG extended release capsule Take 1 capsule by mouth 2 times daily  Patient taking differently: Take 200 mg by mouth in the morning and 200 mg before bedtime.  Yes Francisco New, DO   SITagliptin (JANUVIA) 100 MG tablet Take 100 mg by mouth daily Yes Historical Provider, MD   Cholecalciferol (VITAMIN D) 50 MCG (2000 UT) CAPS capsule Take 4,000 Units by mouth daily Yes Historical Provider, MD   atorvastatin (LIPITOR) 40 MG tablet Take 1 tablet by mouth nightly Yes Francisco New DO   aspirin 81 MG EC tablet Take 162 mg by mouth daily Yes Historical Provider, MD       Social History     Tobacco Use    Smoking status: Light Smoker     Packs/day: 1.00     Types: Cigarettes     Start date: 1/1/1994    Smokeless tobacco: Never   Substance Use Topics    Alcohol use: No    Drug use: No        Allergies   Allergen Reactions    Toradol [Ketorolac Tromethamine]     Tramadol    ,   Past Medical History:   Diagnosis Date    Asthma     Bipolar disease, manic ([facility]     CAD (coronary artery disease)     COPD (chronic obstructive pulmonary disease) ([facility]     Depression     Diabetes ([facility]     DM (diabetes mellitus) ([facility]     Dyspnea     Hx of CABG 3/31/2020    Hyperlipidemia     Hypertension     Suicide attempt by multiple drug overdose ([facility] 2009    TMJ (dislocation of temporomandibular joint)    ,   Past Surgical History:   Procedure Laterality Date    APPENDECTOMY  2010    CARDIAC CATHETERIZATION  07/11/2013    CARDIAC CATHETERIZATION  10/16/2013    DR HERNÁNDEZ    CORONARY ARTERY BYPASS GRAFT  01/2020    CORONARY STENT PLACEMENT  05/2022    TUBAL LIGATION     ,   Family History   Problem Relation Age of Onset    Heart Disease Mother     High Blood Pressure Mother     Diabetes Mother     High Blood Pressure Father     Heart Disease Father     Kidney Disease Father        Physical Examination:  General appearance - alert, well appearing, and in no distress  Mental status - alert, oriented to person, place, and time  Neck - Neck is supple, no JVD or carotid bruits. No thyromegaly or adenopathy. Chest - clear to auscultation, no wheezes, rales or rhonchi, symmetric air entry  Heart - normal rate, regular rhythm, normal S1, S2, no murmurs, rubs, clicks or gallops  Abdomen - soft, nontender, nondistended, no masses or organomegaly  Neurological - alert, oriented, normal speech, no focal findings or movement disorder noted  Extremities - peripheral pulses normal, no pedal edema, no clubbing or cyanosis  Skin - normal coloration and turgor, no rashes, no suspicious skin lesions noted    EKG with normal sinus rhythm left bundle branch block. ASSESSMENT:        Diagnosis Orders   1. Angina at rest [facility])        2. Precordial pain        3. Coronary artery disease involving native coronary artery of native heart without angina pectoris  [facility]    Internal Referral to Home Health      4. Hospital discharge follow-up  IL DISCHARGE MEDS RECONCILED W/ CURRENT OUTPATIENT MED LIST      5. Tracheostomy care [facility])  Mt Hannon MD, Otolaryngology, Cynthia          PLAN:  Patient will need to continue to follow up with you for their general medical care and return to see me in the office in 6 months     As always, aggressive risk factor modification is strongly recommended. We should adhere to the [address] VII guidelines for HTN management and the NCEP ATP III guidelines for LDL-C management. The nature of cardiac risk has been fully discussed with this patient.  I have made her PM  9}

## 2022-10-19 ENCOUNTER — TELEPHONE (OUTPATIENT)
Dept: CARDIOLOGY CLINIC | Age: 47
End: 2022-10-19

## 2022-10-19 DIAGNOSIS — I25.10 CORONARY ARTERY DISEASE INVOLVING NATIVE CORONARY ARTERY OF NATIVE HEART WITHOUT ANGINA PECTORIS: ICD-10-CM

## 2022-10-19 NOTE — TELEPHONE ENCOUNTER
[address] Nurse calling to discuss medications. What medications is Dr Bree Parikh prescribing for pt?  - atorvastatin 40 MG?  - brilinta 90 MG twice a day?  - carvedilol 6.25 MG twice a day?  - losartan 20 MG?  -isosorbide 60 MG? Please call Caitlin Mcclellan back to discuss.     Meliza # [phone]

## 2022-10-20 RX ORDER — LOSARTAN POTASSIUM 25 MG/1
25 TABLET ORAL DAILY
Qty: 90 TABLET | Refills: 1 | Status: CANCELLED | OUTPATIENT
Start: 2022-10-20

## 2022-10-20 RX ORDER — CARVEDILOL 6.25 MG/1
TABLET ORAL
Qty: 180 TABLET | Refills: 1 | Status: CANCELLED | OUTPATIENT
Start: 2022-10-20

## 2022-10-20 RX ORDER — RANOLAZINE 1000 MG/1
1000 TABLET, EXTENDED RELEASE ORAL 2 TIMES DAILY
Qty: 180 TABLET | Refills: 1 | Status: CANCELLED | OUTPATIENT
Start: 2022-10-20

## 2022-10-25 DIAGNOSIS — I25.10 CORONARY ARTERY DISEASE INVOLVING NATIVE CORONARY ARTERY OF NATIVE HEART WITHOUT ANGINA PECTORIS: ICD-10-CM

## 2022-10-25 RX ORDER — ASPIRIN 81 MG/1
162 TABLET ORAL DAILY
Qty: 30 TABLET | Refills: 3 | Status: SHIPPED | OUTPATIENT
Start: 2022-10-25

## 2022-10-25 RX ORDER — ISOSORBIDE MONONITRATE 60 MG/1
60 TABLET, EXTENDED RELEASE ORAL DAILY
Qty: 90 TABLET | Refills: 2 | Status: SHIPPED | OUTPATIENT
Start: 2022-10-25

## 2022-10-25 RX ORDER — ATORVASTATIN CALCIUM 40 MG/1
40 TABLET, FILM COATED ORAL NIGHTLY
Qty: 90 TABLET | Refills: 3 | Status: SHIPPED | OUTPATIENT
Start: 2022-10-25

## 2022-10-25 RX ORDER — LOSARTAN POTASSIUM 25 MG/1
25 TABLET ORAL DAILY
Qty: 90 TABLET | Refills: 2 | Status: SHIPPED | OUTPATIENT
Start: 2022-10-25

## 2022-10-25 RX ORDER — CARVEDILOL 6.25 MG/1
TABLET ORAL
Qty: 180 TABLET | Refills: 0 | Status: SHIPPED | OUTPATIENT
Start: 2022-10-25

## 2022-10-25 RX ORDER — NITROGLYCERIN 0.4 MG/1
0.4 TABLET SUBLINGUAL EVERY 5 MIN PRN
Qty: 25 TABLET | Refills: 3 | Status: SHIPPED | OUTPATIENT
Start: 2022-10-25

## 2022-10-31 RX ORDER — PAROXETINE 10 MG/1
10 TABLET, FILM COATED ORAL EVERY MORNING
Qty: 30 TABLET | Refills: 3 | Status: SHIPPED | OUTPATIENT
Start: 2022-10-31

## 2022-10-31 RX ORDER — RANOLAZINE 1000 MG/1
1000 TABLET, EXTENDED RELEASE ORAL 2 TIMES DAILY
Qty: 180 TABLET | Refills: 0 | Status: SHIPPED | OUTPATIENT
Start: 2022-10-31

## 2022-10-31 NOTE — TELEPHONE ENCOUNTER
Requesting medication refill.  Please approve or deny this request.    Rx requested:  Requested Prescriptions     Pending Prescriptions Disp Refills    PARoxetine (PAXIL) 10 MG tablet 30 tablet      Sig: Take 1 tablet by mouth every morning    ranolazine (RANEXA) 1000 MG extended release tablet 180 tablet 0     Sig: Take 1 tablet by mouth 2 times daily         Last Office Visit:   8/11/2022      Next Visit Date:  Future Appointments   Date Time Provider Yana Kruger   11/17/2022  3:45 PM Francisco Foy  [facility]

## 2022-11-08 ENCOUNTER — OFFICE VISIT (OUTPATIENT)
Dept: CARDIOLOGY CLINIC | Age: 47
End: 2022-11-08
Payer: COMMERCIAL

## 2022-11-08 VITALS
WEIGHT: 172.2 LBS | HEART RATE: 82 BPM | DIASTOLIC BLOOD PRESSURE: 82 MMHG | SYSTOLIC BLOOD PRESSURE: 130 MMHG | BODY MASS INDEX: 27.79 KG/M2

## 2022-11-08 DIAGNOSIS — I25.10 CORONARY ARTERY DISEASE INVOLVING NATIVE CORONARY ARTERY OF NATIVE HEART WITHOUT ANGINA PECTORIS: Primary | ICD-10-CM

## 2022-11-08 DIAGNOSIS — I10 PRIMARY HYPERTENSION: ICD-10-CM

## 2022-11-08 DIAGNOSIS — I25.5 CARDIOMYOPATHY, ISCHEMIC: ICD-10-CM

## 2022-11-08 DIAGNOSIS — E78.2 MIXED HYPERLIPIDEMIA: ICD-10-CM

## 2022-11-08 DIAGNOSIS — Z95.1 HX OF CABG: ICD-10-CM

## 2022-11-08 DIAGNOSIS — R06.02 SHORTNESS OF BREATH: ICD-10-CM

## 2022-11-08 PROCEDURE — 99214 OFFICE O/P EST MOD 30 MIN: CPT | Performed by: INTERNAL MEDICINE

## 2022-11-08 PROCEDURE — 3074F SYST BP LT 130 MM HG: CPT | Performed by: INTERNAL MEDICINE

## 2022-11-08 PROCEDURE — 4004F PT TOBACCO SCREEN RCVD TLK: CPT | Performed by: INTERNAL MEDICINE

## 2022-11-08 PROCEDURE — 93000 ELECTROCARDIOGRAM COMPLETE: CPT | Performed by: INTERNAL MEDICINE

## 2022-11-08 PROCEDURE — G8427 DOCREV CUR MEDS BY ELIG CLIN: HCPCS | Performed by: INTERNAL MEDICINE

## 2022-11-08 PROCEDURE — G8484 FLU IMMUNIZE NO ADMIN: HCPCS | Performed by: INTERNAL MEDICINE

## 2022-11-08 PROCEDURE — 3078F DIAST BP <80 MM HG: CPT | Performed by: INTERNAL MEDICINE

## 2022-11-08 PROCEDURE — G8419 CALC BMI OUT NRM PARAM NOF/U: HCPCS | Performed by: INTERNAL MEDICINE

## 2022-11-08 ASSESSMENT — ENCOUNTER SYMPTOMS
SHORTNESS OF BREATH: 0
VOMITING: 0
GASTROINTESTINAL NEGATIVE: 1
WHEEZING: 0
ABDOMINAL PAIN: 0
EYES NEGATIVE: 1
NAUSEA: 0

## 2022-11-08 NOTE — PROGRESS NOTES
2022        HPI:  Patient presents for follow up medical evaluation. Patient is s/p negative 2Decho and treadmill stress test. Pt denies change in exercise capacity, fatigue, nausea, vomiting, diarrhea, constipation, motor weakness, insomnia, weight loss, syncope, dizziness, lightheadedness, palpitations, PND, orthopnea, or claudication. Patient with some episodes of chest pain, dyspnea, dyspnea on exertion still. Continues to   Smoke, diet could be better controlled. Compliant with meds. 13: as above, patient presents with worsening midsternal CP and SOB. States her CP lasts more than 20 minutes at rest, worse with exertion. Pain radiates to left shoulder. Does not feel like GERD type symptoms. Almost took one of her dad's nitro. Both Mother and father recently  from large MI and CVA. Father with hx of 34 stents with [facility]. Continues to smoke. Under a lot of stress and anxiety. Worried that she may have a heart and die. Patient fatigues very easily. 13: as above, s/p cardiac cath and S/P ZACHARY to mid LAD and distal RCA. Has not been compliant with ASA but takes plavix daily. Has been having numerous midsternal and midepigastric chest pains since cath. Had an episode of sever CP a week ago with associted SOB, Nausea, diaphoresis, and ashy looking per friend. Had a look of impending doom. Has been using numerous SL nitro's with relief. + smoking. Patient complains of LE dicomfort. 13: as above, was in hospital last week for CP. Patient had negative EKG and negative C.E. Continues to smoke, chantix not working per patient. Compliant with meds. BS are still not under very good control. Pt denies dyspnea, dyspnea on exertion, change in exercise capacity, fatigue,  nausea, vomiting, diarrhea, constipation, motor weakness, insomnia, weight loss, syncope, dizziness, lightheadedness, palpitations, PND, orthopnea, or claudication. Still has occasional CP/discomfort. No bleeding issues.  Has not used SL nitro since discharged. soemtimes takes 5 SL nitros a day. Patient on previous cardiac cath with significant PLVB lesion nearing need for PCI. 5-13-14: as above, s/p LHC with moderate distal RCA instent restenosis of 50%, 50% PLVB, 30-40% mid RCA stenosis, normal LVF. Had to take one nitro this morning which helped, but doing better overall. Compliant with meds. Pt denies chest pain, dyspnea, dyspnea on exertion, change in exercise capacity, fatigue,  nausea, vomiting, diarrhea, constipation, motor weakness, insomnia, weight loss, syncope, dizziness, lightheadedness, palpitations, PND, orthopnea, or claudication. Statins increased by Dr. Sukhjinder Arenas. Was smoking 2 ppd now down to a PPD. 1-6-15: as above, states she's having worsening angina over the past couple of weeks. States she's been eating nitro like its candy. Went through a whole bottle of nitro in one week. Nitro completely takes her pain away immeditely. She stearns get tired, LH and dizzy with episodes. Denies N/V or SOB. Symptoms are worse with activity and better with rest. complian with meds, continues to smoke 1ppd. Has had multiple previous hospitalizations since last visit. Feels like there is something is wrong and having similar symptoms to previous PCI. 6-11-15: as above, s/p LHC with patent LAD and RCA stents, normal LVF. S/p recurrent hospitalization for CP. C.E were negative. Did take nitro which relieved her CP immediately. States CP went into her jaw, got diaphoretic. States symptoms occurred at rest. Continues to smoke 0.5ppd. Had run out of her plavix for 3 days but resumed now. Did have a few more episodes since being discharged from hospital.         4-12-16: as above, s/p hospitalization for angina, was treated medically. Does have URI type symptoms.  Pt denies  dyspnea, dyspnea on exertion, change in exercise capacity, fatigue,  nausea, vomiting, diarrhea, constipation, motor weakness, insomnia, weight loss, syncope, dizziness, lightheadedness, palpitations, PND, orthopnea. BP is good. Does have episodes of CP that are on and off, states shes used to it now and does not feel like a heart attack. Has quit smoking for about 2 months. Compliant with meds. 6-2-16: as above, having sig. CP along with nauseas. States its constant in nature, had to get vicodin off of a friend for pain. Going through a bottle of nitro a month. Occurs at rest, radiated to shoulder blades, + diaphoresis, lasting more than 10 min. S/p hospitalization a few days ago for CP, C.E were negative. S/p ECHO  With normal LVF. Continues to smoke. Her ranexa was increased to a 1000mcg daily. Compliant with other meds. BP and HR are good. Pt denies diarrhea, constipation, motor weakness, insomnia, weight loss, syncope, dizziness, lightheadedness, palpitations, PND, orthopnea, or claudication. Does have SOB and worse with walking. 7-7-16: as above, s/p LHC with PTCA of PDA branch, patent OM1 and LAD stents,100% mid CX with right to left collaterals, EF of 60%. S/p CP/abd pain episode yesterady where she felt like she got punched in the gut, got nauseated and diaphoretic, took nitro without any sig. Improvement, she kept throwing up. C.E were negative. Compliant with meds. BP is good. She feels great today. No bleeding issues. EKG was ok in ER. Pt denies chest pain, dyspnea, dyspnea on exertion, change in exercise capacity, fatigue,  nausea, vomiting, diarrhea, constipation, motor weakness, insomnia, weight loss, syncope, dizziness, lightheadedness, palpitations, PND, orthopnea. No smoking. 10-18-16: continues to have chest pressure and heaviness. Has been utilizing nitro almost on a daily basis. States its horrible and almost went to hospital a few times over the past few weeks. It did radiate to left arm pit a few times, got nauseous and left finger tingling. Does get SOB with mild activity, just with sweeping the house sometimes.  Wakes her up at night, occures at rest. Symptoms are accelerating in nature. Compliant with meds. Smokes 3 cig a day. No bleeding issues. Pt denies   vomiting, diarrhea, constipation, motor weakness, insomnia, weight loss, syncope, dizziness, lightheadedness, palpitations, PND, orthopnea, or claudication. BP and HR are good. BS continues to be uncontrolled. In the 200's.      12-8-16: s/p s/p PCI of PDA with ZACHARY, patent mid LAD stent with 30-40% restenosis, patent ostial OM1 stent, 50% mid RCA, normal LVF EF of 55%. Right LE angio was normal to knees. Pt denies chest pain, dyspnea, dyspnea on exertion, change in exercise capacity, fatigue,  nausea, vomiting, diarrhea, constipation, motor weakness, insomnia, weight loss, syncope, dizziness, lightheadedness, palpitations, PND, orthopnea. Did have a right UE venous dupplex US with thrombosis of cephalic vein. Continues to smoke. Taking all her meds. No nitro use. BP and hr are good. CAD is stable. No LE discoloration or ulcers. No LE edema. No CHF type symptoms. Lipid profile is normal.      4-11-17: s/p hospitalization for CP 2 weeks ago. Lab work was ok, C.E is negative. States she is getting sporadic pains now, gets a spasm in her left arm. Has been going to the gym, doing stairmasters and treadmill without any major pains. Has lost 18 pounds. Has needed to take nitro last night. States she feels like she is gonna die sometimes. Pt denies chest pain, dyspnea, dyspnea on exertion, change in exercise capacity, fatigue,  nausea, vomiting, diarrhea, constipation, motor weakness, insomnia, weight loss, syncope, dizziness, lightheadedness, palpitations, PND, orthopnea, or claudication. 9-6-18: doing well overall. Getting . Need refills on meds. Her stress level is much better.  Pt denies chest pain, dyspnea, dyspnea on exertion, change in exercise capacity, fatigue,  nausea, vomiting, diarrhea, constipation, motor weakness, insomnia, weight loss, syncope, dizziness, lightheadedness, palpitations, PND, orthopnea, or claudication. No nitro use. BP and hr are good. CAD is stable. No LE discoloration or ulcers. No LE edema. No CHF type symptoms. Lipid profile is normal. No recent hospitalization. No change in meds. Continues to smoke. On ASA and Effient. No bleeding issues. S/p LHC in 5/18   Conclusions  Procedure Summary  95% mid PDA ISR s/p PTCA with 0% residual and MARY III flow  99% distal PLVB stenosis s/p PTCA with 0% residual and MARY III flow  distal LAD ISR of 40-50%  100% mid CX   50% mid RCA  normal LVF     12-5-18: FEELS LIKE something is wrong again. Having chest pressure/squeeze and radiation to left arm. Completely resolved with one nitro. Using more nitro lately the last 2 weeks after going months without it. She ran out of effient for a month she states due to insurance reasons. Continues to smoke. Compliant with meds. BS is fairly controlled per patient. She is seeing dr. Jacqueline Randall. Pt denies nausea, vomiting, diarrhea, constipation, motor weakness, insomnia, weight loss, syncope, dizziness, lightheadedness, palpitations, PND, orthopnea, or claudication. BP and hr are good. No LE discoloration or ulcers. No LE edema. No CHF type symptoms. Lipid profile is normal. No recent hospitalization. No change in meds. 12-5-19: Lost her insurance and has been out of meds for a while. But has been feeling ok for the past year  But the last 2 weeks have been rough with CP, has been using nitro which provides relief. Feels like she has another blockage. CP can occur at rest, wakes her up from sleep at times. Continues to smoke. Pt denies dyspnea, dyspnea on exertion, change in exercise capacity, fatigue,  nausea, vomiting, diarrhea, constipation, motor weakness, insomnia, weight loss, syncope, dizziness, lightheadedness, palpitations, PND, orthopnea, or claudication. She got  and going on a honeymoon and wants to make sure she's gonna be fine. 19: s/p LHC with normal LVF, EF of 55%, normal LM, 50-60% mid LAD ISR, 100% mid CX , very small OM1 with 80% stenosis, RCA with 30% prox, 50-60% mid, PDA is small with 80% ISR. All vessels are small with diffuse disease. Was felt not to be a good PCI candidate due to small vessels and hx of in stent restenosis. Continues to have angina type symptoms. Taking SL nitro 4-5x/week. Her HR is elevated today at 111 but states she is sick. BP is good. On Imdur 60mg daily, Coreg 6.25mg BID, Ranexa 1000mg BID, on DAPT. Continues to smoke, but trying to quit. Following with dr. Boone Cronin. 20: Santiago Love (:  1975) has requested an audio/video evaluation for the following concern(s):    History of CAD status post remote PCI as well as CABG recently. States she is having CP again but not as bad as it was. Midsternal, feels like indigestion. Worse with palpation. States she is SOB. She has quit smoking since . Pt denies  nausea, vomiting, diarrhea, constipation, motor weakness, insomnia, weight loss, syncope, dizziness, lightheadedness, palpitations, PND, orthopnea, or claudication. Positive history of diabetes. On DAPT. On Imdur and Ranexa. Following with Endo and PCP for DM. BS is under control now. 22: Patient states she is experiencing anginal symptoms again that felt very similar to her previous anginal symptoms status post PCI. Patient with history of coronary disease status post remote PCI as well as CABG x4 at [address] [place] [address]. Patient remains on dual antiplatelet therapy. She is compliant with her medications. She does have stress in her life and she is smoking but not as much as previous. States her blood sugars are under control. CP occurs at rest. Taking nitro. + SOB.       2022: Patient admitted to [address] [place] [address] in  status post V. fib arrest, pneumonia requiring intubation and then trach. She has been 2 months in rehab.   Patient with history of coronary disease status post remote PCI as well as CABG x4 at [address] [place] [address]. She had left heart cath with PCI of OM1 on 2/7/2022 with Dr. Lupe Vail. At that time she was noted to have LVEF 50%, LIMA to LAD patent, all other SVGs were occluded. Patient remains on dual antiplatelet therapy. She is compliant with her medications. Under stress at home. Reports she continues to smoke but has significantly cut back. States her blood sugars are under control. 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 Requesting referral for new ENT physician. Blood pressure is under control. pt had echocardiogram on 5/23/2022 which showed LVEF 30 to 35%. 11-8-22: s/p cardiac arrest/V. fib arrest. Had long hospitalization, nursing home. Status post echo in May 2022 ejection fraction of 30 to 35%  Patient status post cardiac catheterization in February 2022 where she underwent PCI of the diagonal 1 as well as OM1 noted to have occluded SVG grafts and patent LIMA to the LAD. Patient with history of CAD status post CABG x4 at [address] [place] [address] and multivessel PCI  Doing much better now. She is at home. Review of Systems   Constitutional: Negative. Negative for chills and fever. Eyes: Negative. Respiratory:  Negative for shortness of breath and wheezing. Cardiovascular:  Negative for chest pain, palpitations and leg swelling. Gastrointestinal: Negative. Negative for abdominal pain, nausea and vomiting. Skin: Negative. Negative for rash. Neurological:  Negative for dizziness, weakness and headaches. Prior to Visit Medications    Medication Sig Taking?  Authorizing Provider   PARoxetine (PAXIL) 10 MG tablet Take 1 tablet by mouth every morning Yes ARIAN Rodriguez CNP   ranolazine (RANEXA) 1000 MG extended release tablet Take 1 tablet by mouth 2 times daily Yes ARIAN Rodriguez CNP   aspirin 81 MG EC tablet Take 2 tablets by mouth daily Yes ARIAN La CNP atorvastatin (LIPITOR) 40 MG tablet Take 1 tablet by mouth nightly Yes ARIAN Chowdhury CNP   carvedilol (COREG) 6.25 MG tablet TAKE 1 TABLET BY MOUTH 2 TIMES DAILY Yes Hortensia A ARIAN Louis CNP   isosorbide mononitrate (IMDUR) 60 MG extended release tablet Take 1 tablet by mouth daily Yes ARIAN Chowdhury CNP   losartan (COZAAR) 25 MG tablet Take 1 tablet by mouth daily Yes Hortensia A ARIAN Louis - CNP   nitroGLYCERIN (NITROSTAT) 0.4 MG SL tablet Place 1 tablet under the tongue every 5 minutes as needed for Chest pain PLACE 1 TAB UNDER THE TONGUE EVERY 5MIN IF NEEDED FOR CHEST PAIN (MAY REPEAT TWICE) Yes ARIAN Chowdhury CNP   ticagrelor (BRILINTA) 90 MG TABS tablet Take 1 tablet by mouth 2 times daily Yes ARIAN Chowdhury CNP   traZODone (DESYREL) 150 MG tablet Take 150 mg by mouth in the morning and at bedtime Yes Historical Provider, MD   QUEtiapine (SEROQUEL) 100 MG tablet Take 100 mg by mouth nightly Yes Historical Provider, MD   insulin aspart (NOVOLOG) 100 UNIT/ML injection pen Inject into the skin 3 times daily (before meals) Sliding scale Yes Historical Provider, MD   carBAMazepine (CARBATROL) 300 MG extended release capsule Take 1 capsule by mouth 2 times daily  Patient taking differently: Take 200 mg by mouth 2 times daily Yes Francisco CORTEZ Holiday, DO   SITagliptin (JANUVIA) 100 MG tablet Take 100 mg by mouth daily Yes Historical Provider, MD   Cholecalciferol (VITAMIN D) 50 MCG (2000 UT) CAPS capsule Take 4,000 Units by mouth daily  Patient not taking: Reported on 11/8/2022  Historical Provider, MD       Social History     Tobacco Use    Smoking status: Light Smoker     Packs/day: 1.00     Types: Cigarettes     Start date: 1/1/1994    Smokeless tobacco: Never   Substance Use Topics    Alcohol use: No    Drug use: No        Allergies   Allergen Reactions    Toradol [Ketorolac Tromethamine]     Tramadol    ,   Past Medical History:   Diagnosis Date    Asthma     Bipolar disease, manic ([facility]     CAD (coronary artery disease)     COPD (chronic obstructive pulmonary disease) (HCC)     Depression     Diabetes [facility]     DM (diabetes mellitus) ([facility]     Dyspnea     Hx of CABG 3/31/2020    Hyperlipidemia     Hypertension     Suicide attempt by multiple drug overdose ([facility] 2009    TMJ (dislocation of temporomandibular joint)    ,   Past Surgical History:   Procedure Laterality Date    APPENDECTOMY  2010    CARDIAC CATHETERIZATION  07/11/2013    CARDIAC CATHETERIZATION  10/16/2013    DR HERNÁNDEZ    CORONARY ARTERY BYPASS GRAFT  01/2020    CORONARY STENT PLACEMENT  05/2022    TUBAL LIGATION     ,   Family History   Problem Relation Age of Onset    Heart Disease Mother     High Blood Pressure Mother     Diabetes Mother     High Blood Pressure Father     Heart Disease Father     Kidney Disease Father        Physical Examination:  General appearance - alert, well appearing, and in no distress  Mental status - alert, oriented to person, place, and time  Neck - Neck is supple, no JVD or carotid bruits. No thyromegaly or adenopathy. Chest - clear to auscultation, no wheezes, rales or rhonchi, symmetric air entry  Heart - normal rate, regular rhythm, normal S1, S2, no murmurs, rubs, clicks or gallops  Abdomen - soft, nontender, nondistended, no masses or organomegaly  Neurological - alert, oriented, normal speech, no focal findings or movement disorder noted  Extremities - peripheral pulses normal, no pedal edema, no clubbing or cyanosis  Skin - normal coloration and turgor, no rashes, no suspicious skin lesions noted    EKG with normal sinus rhythm left bundle branch block. ASSESSMENT:        Diagnosis Orders   1. Coronary artery disease involving native coronary artery of native heart without angina pectoris  EKG 12 lead      2. Shortness of breath        3. Hx of CABG        4. Mixed hyperlipidemia        5. Primary hypertension        6.  Cardiomyopathy, ischemic  Echo limited    External Ref Cardiac Electrophysiology - Nilay Argueta MD          PLAN:  Patient will need to continue to follow up with you for their general medical care and return to see me in the office in 6 months     As always, aggressive risk factor modification is strongly recommended. We should adhere to the [address] VII guidelines for HTN management and the NCEP ATP III guidelines for LDL-C management. The nature of cardiac risk has been fully discussed with this patient. I have made her aware of her LDL target goal given her cardiovascular risk analysis. I have discussed the appropriate diet. The need for lifelong compliance in order to reduce risk is stressed. A regular exercise program is recommended to help achieve and maintain normal body weight, fitness and improve lipid balance. Continue medications at current doses. Will refer to EP for AICD    Check ECHO limited     DAPT cont with      Follow up with PCP. Check EKG     Smoking cessation was strongly recommended     Patient was advised and encouraged to check blood pressure at home or at a pharmacy, maintain a logbook, and also call us back if blood pressure are above the target ranges or if it is low. Patient clearly understands and agrees to the instructions. We will need to continue to monitor muscle and liver enzymes, BUN, CR, and electrolytes. The proper use and anticipated side effects of nitroglycerine has been carefully explained. If a single episode of chest pain is not relieved by one tablet, the patient will try another within 5 minutes; and if this doesn't relieve the pain, the patient is instructed to call 911 for transportation to an emergency department. Details of medical condition explained and patient was warned about adverse consequences of uncontrolled medical conditions and possible side effects of prescribed medications. Patient was advised to go to the ER if he starts experiencing adverse effects of the medications. patient was instructed to call us back or go to nearby emergency room immediately if symptoms get worse or do not improve. Thank you for allowing me to participate in the care of your patient, please don't hesitate to contact me if you have any further questions. Return in about 6 months (around 5/8/2023). An  electronic signature was used to authenticate this note.     --Irving Will, DO on 11/8/2022 at 11:16 AM  9}

## 2022-11-30 ENCOUNTER — HOSPITAL ENCOUNTER (OUTPATIENT)
Dept: NON INVASIVE DIAGNOSTICS | Age: 47
Discharge: HOME | End: 2022-11-30
Payer: COMMERCIAL

## 2022-11-30 DIAGNOSIS — I25.5 CARDIOMYOPATHY, ISCHEMIC: ICD-10-CM

## 2022-11-30 PROCEDURE — 93308 TTE F-UP OR LMTD: CPT

## 2022-12-13 ENCOUNTER — TELEPHONE (OUTPATIENT)
Dept: CARDIOLOGY CLINIC | Age: 47
End: 2022-12-13

## 2022-12-13 NOTE — TELEPHONE ENCOUNTER
Echo:     Conclusions      Summary   Left ventricular ejection fraction is visually estimated at 30-35%. global   hypokinesis. Left ventricular size is mildly increased . Normal right ventricular chamber size and function. The left atrium is Mildly dilated. Trace (+) mitral regurgitation is present. No evidence of aortic valve regurgitation . No evidence of aortic valve stenosis. There is mild tricuspid regurgitation with estimated RVSP of 51 mm Hg. Right ventricular systolic pressure of 51 mm Hg consistent with moderate   pulmonary hypertension. U/S:    IMPRESSION:     No evidence for DVT within the bilateral lower extremities.

## 2022-12-22 ENCOUNTER — APPOINTMENT (OUTPATIENT)
Dept: GENERAL RADIOLOGY | Age: 47
End: 2022-12-22
Attending: SPECIALIST
Payer: COMMERCIAL

## 2022-12-22 ENCOUNTER — HOSPITAL ENCOUNTER (OUTPATIENT)
Dept: CARDIAC CATH/INVASIVE PROCEDURES | Age: 47
Discharge: HOME | End: 2022-12-23
Attending: SPECIALIST | Admitting: SPECIALIST
Payer: COMMERCIAL

## 2022-12-22 PROBLEM — Z95.810 ICD (IMPLANTABLE CARDIOVERTER-DEFIBRILLATOR) IN PLACE: Status: ACTIVE | Noted: 2022-12-22

## 2022-12-22 LAB
GLUCOSE BLD-MCNC: 388 MG/DL (ref 70–99)
PERFORMED ON: ABNORMAL

## 2022-12-22 PROCEDURE — 2580000003 HC RX 258

## 2022-12-22 PROCEDURE — 6360000002 HC RX W HCPCS: Performed by: SPECIALIST

## 2022-12-22 PROCEDURE — C1777 LEAD, AICD, ENDO SINGLE COIL: HCPCS

## 2022-12-22 PROCEDURE — 6360000002 HC RX W HCPCS

## 2022-12-22 PROCEDURE — 6370000000 HC RX 637 (ALT 250 FOR IP): Performed by: SPECIALIST

## 2022-12-22 PROCEDURE — 33249 INSJ/RPLCMT DEFIB W/LEAD(S): CPT

## 2022-12-22 PROCEDURE — 2709999900 HC NON-CHARGEABLE SUPPLY

## 2022-12-22 PROCEDURE — C1889 IMPLANT/INSERT DEVICE, NOC: HCPCS

## 2022-12-22 PROCEDURE — 71046 X-RAY EXAM CHEST 2 VIEWS: CPT

## 2022-12-22 PROCEDURE — C1721 AICD, DUAL CHAMBER: HCPCS

## 2022-12-22 PROCEDURE — 2580000003 HC RX 258: Performed by: SPECIALIST

## 2022-12-22 PROCEDURE — C1898 LEAD, PMKR, OTHER THAN TRANS: HCPCS

## 2022-12-22 PROCEDURE — C1894 INTRO/SHEATH, NON-LASER: HCPCS

## 2022-12-22 PROCEDURE — 71045 X-RAY EXAM CHEST 1 VIEW: CPT

## 2022-12-22 PROCEDURE — 2500000003 HC RX 250 WO HCPCS

## 2022-12-22 RX ORDER — CEPHALEXIN 250 MG/1
250 CAPSULE ORAL EVERY 8 HOURS SCHEDULED
Status: DISCONTINUED | OUTPATIENT
Start: 2022-12-22 | End: 2022-12-22

## 2022-12-22 RX ORDER — ISOSORBIDE MONONITRATE 60 MG/1
60 TABLET, EXTENDED RELEASE ORAL DAILY
Status: DISCONTINUED | OUTPATIENT
Start: 2022-12-23 | End: 2022-12-23 | Stop reason: HOSPADM

## 2022-12-22 RX ORDER — SODIUM CHLORIDE 0.9 % (FLUSH) 0.9 %
5-40 SYRINGE (ML) INJECTION EVERY 12 HOURS SCHEDULED
Status: DISCONTINUED | OUTPATIENT
Start: 2022-12-22 | End: 2022-12-23

## 2022-12-22 RX ORDER — PAROXETINE 10 MG/1
10 TABLET, FILM COATED ORAL EVERY MORNING
Status: DISCONTINUED | OUTPATIENT
Start: 2022-12-23 | End: 2022-12-23 | Stop reason: HOSPADM

## 2022-12-22 RX ORDER — QUETIAPINE FUMARATE 100 MG/1
100 TABLET, FILM COATED ORAL NIGHTLY
Status: DISCONTINUED | OUTPATIENT
Start: 2022-12-22 | End: 2022-12-23 | Stop reason: HOSPADM

## 2022-12-22 RX ORDER — LOSARTAN POTASSIUM 25 MG/1
25 TABLET ORAL DAILY
Status: DISCONTINUED | OUTPATIENT
Start: 2022-12-23 | End: 2022-12-23 | Stop reason: HOSPADM

## 2022-12-22 RX ORDER — SODIUM CHLORIDE 450 MG/100ML
INJECTION, SOLUTION INTRAVENOUS CONTINUOUS
Status: DISCONTINUED | OUTPATIENT
Start: 2022-12-22 | End: 2022-12-23

## 2022-12-22 RX ORDER — BACITRACIN, NEOMYCIN, POLYMYXIN B 400; 3.5; 5 [USP'U]/G; MG/G; [USP'U]/G
OINTMENT TOPICAL 3 TIMES DAILY
Status: DISCONTINUED | OUTPATIENT
Start: 2022-12-22 | End: 2022-12-23

## 2022-12-22 RX ORDER — CEPHALEXIN 500 MG/1
500 CAPSULE ORAL EVERY 8 HOURS SCHEDULED
Status: DISCONTINUED | OUTPATIENT
Start: 2022-12-22 | End: 2022-12-23 | Stop reason: HOSPADM

## 2022-12-22 RX ORDER — INSULIN LISPRO 100 [IU]/ML
0-8 INJECTION, SOLUTION INTRAVENOUS; SUBCUTANEOUS
Status: DISCONTINUED | OUTPATIENT
Start: 2022-12-22 | End: 2022-12-23

## 2022-12-22 RX ORDER — CARVEDILOL 6.25 MG/1
6.25 TABLET ORAL 2 TIMES DAILY
Status: DISCONTINUED | OUTPATIENT
Start: 2022-12-22 | End: 2022-12-23 | Stop reason: HOSPADM

## 2022-12-22 RX ORDER — ASPIRIN 81 MG/1
162 TABLET ORAL DAILY
Status: DISCONTINUED | OUTPATIENT
Start: 2022-12-23 | End: 2022-12-23 | Stop reason: HOSPADM

## 2022-12-22 RX ORDER — INSULIN LISPRO 100 [IU]/ML
0-4 INJECTION, SOLUTION INTRAVENOUS; SUBCUTANEOUS NIGHTLY
Status: DISCONTINUED | OUTPATIENT
Start: 2022-12-22 | End: 2022-12-23

## 2022-12-22 RX ORDER — HYDROCODONE BITARTRATE AND ACETAMINOPHEN 5; 325 MG/1; MG/1
1 TABLET ORAL EVERY 6 HOURS PRN
Status: DISCONTINUED | OUTPATIENT
Start: 2022-12-22 | End: 2022-12-23

## 2022-12-22 RX ORDER — CARBAMAZEPINE 300 MG/1
300 CAPSULE, EXTENDED RELEASE ORAL 2 TIMES DAILY
Status: DISCONTINUED | OUTPATIENT
Start: 2022-12-22 | End: 2022-12-23 | Stop reason: HOSPADM

## 2022-12-22 RX ORDER — SODIUM CHLORIDE 9 MG/ML
INJECTION, SOLUTION INTRAVENOUS PRN
Status: DISCONTINUED | OUTPATIENT
Start: 2022-12-22 | End: 2022-12-23

## 2022-12-22 RX ORDER — ATORVASTATIN CALCIUM 40 MG/1
40 TABLET, FILM COATED ORAL NIGHTLY
Status: DISCONTINUED | OUTPATIENT
Start: 2022-12-22 | End: 2022-12-23 | Stop reason: HOSPADM

## 2022-12-22 RX ORDER — TRAZODONE HYDROCHLORIDE 150 MG/1
150 TABLET ORAL 2 TIMES DAILY
Status: DISCONTINUED | OUTPATIENT
Start: 2022-12-22 | End: 2022-12-23 | Stop reason: HOSPADM

## 2022-12-22 RX ORDER — SODIUM CHLORIDE 0.9 % (FLUSH) 0.9 %
5-40 SYRINGE (ML) INJECTION PRN
Status: DISCONTINUED | OUTPATIENT
Start: 2022-12-22 | End: 2022-12-23

## 2022-12-22 RX ADMIN — VANCOMYCIN HYDROCHLORIDE 1000 MG: 1 INJECTION, POWDER, LYOPHILIZED, FOR SOLUTION INTRAVENOUS at 10:16

## 2022-12-22 RX ADMIN — HYDROCODONE BITARTRATE AND ACETAMINOPHEN 1 TABLET: 5; 325 TABLET ORAL at 17:33

## 2022-12-22 RX ADMIN — CARVEDILOL 6.25 MG: 6.25 TABLET, FILM COATED ORAL at 20:27

## 2022-12-22 RX ADMIN — SODIUM CHLORIDE, PRESERVATIVE FREE 10 ML: 5 INJECTION INTRAVENOUS at 20:30

## 2022-12-22 RX ADMIN — INSULIN LISPRO 4 UNITS: 100 INJECTION, SOLUTION INTRAVENOUS; SUBCUTANEOUS at 22:21

## 2022-12-22 RX ADMIN — TRAZODONE HYDROCHLORIDE 150 MG: 150 TABLET ORAL at 20:27

## 2022-12-22 RX ADMIN — CEPHALEXIN 500 MG: 500 CAPSULE ORAL at 15:06

## 2022-12-22 RX ADMIN — BACITRACIN, NEOMYCIN, POLYMYXIN B: 400; 3.5; 5 OINTMENT TOPICAL at 20:29

## 2022-12-22 RX ADMIN — ATORVASTATIN CALCIUM 40 MG: 40 TABLET, FILM COATED ORAL at 20:27

## 2022-12-22 RX ADMIN — HYDROCODONE BITARTRATE AND ACETAMINOPHEN 1 TABLET: 5; 325 TABLET ORAL at 22:19

## 2022-12-22 RX ADMIN — CEPHALEXIN 500 MG: 500 CAPSULE ORAL at 20:27

## 2022-12-22 RX ADMIN — CARBAMAZEPINE 300 MG: 300 CAPSULE, EXTENDED RELEASE ORAL at 20:27

## 2022-12-22 RX ADMIN — HYDROMORPHONE HYDROCHLORIDE 0.5 MG: 1 INJECTION, SOLUTION INTRAMUSCULAR; INTRAVENOUS; SUBCUTANEOUS at 15:06

## 2022-12-22 RX ADMIN — GENTAMICIN SULFATE: 40 INJECTION, SOLUTION INTRAMUSCULAR; INTRAVENOUS at 10:53

## 2022-12-22 RX ADMIN — TICAGRELOR 90 MG: 90 TABLET ORAL at 20:27

## 2022-12-22 RX ADMIN — QUETIAPINE FUMARATE 100 MG: 100 TABLET ORAL at 20:27

## 2022-12-22 ASSESSMENT — PAIN SCALES - GENERAL
PAINLEVEL_OUTOF10: 5
PAINLEVEL_OUTOF10: 3
PAINLEVEL_OUTOF10: 4
PAINLEVEL_OUTOF10: 7
PAINLEVEL_OUTOF10: 10

## 2022-12-22 NOTE — OP NOTE
[address]                      [address] [place], [address] [facility]                                OPERATIVE REPORT    PATIENT NAME: Priyank Aparicio                    :        1975  MED REC NO:   [id number]                            ROOM:       [id number]  ACCOUNT NO:   [de-identified]                           ADMIT DATE: 2022  PROVIDER:     Mckenna Mccord MD    DATE OF PROCEDURE:  2022    PREOPERATIVE DIAGNOSES:  Survivor of sudden cardiac death, history of  ischemic cardiomyopathy with low ejection fraction. POSTOPERATIVE DIAGNOSES:  Survivor of sudden cardiac death, history of  ischemic cardiomyopathy with low ejection fraction. PROCEDURE PERFORMED:  Dual-chamber ICD implant. Alternative therapy were rejected by the patient. She wants to proceed  with the ICD. SURGEON:  Mckenna Mccord MD    OPERATIVE PROCEDURE:  The patient was brought to the EP lab in the  postabsorptive state. Vitals were stable. Informed consent was  obtained. The left hemithorax was prepared and draped in the usual  manner. The left infraclavicular fossa was infiltrated with 2%  Xylocaine. Incision was made two fingerbreadths below the left clavicle  and deepened down to the pectoralis fascia. Pocket was made by  dissection. Hemostasis was secured. Using the Seldinger technique in a  caudal view of 35 degrees, the left cephalic axillary venous system was  captured two separate times. Two separate guidewires were advanced  under direct fluoroscopy to the junction of the superior vena cava and  right atrium. Number 9-Citizen of [place] and 7-Citizen [place] dilators and inducers were  advanced over one of the guidewires at a time. The dilators and the  guidewires were removed. RV lead by [employer], model number [id number],  serial number of [id number] was advanced under direct fluoroscopy to RV  septal position. R-wave of 4.0 mV, slew rate of 2.6 volts per second,  impedance 567 ohms.   At 1.0 milliseconds, voltage threshold was 1.1  volts. Current of 1.9 mA. A 10-volt testing was done. No  diaphragmatic pacing was documented. The lead was secured to the  pectoralis fascia muscle using two separate 0-silk sutures. Same  technique was used to manipulate the right atrial lead by [employer],  model number [id number], serial number of [id number]. The lead was  advanced under direct fluoroscopy to the right atrial appendage remnant. P-wave of 1.8 mV, slew rate of 0.6 volts per second, impedance 592 ohms. At 1.0 milliseconds voltage threshold was 0.8 volts and the current of  1.5 mA. A 10-volt testing was done. No diaphragmatic pacing was  documented. The lead was secured to the pectoralis fascia muscle using  two separate 0-silk sutures. The pocket was irrigated with copious  amount of antibiotic. Hemostasis was secured. The device by [employer],  model number [id number], serial number of [id number] was hooked up to the  leads. Device-based testing showed P-wave of 1.8, R-wave of 3.8 mV. Atrial lead impedance 418, RV of 456 ohms. In the atrium at 0.4  milliseconds, voltage threshold was 1.25 volts, as was in the ventricle. HVP impedance was 67 ohms. Heavy sedation was induced with total of 50  mg of propofol. V-fib was induced using the device itself. The device  detected V-fib and terminated it with VFT of lower than 18 joules with  resultant sinus and paced rhythm. The patient tolerated that part of  the procedure well. Hemostasis was evident. Pocket was irrigated with  copious amount of antibiotic. A TYRX pouch was used. The subcutaneous  tissue was closed using 3-0 Vicryl in two separate layers. The  subcuticular tissue was closed using 4-0 Vicryl. The wound was bandaged  in the usual fashion. The patient tolerated the procedure well and was  discharged from the EP lab in stable condition.         iTa Banegas MD    D: 12/22/2022 11:46:03       T: 12/22/2022 11:49:54 [email or web address]  Job#: [id number]     Doc#: [id number]    CC:  MD Sandi Mays MD Midge Bari, DO

## 2022-12-22 NOTE — PROGRESS NOTES
Returned from cath lab to pre/post, alert and oriented. L arm is sling. L chest incision remains stable, aquacel dressing clean, dry, intact, Appears to be swollen. Dr Evia Bumpers at bedside to assess. Swelling is OK per Dr Evia Bumpers. VSS.    1230: Portable CXR completed. Report given to Jeni Saravia RN.  Transport requested for pt to transfer to [address] [place] [address].

## 2022-12-22 NOTE — PROGRESS NOTES
Pt is a/o x 3. Skin is pwd. Pt consented for ICD placement. Antibiotics received. Pt to lab now for 0930 procedure. [employer] aware.

## 2022-12-23 VITALS
HEART RATE: 71 BPM | HEIGHT: 66 IN | SYSTOLIC BLOOD PRESSURE: 100 MMHG | WEIGHT: 170 LBS | OXYGEN SATURATION: 97 % | DIASTOLIC BLOOD PRESSURE: 63 MMHG | RESPIRATION RATE: 16 BRPM | TEMPERATURE: 98.1 F | BODY MASS INDEX: 27.32 KG/M2

## 2022-12-23 LAB
GLUCOSE BLD-MCNC: 511 MG/DL (ref 70–99)
PERFORMED ON: ABNORMAL

## 2022-12-23 PROCEDURE — 6370000000 HC RX 637 (ALT 250 FOR IP): Performed by: SPECIALIST

## 2022-12-23 RX ORDER — CEPHALEXIN 500 MG/1
500 CAPSULE ORAL EVERY 8 HOURS SCHEDULED
Qty: 18 CAPSULE | Refills: 0 | Status: SHIPPED | OUTPATIENT
Start: 2022-12-23 | End: 2022-12-29

## 2022-12-23 RX ADMIN — CEPHALEXIN 500 MG: 500 CAPSULE ORAL at 05:57

## 2022-12-23 RX ADMIN — INSULIN LISPRO 8 UNITS: 100 INJECTION, SOLUTION INTRAVENOUS; SUBCUTANEOUS at 12:14

## 2022-12-23 RX ADMIN — CEPHALEXIN 500 MG: 500 CAPSULE ORAL at 12:14

## 2022-12-23 RX ADMIN — HYDROCODONE BITARTRATE AND ACETAMINOPHEN 1 TABLET: 5; 325 TABLET ORAL at 10:45

## 2022-12-23 RX ADMIN — HYDROCODONE BITARTRATE AND ACETAMINOPHEN 1 TABLET: 5; 325 TABLET ORAL at 03:23

## 2022-12-23 ASSESSMENT — PAIN SCALES - GENERAL
PAINLEVEL_OUTOF10: 5
PAINLEVEL_OUTOF10: 8
PAINLEVEL_OUTOF10: 2

## 2022-12-23 NOTE — PROGRESS NOTES
CLINICAL PHARMACY NOTE: MEDS TO BEDS    Total # of Prescriptions Filled: 1   The following medications were delivered to the patient:  Cephalexin 500mg tab    Additional Documentation:

## 2022-12-23 NOTE — DISCHARGE INSTR - DIET
Good nutrition is important when healing from an illness, injury, or surgery. Follow any nutrition recommendations given to you during your hospital stay. If you were given an oral nutrition supplement while in the hospital, continue to take this supplement at home. You can take it with meals, in-between meals, and/or before bedtime. These supplements can be purchased at most local grocery stores, pharmacies, and chain [employer]stores. If you have any questions about your diet or nutrition, call the hospital and ask for the dietitian. Low fat, low salt, heart healthy diet. Diabetic diet.

## 2022-12-23 NOTE — DISCHARGE INSTR - COC
Continuity of Care Form    Patient Name: Marc Ivan   :  1975  MRN:  [id number]    Admit date:  2022  Discharge date:  ***    Code Status Order: Full Code   Advance Directives:     Admitting Physician:  Jak Azevedo MD  PCP: Sara Graves MD    Discharging Nurse: [facility] Unit/Room#: U849/E357-25  Discharging Unit Phone Number: ***    Emergency Contact:   Extended Emergency Contact Information  Primary Emergency Contact: Julianne [address] Phone: [phone]  Mobile Phone: [phone]  Relation: Child    Past Surgical History:  Past Surgical History:   Procedure Laterality Date    APPENDECTOMY      CARDIAC CATHETERIZATION  2013    CARDIAC CATHETERIZATION  10/16/2013    DR HERNÁNDEZ    CORONARY ARTERY BYPASS GRAFT  2020    CORONARY STENT PLACEMENT  2022    TUBAL LIGATION         Immunization History:   Immunization History   Administered Date(s) Administered    Influenza Virus Vaccine 10/01/2013    Tetanus 2012       Active Problems:  Patient Active Problem List   Diagnosis Code    Asthma J45.909    DM (diabetes mellitus) ([facility] E11.9    COPD (chronic obstructive pulmonary disease) ([facility] J44.9    Dyspnea R06.00    TMJ (dislocation of temporomandibular joint) S03. 00XA    Family history of premature CAD Z82.49    CAD (coronary artery disease) I25.10    Hypertension I10    Bipolar disease, manic ([facility] F31.10    Depression F32. A    Hyperlipidemia E78.5    DM type 2 (diabetes mellitus, type 2) ([facility]) E11.9    Non compliance with medical treatment Z91.199    Hx of CABG Z95.1    ICD (implantable cardioverter-defibrillator) in place Z95.810       Isolation/Infection:   Isolation            No Isolation          Patient Infection Status       None to display            Nurse Assessment:  Last Vital Signs: BP (!) 126/56   Pulse 74   Temp 98.1 °F (36.7 °C) (Oral)   Resp 16   Ht 5' 6\" (1.676 m)   Wt 170 lb (77.1 kg)   SpO2 96%   BMI 27.44 kg/m²     Last documented pain score (0-10 scale): Pain Level: 5  Last Weight:   Wt Readings from Last 1 Encounters:   22 170 lb (77.1 kg)     Mental Status:  {IP PT MENTAL STATUS:}    IV Access:  { CANDELARIO IV ACCESS:391766463}    Nursing Mobility/ADLs:  Walking   {CHP DME QNFT:433351651}  Transfer  {CHP DME RGJA:530737528}  Bathing  {CHP DME GSXS:341486823}  Dressing  {CHP DME DQXA:075184327}  Toileting  {CHP DME IPFT:450351541}  Feeding  {CHP DME YLAF:464534041}  Med Admin  {CHP DME VVXL:647879148}  Med Delivery   { CANDELARIO MED Delivery:034650695}    Wound Care Documentation and Therapy:  Wound 22 Chest Left;Upper L PPM site (Active)   Dressing Status Clean;Dry; Intact 22   Number of days: 0        Elimination:  Continence: Bowel: {YES / SJ:02072}  Bladder: {YES / CL:89082}  Urinary Catheter: {Urinary Catheter:620669570}   Colostomy/Ileostomy/Ileal Conduit: {YES / UV:44718}       Date of Last BM: ***  No intake or output data in the 24 hours ending 22 0845  No intake/output data recorded.     Safety Concerns:     [address] Safety Concerns:287594282}    Impairments/Disabilities:      [address] Impairments/Disabilities:605004935}    Nutrition Therapy:  Current Nutrition Therapy:   [address] Diet List:164369230}    Routes of Feeding: {CHP DME Other Feedings:108505137}  Liquids: {Slp liquid thickness:49049}  Daily Fluid Restriction: {CHP DME Yes amt example:128074496}  Last Modified Barium Swallow with Video (Video Swallowing Test): {Done Not Done GUMI:031743279}    Treatments at the Time of Hospital Discharge:   Respiratory Treatments: ***  Oxygen Therapy:  {Therapy; copd oxygen:11309}  Ventilator:    {[facility] Vent TQDX:512991809}    Rehab Therapies: {THERAPEUTIC INTERVENTION:6556378391}  Weight Bearing Status/Restrictions: [address] [employer] Weight Bearin}  Other Medical Equipment (for information only, NOT a DME order):  {EQUIPMENT:318947186}  Other Treatments: ***    Patient's personal belongings (please select all that are sent with patient):  {CHP DME Belongings:466267704}    RN SIGNATURE:  {Esignature:697583988}    CASE MANAGEMENT/SOCIAL WORK SECTION    Inpatient Status Date: ***    Readmission Risk Assessment Score:  Readmission Risk              Risk of Unplanned Readmission:  0           Discharging to Facility/ Agency   Name:   Address:  Phone:  Fax:    Dialysis Facility (if applicable)   Name:  Address:  Dialysis Schedule:  Phone:  Fax:    / signature: {Esignature:921522247}    PHYSICIAN SECTION    Prognosis: {Prognosis:1749622405}    Condition at Discharge: [address] [place] [address] Patient Condition:103806171}    Rehab Potential (if transferring to Rehab): {Prognosis:6755909731}    Recommended Labs or Other Treatments After Discharge: ***    Physician Certification: I certify the above information and transfer of Vinay Stratton  is necessary for the continuing treatment of the diagnosis listed and that she requires {Admit to Appropriate Level of Care:83611} for {GREATER/LESS:366831142} 30 days.      Update Admission H&P: {CHP DME Changes in NDIES:005893771}    PHYSICIAN SIGNATURE:  {Esignature:261176830}

## 2022-12-23 NOTE — PROGRESS NOTES
Pt given discharge instructions, reviewed follow up appts. And medications. Pt did not go home last night due to pain at ICD site. Dr. Stevenson James was notified. Pt waiting for pharmacy to deliver meds and her ride to get here.    Electronically signed by Claudine Negro RN on 12/23/2022 at 12:47 PM

## 2022-12-23 NOTE — PROGRESS NOTES
Patient requesting additional pain medication.   Called  And he stated that the PRN pain medication that she has on board is enough and will not order anything additional.

## 2023-01-23 NOTE — TELEPHONE ENCOUNTER
Requesting medication refill. Please approve or deny this request.    Rx requested:  Requested Prescriptions     Pending Prescriptions Disp Refills    carvedilol (COREG) 6.25 MG tablet [Pharmacy Med Name: CARVEDILOL 6.25MG TABLETS] 180 tablet 0     Sig: TAKE 1 TABLET BY MOUTH TWICE DAILY    ranolazine (RANEXA) 1000 MG extended release tablet [Pharmacy Med Name: RANOLAZINE 1000MG ER TABLETS] 180 tablet 0     Sig: TAKE 1 TABLET BY MOUTH TWICE DAILY         Last Office Visit:   8/11/2022      Next Visit Date:  Future Appointments   Date Time Provider Yana Kruger   5/16/2023 10:30 AM Francisco Paniagua, DO [facility]               Last refill 10/31/22. Please approve or deny.

## 2023-01-26 RX ORDER — RANOLAZINE 1000 MG/1
TABLET, EXTENDED RELEASE ORAL
Qty: 180 TABLET | Refills: 0 | Status: SHIPPED | OUTPATIENT
Start: 2023-01-26

## 2023-01-26 RX ORDER — CARVEDILOL 6.25 MG/1
TABLET ORAL
Qty: 180 TABLET | Refills: 0 | Status: SHIPPED | OUTPATIENT
Start: 2023-01-26

## 2023-01-31 ENCOUNTER — OFFICE VISIT (OUTPATIENT)
Dept: CARDIOLOGY CLINIC | Age: 48
End: 2023-01-31
Payer: COMMERCIAL

## 2023-01-31 VITALS
HEART RATE: 55 BPM | OXYGEN SATURATION: 96 % | WEIGHT: 173 LBS | DIASTOLIC BLOOD PRESSURE: 80 MMHG | SYSTOLIC BLOOD PRESSURE: 120 MMHG | BODY MASS INDEX: 27.92 KG/M2

## 2023-01-31 DIAGNOSIS — E78.2 MIXED HYPERLIPIDEMIA: ICD-10-CM

## 2023-01-31 DIAGNOSIS — I10 PRIMARY HYPERTENSION: ICD-10-CM

## 2023-01-31 DIAGNOSIS — Z95.810 ICD (IMPLANTABLE CARDIOVERTER-DEFIBRILLATOR) IN PLACE: ICD-10-CM

## 2023-01-31 DIAGNOSIS — I25.10 CORONARY ARTERY DISEASE INVOLVING NATIVE CORONARY ARTERY OF NATIVE HEART WITHOUT ANGINA PECTORIS: Primary | Chronic | ICD-10-CM

## 2023-01-31 PROCEDURE — 3074F SYST BP LT 130 MM HG: CPT | Performed by: INTERNAL MEDICINE

## 2023-01-31 PROCEDURE — G8484 FLU IMMUNIZE NO ADMIN: HCPCS | Performed by: INTERNAL MEDICINE

## 2023-01-31 PROCEDURE — 3079F DIAST BP 80-89 MM HG: CPT | Performed by: INTERNAL MEDICINE

## 2023-01-31 PROCEDURE — 4004F PT TOBACCO SCREEN RCVD TLK: CPT | Performed by: INTERNAL MEDICINE

## 2023-01-31 PROCEDURE — 99214 OFFICE O/P EST MOD 30 MIN: CPT | Performed by: INTERNAL MEDICINE

## 2023-01-31 PROCEDURE — G8427 DOCREV CUR MEDS BY ELIG CLIN: HCPCS | Performed by: INTERNAL MEDICINE

## 2023-01-31 PROCEDURE — G8419 CALC BMI OUT NRM PARAM NOF/U: HCPCS | Performed by: INTERNAL MEDICINE

## 2023-01-31 ASSESSMENT — ENCOUNTER SYMPTOMS
ABDOMINAL PAIN: 0
EYES NEGATIVE: 1
SHORTNESS OF BREATH: 0
VOMITING: 0
WHEEZING: 0
NAUSEA: 0
GASTROINTESTINAL NEGATIVE: 1

## 2023-01-31 NOTE — PROGRESS NOTES
2023        HPI:  Patient presents for follow up medical evaluation. Patient is s/p negative 2Decho and treadmill stress test. Pt denies change in exercise capacity, fatigue, nausea, vomiting, diarrhea, constipation, motor weakness, insomnia, weight loss, syncope, dizziness, lightheadedness, palpitations, PND, orthopnea, or claudication. Patient with some episodes of chest pain, dyspnea, dyspnea on exertion still. Continues to   Smoke, diet could be better controlled. Compliant with meds. 13: as above, patient presents with worsening midsternal CP and SOB. States her CP lasts more than 20 minutes at rest, worse with exertion. Pain radiates to left shoulder. Does not feel like GERD type symptoms. Almost took one of her dad's nitro. Both Mother and father recently  from large MI and CVA. Father with hx of 34 stents with [facility]. Continues to smoke. Under a lot of stress and anxiety. Worried that she may have a heart and die. Patient fatigues very easily. 13: as above, s/p cardiac cath and S/P ZACHARY to mid LAD and distal RCA. Has not been compliant with ASA but takes plavix daily. Has been having numerous midsternal and midepigastric chest pains since cath. Had an episode of sever CP a week ago with associted SOB, Nausea, diaphoresis, and ashy looking per friend. Had a look of impending doom. Has been using numerous SL nitro's with relief. + smoking. Patient complains of LE dicomfort. 13: as above, was in hospital last week for CP. Patient had negative EKG and negative C.E. Continues to smoke, chantix not working per patient. Compliant with meds. BS are still not under very good control. Pt denies dyspnea, dyspnea on exertion, change in exercise capacity, fatigue,  nausea, vomiting, diarrhea, constipation, motor weakness, insomnia, weight loss, syncope, dizziness, lightheadedness, palpitations, PND, orthopnea, or claudication. Still has occasional CP/discomfort. No bleeding issues.  Has not used SL nitro since discharged. soemtimes takes 5 SL nitros a day. Patient on previous cardiac cath with significant PLVB lesion nearing need for PCI. 5-13-14: as above, s/p LHC with moderate distal RCA instent restenosis of 50%, 50% PLVB, 30-40% mid RCA stenosis, normal LVF. Had to take one nitro this morning which helped, but doing better overall. Compliant with meds. Pt denies chest pain, dyspnea, dyspnea on exertion, change in exercise capacity, fatigue,  nausea, vomiting, diarrhea, constipation, motor weakness, insomnia, weight loss, syncope, dizziness, lightheadedness, palpitations, PND, orthopnea, or claudication. Statins increased by Dr. Linda Degroot. Was smoking 2 ppd now down to a PPD. 1-6-15: as above, states she's having worsening angina over the past couple of weeks. States she's been eating nitro like its candy. Went through a whole bottle of nitro in one week. Nitro completely takes her pain away immeditely. She stearns get tired, LH and dizzy with episodes. Denies N/V or SOB. Symptoms are worse with activity and better with rest. complian with meds, continues to smoke 1ppd. Has had multiple previous hospitalizations since last visit. Feels like there is something is wrong and having similar symptoms to previous PCI. 6-11-15: as above, s/p LHC with patent LAD and RCA stents, normal LVF. S/p recurrent hospitalization for CP. C.E were negative. Did take nitro which relieved her CP immediately. States CP went into her jaw, got diaphoretic. States symptoms occurred at rest. Continues to smoke 0.5ppd. Had run out of her plavix for 3 days but resumed now. Did have a few more episodes since being discharged from hospital.         4-12-16: as above, s/p hospitalization for angina, was treated medically. Does have URI type symptoms.  Pt denies  dyspnea, dyspnea on exertion, change in exercise capacity, fatigue,  nausea, vomiting, diarrhea, constipation, motor weakness, insomnia, weight loss, syncope, dizziness, lightheadedness, palpitations, PND, orthopnea. BP is good. Does have episodes of CP that are on and off, states shes used to it now and does not feel like a heart attack. Has quit smoking for about 2 months. Compliant with meds. 6-2-16: as above, having sig. CP along with nauseas. States its constant in nature, had to get vicodin off of a friend for pain. Going through a bottle of nitro a month. Occurs at rest, radiated to shoulder blades, + diaphoresis, lasting more than 10 min. S/p hospitalization a few days ago for CP, C.E were negative. S/p ECHO  With normal LVF. Continues to smoke. Her ranexa was increased to a 1000mcg daily. Compliant with other meds. BP and HR are good. Pt denies diarrhea, constipation, motor weakness, insomnia, weight loss, syncope, dizziness, lightheadedness, palpitations, PND, orthopnea, or claudication. Does have SOB and worse with walking. 7-7-16: as above, s/p LHC with PTCA of PDA branch, patent OM1 and LAD stents,100% mid CX with right to left collaterals, EF of 60%. S/p CP/abd pain episode yesterady where she felt like she got punched in the gut, got nauseated and diaphoretic, took nitro without any sig. Improvement, she kept throwing up. C.E were negative. Compliant with meds. BP is good. She feels great today. No bleeding issues. EKG was ok in ER. Pt denies chest pain, dyspnea, dyspnea on exertion, change in exercise capacity, fatigue,  nausea, vomiting, diarrhea, constipation, motor weakness, insomnia, weight loss, syncope, dizziness, lightheadedness, palpitations, PND, orthopnea. No smoking. 10-18-16: continues to have chest pressure and heaviness. Has been utilizing nitro almost on a daily basis. States its horrible and almost went to hospital a few times over the past few weeks. It did radiate to left arm pit a few times, got nauseous and left finger tingling. Does get SOB with mild activity, just with sweeping the house sometimes.  Wakes her up at night, occures at rest. Symptoms are accelerating in nature. Compliant with meds. Smokes 3 cig a day. No bleeding issues. Pt denies   vomiting, diarrhea, constipation, motor weakness, insomnia, weight loss, syncope, dizziness, lightheadedness, palpitations, PND, orthopnea, or claudication. BP and HR are good. BS continues to be uncontrolled. In the 200's.      12-8-16: s/p s/p PCI of PDA with ZACHARY, patent mid LAD stent with 30-40% restenosis, patent ostial OM1 stent, 50% mid RCA, normal LVF EF of 55%. Right LE angio was normal to knees. Pt denies chest pain, dyspnea, dyspnea on exertion, change in exercise capacity, fatigue,  nausea, vomiting, diarrhea, constipation, motor weakness, insomnia, weight loss, syncope, dizziness, lightheadedness, palpitations, PND, orthopnea. Did have a right UE venous dupplex US with thrombosis of cephalic vein. Continues to smoke. Taking all her meds. No nitro use. BP and hr are good. CAD is stable. No LE discoloration or ulcers. No LE edema. No CHF type symptoms. Lipid profile is normal.      4-11-17: s/p hospitalization for CP 2 weeks ago. Lab work was ok, C.E is negative. States she is getting sporadic pains now, gets a spasm in her left arm. Has been going to the gym, doing stairmasters and treadmill without any major pains. Has lost 18 pounds. Has needed to take nitro last night. States she feels like she is gonna die sometimes. Pt denies chest pain, dyspnea, dyspnea on exertion, change in exercise capacity, fatigue,  nausea, vomiting, diarrhea, constipation, motor weakness, insomnia, weight loss, syncope, dizziness, lightheadedness, palpitations, PND, orthopnea, or claudication. 9-6-18: doing well overall. Getting . Need refills on meds. Her stress level is much better.  Pt denies chest pain, dyspnea, dyspnea on exertion, change in exercise capacity, fatigue,  nausea, vomiting, diarrhea, constipation, motor weakness, insomnia, weight loss, syncope, dizziness, lightheadedness, palpitations, PND, orthopnea, or claudication. No nitro use. BP and hr are good. CAD is stable. No LE discoloration or ulcers. No LE edema. No CHF type symptoms. Lipid profile is normal. No recent hospitalization. No change in meds. Continues to smoke. On ASA and Effient. No bleeding issues. S/p LHC in 5/18   Conclusions  Procedure Summary  95% mid PDA ISR s/p PTCA with 0% residual and MARY III flow  99% distal PLVB stenosis s/p PTCA with 0% residual and MARY III flow  distal LAD ISR of 40-50%  100% mid CX   50% mid RCA  normal LVF     12-5-18: FEELS LIKE something is wrong again. Having chest pressure/squeeze and radiation to left arm. Completely resolved with one nitro. Using more nitro lately the last 2 weeks after going months without it. She ran out of effient for a month she states due to insurance reasons. Continues to smoke. Compliant with meds. BS is fairly controlled per patient. She is seeing dr. Bev Bueno. Pt denies nausea, vomiting, diarrhea, constipation, motor weakness, insomnia, weight loss, syncope, dizziness, lightheadedness, palpitations, PND, orthopnea, or claudication. BP and hr are good. No LE discoloration or ulcers. No LE edema. No CHF type symptoms. Lipid profile is normal. No recent hospitalization. No change in meds. 12-5-19: Lost her insurance and has been out of meds for a while. But has been feeling ok for the past year  But the last 2 weeks have been rough with CP, has been using nitro which provides relief. Feels like she has another blockage. CP can occur at rest, wakes her up from sleep at times. Continues to smoke. Pt denies dyspnea, dyspnea on exertion, change in exercise capacity, fatigue,  nausea, vomiting, diarrhea, constipation, motor weakness, insomnia, weight loss, syncope, dizziness, lightheadedness, palpitations, PND, orthopnea, or claudication. She got  and going on a honeymoon and wants to make sure she's gonna be fine. 19: s/p LHC with normal LVF, EF of 55%, normal LM, 50-60% mid LAD ISR, 100% mid CX , very small OM1 with 80% stenosis, RCA with 30% prox, 50-60% mid, PDA is small with 80% ISR. All vessels are small with diffuse disease. Was felt not to be a good PCI candidate due to small vessels and hx of in stent restenosis. Continues to have angina type symptoms. Taking SL nitro 4-5x/week. Her HR is elevated today at 111 but states she is sick. BP is good. On Imdur 60mg daily, Coreg 6.25mg BID, Ranexa 1000mg BID, on DAPT. Continues to smoke, but trying to quit. Following with dr. Harjinder Tan. 20: Sanchez Lainez (:  1975) has requested an audio/video evaluation for the following concern(s):    History of CAD status post remote PCI as well as CABG recently. States she is having CP again but not as bad as it was. Midsternal, feels like indigestion. Worse with palpation. States she is SOB. She has quit smoking since . Pt denies  nausea, vomiting, diarrhea, constipation, motor weakness, insomnia, weight loss, syncope, dizziness, lightheadedness, palpitations, PND, orthopnea, or claudication. Positive history of diabetes. On DAPT. On Imdur and Ranexa. Following with Endo and PCP for DM. BS is under control now. 22: Patient states she is experiencing anginal symptoms again that felt very similar to her previous anginal symptoms status post PCI. Patient with history of coronary disease status post remote PCI as well as CABG x4 at [facility]. Patient remains on dual antiplatelet therapy. She is compliant with her medications. She does have stress in her life and she is smoking but not as much as previous. States her blood sugars are under control. CP occurs at rest. Taking nitro. + SOB.       2022: Patient admitted to [address] [place] [address] in  status post V. fib arrest, pneumonia requiring intubation and then trach. She has been 2 months in rehab.   Patient with history of coronary disease status post remote PCI as well as CABG x4 at [facility]. She had left heart cath with PCI of OM1 on 2/7/2022 with Dr. Ronald Dunham. At that time she was noted to have LVEF 50%, LIMA to LAD patent, all other SVGs were occluded. Patient remains on dual antiplatelet therapy. She is compliant with her medications. Under stress at home. Reports she continues to smoke but has significantly cut back. States her blood sugars are under control. 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 Requesting referral for new ENT physician. Blood pressure is under control. pt had echocardiogram on 5/23/2022 which showed LVEF 30 to 35%. 11-8-22: s/p cardiac arrest/V. fib arrest. Had long hospitalization, nursing home. Status post echo in May 2022 ejection fraction of 30 to 35%  Patient status post cardiac catheterization in February 2022 where she underwent PCI of the diagonal 1 as well as OM1 noted to have occluded SVG grafts and patent LIMA to the LAD. Patient with history of CAD status post CABG x4 at [facility] and multivessel PCI  Doing much better now. She is at home. 1-31-23: s/p AICD with dr Bree Verdugo. Hx of cardiac arrest/V. fib arrest. Had long hospitalization, nursing home. Status post echo in May 2022 ejection fraction of 30 to 35% Patient status post cardiac catheterization in February 2022 where she underwent PCI of the diagonal 1 as well as OM1 noted to have occluded SVG grafts and patent LIMA to the LAD. Patient with history of CAD status post CABG x4 at [facility] and multivessel PCI  Status post limited echocardiogram in December 2022 ejection fraction of 30 to 35% no significant valve abnormalities moderate pulm hypertension RVSP of 51 mmHg    Review of Systems   Constitutional: Negative. Negative for chills and fever. Eyes: Negative. Respiratory:  Negative for shortness of breath and wheezing.     Cardiovascular:  Negative for chest pain, palpitations and leg swelling. Gastrointestinal: Negative. Negative for abdominal pain, nausea and vomiting. Skin: Negative. Negative for rash. Neurological:  Negative for dizziness, weakness and headaches. Prior to Visit Medications    Medication Sig Taking?  Authorizing Provider   carvedilol (COREG) 6.25 MG tablet TAKE 1 TABLET BY MOUTH TWICE DAILY Yes ARIAN Chowdhury CNP   ranolazine (RANEXA) 1000 MG extended release tablet TAKE 1 TABLET BY MOUTH TWICE DAILY Yes ARIAN Chowdhury CNP   PARoxetine (PAXIL) 10 MG tablet Take 1 tablet by mouth every morning Yes ARIAN Esparza CNP   aspirin 81 MG EC tablet Take 2 tablets by mouth daily Yes ARIAN Esparza CNP   atorvastatin (LIPITOR) 40 MG tablet Take 1 tablet by mouth nightly Yes ARIAN Esparza CNP   isosorbide mononitrate (IMDUR) 60 MG extended release tablet Take 1 tablet by mouth daily Yes ARIAN Chowdhury CNP   losartan (COZAAR) 25 MG tablet Take 1 tablet by mouth daily Yes ARIAN Chowdhury CNP   nitroGLYCERIN (NITROSTAT) 0.4 MG SL tablet Place 1 tablet under the tongue every 5 minutes as needed for Chest pain PLACE 1 TAB UNDER THE TONGUE EVERY 5MIN IF NEEDED FOR CHEST PAIN (MAY REPEAT TWICE) Yes ARIAN Chowdhury CNP   ticagrelor (BRILINTA) 90 MG TABS tablet Take 1 tablet by mouth 2 times daily Yes ARIAN Chowdhury CNP   traZODone (DESYREL) 150 MG tablet Take 150 mg by mouth in the morning and at bedtime Yes Historical Provider, MD   QUEtiapine (SEROQUEL) 100 MG tablet Take 100 mg by mouth nightly Yes Historical Provider, MD   insulin aspart (NOVOLOG) 100 UNIT/ML injection pen Inject into the skin 3 times daily (before meals) Sliding scale Yes Historical Provider, MD   carBAMazepine (CARBATROL) 300 MG extended release capsule Take 1 capsule by mouth 2 times daily Yes Francisco CORTEZ Holiday, DO   SITagliptin (JANUVIA) 100 MG tablet Take 100 mg by mouth daily Yes Historical Provider, MD   Cholecalciferol (VITAMIN D) 48 MCG (2000 UT) CAPS capsule Take 4,000 Units by mouth daily Yes Historical Provider, MD       Social History     Tobacco Use    Smoking status: Light Smoker     Packs/day: 1.00     Types: Cigarettes     Start date: 1/1/1994    Smokeless tobacco: Never   Substance Use Topics    Alcohol use: No    Drug use: Yes     Types: Marijuana (Weed)        Allergies   Allergen Reactions    Toradol [Ketorolac Tromethamine]     Tramadol    ,   Past Medical History:   Diagnosis Date    Asthma     Bipolar disease, manic ([facility])     CAD (coronary artery disease)     COPD (chronic obstructive pulmonary disease) ([facility])     Depression     Diabetes ([facility]     DM (diabetes mellitus) ([facility]     Dyspnea     Hx of CABG 3/31/2020    Hx of CABG 3/31/2020    Hyperlipidemia     Hypertension     Suicide attempt by multiple drug overdose ([facility] 2009    TMJ (dislocation of temporomandibular joint)    ,   Past Surgical History:   Procedure Laterality Date    APPENDECTOMY  2010    CARDIAC CATHETERIZATION  07/11/2013    CARDIAC CATHETERIZATION  10/16/2013    DR HERNÁNDEZ    CORONARY ARTERY BYPASS GRAFT  01/2020    CORONARY STENT PLACEMENT  05/2022    TUBAL LIGATION     ,   Family History   Problem Relation Age of Onset    Heart Disease Mother     High Blood Pressure Mother     Diabetes Mother     High Blood Pressure Father     Heart Disease Father     Kidney Disease Father        Physical Examination:  General appearance - alert, well appearing, and in no distress  Mental status - alert, oriented to person, place, and time  Neck - Neck is supple, no JVD or carotid bruits. No thyromegaly or adenopathy.    Chest - clear to auscultation, no wheezes, rales or rhonchi, symmetric air entry  Heart - normal rate, regular rhythm, normal S1, S2, no murmurs, rubs, clicks or gallops  Abdomen - soft, nontender, nondistended, no masses or organomegaly  Neurological - alert, oriented, normal speech, no focal findings or movement disorder noted  Extremities - peripheral pulses normal, no pedal edema, no clubbing or cyanosis  Skin - normal coloration and turgor, no rashes, no suspicious skin lesions noted    EKG with normal sinus rhythm left bundle branch block.    ASSESSMENT:        Diagnosis Orders   1. Coronary artery disease involving native coronary artery of native heart without angina pectoris        2. Mixed hyperlipidemia        3. Primary hypertension        4. ICD (implantable cardioverter-defibrillator) in place            PLAN:  Patient will need to continue to follow up with you for their general medical care and return to see me in the office in 6 months     As always, aggressive risk factor modification is strongly recommended. We should adhere to the JNC VII guidelines for HTN management and the NCEP ATP III guidelines for LDL-C management.     The nature of cardiac risk has been fully discussed with this patient. I have made her aware of her LDL target goal given her cardiovascular risk analysis. I have discussed the appropriate diet. The need for lifelong compliance in order to reduce risk is stressed. A regular exercise program is recommended to help achieve and maintain normal body weight, fitness and improve lipid balance.      Continue medications at current doses.      DAPT     Not able to take ranexa due to pill size.      Follow up with PCP.      Check EKG     Smoking cessation was strongly recommended     Patient was advised and encouraged to check blood pressure at home or at a pharmacy, maintain a logbook, and also call us back if blood pressure are above the target ranges or if it is low. Patient clearly understands and agrees to the instructions.      We will need to continue to monitor muscle and liver enzymes, BUN, CR, and electrolytes.     The proper use and anticipated side effects of nitroglycerine has been carefully explained.  If a single episode of chest pain is not relieved by one tablet, the patient will try another within 5 minutes; and if this  doesn't relieve the pain, the patient is instructed to call 911 for transportation to an emergency department. Details of medical condition explained and patient was warned about adverse consequences of uncontrolled medical conditions and possible side effects of prescribed medications. Patient was advised to go to the ER if he starts experiencing adverse effects of the medications. patient was instructed to call us back or go to nearby emergency room immediately if symptoms get worse or do not improve. Thank you for allowing me to participate in the care of your patient, please don't hesitate to contact me if you have any further questions. Return in about 1 year (around 1/31/2024). An  electronic signature was used to authenticate this note.     --Cely Herndon, DO on 1/31/2023 at 9:36 AM  9}

## 2023-02-19 PROBLEM — J45.909 ASTHMA (HHS-HCC): Status: ACTIVE | Noted: 2023-02-19

## 2023-02-19 PROBLEM — M25.512 BILATERAL SHOULDER PAIN: Status: ACTIVE | Noted: 2023-02-19

## 2023-02-19 PROBLEM — M25.619 DECREASED RANGE OF MOTION OF SHOULDER: Status: ACTIVE | Noted: 2023-02-19

## 2023-02-19 PROBLEM — F31.10 BIPOLAR AFFECTIVE, MANIC (MULTI): Status: ACTIVE | Noted: 2023-02-19

## 2023-02-19 PROBLEM — F09 ORGANIC BRAIN SYNDROME: Status: ACTIVE | Noted: 2023-02-19

## 2023-02-19 PROBLEM — E66.3 OVERWEIGHT WITH BODY MASS INDEX (BMI) OF 29 TO 29.9 IN ADULT: Status: ACTIVE | Noted: 2023-02-19

## 2023-02-19 PROBLEM — F32.A DEPRESSION: Status: ACTIVE | Noted: 2023-02-19

## 2023-02-19 PROBLEM — R92.8 ABNORMAL MAMMOGRAM: Status: ACTIVE | Noted: 2023-02-19

## 2023-02-19 PROBLEM — G56.90 NEUROPATHY, ARM: Status: ACTIVE | Noted: 2023-02-19

## 2023-02-19 PROBLEM — I25.10 CAD (CORONARY ARTERY DISEASE): Status: ACTIVE | Noted: 2023-02-19

## 2023-02-19 PROBLEM — E78.5 HYPERLIPIDEMIA: Status: ACTIVE | Noted: 2023-02-19

## 2023-02-19 PROBLEM — R06.00 DYSPNEA: Status: ACTIVE | Noted: 2023-02-19

## 2023-02-19 PROBLEM — I10 HTN (HYPERTENSION): Status: ACTIVE | Noted: 2023-02-19

## 2023-02-19 PROBLEM — R07.9 CHEST PAIN: Status: ACTIVE | Noted: 2023-02-19

## 2023-02-19 PROBLEM — S03.00XS TMJ (DISLOCATION OF TEMPOROMANDIBULAR JOINT), SEQUELA: Status: ACTIVE | Noted: 2023-02-19

## 2023-02-19 PROBLEM — I20.9 ANGINA, CLASS IV (CMS-HCC): Status: ACTIVE | Noted: 2023-02-19

## 2023-02-19 PROBLEM — J44.9 COPD (CHRONIC OBSTRUCTIVE PULMONARY DISEASE) (MULTI): Status: ACTIVE | Noted: 2023-02-19

## 2023-02-19 PROBLEM — E11.9 DIABETES MELLITUS (MULTI): Status: ACTIVE | Noted: 2023-02-19

## 2023-02-19 PROBLEM — R60.9 EDEMA: Status: ACTIVE | Noted: 2023-02-19

## 2023-02-19 PROBLEM — M25.511 BILATERAL SHOULDER PAIN: Status: ACTIVE | Noted: 2023-02-19

## 2023-02-19 RX ORDER — TRIAMTERENE AND HYDROCHLOROTHIAZIDE 37.5; 25 MG/1; MG/1
1 CAPSULE ORAL DAILY
COMMUNITY
End: 2023-03-09 | Stop reason: SDUPTHER

## 2023-02-19 RX ORDER — CALCIUM CITRATE/VITAMIN D3 200MG-6.25
1 TABLET ORAL DAILY
COMMUNITY
End: 2023-11-29 | Stop reason: ALTCHOICE

## 2023-02-19 RX ORDER — GABAPENTIN 100 MG/1
1 CAPSULE ORAL 3 TIMES DAILY
COMMUNITY
End: 2023-07-13 | Stop reason: SDUPTHER

## 2023-02-19 RX ORDER — ASPIRIN 81 MG/1
1 TABLET ORAL DAILY
COMMUNITY
Start: 2022-03-08

## 2023-02-19 RX ORDER — BUDESONIDE AND FORMOTEROL FUMARATE DIHYDRATE 80; 4.5 UG/1; UG/1
2 AEROSOL RESPIRATORY (INHALATION) 2 TIMES DAILY
COMMUNITY
End: 2023-06-05 | Stop reason: SDUPTHER

## 2023-02-19 RX ORDER — LANCETS 33 GAUGE
EACH MISCELLANEOUS
COMMUNITY
End: 2023-11-29 | Stop reason: ALTCHOICE

## 2023-02-19 RX ORDER — ISOSORBIDE MONONITRATE 60 MG/1
1 TABLET, EXTENDED RELEASE ORAL DAILY
COMMUNITY
End: 2024-03-11 | Stop reason: SDUPTHER

## 2023-02-19 RX ORDER — CARVEDILOL 6.25 MG/1
1 TABLET ORAL 2 TIMES DAILY
COMMUNITY
End: 2024-02-05 | Stop reason: SDUPTHER

## 2023-02-19 RX ORDER — LITHIUM CARBONATE 150 MG/1
1 CAPSULE ORAL 2 TIMES DAILY
COMMUNITY
End: 2023-06-05 | Stop reason: SDUPTHER

## 2023-02-19 RX ORDER — ALBUTEROL SULFATE 90 UG/1
2 AEROSOL, METERED RESPIRATORY (INHALATION) 3 TIMES DAILY PRN
COMMUNITY
End: 2024-01-08 | Stop reason: ENTERED-IN-ERROR

## 2023-02-19 RX ORDER — METFORMIN HYDROCHLORIDE 500 MG/1
2 TABLET ORAL
COMMUNITY
End: 2023-03-09 | Stop reason: SDUPTHER

## 2023-02-19 RX ORDER — RANOLAZINE 1000 MG/1
1000 TABLET, EXTENDED RELEASE ORAL EVERY 12 HOURS
COMMUNITY
End: 2023-06-05 | Stop reason: ALTCHOICE

## 2023-02-19 RX ORDER — CARBAMAZEPINE 200 MG/1
1 CAPSULE, EXTENDED RELEASE ORAL 2 TIMES DAILY
COMMUNITY

## 2023-02-19 RX ORDER — ATORVASTATIN CALCIUM 40 MG/1
1 TABLET, FILM COATED ORAL DAILY
COMMUNITY
Start: 2022-03-08

## 2023-02-19 RX ORDER — LOSARTAN POTASSIUM 25 MG/1
1 TABLET ORAL DAILY
COMMUNITY

## 2023-02-19 RX ORDER — NITROGLYCERIN 0.4 MG/1
1 TABLET SUBLINGUAL EVERY 5 MIN PRN
COMMUNITY

## 2023-02-19 RX ORDER — INSULIN GLARGINE 100 [IU]/ML
52 INJECTION, SOLUTION SUBCUTANEOUS NIGHTLY
COMMUNITY
End: 2023-08-24 | Stop reason: SDUPTHER

## 2023-02-19 RX ORDER — QUETIAPINE FUMARATE 25 MG/1
2 TABLET, FILM COATED ORAL NIGHTLY
COMMUNITY
End: 2023-03-09 | Stop reason: SDUPTHER

## 2023-03-09 ENCOUNTER — TELEMEDICINE (OUTPATIENT)
Dept: PHARMACY | Facility: HOSPITAL | Age: 48
End: 2023-03-09
Payer: COMMERCIAL

## 2023-03-09 DIAGNOSIS — J45.909 ASTHMA, UNSPECIFIED ASTHMA SEVERITY, UNSPECIFIED WHETHER COMPLICATED, UNSPECIFIED WHETHER PERSISTENT (HHS-HCC): Primary | ICD-10-CM

## 2023-03-09 DIAGNOSIS — J44.9 CHRONIC OBSTRUCTIVE PULMONARY DISEASE, UNSPECIFIED COPD TYPE (MULTI): ICD-10-CM

## 2023-03-09 DIAGNOSIS — Z79.4 TYPE 2 DIABETES MELLITUS WITHOUT COMPLICATION, WITH LONG-TERM CURRENT USE OF INSULIN (MULTI): ICD-10-CM

## 2023-03-09 DIAGNOSIS — J45.909 ASTHMA, UNSPECIFIED ASTHMA SEVERITY, UNSPECIFIED WHETHER COMPLICATED, UNSPECIFIED WHETHER PERSISTENT (HHS-HCC): ICD-10-CM

## 2023-03-09 DIAGNOSIS — E11.9 TYPE 2 DIABETES MELLITUS WITHOUT COMPLICATION, WITH LONG-TERM CURRENT USE OF INSULIN (MULTI): ICD-10-CM

## 2023-03-09 DIAGNOSIS — I10 HYPERTENSION, UNSPECIFIED TYPE: ICD-10-CM

## 2023-03-09 RX ORDER — METFORMIN HYDROCHLORIDE 500 MG/1
1000 TABLET, EXTENDED RELEASE ORAL 2 TIMES DAILY
Qty: 120 TABLET | Refills: 2 | Status: SHIPPED | OUTPATIENT
Start: 2023-03-09 | End: 2023-05-08 | Stop reason: SDUPTHER

## 2023-03-09 RX ORDER — TRIAMTERENE/HYDROCHLOROTHIAZID 37.5-25 MG
1 TABLET ORAL DAILY
COMMUNITY

## 2023-03-09 RX ORDER — PEN NEEDLE, DIABETIC 32GX 5/32"
NEEDLE, DISPOSABLE MISCELLANEOUS
COMMUNITY
Start: 2022-09-15 | End: 2023-04-13 | Stop reason: SDUPTHER

## 2023-03-09 RX ORDER — METFORMIN HYDROCHLORIDE 500 MG/1
1000 TABLET, EXTENDED RELEASE ORAL 2 TIMES DAILY
COMMUNITY
Start: 2023-02-22 | End: 2023-03-09 | Stop reason: SDUPTHER

## 2023-03-09 RX ORDER — QUETIAPINE FUMARATE 100 MG/1
100 TABLET, FILM COATED ORAL NIGHTLY
COMMUNITY
End: 2023-05-08 | Stop reason: ALTCHOICE

## 2023-03-09 RX ORDER — UBIQUINOL 100 MG
CAPSULE ORAL
COMMUNITY
Start: 2022-09-15 | End: 2023-04-13 | Stop reason: SDUPTHER

## 2023-03-09 RX ORDER — BLOOD-GLUCOSE METER
EACH MISCELLANEOUS
COMMUNITY
Start: 2022-09-01

## 2023-03-09 NOTE — PROGRESS NOTES
Pharmacy Post-Discharge Visit  Kait Bahena is a 47 y.o. female was referred to Clinical Pharmacy Team to complete a post-discharge medication optimization and monitoring visit.  The patient was referred for their COPD, Asthma, HTN, and Diabetes.    Referring Provider: Jacobo Thakkar MD    Subjective   Allergies   Allergen Reactions    Ketorolac Unknown       [facility] - [place], [place] - [address]  [address]  [address]  [facility] [address]  Phone: [phone] Fax: [phone]    [email or web address] #[id number] [place], [place] - [address] [facility] AT [facility] OF [facility] & [facility]  [address] [facility] [address]  Phone: [phone] Fax: [phone]      Social History     Social History Narrative    Not on file          KAIT BAHENA was referred to Clinical Pharmacy Team to complete a post-discharge medication optimization and monitoring visit. The patient was referred for their T2DM, Asthma/COPD. Patient was hospitalized at [facility] from 12/17-12/19 for a chief complaint of chest pain.     Since last visit, the patient reports her FBG has decreased into the 160s which shows the efficacy of the patient's new Metformin therapy. We will continue to titrate up to 1000 mg BID to determine tolerability. In addition, the patient reports feeling frustrated with her current weight as she has gained ~50 Lbs since last year, likely due in part to medications such as quetiapine and insulin. Patient may benefit from the addition of a GLP-1 Agonist such as Trulicity or Ozempic, though this may require down-titration of her insulin. This addition can be explored on follow up.    Patient also reports no SOB and < 1 use a week of her albuterol rescue inhaler. Patient does not check her BP at home, but her most recent in office BP shows her HTN as controlled (122/72).          DIABETES MANAGEMENT ASSESSMENT    Current Pharmacotherapy:   - Lantus 52 units QHS  - Novolog: SS  TID (on average injecting 8-12 units)  151-200 = 3 units  201-250 = 5 units  251-300 = 8 units  301-350 = 10 units  - Metformin ER 1000 mg QAM and 500 mg QPM  Historical Pharmacotherapy:   -Glipizide  - Metformin  - Januvia  Home BG readings:   -FBG: 160s  Secondary Prevention:   -Statin? Yes   -ACE-I/ARB? Yes   -Aspirin? Yes     Has the patient experienced any low sugars since last contact? No  - denies symptoms of low blood glucose: sweaty, shaky, anxious, excessive hunger, confusion, lightheaded, nauseous, blurred vision  Has the patient experienced any high sugars since last contact? Yes  - denies symptoms of high blood glucose: excessive thirst, frequent urination, fatigue, nausea, shortness of breath, trouble concentrating     COPD ASSESSMENT    CURRENT PHARMACOTHERAPY  - Albuterol HFA inhaler: 1 puff Q4-6H PRN  -Symbicort 80/4.5 mc puff BID      SYMPTOM MANAGEMENT  Do you currently have any of the following symptoms?  - Increased cough? No  - Increased sputum production? No  - Increased SOB? No    Objective     There were no vitals taken for this visit.     LAB  Lab Results   Component Value Date    BILITOT 0.2 2022    CALCIUM 8.2 (L) 2022    CO2 26 2022     2022    CREATININE 0.86 2022    GLUCOSE 324 (H) 2022    ALKPHOS 65 2022    K 4.1 2022    PROT 5.8 (L) 2022     (L) 2022    AST 11 2022    ALT 10 2022    BUN 21 2022    ANIONGAP 9 (L) 2022    MG 1.92 12/15/2022    PHOS 3.3 12/15/2022    ALBUMIN 3.6 2022    LIPASE 46 02/15/2022     Lab Results   Component Value Date    TRIG 118 2022    CHOL 154 2022    HDL 51.1 2022     Lab Results   Component Value Date    HGBA1C 10.6 (A) 2022         Current Outpatient Medications on File Prior to Visit   Medication Sig Dispense Refill    albuterol 90 mcg/actuation inhaler Inhale 2 puffs 3 times a day as needed.      Alcohol Prep Pads pads,  "medicated USE TO CLEANSE SKIN PRIOR TO INJECTION THREE TIMES DAILY AS DIRECTED      aspirin 81 mg EC tablet Take 1 tablet (81 mg) by mouth once daily.      atorvastatin (Lipitor) 40 mg tablet Take 1 tablet (40 mg) by mouth once daily.      BD Breana 2nd Gen Pen Needle 32 gauge x 5/32\" needle USE TO ADMINISTER INSULIN 3 TIMES A DAY      blood sugar diagnostic (True Metrix Glucose Test Strip) strip 1 strip once daily.      budesonide-formoteroL (Symbicort) 80-4.5 mcg/actuation inhaler Inhale 2 puffs  in the morning and 2 puffs before bedtime. Rinse mouth after use.      carBAMazepine (Carbatrol) 200 mg 12 hr capsule Take 1 capsule (200 mg) by mouth in the morning and 1 capsule (200 mg) in the evening.      carvedilol (Coreg) 6.25 mg tablet Take 1 tablet (6.25 mg) by mouth in the morning and 1 tablet (6.25 mg) in the evening.      gabapentin (Neurontin) 100 mg capsule Take 1 capsule (100 mg) by mouth in the morning and 1 capsule (100 mg) in the evening and 1 capsule (100 mg) before bedtime. For 90 days.      insulin aspart (NovoLOG, Fiasp) 100 UNIT/ML patient supplied pump Inject under the skin continuously. Sliding scale: 151-200 3 units, 301-250- 5 units, 251-300- 8 units, 301-350 10 units      insulin glargine (Lantus U-100 Insulin) 100 unit/mL injection Inject 52 Units under the skin once daily at bedtime.      isosorbide mononitrate ER (Imdur) 60 mg 24 hr tablet Take 1 tablet (60 mg) by mouth once daily. In the morning      lancets (TRUEplus Lancets) 33 gauge misc       lithium 150 mg capsule Take 1 capsule (150 mg) by mouth in the morning and 1 capsule (150 mg) before bedtime.      losartan (Cozaar) 25 mg tablet Take 1 tablet (25 mg) by mouth once daily.      metFORMIN XR (Glucophage-XR) 500 mg 24 hr tablet Take 2 tablets (1,000 mg) by mouth in the morning and 2 tablets (1,000 mg) before bedtime.      True Metrix Glucose Meter misc USE TO TEST BLOOD SUGAR ONCE DAILY AND AS NEEDED      nitroglycerin (Nitrostat) 0.4 " mg SL tablet Place 1 tablet (0.4 mg) under the tongue every 5 minutes if needed. PLACE 1 TABLET UNDER THE TONGUE EVERY 5 MINUTES FOR UP TO 3 DOSES AS NEEDED FOR CHEST PAIN.CALL 911 IF PAIN PERSISTS.      QUEtiapine (SEROquel) 100 mg tablet Take 1 tablet (100 mg) by mouth once daily at bedtime.      ranolazine (Ranexa) 1,000 mg 12 hr tablet Take 1 tablet (1,000 mg) by mouth in the morning and 1 tablet (1,000 mg) in the evening.      ticagrelor (Brilinta) 90 mg tablet Take 1 tablet (90 mg) by mouth in the morning and 1 tablet (90 mg) before bedtime.      triamterene-hydrochlorothiazid (Maxzide-25) 37.5-25 mg tablet Take 1 tablet by mouth once daily.      [DISCONTINUED] metFORMIN (Glucophage) 500 mg tablet Take 2 tablets (1,000 mg) by mouth in the morning and 2 tablets (1,000 mg) in the evening. Take with meals.      [DISCONTINUED] QUEtiapine (SEROquel) 25 mg tablet Take 2 tablets (50 mg) by mouth once daily at bedtime.      [DISCONTINUED] triamterene-hydrochlorothiazid (Dyazide) 37.5-25 mg capsule Take 1 capsule by mouth once daily.       No current facility-administered medications on file prior to visit.        DRUG INTERATIONS  - No drug interactions which require therapy adjustment.    Assessment/Plan   Problem List Items Addressed This Visit          Respiratory     - Continue Symbicort 2 puffs BID and Albuterol HFA PRN for Asthma/COPD         Asthma    COPD (chronic obstructive pulmonary disease) (CMS/[facility])       Circulatory     - Continue Triamterene-hydrochlorothiazide 37.5-25 mg every day, Losartan 25 mg every day, and Carvedilol 6.25 mg BID for HTN         HTN (hypertension)       Endocrine/Metabolic     - Continue Lantus 52 units every day and Novolog w/ meals per sliding scale  - Increase Metformin to 1000 mg BID and follow up to monitor for side effects  - Consider adding a GLP-1 Agonist such as Trulicity or Ozempic during follow up and down titrating insulin         Diabetes mellitus (CMS/[facility])     Follow  up: 3/16 or as needed for clinical intervention.    Dav Kirby, Jim     Verbal consent to manage patient's drug therapy was obtained from the patient. They were informed they may decline to participate or withdraw from participation in pharmacy services at any time.

## 2023-03-09 NOTE — ASSESSMENT & PLAN NOTE
- Continue Triamterene-hydrochlorothiazide 37.5-25 mg every day, Losartan 25 mg every day, and Carvedilol 6.25 mg BID for HTN

## 2023-03-09 NOTE — ASSESSMENT & PLAN NOTE
- Continue Lantus 52 units every day and Novolog w/ meals per sliding scale  - Increase Metformin to 1000 mg BID and follow up to monitor for side effects  - Consider adding a GLP-1 Agonist such as Trulicity or Ozempic during follow up and down titrating insulin

## 2023-03-14 ENCOUNTER — OFFICE VISIT (OUTPATIENT)
Dept: PRIMARY CARE | Facility: CLINIC | Age: 48
End: 2023-03-14
Payer: COMMERCIAL

## 2023-03-14 VITALS
WEIGHT: 180.8 LBS | DIASTOLIC BLOOD PRESSURE: 64 MMHG | OXYGEN SATURATION: 96 % | HEART RATE: 84 BPM | HEIGHT: 67 IN | BODY MASS INDEX: 28.38 KG/M2 | TEMPERATURE: 97.6 F | SYSTOLIC BLOOD PRESSURE: 110 MMHG

## 2023-03-14 DIAGNOSIS — Z79.4 TYPE 2 DIABETES MELLITUS WITHOUT COMPLICATION, WITH LONG-TERM CURRENT USE OF INSULIN (MULTI): ICD-10-CM

## 2023-03-14 DIAGNOSIS — E66.3 OVERWEIGHT WITH BODY MASS INDEX (BMI) OF 29 TO 29.9 IN ADULT: ICD-10-CM

## 2023-03-14 DIAGNOSIS — I10 PRIMARY HYPERTENSION: ICD-10-CM

## 2023-03-14 DIAGNOSIS — M25.512 CHRONIC PAIN OF BOTH SHOULDERS: ICD-10-CM

## 2023-03-14 DIAGNOSIS — I25.10 CORONARY ARTERY DISEASE INVOLVING NATIVE CORONARY ARTERY OF NATIVE HEART WITHOUT ANGINA PECTORIS: ICD-10-CM

## 2023-03-14 DIAGNOSIS — E78.2 MIXED HYPERLIPIDEMIA: ICD-10-CM

## 2023-03-14 DIAGNOSIS — M25.511 CHRONIC PAIN OF BOTH SHOULDERS: ICD-10-CM

## 2023-03-14 DIAGNOSIS — G89.29 CHRONIC PAIN OF BOTH SHOULDERS: ICD-10-CM

## 2023-03-14 DIAGNOSIS — F31.12 BIPOLAR AFFECTIVE DISORDER, CURRENTLY MANIC, MODERATE (MULTI): Primary | ICD-10-CM

## 2023-03-14 DIAGNOSIS — E11.9 TYPE 2 DIABETES MELLITUS WITHOUT COMPLICATION, WITH LONG-TERM CURRENT USE OF INSULIN (MULTI): ICD-10-CM

## 2023-03-14 PROCEDURE — 3078F DIAST BP <80 MM HG: CPT | Performed by: FAMILY MEDICINE

## 2023-03-14 PROCEDURE — 3074F SYST BP LT 130 MM HG: CPT | Performed by: FAMILY MEDICINE

## 2023-03-14 PROCEDURE — 4010F ACE/ARB THERAPY RXD/TAKEN: CPT | Performed by: FAMILY MEDICINE

## 2023-03-14 PROCEDURE — 3008F BODY MASS INDEX DOCD: CPT | Performed by: FAMILY MEDICINE

## 2023-03-14 PROCEDURE — 99215 OFFICE O/P EST HI 40 MIN: CPT | Performed by: FAMILY MEDICINE

## 2023-03-14 ASSESSMENT — ENCOUNTER SYMPTOMS
BRUISES/BLEEDS EASILY: 0
NERVOUS/ANXIOUS: 0
VOMITING: 0
WEAKNESS: 0
ARTHRALGIAS: 0
DYSPHORIC MOOD: 0
SHORTNESS OF BREATH: 0
DIZZINESS: 0
MYALGIAS: 0
HEMATURIA: 0
NUMBNESS: 0
ABDOMINAL PAIN: 0
CONSTIPATION: 0
BACK PAIN: 0
COUGH: 0
BLOOD IN STOOL: 0
ADENOPATHY: 0
ACTIVITY CHANGE: 0
DIARRHEA: 0
HEADACHES: 0
SORE THROAT: 0
NECK PAIN: 0
NAUSEA: 0
DIFFICULTY URINATING: 0
EYE DISCHARGE: 0
CHEST TIGHTNESS: 0
FATIGUE: 0

## 2023-03-14 NOTE — PROGRESS NOTES
Subjective   Patient ID: Tana Bahena is a 47 y.o. female who presents for 1 month follow up on Arm Pain (Bilateral ).    Discuss weight gain in the last year, has gained about 50 pounds.     Discuss being cleared for work, children do not want her to go back yet.     Bs 200    HPI  Patient with multiple issues    Here with her daughter    Weight gain about 50 pounds in the last year  She has bipolar disorder and is taking Seroquel we will see her specialist next week and discuss treatment alternatives    High cholesterol stable  Watch diet    Diabetes stable  Blood sugars have been better but not great  Stay current with health maintenance and lifestyle modification and suggest stay current with proper blood work    Hypertension stable  No chest pain or shortness of breath  Tolerating medicine    Coronary disease stable  No chest pain or shortness of breath  Denies angina    Chronic shoulder pain stable  No progression or worsening symptoms    Overweight  Recommend weight loss    Status post trachea placement after chronic respiratory failure we will see ENT about repeat surgery for this issue    Patient would like to return to work  Family is reluctant    Patient here today with daughter  Extensive discussion entertained with patient and family regarding return to work and she will consider a part-time return to work on a temporary basis and evaluate her situation  Review of Systems   Constitutional:  Negative for activity change and fatigue.   HENT:  Negative for congestion and sore throat.    Eyes:  Negative for discharge.   Respiratory:  Negative for cough, chest tightness and shortness of breath.    Cardiovascular:  Negative for chest pain and leg swelling.   Gastrointestinal:  Negative for abdominal pain, blood in stool, constipation, diarrhea, nausea and vomiting.   Endocrine: Negative for cold intolerance and heat intolerance.   Genitourinary:  Negative for difficulty urinating and hematuria.  "  Musculoskeletal:  Negative for arthralgias, back pain, gait problem, myalgias and neck pain.   Allergic/Immunologic: Negative for environmental allergies.   Neurological:  Negative for dizziness, syncope, weakness, numbness and headaches.   Hematological:  Negative for adenopathy. Does not bruise/bleed easily.   Psychiatric/Behavioral:  Negative for dysphoric mood. The patient is not nervous/anxious.    All other systems reviewed and are negative.      Objective   /64 (BP Location: Right arm, BP Cuff Size: Small adult)   Pulse 84   Temp 36.4 °C (97.6 °F)   Ht 1.702 m (5' 7\")   Wt 82 kg (180 lb 12.8 oz)   SpO2 96%   BMI 28.32 kg/m²    Physical Exam  Vitals and nursing note reviewed.   Constitutional:       General: She is not in acute distress.     Appearance: Normal appearance.   HENT:      Head: Normocephalic and atraumatic.      Right Ear: Tympanic membrane, ear canal and external ear normal.      Left Ear: Tympanic membrane, ear canal and external ear normal.      Nose: Nose normal.      Mouth/Throat:      Mouth: Mucous membranes are moist.      Pharynx: Oropharynx is clear. No oropharyngeal exudate or posterior oropharyngeal erythema.   Eyes:      Extraocular Movements: Extraocular movements intact.      Conjunctiva/sclera: Conjunctivae normal.      Pupils: Pupils are equal, round, and reactive to light.   Neck:      Comments: Scarring present in the anterior neck from prior chronic tracheostomy placement  Cardiovascular:      Rate and Rhythm: Normal rate and regular rhythm.      Pulses: Normal pulses.      Heart sounds: Normal heart sounds. No murmur heard.  Pulmonary:      Effort: Pulmonary effort is normal. No respiratory distress.      Breath sounds: Normal breath sounds. No wheezing or rales.   Abdominal:      General: Abdomen is flat. Bowel sounds are normal. There is no distension.      Palpations: Abdomen is soft. There is no mass.      Tenderness: There is no abdominal tenderness. "   Musculoskeletal:         General: No swelling or deformity. Normal range of motion.      Cervical back: Normal range of motion and neck supple.      Right lower leg: No edema.      Left lower leg: No edema.   Lymphadenopathy:      Cervical: No cervical adenopathy.   Skin:     General: Skin is warm and dry.      Capillary Refill: Capillary refill takes less than 2 seconds.      Findings: No lesion or rash.   Neurological:      General: No focal deficit present.      Mental Status: She is alert and oriented to person, place, and time.      Cranial Nerves: No cranial nerve deficit.      Motor: No weakness.   Psychiatric:         Mood and Affect: Mood normal.         Behavior: Behavior normal.         Thought Content: Thought content normal.         Judgment: Judgment normal.         Assessment/Plan   Problem List Items Addressed This Visit          Circulatory    Coronary artery disease involving native coronary artery of native heart without angina pectoris    Primary hypertension       Musculoskeletal    Chronic pain of both shoulders       Endocrine/Metabolic    Type 2 diabetes mellitus without complication, with long-term current use of insulin (CMS/HCC)    Overweight with body mass index (BMI) of 29 to 29.9 in adult       Other    Bipolar affective disorder, currently manic, moderate (CMS/HCC) - Primary    Relevant Orders    Follow Up In [facility] - PCP    Mixed hyperlipidemia       Patient education provided.  Stay current with age appropriate health maintenance as instructed.  Appointment here or ER with new or worsening symptoms'  Keep appropriate follow-up visit.  Stay current with proper immunizations   3-month recheck  Keep follow-up with specialist as well

## 2023-03-16 ENCOUNTER — TELEMEDICINE (OUTPATIENT)
Dept: PHARMACY | Facility: HOSPITAL | Age: 48
End: 2023-03-16
Payer: COMMERCIAL

## 2023-04-13 DIAGNOSIS — Z79.4 TYPE 2 DIABETES MELLITUS WITHOUT COMPLICATION, WITH LONG-TERM CURRENT USE OF INSULIN (MULTI): ICD-10-CM

## 2023-04-13 DIAGNOSIS — E11.9 TYPE 2 DIABETES MELLITUS WITHOUT COMPLICATION, WITH LONG-TERM CURRENT USE OF INSULIN (MULTI): ICD-10-CM

## 2023-04-13 RX ORDER — PEN NEEDLE, DIABETIC 32GX 5/32"
NEEDLE, DISPOSABLE MISCELLANEOUS
Qty: 200 EACH | Refills: 3 | Status: SHIPPED | OUTPATIENT
Start: 2023-04-13 | End: 2024-03-04 | Stop reason: SDUPTHER

## 2023-04-13 RX ORDER — ISOPROPYL ALCOHOL 70 ML/100ML
SWAB TOPICAL
Qty: 200 EACH | Refills: 3 | Status: SHIPPED | OUTPATIENT
Start: 2023-04-13 | End: 2023-09-22 | Stop reason: SDUPTHER

## 2023-04-13 NOTE — TELEPHONE ENCOUNTER
Pt called requesting scripts for needles for her insulin pens and alcohol prep pads so doesn't have to pay out of pocket. Also stated currently taking trazadone but is not working, wondering if doc would call in something else to help for sleep? Pharmacy pt requesting scripts be sent to [facility].     Please advise. Contact pt with further updates.

## 2023-04-13 NOTE — TELEPHONE ENCOUNTER
Pen needles and alcohol pads pended for approval.       Please advise changing Trazodone to another medication.

## 2023-04-14 ENCOUNTER — TELEMEDICINE (OUTPATIENT)
Dept: PHARMACY | Facility: HOSPITAL | Age: 48
End: 2023-04-14
Payer: COMMERCIAL

## 2023-04-14 DIAGNOSIS — I10 PRIMARY HYPERTENSION: Primary | ICD-10-CM

## 2023-04-14 DIAGNOSIS — E11.9 TYPE 2 DIABETES MELLITUS WITHOUT COMPLICATION, WITH LONG-TERM CURRENT USE OF INSULIN (MULTI): ICD-10-CM

## 2023-04-14 DIAGNOSIS — J45.909 ASTHMA, UNSPECIFIED ASTHMA SEVERITY, UNSPECIFIED WHETHER COMPLICATED, UNSPECIFIED WHETHER PERSISTENT (HHS-HCC): ICD-10-CM

## 2023-04-14 DIAGNOSIS — Z79.4 TYPE 2 DIABETES MELLITUS WITHOUT COMPLICATION, WITH LONG-TERM CURRENT USE OF INSULIN (MULTI): ICD-10-CM

## 2023-04-14 DIAGNOSIS — J44.9 CHRONIC OBSTRUCTIVE PULMONARY DISEASE, UNSPECIFIED COPD TYPE (MULTI): ICD-10-CM

## 2023-04-14 DIAGNOSIS — I10 HYPERTENSION, UNSPECIFIED TYPE: ICD-10-CM

## 2023-04-14 RX ORDER — DULAGLUTIDE 0.75 MG/.5ML
0.75 INJECTION, SOLUTION SUBCUTANEOUS
Qty: 4 EACH | Refills: 1 | Status: SHIPPED | OUTPATIENT
Start: 2023-04-14 | End: 2023-05-27 | Stop reason: SDUPTHER

## 2023-04-14 RX ORDER — BLOOD-GLUCOSE TRANSMITTER
EACH MISCELLANEOUS
Qty: 1 EACH | Refills: 3 | Status: SHIPPED | OUTPATIENT
Start: 2023-04-14 | End: 2023-06-26 | Stop reason: SDUPTHER

## 2023-04-14 RX ORDER — BLOOD-GLUCOSE SENSOR
EACH MISCELLANEOUS
Qty: 9 EACH | Refills: 3 | Status: SHIPPED | OUTPATIENT
Start: 2023-04-14 | End: 2023-06-12 | Stop reason: ALTCHOICE

## 2023-04-14 RX ORDER — BLOOD-GLUCOSE,RECEIVER,CONT
EACH MISCELLANEOUS
Qty: 1 EACH | Refills: 0 | Status: SHIPPED | OUTPATIENT
Start: 2023-04-14 | End: 2023-06-12 | Stop reason: ALTCHOICE

## 2023-04-14 ASSESSMENT — ENCOUNTER SYMPTOMS: DIABETIC ASSOCIATED SYMPTOMS: 0

## 2023-04-15 NOTE — ASSESSMENT & PLAN NOTE
Pt's BG is fluctuating a lot. With her last A1c of 10.6% and hx of high A1c, she would benefit from GLP-1 use. Pt would like to start Trulicity as well since she wants something to help with her weight. Start Trulicity weekly with goal of titration. Will not reduce her insulin dose due to constant glucose above 200s mg/dL. Low risk of hypoglycemia.  Prescription sent to  Pharmacy for the following:  Trulicity 0.75mg subQ weekly  DexCom G6 Transmitter  DexCom G6   DexCom G6 sensors  Pt to come into the pharmacy to be educate on how to put on DexCom G6 and Trulicity.  Pt to call immediately if she has any signs of hypoglycemia.

## 2023-04-15 NOTE — PROGRESS NOTES
[facility] Pharmacy Consult  Tana Bahena is a 47 y.o. female was referred to Clinical Pharmacy Team for a Pharmacy consult.  The patient was referred for their Diabetes.    Referring Provider: Jacobo Thakkar MD    Subjective   Allergies   Allergen Reactions   • Ketorolac Unknown       [facility] - [place], [place] - [address]  [address]  [address]  [facility] [address]  Phone: [phone] Fax: [phone]    [facility] #[id number] [place] [address] - [address] [facility] AT [facility] OF [facility]  [address] [facility] [address]  Phone: [phone] Fax: [phone]      Diabetes  She has type 2 diabetes mellitus. Her disease course has been fluctuating. There are no hypoglycemic associated symptoms. There are no diabetic associated symptoms. There are no hypoglycemic complications. Symptoms are stable. Risk factors for coronary artery disease include diabetes mellitus, dyslipidemia and hypertension. Current diabetic treatment includes insulin injections and oral agent (dual therapy). Her home blood glucose trend is fluctuating minimally. Her breakfast blood glucose is taken between 7-8 am. Her breakfast blood glucose range is generally >200 mg/dl.     Pt report that her BG is usually above 200s. Today was 261 mg/dL. She Would like to start something to help control her BG and reduce her weight. She would also like to start something to help monitor her blood sugar continuously because she does not want to poke herself so often. Pt would like to start DexCom G6 and Trucility. Pt will  the medication at a local  Pharmacy and learn how to use it.    Review of Systems    Objective     There were no vitals taken for this visit.     LAB  Lab Results   Component Value Date    BILITOT 0.2 12/18/2022    CALCIUM 9.3 04/14/2023    CO2 29 04/14/2023     04/14/2023    CREATININE 0.74 04/14/2023    GLUCOSE 152 (H) 04/14/2023    ALKPHOS 65 12/18/2022    K 3.9  "04/14/2023    PROT 5.8 (L) 12/18/2022     04/14/2023    AST 11 12/18/2022    ALT 10 12/18/2022    BUN 13 04/14/2023    ANIONGAP 11 04/14/2023    MG 1.86 04/14/2023    PHOS 3.3 12/15/2022    ALBUMIN 3.6 12/18/2022    LIPASE 46 02/15/2022    GFRF >90 04/14/2023     Lab Results   Component Value Date    TRIG 118 12/19/2022    CHOL 154 12/19/2022    HDL 51.1 12/19/2022     Lab Results   Component Value Date    HGBA1C 10.6 (A) 06/08/2022       Current Outpatient Medications on File Prior to Visit   Medication Sig Dispense Refill   • albuterol 90 mcg/actuation inhaler Inhale 2 puffs 3 times a day as needed.     • alcohol swabs (Alcohol Prep Pads) pads, medicated USE TO CLEANSE SKIN PRIOR TO INJECTION THREE TIMES DAILY AS DIRECTED 200 each 3   • aspirin 81 mg EC tablet Take 1 tablet (81 mg) by mouth once daily.     • atorvastatin (Lipitor) 40 mg tablet Take 1 tablet (40 mg) by mouth once daily.     • BD Breana 2nd Gen Pen Needle 32 gauge x 5/32\" needle USE TO ADMINISTER INSULIN 3 TIMES A  each 3   • blood sugar diagnostic (True Metrix Glucose Test Strip) strip 1 strip once daily.     • budesonide-formoteroL (Symbicort) 80-4.5 mcg/actuation inhaler Inhale 2 puffs  in the morning and 2 puffs before bedtime. Rinse mouth after use.     • carBAMazepine (Carbatrol) 200 mg 12 hr capsule Take 1 capsule (200 mg) by mouth in the morning and 1 capsule (200 mg) in the evening.     • carvedilol (Coreg) 6.25 mg tablet Take 1 tablet (6.25 mg) by mouth in the morning and 1 tablet (6.25 mg) in the evening.     • gabapentin (Neurontin) 100 mg capsule Take 1 capsule (100 mg) by mouth in the morning and 1 capsule (100 mg) in the evening and 1 capsule (100 mg) before bedtime. For 90 days.     • insulin aspart (NovoLOG, Fiasp) 100 UNIT/ML patient supplied pump Inject under the skin continuously. Sliding scale: 151-200 3 units, 301-250- 5 units, 251-300- 8 units, 301-350 10 units     • insulin glargine (Lantus) 100 unit/mL injection " "Inject 52 Units under the skin once daily at bedtime.     • isosorbide mononitrate ER (Imdur) 60 mg 24 hr tablet Take 1 tablet (60 mg) by mouth once daily. In the morning     • lancets 33 gauge misc      • lithium 150 mg capsule Take 1 capsule (150 mg) by mouth in the morning and 1 capsule (150 mg) before bedtime.     • losartan (Cozaar) 25 mg tablet Take 1 tablet (25 mg) by mouth once daily.     • metFORMIN XR (Glucophage-XR) 500 mg 24 hr tablet Take 2 tablets (1,000 mg) by mouth in the morning and 2 tablets (1,000 mg) before bedtime. 120 tablet 2   • nitroglycerin (Nitrostat) 0.4 mg SL tablet Place 1 tablet (0.4 mg) under the tongue every 5 minutes if needed. PLACE 1 TABLET UNDER THE TONGUE EVERY 5 MINUTES FOR UP TO 3 DOSES AS NEEDED FOR CHEST PAIN.CALL 911 IF PAIN PERSISTS.     • QUEtiapine (SEROquel) 100 mg tablet Take 1 tablet (100 mg) by mouth once daily at bedtime.     • ranolazine (Ranexa) 1,000 mg 12 hr tablet Take 1 tablet (1,000 mg) by mouth in the morning and 1 tablet (1,000 mg) in the evening.     • ticagrelor (Brilinta) 90 mg tablet Take 1 tablet (90 mg) by mouth in the morning and 1 tablet (90 mg) before bedtime.     • triamterene-hydrochlorothiazid (Maxzide-25) 37.5-25 mg tablet Take 1 tablet by mouth once daily.     • True Metrix Glucose Meter misc USE TO TEST BLOOD SUGAR ONCE DAILY AND AS NEEDED     • [DISCONTINUED] Alcohol Prep Pads pads, medicated USE TO CLEANSE SKIN PRIOR TO INJECTION THREE TIMES DAILY AS DIRECTED     • [DISCONTINUED] BD Breana 2nd Gen Pen Needle 32 gauge x 5/32\" needle USE TO ADMINISTER INSULIN 3 TIMES A DAY       No current facility-administered medications on file prior to visit.        SECONDARY PREVENTION  - Statin? yes  - ACE-I/ARB? no    Assessment/Plan   Problem List Items Addressed This Visit          Respiratory    Asthma     Continue current medication regimen         COPD (chronic obstructive pulmonary disease) (CMS/[facility])     Continue current medication regimen         "    Circulatory    Primary hypertension - Primary     Continue current medication regimen            Endocrine/Metabolic    Type 2 diabetes mellitus without complication, with long-term current use of insulin (CMS/[facility])     Pt's BG is fluctuating a lot. With her last A1c of 10.6% and hx of high A1c, she would benefit from GLP-1 use. Pt would like to start Trulicity as well since she wants something to help with her weight. Start Trulicity weekly with goal of titration. Will not reduce her insulin dose due to constant glucose above 200s mg/dL. Low risk of hypoglycemia.  Prescription sent to  Pharmacy for the following:  Trulicity 0.75mg subQ weekly  DexCom G6 Transmitter  DexCom G6   DexCom G6 sensors  Pt to come into the pharmacy to be educate on how to put on DexCom G6 and Trulicity.  Pt to call immediately if she has any signs of hypoglycemia.          Other Visit Diagnoses       Hypertension, unspecified type                   Michele Ambrosio PharmD     Verbal consent to manage patient's drug therapy was obtained from [the patient and/or an individual authorized to act on behalf of a patient]. They were informed they may decline to participate or withdraw from participation in pharmacy services at any time.

## 2023-04-17 ENCOUNTER — PATIENT OUTREACH (OUTPATIENT)
Dept: PRIMARY CARE | Facility: CLINIC | Age: 48
End: 2023-04-17
Payer: COMMERCIAL

## 2023-04-17 DIAGNOSIS — E11.9 TYPE 2 DIABETES MELLITUS WITHOUT COMPLICATION, WITH LONG-TERM CURRENT USE OF INSULIN (MULTI): ICD-10-CM

## 2023-04-17 DIAGNOSIS — J44.9 CHRONIC OBSTRUCTIVE PULMONARY DISEASE, UNSPECIFIED COPD TYPE (MULTI): ICD-10-CM

## 2023-04-17 DIAGNOSIS — Z79.4 TYPE 2 DIABETES MELLITUS WITHOUT COMPLICATION, WITH LONG-TERM CURRENT USE OF INSULIN (MULTI): ICD-10-CM

## 2023-04-17 DIAGNOSIS — J45.909 ASTHMA, UNSPECIFIED ASTHMA SEVERITY, UNSPECIFIED WHETHER COMPLICATED, UNSPECIFIED WHETHER PERSISTENT (HHS-HCC): Primary | ICD-10-CM

## 2023-04-17 NOTE — PROGRESS NOTES
Discharge Facility: [facility]  Discharge Diagnosis: Atypical Chest Pain  Admission Date: 4/14/2023  Discharge Date: 4/15/2023    PCP appt: TBD-office tasked to outreach patient for an appt.    TCM call completed 4/17/2023, unable to reach patient during two business days after discharge despite at least two attempts made.

## 2023-04-17 NOTE — PROGRESS NOTES
I reviewed the progress note and agree with the resident’s findings and plans as written. Case discussed with resident.    Leatha Billy, SabihaD

## 2023-04-18 ENCOUNTER — TELEPHONE (OUTPATIENT)
Dept: PRIMARY CARE | Facility: CLINIC | Age: 48
End: 2023-04-18
Payer: COMMERCIAL

## 2023-04-18 NOTE — TELEPHONE ENCOUNTER
Patient is scheduled for 6/13/2023. Unable to schedule patient for DALE before 14 day time limit.

## 2023-04-24 ENCOUNTER — DOCUMENTATION (OUTPATIENT)
Dept: PRIMARY CARE | Facility: CLINIC | Age: 48
End: 2023-04-24
Payer: COMMERCIAL

## 2023-04-24 DIAGNOSIS — R07.9 CHEST PAIN, UNSPECIFIED TYPE: ICD-10-CM

## 2023-04-25 NOTE — PROGRESS NOTES
Discharge Facility: [facility]  Discharge Diagnosis: Chest pain  Admission Date: 4/21/2023  Discharge Date: 4/22/2023    PCP Appointment Date: TBD-office tasked to outreach to patient to schedule follow up with PCP  Specialist Appointment Date: TBD  Hospital Encounter and Summary: Linked   Unable to reach patient during two business days after discharge despite at least two attempts made.

## 2023-05-01 RX ORDER — RANOLAZINE 1000 MG/1
TABLET, EXTENDED RELEASE ORAL
Qty: 180 TABLET | Refills: 0 | Status: SHIPPED | OUTPATIENT
Start: 2023-05-01

## 2023-05-01 RX ORDER — CARVEDILOL 6.25 MG/1
TABLET ORAL
Qty: 180 TABLET | Refills: 0 | Status: SHIPPED | OUTPATIENT
Start: 2023-05-01

## 2023-05-01 NOTE — TELEPHONE ENCOUNTER
Please approve or deny this refill request. The order is pended. Thank you.     LOV 8/11/2022    Next Visit Date:  Future Appointments   Date Time Provider Yana Kruger   5/1/2023  2:30 PM ARIAN Mcguire - CNP [facility]   1/30/2024 10:30 AM Francisco Dixon, DO [facility]

## 2023-05-08 ENCOUNTER — OFFICE VISIT (OUTPATIENT)
Dept: PRIMARY CARE | Facility: CLINIC | Age: 48
End: 2023-05-08
Payer: COMMERCIAL

## 2023-05-08 VITALS
HEART RATE: 50 BPM | WEIGHT: 186.6 LBS | OXYGEN SATURATION: 97 % | BODY MASS INDEX: 29.29 KG/M2 | HEIGHT: 67 IN | TEMPERATURE: 98 F | DIASTOLIC BLOOD PRESSURE: 84 MMHG | SYSTOLIC BLOOD PRESSURE: 126 MMHG

## 2023-05-08 DIAGNOSIS — G89.29 CHRONIC BILATERAL LOW BACK PAIN WITHOUT SCIATICA: ICD-10-CM

## 2023-05-08 DIAGNOSIS — R60.1 GENERALIZED EDEMA: ICD-10-CM

## 2023-05-08 DIAGNOSIS — E78.2 MIXED HYPERLIPIDEMIA: ICD-10-CM

## 2023-05-08 DIAGNOSIS — F51.01 PRIMARY INSOMNIA: ICD-10-CM

## 2023-05-08 DIAGNOSIS — F31.12 BIPOLAR AFFECTIVE DISORDER, CURRENTLY MANIC, MODERATE (MULTI): ICD-10-CM

## 2023-05-08 DIAGNOSIS — M54.50 CHRONIC BILATERAL LOW BACK PAIN WITHOUT SCIATICA: ICD-10-CM

## 2023-05-08 DIAGNOSIS — E11.9 TYPE 2 DIABETES MELLITUS WITHOUT COMPLICATION, WITH LONG-TERM CURRENT USE OF INSULIN (MULTI): ICD-10-CM

## 2023-05-08 DIAGNOSIS — Z79.4 TYPE 2 DIABETES MELLITUS WITHOUT COMPLICATION, WITH LONG-TERM CURRENT USE OF INSULIN (MULTI): ICD-10-CM

## 2023-05-08 DIAGNOSIS — I25.10 CORONARY ARTERY DISEASE INVOLVING NATIVE CORONARY ARTERY OF NATIVE HEART WITHOUT ANGINA PECTORIS: ICD-10-CM

## 2023-05-08 DIAGNOSIS — J39.8 TRACHEAL STENOSIS: ICD-10-CM

## 2023-05-08 DIAGNOSIS — J43.1 PANLOBULAR EMPHYSEMA (MULTI): Primary | ICD-10-CM

## 2023-05-08 PROBLEM — R80.9 MICROALBUMINURIA: Status: ACTIVE | Noted: 2021-02-26

## 2023-05-08 PROBLEM — R56.9 SEIZURE-LIKE ACTIVITY (MULTI): Status: RESOLVED | Noted: 2023-05-08 | Resolved: 2023-05-08

## 2023-05-08 PROBLEM — R49.9 CHANGE IN VOICE: Status: ACTIVE | Noted: 2023-05-08

## 2023-05-08 PROBLEM — Z95.1 HX OF CABG: Status: ACTIVE | Noted: 2020-03-31

## 2023-05-08 PROBLEM — R07.89 OTHER CHEST PAIN: Status: ACTIVE | Noted: 2023-02-19

## 2023-05-08 PROBLEM — Z95.810 ICD (IMPLANTABLE CARDIOVERTER-DEFIBRILLATOR) IN PLACE: Status: ACTIVE | Noted: 2022-12-22

## 2023-05-08 PROBLEM — F33.1 MODERATE EPISODE OF RECURRENT MAJOR DEPRESSIVE DISORDER (MULTI): Status: ACTIVE | Noted: 2023-02-19

## 2023-05-08 PROBLEM — R51.9 HEADACHE: Status: ACTIVE | Noted: 2023-05-08

## 2023-05-08 PROBLEM — R56.9 SEIZURE-LIKE ACTIVITY (MULTI): Status: ACTIVE | Noted: 2023-05-08

## 2023-05-08 PROCEDURE — 99215 OFFICE O/P EST HI 40 MIN: CPT | Performed by: FAMILY MEDICINE

## 2023-05-08 PROCEDURE — 3074F SYST BP LT 130 MM HG: CPT | Performed by: FAMILY MEDICINE

## 2023-05-08 PROCEDURE — 3008F BODY MASS INDEX DOCD: CPT | Performed by: FAMILY MEDICINE

## 2023-05-08 PROCEDURE — 3079F DIAST BP 80-89 MM HG: CPT | Performed by: FAMILY MEDICINE

## 2023-05-08 PROCEDURE — 4010F ACE/ARB THERAPY RXD/TAKEN: CPT | Performed by: FAMILY MEDICINE

## 2023-05-08 PROCEDURE — 3051F HG A1C>EQUAL 7.0%<8.0%: CPT | Performed by: FAMILY MEDICINE

## 2023-05-08 RX ORDER — METFORMIN HYDROCHLORIDE 500 MG/1
1000 TABLET, EXTENDED RELEASE ORAL 2 TIMES DAILY
Qty: 120 TABLET | Refills: 3 | Status: SHIPPED | OUTPATIENT
Start: 2023-05-08 | End: 2023-12-01 | Stop reason: SDUPTHER

## 2023-05-08 RX ORDER — CYCLOBENZAPRINE HCL 10 MG
10 TABLET ORAL 3 TIMES DAILY PRN
Qty: 60 TABLET | Refills: 2 | Status: SHIPPED | OUTPATIENT
Start: 2023-05-08 | End: 2023-12-14 | Stop reason: SDUPTHER

## 2023-05-08 RX ORDER — MIRTAZAPINE 15 MG/1
15 TABLET, FILM COATED ORAL NIGHTLY
Qty: 90 TABLET | Refills: 0 | Status: SHIPPED | OUTPATIENT
Start: 2023-05-08 | End: 2023-08-11 | Stop reason: SDUPTHER

## 2023-05-08 ASSESSMENT — ENCOUNTER SYMPTOMS
EYE DISCHARGE: 0
NECK PAIN: 0
MYALGIAS: 0
SORE THROAT: 0
BRUISES/BLEEDS EASILY: 0
FATIGUE: 0
NERVOUS/ANXIOUS: 0
COUGH: 0
CHEST TIGHTNESS: 0
DIARRHEA: 0
HEMATURIA: 0
NUMBNESS: 0
ABDOMINAL PAIN: 0
WEAKNESS: 0
BLOOD IN STOOL: 0
DYSPHORIC MOOD: 0
DIZZINESS: 0
ADENOPATHY: 0
CONSTIPATION: 0
SHORTNESS OF BREATH: 0
BACK PAIN: 0
HEADACHES: 0
VOMITING: 0
NAUSEA: 0
ARTHRALGIAS: 0
DIFFICULTY URINATING: 0
ACTIVITY CHANGE: 0

## 2023-05-08 NOTE — PROGRESS NOTES
Subjective   Patient ID: Tana Bahena is a 47 y.o. female who presents for Hospital Follow-up from /[place] for chest pain.     Discuss changing medications, currently take Paroxetine for depression, not helping.     Wants to change Seroquel to another medication, not helping her sleep.     Swelling in feet not gone, right foot is better but left is still swollen.     Discuss getting a muscle relaxer for back pain.     HPI  Patient here with daughter    Patient here with multiple issues    Was in ER for chest pain  Has follow-up with cardiology  Might need heart catheterization  Positive coronary disease    Tracheal stenosis stable  Will have surgical repair soon    COPD stable  No cough or wheeze    Diabetes controlled  Follow diet  Take medicine    Mood symptoms stable  No suicidal ideation no psychotic or manic symptoms    High cholesterol stable  Watch diet    Chronic low back pain stable  No radicular symptoms  Keep follow-up with persistent or worsening symptoms    Positive edema  Follow with cardio  Low-sodium diet    Issues with insomnia  Discussed treatment options  She would like to try new medicine  Review of Systems   Constitutional:  Negative for activity change and fatigue.   HENT:  Negative for congestion and sore throat.    Eyes:  Negative for discharge.   Respiratory:  Negative for cough, chest tightness and shortness of breath.    Cardiovascular:  Negative for chest pain and leg swelling.   Gastrointestinal:  Negative for abdominal pain, blood in stool, constipation, diarrhea, nausea and vomiting.   Endocrine: Negative for cold intolerance and heat intolerance.   Genitourinary:  Negative for difficulty urinating and hematuria.   Musculoskeletal:  Negative for arthralgias, back pain, gait problem, myalgias and neck pain.   Allergic/Immunologic: Negative for environmental allergies.   Neurological:  Negative for dizziness, syncope, weakness, numbness and headaches.   Hematological:  Negative for  "adenopathy. Does not bruise/bleed easily.   Psychiatric/Behavioral:  Negative for dysphoric mood. The patient is not nervous/anxious.    All other systems reviewed and are negative.      Objective   /84 (BP Location: Left arm, BP Cuff Size: Large adult)   Pulse 50   Temp 36.7 °C (98 °F)   Ht 1.702 m (5' 7\")   Wt 84.6 kg (186 lb 9.6 oz)   SpO2 97%   BMI 29.23 kg/m²    Physical Exam  Vitals and nursing note reviewed.   Constitutional:       General: She is not in acute distress.     Appearance: Normal appearance.   HENT:      Head: Normocephalic and atraumatic.      Right Ear: Tympanic membrane, ear canal and external ear normal.      Left Ear: Tympanic membrane, ear canal and external ear normal.      Nose: Nose normal.      Mouth/Throat:      Mouth: Mucous membranes are moist.      Pharynx: Oropharynx is clear. No oropharyngeal exudate or posterior oropharyngeal erythema.   Eyes:      Extraocular Movements: Extraocular movements intact.      Conjunctiva/sclera: Conjunctivae normal.      Pupils: Pupils are equal, round, and reactive to light.   Cardiovascular:      Rate and Rhythm: Normal rate and regular rhythm.      Pulses: Normal pulses.      Heart sounds: Normal heart sounds. No murmur heard.  Pulmonary:      Effort: Pulmonary effort is normal. No respiratory distress.      Breath sounds: Normal breath sounds. No wheezing or rales.   Abdominal:      General: Abdomen is flat. Bowel sounds are normal. There is no distension.      Palpations: Abdomen is soft. There is no mass.      Tenderness: There is no abdominal tenderness.   Musculoskeletal:         General: No swelling or deformity. Normal range of motion.      Cervical back: Normal range of motion and neck supple.      Right lower leg: No edema.      Left lower leg: No edema.   Lymphadenopathy:      Cervical: No cervical adenopathy.   Skin:     General: Skin is warm and dry.      Capillary Refill: Capillary refill takes less than 2 seconds.      " Findings: No lesion or rash.   Neurological:      General: No focal deficit present.      Mental Status: She is alert and oriented to person, place, and time.      Cranial Nerves: No cranial nerve deficit.      Motor: No weakness.   Psychiatric:         Mood and Affect: Mood normal.         Behavior: Behavior normal.         Thought Content: Thought content normal.         Judgment: Judgment normal.         Assessment/Plan   Problem List Items Addressed This Visit          Nervous    Primary insomnia    Relevant Medications    cyclobenzaprine (Flexeril) 10 mg tablet       Respiratory    Panlobular emphysema (CMS/HCC) - Primary    Tracheal stenosis       Circulatory    Coronary artery disease involving native coronary artery of native heart without angina pectoris       Endocrine/Metabolic    Type 2 diabetes mellitus without complication, with long-term current use of insulin (CMS/HCC)    Relevant Medications    metFORMIN XR (Glucophage-XR) 500 mg 24 hr tablet       Other    Bipolar affective disorder, currently manic, moderate (CMS/HCC)    Mixed hyperlipidemia    Edema    Chronic bilateral low back pain without sciatica    Relevant Medications    mirtazapine (Remeron) 15 mg tablet       Report suicidal ideation  Report psychotic or manic symptoms    Patient education provided.  Stay current with age appropriate health maintenance as instructed.  Appointment here or ER with new or worsening symptoms'  Keep appropriate follow-up visit.  Stay current with proper immunizations  Discussed at length with daughter  Follow-up visit 3 months and as needed

## 2023-05-09 ENCOUNTER — PATIENT OUTREACH (OUTPATIENT)
Dept: PRIMARY CARE | Facility: CLINIC | Age: 48
End: 2023-05-09
Payer: COMMERCIAL

## 2023-05-09 NOTE — PROGRESS NOTES
Unable to reach patient for call back after patient's follow up appointment with PCP.   MEKAM with call back number for patient to call if needed   If no voicemail available call attempts x 2 were made to contact the patient to assist with any questions or concerns patient may have.

## 2023-05-10 ENCOUNTER — APPOINTMENT (OUTPATIENT)
Dept: PHARMACY | Facility: HOSPITAL | Age: 48
End: 2023-05-10
Payer: COMMERCIAL

## 2023-05-23 DIAGNOSIS — E11.9 TYPE 2 DIABETES MELLITUS WITHOUT COMPLICATION, WITH LONG-TERM CURRENT USE OF INSULIN (MULTI): ICD-10-CM

## 2023-05-23 DIAGNOSIS — Z79.4 TYPE 2 DIABETES MELLITUS WITHOUT COMPLICATION, WITH LONG-TERM CURRENT USE OF INSULIN (MULTI): ICD-10-CM

## 2023-05-25 ENCOUNTER — TELEPHONE (OUTPATIENT)
Dept: PRIMARY CARE | Facility: CLINIC | Age: 48
End: 2023-05-25
Payer: COMMERCIAL

## 2023-05-25 NOTE — TELEPHONE ENCOUNTER
Patient called in stating that she has a defibrillator and is needing to know if it is ok for her to go tanning?  Please Advise

## 2023-05-25 NOTE — TELEPHONE ENCOUNTER
Patient called in stating that she has applied for Social Security due to a brain injury she had last year. She is wondering if Dr. Thakkar will sign a statement stating she is unable to work  Please Advise      **Patient aware Dr. Thakkar is not in  office until Monday

## 2023-05-26 ENCOUNTER — PATIENT MESSAGE (OUTPATIENT)
Dept: PRIMARY CARE | Facility: CLINIC | Age: 48
End: 2023-05-26
Payer: COMMERCIAL

## 2023-05-26 DIAGNOSIS — E11.9 TYPE 2 DIABETES MELLITUS WITHOUT COMPLICATION, WITH LONG-TERM CURRENT USE OF INSULIN (MULTI): ICD-10-CM

## 2023-05-26 DIAGNOSIS — Z79.4 TYPE 2 DIABETES MELLITUS WITHOUT COMPLICATION, WITH LONG-TERM CURRENT USE OF INSULIN (MULTI): ICD-10-CM

## 2023-05-26 RX ORDER — DULAGLUTIDE 0.75 MG/.5ML
INJECTION, SOLUTION SUBCUTANEOUS
Qty: 2 EACH | OUTPATIENT
Start: 2023-05-26

## 2023-05-26 NOTE — TELEPHONE ENCOUNTER
Patient canceled appointment with pharmacy and did not reschedule. Will defer to PCP until patient sees pharmacy again.

## 2023-05-27 NOTE — TELEPHONE ENCOUNTER
From: Tana Bahena  To: Jacobo Thakkar MD  Sent: 5/26/2023 8:49 PM EDT  Subject: Trulicity pen    My prescription ran out. I need this filled immediately please.

## 2023-05-30 RX ORDER — DULAGLUTIDE 0.75 MG/.5ML
0.75 INJECTION, SOLUTION SUBCUTANEOUS
Qty: 4 EACH | Refills: 1 | Status: SHIPPED | OUTPATIENT
Start: 2023-05-30 | End: 2023-06-26 | Stop reason: SDUPTHER

## 2023-05-31 ENCOUNTER — TELEPHONE (OUTPATIENT)
Dept: PRIMARY CARE | Facility: CLINIC | Age: 48
End: 2023-05-31
Payer: COMMERCIAL

## 2023-06-01 ENCOUNTER — TELEPHONE (OUTPATIENT)
Dept: PRIMARY CARE | Facility: CLINIC | Age: 48
End: 2023-06-01
Payer: COMMERCIAL

## 2023-06-01 NOTE — TELEPHONE ENCOUNTER
Patient called in stating that she picked up her prescription for dulaglutide (Trulicity) 0.75 mg/0.5 mL pen injector yesterday. Patient stated that she was not using the pens correctly and used all of them. She is wondering if there are any samples that she can have or if this can be resent to her pharmacy?  [facility]  Please Advise

## 2023-06-05 ENCOUNTER — OFFICE VISIT (OUTPATIENT)
Dept: PRIMARY CARE | Facility: CLINIC | Age: 48
End: 2023-06-05
Payer: COMMERCIAL

## 2023-06-05 VITALS
DIASTOLIC BLOOD PRESSURE: 76 MMHG | TEMPERATURE: 97.5 F | HEART RATE: 89 BPM | BODY MASS INDEX: 29.7 KG/M2 | WEIGHT: 189.2 LBS | HEIGHT: 67 IN | OXYGEN SATURATION: 98 % | SYSTOLIC BLOOD PRESSURE: 102 MMHG

## 2023-06-05 DIAGNOSIS — F09 ORGANIC BRAIN SYNDROME: Primary | ICD-10-CM

## 2023-06-05 DIAGNOSIS — F51.01 PRIMARY INSOMNIA: ICD-10-CM

## 2023-06-05 DIAGNOSIS — F31.12 BIPOLAR AFFECTIVE DISORDER, CURRENTLY MANIC, MODERATE (MULTI): ICD-10-CM

## 2023-06-05 DIAGNOSIS — F33.1 MODERATE EPISODE OF RECURRENT MAJOR DEPRESSIVE DISORDER (MULTI): ICD-10-CM

## 2023-06-05 DIAGNOSIS — M54.50 CHRONIC BILATERAL LOW BACK PAIN WITHOUT SCIATICA: ICD-10-CM

## 2023-06-05 DIAGNOSIS — J43.1 PANLOBULAR EMPHYSEMA (MULTI): ICD-10-CM

## 2023-06-05 DIAGNOSIS — Z79.4 TYPE 2 DIABETES MELLITUS WITHOUT COMPLICATION, WITH LONG-TERM CURRENT USE OF INSULIN (MULTI): ICD-10-CM

## 2023-06-05 DIAGNOSIS — G89.29 CHRONIC BILATERAL LOW BACK PAIN WITHOUT SCIATICA: ICD-10-CM

## 2023-06-05 DIAGNOSIS — E11.9 TYPE 2 DIABETES MELLITUS WITHOUT COMPLICATION, WITH LONG-TERM CURRENT USE OF INSULIN (MULTI): ICD-10-CM

## 2023-06-05 DIAGNOSIS — E78.2 MIXED HYPERLIPIDEMIA: ICD-10-CM

## 2023-06-05 PROBLEM — T82.111A PACEMAKER MALFUNCTION: Status: ACTIVE | Noted: 2023-06-05

## 2023-06-05 PROBLEM — G44.89 OTHER HEADACHE SYNDROME: Status: ACTIVE | Noted: 2023-05-08

## 2023-06-05 PROCEDURE — 3051F HG A1C>EQUAL 7.0%<8.0%: CPT | Performed by: FAMILY MEDICINE

## 2023-06-05 PROCEDURE — 99215 OFFICE O/P EST HI 40 MIN: CPT | Performed by: FAMILY MEDICINE

## 2023-06-05 PROCEDURE — 3074F SYST BP LT 130 MM HG: CPT | Performed by: FAMILY MEDICINE

## 2023-06-05 PROCEDURE — 4010F ACE/ARB THERAPY RXD/TAKEN: CPT | Performed by: FAMILY MEDICINE

## 2023-06-05 PROCEDURE — 3078F DIAST BP <80 MM HG: CPT | Performed by: FAMILY MEDICINE

## 2023-06-05 PROCEDURE — 3008F BODY MASS INDEX DOCD: CPT | Performed by: FAMILY MEDICINE

## 2023-06-05 RX ORDER — BUDESONIDE AND FORMOTEROL FUMARATE DIHYDRATE 80; 4.5 UG/1; UG/1
2 AEROSOL RESPIRATORY (INHALATION) 2 TIMES DAILY
Qty: 10.2 G | Refills: 3 | Status: SHIPPED | OUTPATIENT
Start: 2023-06-05 | End: 2023-12-19 | Stop reason: SDUPTHER

## 2023-06-05 RX ORDER — LITHIUM CARBONATE 150 MG/1
150 CAPSULE ORAL 2 TIMES DAILY
Qty: 180 CAPSULE | Refills: 1 | Status: SHIPPED | OUTPATIENT
Start: 2023-06-05 | End: 2024-03-07 | Stop reason: ALTCHOICE

## 2023-06-05 ASSESSMENT — ENCOUNTER SYMPTOMS
MYALGIAS: 0
BRUISES/BLEEDS EASILY: 0
NERVOUS/ANXIOUS: 0
WEAKNESS: 0
DIFFICULTY URINATING: 0
SHORTNESS OF BREATH: 0
FATIGUE: 0
HEADACHES: 0
DIZZINESS: 0
COUGH: 0
HEMATURIA: 0
ABDOMINAL PAIN: 0
DYSPHORIC MOOD: 0
NAUSEA: 0
VOMITING: 0
CHEST TIGHTNESS: 0
CONSTIPATION: 0
ARTHRALGIAS: 0
NECK PAIN: 0
EYE DISCHARGE: 0
NUMBNESS: 0
DIARRHEA: 0
SORE THROAT: 0
ADENOPATHY: 0
ACTIVITY CHANGE: 0
BACK PAIN: 0
BLOOD IN STOOL: 0

## 2023-06-05 NOTE — PROGRESS NOTES
"Subjective   Patient ID: Tana Bahena is a 48 y.o. female who presents for follow up on Diabetes and medications.     Discuss possibility of getting a insulin pump.     Sleeping medication is not helping at all.     Was taking Lithium, no longer has medication.     Depression has increased.     HPI  Diabetic issues  Should see endocrinology about insulin pump    Sleep issues ongoing  Positive insomnia  Discussed options      COPD stable no chest pain or shortness of breath    Low back pain stable no radiculopathy  No red flag symptoms    Ongoing mood symptoms  No suicidal ideation no psychotic or manic symptoms  Recommend counseling and see therapist    High cholesterol stable watch diet    Chronic low back pain stable  No red flag symptoms noted  Review of Systems   Constitutional:  Negative for activity change and fatigue.   HENT:  Negative for congestion and sore throat.    Eyes:  Negative for discharge.   Respiratory:  Negative for cough, chest tightness and shortness of breath.    Cardiovascular:  Negative for chest pain and leg swelling.   Gastrointestinal:  Negative for abdominal pain, blood in stool, constipation, diarrhea, nausea and vomiting.   Endocrine: Negative for cold intolerance and heat intolerance.   Genitourinary:  Negative for difficulty urinating and hematuria.   Musculoskeletal:  Negative for arthralgias, back pain, gait problem, myalgias and neck pain.   Allergic/Immunologic: Negative for environmental allergies.   Neurological:  Negative for dizziness, syncope, weakness, numbness and headaches.   Hematological:  Negative for adenopathy. Does not bruise/bleed easily.   Psychiatric/Behavioral:  Negative for dysphoric mood. The patient is not nervous/anxious.    All other systems reviewed and are negative.      Objective   /76 (BP Location: Right arm, BP Cuff Size: Large adult)   Pulse 89   Temp 36.4 °C (97.5 °F)   Ht 1.702 m (5' 7\")   Wt 85.8 kg (189 lb 3.2 oz)   SpO2 98%   BMI " 29.63 kg/m²    Physical Exam  Vitals and nursing note reviewed.   Constitutional:       General: She is not in acute distress.     Appearance: Normal appearance.   HENT:      Head: Normocephalic and atraumatic.      Right Ear: Tympanic membrane, ear canal and external ear normal.      Left Ear: Tympanic membrane, ear canal and external ear normal.      Nose: Nose normal.      Mouth/Throat:      Mouth: Mucous membranes are moist.      Pharynx: Oropharynx is clear. No oropharyngeal exudate or posterior oropharyngeal erythema.   Eyes:      Extraocular Movements: Extraocular movements intact.      Conjunctiva/sclera: Conjunctivae normal.      Pupils: Pupils are equal, round, and reactive to light.   Cardiovascular:      Rate and Rhythm: Normal rate and regular rhythm.      Pulses: Normal pulses.      Heart sounds: Normal heart sounds. No murmur heard.  Pulmonary:      Effort: Pulmonary effort is normal. No respiratory distress.      Breath sounds: Normal breath sounds. No wheezing or rales.   Abdominal:      General: Abdomen is flat. Bowel sounds are normal. There is no distension.      Palpations: Abdomen is soft. There is no mass.      Tenderness: There is no abdominal tenderness.   Musculoskeletal:         General: No swelling or deformity. Normal range of motion.      Cervical back: Normal range of motion and neck supple.      Right lower leg: No edema.      Left lower leg: No edema.   Lymphadenopathy:      Cervical: No cervical adenopathy.   Skin:     General: Skin is warm and dry.      Capillary Refill: Capillary refill takes less than 2 seconds.      Findings: No lesion or rash.   Neurological:      General: No focal deficit present.      Mental Status: She is alert and oriented to person, place, and time.      Cranial Nerves: No cranial nerve deficit.      Motor: No weakness.   Psychiatric:         Mood and Affect: Mood normal.         Behavior: Behavior normal.         Thought Content: Thought content normal.          Judgment: Judgment normal.         Assessment/Plan   Problem List Items Addressed This Visit          Nervous    Organic brain syndrome - Primary    Primary insomnia    Relevant Orders    Referral to Psychiatry    Referral to Psychology       Respiratory    Panlobular emphysema (CMS/HCC)    Relevant Medications    budesonide-formoteroL (Symbicort) 80-4.5 mcg/actuation inhaler       Endocrine/Metabolic    Type 2 diabetes mellitus without complication, with long-term current use of insulin (CMS/HCC)    Relevant Orders    Follow Up In [facility] - PCP    Referral to Endocrinology       Other    Bipolar affective disorder, currently manic, moderate (CMS/HCC)    Relevant Medications    lithium 150 mg capsule    Other Relevant Orders    Referral to Psychiatry    Referral to Psychology    Moderate episode of recurrent major depressive disorder (CMS/HCC)    Relevant Orders    Referral to Psychiatry    Referral to Psychology    Mixed hyperlipidemia    Chronic bilateral low back pain without sciatica       Patient education provided.  Stay current with age appropriate health maintenance as instructed.  Appointment here or ER with new or worsening symptoms'  Keep appropriate follow-up visit.  Stay current with proper immunizations   Report suicidal ideation report psychotic or manic symptoms  Refill medicines  Refer to psychiatry and psychology  3-month recheck  Return sooner with new or worsening symptoms

## 2023-06-05 NOTE — TELEPHONE ENCOUNTER
Left message for the patient to call the office back at normal business hours.      Task can be reactivated if the patient calls back.

## 2023-06-08 ENCOUNTER — TELEPHONE (OUTPATIENT)
Dept: PRIMARY CARE | Facility: CLINIC | Age: 48
End: 2023-06-08
Payer: COMMERCIAL

## 2023-06-08 NOTE — TELEPHONE ENCOUNTER
Patient stated that she went to  her Trulicity script at her pharmacy and the pharmacy advised her that Dr. Thakkar cancelled her script.  Look at Trulicity medication written on 5/30/23 and it shows we received a e-receipt that pharmacy confirmed.    Please advise.

## 2023-06-08 NOTE — TELEPHONE ENCOUNTER
Spoke with Иван and was informed the patient picked up one prescription on 5/31/23 and has 1 refill on file with them. Patient is going to be unable to get this prescription until the end of June.     Patient aware.

## 2023-06-12 ENCOUNTER — TELEMEDICINE (OUTPATIENT)
Dept: PHARMACY | Facility: HOSPITAL | Age: 48
End: 2023-06-12
Payer: COMMERCIAL

## 2023-06-12 DIAGNOSIS — Z79.4 TYPE 2 DIABETES MELLITUS WITHOUT COMPLICATION, WITH LONG-TERM CURRENT USE OF INSULIN (MULTI): ICD-10-CM

## 2023-06-12 DIAGNOSIS — E11.9 TYPE 2 DIABETES MELLITUS WITHOUT COMPLICATION, WITH LONG-TERM CURRENT USE OF INSULIN (MULTI): Primary | ICD-10-CM

## 2023-06-12 DIAGNOSIS — Z79.4 TYPE 2 DIABETES MELLITUS WITHOUT COMPLICATION, WITH LONG-TERM CURRENT USE OF INSULIN (MULTI): Primary | ICD-10-CM

## 2023-06-12 DIAGNOSIS — E11.9 TYPE 2 DIABETES MELLITUS WITHOUT COMPLICATION, WITH LONG-TERM CURRENT USE OF INSULIN (MULTI): ICD-10-CM

## 2023-06-12 RX ORDER — BLOOD-GLUCOSE,RECEIVER,CONT
EACH MISCELLANEOUS
Qty: 1 EACH | Refills: 0 | Status: SHIPPED | OUTPATIENT
Start: 2023-06-12 | End: 2023-06-27 | Stop reason: SDUPTHER

## 2023-06-12 RX ORDER — BLOOD-GLUCOSE SENSOR
EACH MISCELLANEOUS
Qty: 2 EACH | Refills: 3 | Status: SHIPPED | OUTPATIENT
Start: 2023-06-12 | End: 2023-06-27 | Stop reason: SDUPTHER

## 2023-06-12 ASSESSMENT — ENCOUNTER SYMPTOMS: DIABETIC ASSOCIATED SYMPTOMS: 0

## 2023-06-12 NOTE — PROGRESS NOTES
[facility] Pharmacy Consult  Tana Bahena is a 48 y.o. female was referred to Clinical Pharmacy Team for a Pharmacy consult.  The patient was referred for their Diabetes.    Referring Provider: Jacobo Thakkar MD    Subjective   Allergies   Allergen Reactions    Ketorolac Unknown    Tramadol Unknown       [facility] - [place], [place] - [address]  [address]  [address]  [facility] [address]  Phone: [phone] Fax: [phone]    [id number] - [facility] [place] - [address] [facility] AT NEC OF [facility]  [address] [facility] [address]  Phone: [phone] Fax: [phone]    [facility] - [place], [place] - [address]  [address]  [place] [address]  Phone: [phone] Fax: [phone]      Diabetes  She has type 2 diabetes mellitus. Her disease course has been fluctuating. There are no diabetic associated symptoms. Symptoms are stable. Risk factors for coronary artery disease include diabetes mellitus, dyslipidemia and hypertension. She is compliant with treatment most of the time. There is no change in her home blood glucose trend.     Pt is here for a follow up for her diabetes. Pharmacy reported patient is having trouble remembering to keep her transmitter for her Dexcom G6. Pt also agree it would be better to switch from Trulicity to daily pill as she accidentally squirted some of her pens a few times in the past. Pt stated her blood glucose has been fluctuating and her fingerstick test is not matching her CGM. But does not have enough data.    Review of Systems    Objective     There were no vitals taken for this visit.     LAB  Lab Results   Component Value Date    BILITOT CANCELED 05/20/2023    CALCIUM CANCELED 05/20/2023    CO2 CANCELED 05/20/2023    CL CANCELED 05/20/2023    CREATININE CANCELED 05/20/2023    GLUCOSE CANCELED 05/20/2023    ALKPHOS CANCELED 05/20/2023    K CANCELED 05/20/2023    PROT CANCELED  "05/20/2023    NA CANCELED 05/20/2023    AST CANCELED 05/20/2023    ALT CANCELED 05/20/2023    BUN CANCELED 05/20/2023    ANIONGAP CANCELED 05/20/2023    MG 1.86 05/02/2023    PHOS 3.4 05/02/2023    ALBUMIN CANCELED 05/20/2023    LIPASE 46 02/15/2022    GFRF CANCELED 05/20/2023    GFRMALE CANCELED 05/20/2023     Lab Results   Component Value Date    TRIG 118 12/19/2022    CHOL 154 12/19/2022    HDL 51.1 12/19/2022     Lab Results   Component Value Date    HGBA1C 7.4 (A) 05/02/2023       Current Outpatient Medications on File Prior to Visit   Medication Sig Dispense Refill    albuterol 90 mcg/actuation inhaler Inhale 2 puffs 3 times a day as needed.      alcohol swabs (Alcohol Prep Pads) pads, medicated USE TO CLEANSE SKIN PRIOR TO INJECTION THREE TIMES DAILY AS DIRECTED 200 each 3    aspirin 81 mg EC tablet Take 1 tablet (81 mg) by mouth once daily.      atorvastatin (Lipitor) 40 mg tablet Take 1 tablet (40 mg) by mouth once daily.      BD Breana 2nd Gen Pen Needle 32 gauge x 5/32\" needle USE TO ADMINISTER INSULIN 3 TIMES A  each 3    blood sugar diagnostic (True Metrix Glucose Test Strip) strip 1 strip once daily.      blood-glucose sensor (Dexcom G6 Sensor) device Use as directed to test blood sugar up to 4 times daily. Replace every 10 days. 9 each 3    budesonide-formoteroL (Symbicort) 80-4.5 mcg/actuation inhaler Inhale 2 puffs 2 times a day. Rinse mouth after use 10.2 g 3    carBAMazepine (Carbatrol) 200 mg 12 hr capsule Take 1 capsule (200 mg) by mouth twice a day.      carvedilol (Coreg) 6.25 mg tablet Take 1 tablet (6.25 mg) by mouth twice a day.      cyclobenzaprine (Flexeril) 10 mg tablet Take 1 tablet (10 mg) by mouth 3 times a day as needed for muscle spasms. 60 tablet 2    Dexcom G4 platinum  (Dexcom G6 ) misc Use as instructed to test blood sugar 4 times daily. 1 each 0    Dexcom G4 platinum transmitter (Dexcom G6 Transmitter) device Use as instructed to test blood sugar 4 times " daily. Replace every 90 days. 1 each 3    dulaglutide (Trulicity) 0.75 mg/0.5 mL pen injector Inject 0.75 mg under the skin 1 (one) time per week. 4 each 1    gabapentin (Neurontin) 100 mg capsule Take 1 capsule (100 mg) by mouth 3 times a day. For 90 days      insulin aspart (NovoLOG, Fiasp) 100 UNIT/ML patient supplied pump Inject under the skin continuously. Sliding scale: 151-200 3 units, 301-250- 5 units, 251-300- 8 units, 301-350 10 units      insulin glargine (Lantus) 100 unit/mL injection Inject 52 Units under the skin once daily at bedtime.      isosorbide mononitrate ER (Imdur) 60 mg 24 hr tablet Take 1 tablet (60 mg) by mouth once daily. In the morning      lancets 33 gauge misc       lithium 150 mg capsule Take 1 capsule (150 mg) by mouth 2 times a day. 180 capsule 1    losartan (Cozaar) 25 mg tablet Take 1 tablet (25 mg) by mouth once daily.      metFORMIN XR (Glucophage-XR) 500 mg 24 hr tablet Take 2 tablets (1,000 mg) by mouth 2 times a day. 120 tablet 3    mirtazapine (Remeron) 15 mg tablet Take 1 tablet (15 mg) by mouth once daily at bedtime. 90 tablet 0    nitroglycerin (Nitrostat) 0.4 mg SL tablet Place 1 tablet (0.4 mg) under the tongue every 5 minutes if needed. PLACE 1 TABLET UNDER THE TONGUE EVERY 5 MINUTES FOR UP TO 3 DOSES AS NEEDED FOR CHEST PAIN.CALL 911 IF PAIN PERSISTS.      ticagrelor (Brilinta) 90 mg tablet Take 1 tablet (90 mg) by mouth 2 times a day.      triamterene-hydrochlorothiazid (Maxzide-25) 37.5-25 mg tablet Take 1 tablet by mouth once daily.      True Metrix Glucose Meter misc USE TO TEST BLOOD SUGAR ONCE DAILY AND AS NEEDED       No current facility-administered medications on file prior to visit.        Assessment/Plan   Problem List Items Addressed This Visit          Endocrine/Metabolic    Type 2 diabetes mellitus without complication, with long-term current use of insulin (CMS/HCC)     Switched to the following:  Dexcom G7 sensors  Dexcom G7 transmitter  Switched from  Trulicity to the following:  Rybelsus 3mg PO once daily.  Pt would benefit more from the G7 as it is a 2 piece system. No chance of throwing out the transmitter. Pt would also benefit from daily pill Rybelsus over weekly injection as pt has trouble in the past working the injectable pen, caused her to miss 3 weeks of meds.  Prescription sent over to patient's preferred pharmacy  Stopped the following:  Trulicity  Dexcom G6 system  FUV in 3 weeks on 6/29 to assess tolerance and blood glucose.             Continue all meds under the continuation of care with the referring provider and clinical pharmacy team.    Michele Ambrosio, Jim     Verbal consent to manage patient's drug therapy was obtained from [the patient and/or an individual authorized to act on behalf of a patient]. They were informed they may decline to participate or withdraw from participation in pharmacy services at any time.

## 2023-06-12 NOTE — ASSESSMENT & PLAN NOTE
Switched to the following:  Dexcom G7 sensors  Dexcom G7 transmitter  Switched from Trulicity to the following:  Rybelsus 3mg PO once daily.  Pt would benefit more from the G7 as it is a 2 piece system. No chance of throwing out the transmitter. Pt would also benefit from daily pill Rybelsus over weekly injection as pt has trouble in the past working the injectable pen, caused her to miss 3 weeks of meds.  Prescription sent over to patient's preferred  pharmacy  Stopped the following:  Trulicity  Dexcom G6 system  FUV in 3 weeks on 6/29 to assess tolerance and blood glucose.

## 2023-06-13 ENCOUNTER — APPOINTMENT (OUTPATIENT)
Dept: PRIMARY CARE | Facility: CLINIC | Age: 48
End: 2023-06-13
Payer: COMMERCIAL

## 2023-06-26 DIAGNOSIS — E11.9 TYPE 2 DIABETES MELLITUS WITHOUT COMPLICATION, WITH LONG-TERM CURRENT USE OF INSULIN (MULTI): ICD-10-CM

## 2023-06-26 DIAGNOSIS — Z79.4 TYPE 2 DIABETES MELLITUS WITHOUT COMPLICATION, WITH LONG-TERM CURRENT USE OF INSULIN (MULTI): ICD-10-CM

## 2023-06-26 RX ORDER — BLOOD-GLUCOSE TRANSMITTER
EACH MISCELLANEOUS
Qty: 1 EACH | Refills: 3 | Status: SHIPPED | OUTPATIENT
Start: 2023-06-26

## 2023-06-26 RX ORDER — DULAGLUTIDE 0.75 MG/.5ML
0.75 INJECTION, SOLUTION SUBCUTANEOUS
Qty: 4 EACH | Refills: 1 | Status: SHIPPED | OUTPATIENT
Start: 2023-06-26 | End: 2023-11-17 | Stop reason: ALTCHOICE

## 2023-06-26 NOTE — TELEPHONE ENCOUNTER
Rx Refill Request Telephone Encounter    Name:  Tana Bahnea  :  [id number]  Medication Name:  Trulicity 0.75mg/0.5ml pen injector   Specific Pharmacy location:  [facility]  Date of last appointment:  23  Date of next appointment:  23  Best number to reach patient:  [phone]    Rx Refill Request Telephone Encounter    Name:  Tana Bahena  :  [id number]  Medication Name:  Dexcom G6 transmitter   Specific Pharmacy location:  [facility]  Date of last appointment:  23  Date of next appointment:  23  Best number to reach patient:  [phone]    Pt stated Dr. Thakkar cancelled these but would like to go back on. Please advise.

## 2023-06-27 RX ORDER — BLOOD-GLUCOSE SENSOR
EACH MISCELLANEOUS
Qty: 3 EACH | Refills: 3 | Status: SHIPPED | OUTPATIENT
Start: 2023-06-27 | End: 2023-09-22 | Stop reason: SDUPTHER

## 2023-06-27 RX ORDER — BLOOD-GLUCOSE,RECEIVER,CONT
EACH MISCELLANEOUS
Qty: 1 EACH | Refills: 0 | Status: SHIPPED | OUTPATIENT
Start: 2023-06-27

## 2023-06-29 ENCOUNTER — APPOINTMENT (OUTPATIENT)
Dept: PHARMACY | Facility: HOSPITAL | Age: 48
End: 2023-06-29
Payer: COMMERCIAL

## 2023-06-30 ENCOUNTER — TELEPHONE (OUTPATIENT)
Dept: PRIMARY CARE | Facility: CLINIC | Age: 48
End: 2023-06-30
Payer: COMMERCIAL

## 2023-06-30 DIAGNOSIS — Z79.4 TYPE 2 DIABETES MELLITUS WITHOUT COMPLICATION, WITH LONG-TERM CURRENT USE OF INSULIN (MULTI): ICD-10-CM

## 2023-06-30 DIAGNOSIS — E11.9 TYPE 2 DIABETES MELLITUS WITHOUT COMPLICATION, WITH LONG-TERM CURRENT USE OF INSULIN (MULTI): ICD-10-CM

## 2023-06-30 NOTE — TELEPHONE ENCOUNTER
We received a request for prior authorization on the patient's RYBELSUS from their pharmacy. Prior authorization was submitted to insurance today Through Medicaid. Patient also has [employer] [place] insurance and secondary.  We will await their determination.

## 2023-07-03 ENCOUNTER — PATIENT OUTREACH (OUTPATIENT)
Dept: PRIMARY CARE | Facility: CLINIC | Age: 48
End: 2023-07-03
Payer: COMMERCIAL

## 2023-07-03 NOTE — PROGRESS NOTES
Discharge Facility: [facility]  Discharge Diagnosis: Stable angina  Admission Date: 6/29/23   Discharge Date: 6/30/23    PCP Appointment Date: TBD  Specialist Appointment Date:           Physician/Dept/Service:   Dr. Isma Sykes          Reason for Referral:   CAD, stable angina, Cardiomyopathy  Hospital Encounter and Summary: Linked  See discharge assessment below for further details    Medications  Medications reviewed with patient/caregiver?: Yes (no med changes) (7/3/2023  1:48 PM)  Is the patient having any side effects they believe may be caused by any medication additions or changes?: No (7/3/2023  1:48 PM)  Does the patient have all medications ordered at discharge?: Yes (7/3/2023  1:48 PM)  Is the patient taking all medications as directed (includes completed medication regime)?: Yes (7/3/2023  1:48 PM)  Care Management Interventions: Provided patient education (7/3/2023  1:48 PM)    Appointments  Does the patient have a primary care provider?: Yes (7/3/2023  1:48 PM)  Care Management Interventions: Advised patient to make appointment (7/3/2023  1:48 PM)    Self Management  Has home health visited the patient within 72 hours of discharge?: Not applicable (7/3/2023  1:48 PM)  What Durable Medical Equipment (DME) was ordered?: n/a (7/3/2023  1:48 PM)    Patient Teaching  Does the patient have access to their discharge instructions?: Yes (7/3/2023  1:48 PM)  Care Management Interventions: Reviewed instructions with patient (7/3/2023  1:48 PM)  What is the patient's perception of their health status since discharge?: Improving (7/3/2023  1:48 PM)  Is the patient/caregiver able to teach back the hierarchy of who to call/visit for symptoms/problems? PCP, Specialist, Home Health nurse, Urgent Care, ED, 911: Yes (7/3/2023  1:48 PM)

## 2023-07-05 DIAGNOSIS — Z79.4 TYPE 2 DIABETES MELLITUS WITHOUT COMPLICATION, WITH LONG-TERM CURRENT USE OF INSULIN (MULTI): Primary | ICD-10-CM

## 2023-07-05 DIAGNOSIS — E11.9 TYPE 2 DIABETES MELLITUS WITHOUT COMPLICATION, WITH LONG-TERM CURRENT USE OF INSULIN (MULTI): Primary | ICD-10-CM

## 2023-07-05 RX ORDER — INSULIN ASPART 100 [IU]/ML
INJECTION, SOLUTION INTRAVENOUS; SUBCUTANEOUS
Qty: 10 ML | Refills: 3 | Status: SHIPPED | OUTPATIENT
Start: 2023-07-05 | End: 2023-07-10 | Stop reason: SDUPTHER

## 2023-07-05 NOTE — TELEPHONE ENCOUNTER
Rx Refill Request Telephone Encounter    Name:  Tana Bahena  :  [id number]  Medication Name:  insulin aspart (NovoLOG, Fiasp) 100 UNIT/ML   Specific Pharmacy location:  [facility]  Date of last appointment:    Date of next appointment:    Best number to reach patient:  [phone]

## 2023-07-06 NOTE — TELEPHONE ENCOUNTER
Pt called regarding their medication Rybelsus 3mg tab. Stated pharmacy is requiring prior auth. [facility] Please advise. Best # to reach pt once put through is [phone].

## 2023-07-06 NOTE — TELEPHONE ENCOUNTER
Spoke to [facility] pharmacy and informed them directions for the insulin, Novolog is as follows,       Inject under the skin continuously. Sliding scale: 151-200 3 units, 301-250- 5 units, 251-300- 8 units, 301-350 10 units.       Pharmacy is needing a max dose per day. Please advise

## 2023-07-06 NOTE — TELEPHONE ENCOUNTER
Received denial on PA for Rybelsus.     Coverage is provided when the member has a documented history of at least 120 days of therapy with THREE preferred medications [ ONE of the 120 day trials must be Byetta (5 mcg and 10 mcg), Victoza ( 18mg/3ml pen) or Trulicity ( 0.75mg, 1.5mg, 3mg, and 4.5mg)], which include but are not limited to : Farxiga 5 and 10 mg, Invokana 100mg and 300mg, Victoza 18mg/3ml pen, and Jardiance 10 and 25 mg.       Pt must have a 120 day trial of Victoza, Trulicity, or Byetta. And one of these medications Farxiga 5 and 10 mg, Invokana 100mg and 300mg. Prior to approval.    Please advise

## 2023-07-06 NOTE — TELEPHONE ENCOUNTER
Patient called and said pharmacy called her and told her the instructions were unclear when they went to refill her insulin. Please contact pharmacy. Иван Lee

## 2023-07-10 RX ORDER — DULAGLUTIDE 0.75 MG/.5ML
0.75 INJECTION, SOLUTION SUBCUTANEOUS
Qty: 2 ML | Refills: 11 | Status: SHIPPED | OUTPATIENT
Start: 2023-07-10 | End: 2023-11-17 | Stop reason: ALTCHOICE

## 2023-07-10 RX ORDER — INSULIN ASPART 100 [IU]/ML
INJECTION, SOLUTION INTRAVENOUS; SUBCUTANEOUS
Qty: 10 ML | Refills: 3 | Status: SHIPPED | OUTPATIENT
Start: 2023-07-10 | End: 2023-09-12 | Stop reason: SDUPTHER

## 2023-07-13 DIAGNOSIS — G56.90 NEUROPATHY OF UPPER EXTREMITY, UNSPECIFIED LATERALITY: Primary | ICD-10-CM

## 2023-07-13 RX ORDER — GABAPENTIN 100 MG/1
100 CAPSULE ORAL 3 TIMES DAILY
Qty: 90 CAPSULE | Refills: 0 | Status: SHIPPED | OUTPATIENT
Start: 2023-07-13 | End: 2023-08-24 | Stop reason: SDUPTHER

## 2023-07-13 NOTE — TELEPHONE ENCOUNTER
Please advise pended medication for Dr. Thakkar patient.     Received a fax from [facility] requesting refill for the patient's prescription of Gabapentin. Medication has been pended to the provider for approval.     LV: 6/5/23  NV: 7/24/23

## 2023-07-17 ENCOUNTER — TELEPHONE (OUTPATIENT)
Dept: PRIMARY CARE | Facility: CLINIC | Age: 48
End: 2023-07-17
Payer: COMMERCIAL

## 2023-07-17 DIAGNOSIS — F33.1 MODERATE EPISODE OF RECURRENT MAJOR DEPRESSIVE DISORDER (MULTI): Primary | ICD-10-CM

## 2023-07-17 RX ORDER — TRAZODONE HYDROCHLORIDE 100 MG/1
300 TABLET ORAL NIGHTLY
COMMUNITY
End: 2023-07-17 | Stop reason: SDUPTHER

## 2023-07-17 RX ORDER — TRAZODONE HYDROCHLORIDE 100 MG/1
300 TABLET ORAL NIGHTLY
Qty: 90 TABLET | Refills: 0 | Status: SHIPPED | OUTPATIENT
Start: 2023-07-17 | End: 2023-08-30 | Stop reason: SDUPTHER

## 2023-07-17 NOTE — TELEPHONE ENCOUNTER
Patient requesting a refill on her trazodone 100 mg. She stated she was switched to cyclobenzaprine (Flexeril) 10 mg previously but feels this medication is not working for her  [facility]  Please Advise

## 2023-07-19 ENCOUNTER — PATIENT OUTREACH (OUTPATIENT)
Dept: PRIMARY CARE | Facility: CLINIC | Age: 48
End: 2023-07-19
Payer: COMMERCIAL

## 2023-07-19 NOTE — PROGRESS NOTES
Call regarding recent hospitalization. Patient has not yet had follow up with PCP. She is scheduled for next week.   At time of outreach call the patient feels as if their condition has improved since last visit.  Reviewed the PCP appointment with the pt and addressed any questions or concerns.

## 2023-07-24 ENCOUNTER — OFFICE VISIT (OUTPATIENT)
Dept: PRIMARY CARE | Facility: CLINIC | Age: 48
End: 2023-07-24
Payer: COMMERCIAL

## 2023-07-24 VITALS
SYSTOLIC BLOOD PRESSURE: 126 MMHG | OXYGEN SATURATION: 95 % | WEIGHT: 200.4 LBS | BODY MASS INDEX: 31.45 KG/M2 | TEMPERATURE: 98 F | HEART RATE: 84 BPM | HEIGHT: 67 IN | DIASTOLIC BLOOD PRESSURE: 84 MMHG

## 2023-07-24 DIAGNOSIS — F31.12 BIPOLAR AFFECTIVE DISORDER, CURRENTLY MANIC, MODERATE (MULTI): Primary | ICD-10-CM

## 2023-07-24 DIAGNOSIS — I10 PRIMARY HYPERTENSION: ICD-10-CM

## 2023-07-24 DIAGNOSIS — K21.9 GASTROESOPHAGEAL REFLUX DISEASE WITHOUT ESOPHAGITIS: ICD-10-CM

## 2023-07-24 DIAGNOSIS — E11.9 TYPE 2 DIABETES MELLITUS WITHOUT COMPLICATION, WITH LONG-TERM CURRENT USE OF INSULIN (MULTI): ICD-10-CM

## 2023-07-24 DIAGNOSIS — I25.10 CORONARY ARTERY DISEASE INVOLVING NATIVE CORONARY ARTERY OF NATIVE HEART WITHOUT ANGINA PECTORIS: ICD-10-CM

## 2023-07-24 DIAGNOSIS — M54.50 CHRONIC BILATERAL LOW BACK PAIN WITHOUT SCIATICA: ICD-10-CM

## 2023-07-24 DIAGNOSIS — G89.29 CHRONIC BILATERAL LOW BACK PAIN WITHOUT SCIATICA: ICD-10-CM

## 2023-07-24 DIAGNOSIS — E66.09 CLASS 1 OBESITY DUE TO EXCESS CALORIES WITH SERIOUS COMORBIDITY AND BODY MASS INDEX (BMI) OF 31.0 TO 31.9 IN ADULT: ICD-10-CM

## 2023-07-24 DIAGNOSIS — E78.2 MIXED HYPERLIPIDEMIA: ICD-10-CM

## 2023-07-24 DIAGNOSIS — F51.01 PRIMARY INSOMNIA: ICD-10-CM

## 2023-07-24 DIAGNOSIS — Z79.4 TYPE 2 DIABETES MELLITUS WITHOUT COMPLICATION, WITH LONG-TERM CURRENT USE OF INSULIN (MULTI): ICD-10-CM

## 2023-07-24 PROCEDURE — 3079F DIAST BP 80-89 MM HG: CPT | Performed by: FAMILY MEDICINE

## 2023-07-24 PROCEDURE — 3008F BODY MASS INDEX DOCD: CPT | Performed by: FAMILY MEDICINE

## 2023-07-24 PROCEDURE — 3051F HG A1C>EQUAL 7.0%<8.0%: CPT | Performed by: FAMILY MEDICINE

## 2023-07-24 PROCEDURE — 4010F ACE/ARB THERAPY RXD/TAKEN: CPT | Performed by: FAMILY MEDICINE

## 2023-07-24 PROCEDURE — 99215 OFFICE O/P EST HI 40 MIN: CPT | Performed by: FAMILY MEDICINE

## 2023-07-24 PROCEDURE — 3074F SYST BP LT 130 MM HG: CPT | Performed by: FAMILY MEDICINE

## 2023-07-24 RX ORDER — OMEPRAZOLE 40 MG/1
40 CAPSULE, DELAYED RELEASE ORAL
Qty: 30 CAPSULE | Refills: 11 | Status: SHIPPED | OUTPATIENT
Start: 2023-07-24

## 2023-07-24 RX ORDER — HYDROXYZINE HYDROCHLORIDE 25 MG/1
25 TABLET, FILM COATED ORAL 3 TIMES DAILY
Qty: 60 TABLET | Refills: 2 | Status: SHIPPED | OUTPATIENT
Start: 2023-07-24 | End: 2023-12-14 | Stop reason: SDUPTHER

## 2023-07-24 RX ORDER — PAROXETINE HYDROCHLORIDE 20 MG/1
20 TABLET, FILM COATED ORAL EVERY MORNING
Qty: 30 TABLET | Refills: 5 | Status: SHIPPED | OUTPATIENT
Start: 2023-07-24 | End: 2024-01-23 | Stop reason: ALTCHOICE

## 2023-07-24 ASSESSMENT — ENCOUNTER SYMPTOMS
NUMBNESS: 0
BLOOD IN STOOL: 0
HEADACHES: 0
VOMITING: 0
EYE DISCHARGE: 0
ARTHRALGIAS: 0
FATIGUE: 0
BRUISES/BLEEDS EASILY: 0
NAUSEA: 0
DIZZINESS: 0
SHORTNESS OF BREATH: 0
DIARRHEA: 0
NERVOUS/ANXIOUS: 0
MYALGIAS: 0
BACK PAIN: 0
ABDOMINAL PAIN: 0
DIFFICULTY URINATING: 0
COUGH: 0
CONSTIPATION: 0
NECK PAIN: 0
HEMATURIA: 0
WEAKNESS: 0
ACTIVITY CHANGE: 0
DYSPHORIC MOOD: 0
CHEST TIGHTNESS: 0
SORE THROAT: 0
ADENOPATHY: 0

## 2023-07-24 NOTE — PROGRESS NOTES
Subjective   Patient ID: Tana Bahena is a 48 y.o. female who presents for Hospital Follow-up from /[place] 6/29-6/30 for stable angina.       Discuss getting another medication for sleep.     Discuss what needs to done for bariatric surgery for possible gastric sleeve.       HPI  Patient had chest pain  Seen in the emergency department  Ruled out for MI  We will see cardio in several days  No current chest pain    Bipolar stable no suicidal ideation no psychotic or manic symptoms    Insomnia  Would like adjusted medication for this    Hypertension stable tolerates meds    High cholesterol stable watch diet    Diabetes stable no progression or worsening of symptoms    GERD stable no change and no red flag symptoms    Obese  She would like bariatric evaluation  BMI is 31  Not certain she would qualify for bariatric intervention but she would like to be evaluated    Patient here today with daughter  Review of Systems   Constitutional:  Negative for activity change and fatigue.   HENT:  Negative for congestion and sore throat.    Eyes:  Negative for discharge.   Respiratory:  Negative for cough, chest tightness and shortness of breath.    Cardiovascular:  Negative for chest pain and leg swelling.   Gastrointestinal:  Negative for abdominal pain, blood in stool, constipation, diarrhea, nausea and vomiting.   Endocrine: Negative for cold intolerance and heat intolerance.   Genitourinary:  Negative for difficulty urinating and hematuria.   Musculoskeletal:  Negative for arthralgias, back pain, gait problem, myalgias and neck pain.   Allergic/Immunologic: Negative for environmental allergies.   Neurological:  Negative for dizziness, syncope, weakness, numbness and headaches.   Hematological:  Negative for adenopathy. Does not bruise/bleed easily.   Psychiatric/Behavioral:  Negative for dysphoric mood. The patient is not nervous/anxious.    All other systems reviewed and are negative.      Objective   /84 (BP  "Location: Left arm, BP Cuff Size: Large adult)   Pulse 84   Temp 36.7 °C (98 °F)   Ht 1.702 m (5' 7\")   Wt 90.9 kg (200 lb 6.4 oz)   SpO2 95%   BMI 31.39 kg/m²    Physical Exam  Vitals and nursing note reviewed.   Constitutional:       General: She is not in acute distress.     Appearance: Normal appearance.   HENT:      Head: Normocephalic and atraumatic.      Right Ear: Tympanic membrane, ear canal and external ear normal.      Left Ear: Tympanic membrane, ear canal and external ear normal.      Nose: Nose normal.      Mouth/Throat:      Mouth: Mucous membranes are moist.      Pharynx: Oropharynx is clear. No oropharyngeal exudate or posterior oropharyngeal erythema.   Eyes:      Extraocular Movements: Extraocular movements intact.      Conjunctiva/sclera: Conjunctivae normal.      Pupils: Pupils are equal, round, and reactive to light.   Cardiovascular:      Rate and Rhythm: Normal rate and regular rhythm.      Pulses: Normal pulses.      Heart sounds: Normal heart sounds. No murmur heard.  Pulmonary:      Effort: Pulmonary effort is normal. No respiratory distress.      Breath sounds: Normal breath sounds. No wheezing or rales.   Abdominal:      General: Abdomen is flat. Bowel sounds are normal. There is no distension.      Palpations: Abdomen is soft. There is no mass.      Tenderness: There is no abdominal tenderness.   Musculoskeletal:         General: No swelling or deformity. Normal range of motion.      Cervical back: Normal range of motion and neck supple.      Right lower leg: No edema.      Left lower leg: No edema.   Lymphadenopathy:      Cervical: No cervical adenopathy.   Skin:     General: Skin is warm and dry.      Capillary Refill: Capillary refill takes less than 2 seconds.      Findings: No lesion or rash.   Neurological:      General: No focal deficit present.      Mental Status: She is alert and oriented to person, place, and time.      Cranial Nerves: No cranial nerve deficit.      " Motor: No weakness.   Psychiatric:         Mood and Affect: Mood normal.         Behavior: Behavior normal.         Thought Content: Thought content normal.         Judgment: Judgment normal.         Assessment/Plan   Problem List Items Addressed This Visit       Bipolar affective disorder, currently manic, moderate (CMS/HCC) - Primary    Relevant Medications    PARoxetine (Paxil) 20 mg tablet    Coronary artery disease involving native coronary artery of native heart without angina pectoris    Type 2 diabetes mellitus without complication, with long-term current use of insulin (CMS/HCC)    Primary hypertension    Mixed hyperlipidemia    Chronic bilateral low back pain without sciatica    Primary insomnia    Relevant Medications    hydrOXYzine HCL (Atarax) 25 mg tablet    Other Relevant Orders    Follow Up In [facility] - PCP     Other Visit Diagnoses       Class 1 obesity due to excess calories with serious comorbidity and body mass index (BMI) of 31.0 to 31.9 in adult        Relevant Orders    Referral to Bariatric Surgery    Gastroesophageal reflux disease without esophagitis        Relevant Medications    omeprazole (PriLOSEC) 40 mg DR capsule            Patient education provided.  Stay current with age appropriate health maintenance as instructed.  Appointment here or ER with new or worsening symptoms'  Keep appropriate follow-up visit.  Stay current with proper immunizations   Report suicidal ideation report psychotic or manic symptoms  See specialist  Trial of hydroxyzine  3-month follow-up visit  Discussed at length with patient and daughter

## 2023-07-25 ENCOUNTER — TELEPHONE (OUTPATIENT)
Dept: PRIMARY CARE | Facility: CLINIC | Age: 48
End: 2023-07-25
Payer: COMMERCIAL

## 2023-07-25 ENCOUNTER — OFFICE VISIT (OUTPATIENT)
Dept: CARDIOLOGY CLINIC | Age: 48
End: 2023-07-25
Payer: COMMERCIAL

## 2023-07-25 VITALS
SYSTOLIC BLOOD PRESSURE: 124 MMHG | HEART RATE: 90 BPM | WEIGHT: 201 LBS | OXYGEN SATURATION: 98 % | DIASTOLIC BLOOD PRESSURE: 80 MMHG | BODY MASS INDEX: 32.44 KG/M2

## 2023-07-25 DIAGNOSIS — I25.10 CORONARY ARTERY DISEASE INVOLVING NATIVE CORONARY ARTERY OF NATIVE HEART WITHOUT ANGINA PECTORIS: Primary | Chronic | ICD-10-CM

## 2023-07-25 DIAGNOSIS — Z95.1 HX OF CABG: ICD-10-CM

## 2023-07-25 DIAGNOSIS — I20.8 ANGINA AT REST (HCC): ICD-10-CM

## 2023-07-25 DIAGNOSIS — G93.40 ENCEPHALOPATHY: ICD-10-CM

## 2023-07-25 DIAGNOSIS — E78.2 MIXED HYPERLIPIDEMIA: ICD-10-CM

## 2023-07-25 DIAGNOSIS — R06.02 SHORTNESS OF BREATH: ICD-10-CM

## 2023-07-25 DIAGNOSIS — R07.2 PRECORDIAL PAIN: ICD-10-CM

## 2023-07-25 DIAGNOSIS — I25.5 CARDIOMYOPATHY, ISCHEMIC: ICD-10-CM

## 2023-07-25 DIAGNOSIS — Z95.810 ICD (IMPLANTABLE CARDIOVERTER-DEFIBRILLATOR) IN PLACE: ICD-10-CM

## 2023-07-25 DIAGNOSIS — I10 PRIMARY HYPERTENSION: ICD-10-CM

## 2023-07-25 PROCEDURE — 4004F PT TOBACCO SCREEN RCVD TLK: CPT | Performed by: INTERNAL MEDICINE

## 2023-07-25 PROCEDURE — G8427 DOCREV CUR MEDS BY ELIG CLIN: HCPCS | Performed by: INTERNAL MEDICINE

## 2023-07-25 PROCEDURE — 99214 OFFICE O/P EST MOD 30 MIN: CPT | Performed by: INTERNAL MEDICINE

## 2023-07-25 PROCEDURE — 3079F DIAST BP 80-89 MM HG: CPT | Performed by: INTERNAL MEDICINE

## 2023-07-25 PROCEDURE — G8417 CALC BMI ABV UP PARAM F/U: HCPCS | Performed by: INTERNAL MEDICINE

## 2023-07-25 PROCEDURE — 3074F SYST BP LT 130 MM HG: CPT | Performed by: INTERNAL MEDICINE

## 2023-07-25 RX ORDER — NITROGLYCERIN 0.4 MG/1
0.4 TABLET SUBLINGUAL EVERY 5 MIN PRN
OUTPATIENT
Start: 2023-07-25

## 2023-07-25 RX ORDER — SODIUM CHLORIDE 9 MG/ML
INJECTION, SOLUTION INTRAVENOUS CONTINUOUS
OUTPATIENT
Start: 2023-07-25

## 2023-07-25 RX ORDER — SODIUM CHLORIDE 0.9 % (FLUSH) 0.9 %
5-40 SYRINGE (ML) INJECTION EVERY 12 HOURS SCHEDULED
OUTPATIENT
Start: 2023-07-25

## 2023-07-25 RX ORDER — SODIUM CHLORIDE 0.9 % (FLUSH) 0.9 %
5-40 SYRINGE (ML) INJECTION PRN
OUTPATIENT
Start: 2023-07-25

## 2023-07-25 RX ORDER — DIPHENHYDRAMINE HCL 25 MG
50 TABLET ORAL ONCE
OUTPATIENT
Start: 2023-07-25 | End: 2023-07-25

## 2023-07-25 RX ORDER — ONDANSETRON 2 MG/ML
4 INJECTION INTRAMUSCULAR; INTRAVENOUS EVERY 6 HOURS PRN
OUTPATIENT
Start: 2023-07-25

## 2023-07-25 RX ORDER — METFORMIN HYDROCHLORIDE 500 MG/1
TABLET, EXTENDED RELEASE ORAL
COMMUNITY
Start: 2023-06-22

## 2023-07-25 RX ORDER — SODIUM CHLORIDE 9 MG/ML
INJECTION, SOLUTION INTRAVENOUS PRN
OUTPATIENT
Start: 2023-07-25

## 2023-07-25 RX ORDER — PREDNISONE 5 MG/1
50 TABLET ORAL ONCE
OUTPATIENT
Start: 2023-07-25 | End: 2023-07-25

## 2023-07-25 ASSESSMENT — ENCOUNTER SYMPTOMS
WHEEZING: 0
GASTROINTESTINAL NEGATIVE: 1
NAUSEA: 0
SHORTNESS OF BREATH: 0
EYES NEGATIVE: 1
VOMITING: 0
ABDOMINAL PAIN: 0

## 2023-07-25 NOTE — PROGRESS NOTES
night, occures at rest. Symptoms are accelerating in nature. Compliant with meds. Smokes 3 cig a day. No bleeding issues. Pt denies   vomiting, diarrhea, constipation, motor weakness, insomnia, weight loss, syncope, dizziness, lightheadedness, palpitations, PND, orthopnea, or claudication. BP and HR are good. BS continues to be uncontrolled. In the 200's.      12-8-16: s/p s/p PCI of PDA with ZACHARY, patent mid LAD stent with 30-40% restenosis, patent ostial OM1 stent, 50% mid RCA, normal LVF EF of 55%. Right LE angio was normal to knees. Pt denies chest pain, dyspnea, dyspnea on exertion, change in exercise capacity, fatigue,  nausea, vomiting, diarrhea, constipation, motor weakness, insomnia, weight loss, syncope, dizziness, lightheadedness, palpitations, PND, orthopnea. Did have a right UE venous dupplex US with thrombosis of cephalic vein. Continues to smoke. Taking all her meds. No nitro use. BP and hr are good. CAD is stable. No LE discoloration or ulcers. No LE edema. No CHF type symptoms. Lipid profile is normal.      4-11-17: s/p hospitalization for CP 2 weeks ago. Lab work was ok, C.E is negative. States she is getting sporadic pains now, gets a spasm in her left arm. Has been going to the gym, doing stairmasters and treadmill without any major pains. Has lost 18 pounds. Has needed to take nitro last night. States she feels like she is gonna die sometimes. Pt denies chest pain, dyspnea, dyspnea on exertion, change in exercise capacity, fatigue,  nausea, vomiting, diarrhea, constipation, motor weakness, insomnia, weight loss, syncope, dizziness, lightheadedness, palpitations, PND, orthopnea, or claudication. 9-6-18: doing well overall. Getting . Need refills on meds. Her stress level is much better.  Pt denies chest pain, dyspnea, dyspnea on exertion, change in exercise capacity, fatigue,  nausea, vomiting, diarrhea, constipation, motor weakness, insomnia, weight loss, syncope, dizziness,

## 2023-07-25 NOTE — TELEPHONE ENCOUNTER
Patient stated that she suffered from a head injury a year ago and every year she goes for a CT w/o iv contrast of the head and she also has had a MRI Brain wo contrast for memory loss. 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

## 2023-07-27 ENCOUNTER — TELEMEDICINE (OUTPATIENT)
Dept: PHARMACY | Facility: HOSPITAL | Age: 48
End: 2023-07-27
Payer: COMMERCIAL

## 2023-07-27 DIAGNOSIS — Z79.4 TYPE 2 DIABETES MELLITUS WITHOUT COMPLICATION, WITH LONG-TERM CURRENT USE OF INSULIN (MULTI): Primary | ICD-10-CM

## 2023-07-27 DIAGNOSIS — E11.9 TYPE 2 DIABETES MELLITUS WITHOUT COMPLICATION, WITH LONG-TERM CURRENT USE OF INSULIN (MULTI): Primary | ICD-10-CM

## 2023-07-27 NOTE — PROGRESS NOTES
"Subjective   Patient ID: Tana Bahena is a 48 y.o. female who presents for Diabetes.    Referring Provider: Jacobo Thakkar MD     Patient states that she takes her BG readings in the morning and evening and it is consistently between 150-250 mg/dL. Patient was not able to start the Rybelsus due to the insurance requiring a PA and there being a problem getting the PA approved, will try to resubmit the PA for Rybelsus. However, patient was able to get the Dexcom filled and states that it is working well for her.    Diabetes  She presents for her follow-up diabetic visit. She has type 2 diabetes mellitus. Her disease course has been stable.       Objective     There were no vitals taken for this visit.     Labs  Lab Results   Component Value Date    BILITOT 0.3 06/29/2023    CALCIUM 8.6 06/30/2023    CO2 27 06/30/2023     06/30/2023    CREATININE 0.71 06/30/2023    GLUCOSE 220 (H) 06/30/2023    ALKPHOS 91 06/29/2023    K 4.1 06/30/2023    PROT 6.9 06/29/2023     06/30/2023    AST 11 06/29/2023    ALT 15 06/29/2023    BUN 14 06/30/2023    ANIONGAP 11 06/30/2023    MG 1.78 06/30/2023    PHOS 3.4 05/02/2023    ALBUMIN 4.0 06/29/2023    LIPASE 15 06/29/2023    GFRF >90 06/30/2023    GFRMALE CANCELED 05/20/2023     Lab Results   Component Value Date    TRIG 118 12/19/2022    CHOL 154 12/19/2022    HDL 51.1 12/19/2022     Lab Results   Component Value Date    HGBA1C 7.4 (A) 05/02/2023       Current Outpatient Medications on File Prior to Visit   Medication Sig Dispense Refill    albuterol 90 mcg/actuation inhaler Inhale 2 puffs 3 times a day as needed.      alcohol swabs (Alcohol Prep Pads) pads, medicated USE TO CLEANSE SKIN PRIOR TO INJECTION THREE TIMES DAILY AS DIRECTED 200 each 3    aspirin 81 mg EC tablet Take 1 tablet (81 mg) by mouth once daily.      atorvastatin (Lipitor) 40 mg tablet Take 1 tablet (40 mg) by mouth once daily.      BD Breana 2nd Gen Pen Needle 32 gauge x 5/32\" needle USE TO " ADMINISTER INSULIN 3 TIMES A  each 3    blood sugar diagnostic (True Metrix Glucose Test Strip) strip 1 strip once daily.      blood-glucose sensor (Dexcom G7 Sensor) device Use as directed to test blood glucose continuously. Replace every 10 days. 3 each 3    budesonide-formoteroL (Symbicort) 80-4.5 mcg/actuation inhaler Inhale 2 puffs 2 times a day. Rinse mouth after use 10.2 g 3    carBAMazepine (Carbatrol) 200 mg 12 hr capsule Take 1 capsule (200 mg) by mouth twice a day.      carvedilol (Coreg) 6.25 mg tablet Take 1 tablet (6.25 mg) by mouth twice a day.      cyclobenzaprine (Flexeril) 10 mg tablet Take 1 tablet (10 mg) by mouth 3 times a day as needed for muscle spasms. 60 tablet 2    Dexcom G4 platinum  (Dexcom G7 ) misc Use as instructed to test blood sugar continuously 1 each 0    Dexcom G4 platinum transmitter (Dexcom G6 Transmitter) device Use as instructed to test blood sugar 4 times daily. Replace every 90 days. 1 each 3    dulaglutide (Trulicity) 0.75 mg/0.5 mL pen injector Inject 0.75 mg under the skin 1 (one) time per week. 4 each 1    dulaglutide (Trulicity) 0.75 mg/0.5 mL pen injector Inject 0.75 mg under the skin 1 (one) time per week. 2 mL 11    gabapentin (Neurontin) 100 mg capsule Take 1 capsule (100 mg) by mouth 3 times a day. For 90 days 90 capsule 0    hydrOXYzine HCL (Atarax) 25 mg tablet Take 1 tablet (25 mg) by mouth 3 times a day. prn 60 tablet 2    insulin aspart (NovoLOG U-100 Insulin aspart) 100 unit/mL injection Inject under the skin continuously. Sliding scale: 151-200 3 units, 301-250- 5 units, 251-300- 8 units, 301-350 10 units. Max dose 14 units daily. 10 mL 3    insulin aspart (NovoLOG, Fiasp) 100 UNIT/ML patient supplied pump Inject under the skin continuously. Sliding scale: 151-200 3 units, 301-250- 5 units, 251-300- 8 units, 301-350 10 units      insulin glargine (Lantus) 100 unit/mL injection Inject 52 Units under the skin once daily at bedtime.       isosorbide mononitrate ER (Imdur) 60 mg 24 hr tablet Take 1 tablet (60 mg) by mouth once daily. In the morning      lancets 33 gauge misc       lithium 150 mg capsule Take 1 capsule (150 mg) by mouth 2 times a day. 180 capsule 1    losartan (Cozaar) 25 mg tablet Take 1 tablet (25 mg) by mouth once daily.      metFORMIN XR (Glucophage-XR) 500 mg 24 hr tablet Take 2 tablets (1,000 mg) by mouth 2 times a day. 120 tablet 3    mirtazapine (Remeron) 15 mg tablet Take 1 tablet (15 mg) by mouth once daily at bedtime. 90 tablet 0    nitroglycerin (Nitrostat) 0.4 mg SL tablet Place 1 tablet (0.4 mg) under the tongue every 5 minutes if needed. PLACE 1 TABLET UNDER THE TONGUE EVERY 5 MINUTES FOR UP TO 3 DOSES AS NEEDED FOR CHEST PAIN.CALL 911 IF PAIN PERSISTS.      omeprazole (PriLOSEC) 40 mg DR capsule Take 1 capsule (40 mg) by mouth once daily in the morning. Take before meals. Do not crush or chew. 30 capsule 11    PARoxetine (Paxil) 20 mg tablet Take 1 tablet (20 mg) by mouth once daily in the morning. 30 tablet 5    semaglutide (Rybelsus) 3 mg tablet tablet Take 1 tablet (3 mg) by mouth once daily. 30 tablet 2    ticagrelor (Brilinta) 90 mg tablet Take 1 tablet (90 mg) by mouth 2 times a day.      traZODone (Desyrel) 100 mg tablet Take 3 tablets (300 mg) by mouth once daily at bedtime. 90 tablet 0    triamterene-hydrochlorothiazid (Maxzide-25) 37.5-25 mg tablet Take 1 tablet by mouth once daily.      True Metrix Glucose Meter misc USE TO TEST BLOOD SUGAR ONCE DAILY AND AS NEEDED       No current facility-administered medications on file prior to visit.        Assessment/Plan   Problem List Items Addressed This Visit       Type 2 diabetes mellitus without complication, with long-term current use of insulin (CMS/[facility]) - Primary     Patient's diabetes is currently not well controlled because she was not able to pick-up Rybelsus 3mg tablets due to insurance requiring a prior authorization. Will resubmit the prior authorization  for Rybelsus, but patient is willing to start Jardiance while we are waiting for the Rybelsus,    Start: Jardiance 10mg daily    Jardiance Education:     - Counseled patient on Jardiance MOA, expectations, side effects, duration of therapy, administration, and monitoring parameters.  - Reviewed the benefits of SGLT-2i therapy, such as glycemic control and kidney and CV protection.  - Advised patient to practice proper  hygiene to reduce risk of UTIs or yeast infections.  - Advised patient to maintain adequate fluid intake to remain hydrated while on SGLT2i therapy.  - Answered all patient questions and concerns.     2. Get a new A1c after 08/02/23, will put in an order for the lab    3. Continue:   Metformin  mg - 2 tablets by mouth twice a day  Lantus 100 units/ml - Inject 52 units under the skin once a day at bedtime  Novolog 100 units/ml - Inject per sliding scale, max 14 units daily            Melissa Bazzi  PGY-1 Pharmacy Resident  Ph: [phone]    Continue all meds under the continuation of care with the referring provider and clinical pharmacy team.    Clinical Pharmacist follow up: 4 weeks or sooner as needed based on clinical intervention  Type of Encounter:  Telephone    Verbal consent to manage patient's drug therapy was obtained from the patient. They were informed they may decline to participate or withdraw from participation in pharmacy services at any time.

## 2023-07-27 NOTE — PROGRESS NOTES
I reviewed the progress note and agree with the resident’s findings and plans as written. Case discussed with resident.    Jackeline Potts, SabihaD

## 2023-07-27 NOTE — ASSESSMENT & PLAN NOTE
Patient's diabetes is currently not well controlled because she was not able to pick-up Rybelsus 3mg tablets due to insurance requiring a prior authorization. Will resubmit the prior authorization for Rybelsus, but patient is willing to start Jardiance while we are waiting for the Rybelsus,    Start: Jardiance 10mg daily    Jardiance Education:     - Counseled patient on Jardiance MOA, expectations, side effects, duration of therapy, administration, and monitoring parameters.  - Reviewed the benefits of SGLT-2i therapy, such as glycemic control and kidney and CV protection.  - Advised patient to practice proper  hygiene to reduce risk of UTIs or yeast infections.  - Advised patient to maintain adequate fluid intake to remain hydrated while on SGLT2i therapy.  - Answered all patient questions and concerns.     2. Get a new A1c after 08/02/23, will put in an order for the lab    3. Continue:   Metformin  mg - 2 tablets by mouth twice a day  Lantus 100 units/ml - Inject 52 units under the skin once a day at bedtime  Novolog 100 units/ml - Inject per sliding scale, max 14 units daily

## 2023-08-02 DIAGNOSIS — Z79.4 TYPE 2 DIABETES MELLITUS WITHOUT COMPLICATION, WITH LONG-TERM CURRENT USE OF INSULIN (MULTI): ICD-10-CM

## 2023-08-02 DIAGNOSIS — E11.9 TYPE 2 DIABETES MELLITUS WITHOUT COMPLICATION, WITH LONG-TERM CURRENT USE OF INSULIN (MULTI): ICD-10-CM

## 2023-08-02 NOTE — TELEPHONE ENCOUNTER
Recent Visits  Date Type Provider Dept   07/24/23 Office Visit Jacobo Thakkar MD Do [id number] [facility]   06/05/23 Office Visit Jacboo Thakkar MD Do [id number] [facility]   05/08/23 Office Visit Jacobo Thakkar MD Do [id number] [facility]   03/14/23 Office Visit Jacobo Thakkar MD Do [id number] [facility]   Showing recent visits within past 540 days and meeting all other requirements  Future Appointments  Date Type Provider Dept   08/08/23 Appointment Jacobo Thakkar MD Do [id number] [facility]   09/05/23 Appointment Jacobo Thakkar MD Do [id number] [facility]   10/24/23 Appointment Jacobo Thakkar MD Do [id number] [facility]   Showing future appointments within next 180 days and meeting all other requirements

## 2023-08-03 ENCOUNTER — HOSPITAL ENCOUNTER (OUTPATIENT)
Dept: DATA CONVERSION | Facility: HOSPITAL | Age: 48
End: 2023-08-03
Attending: INTERNAL MEDICINE | Admitting: INTERNAL MEDICINE
Payer: COMMERCIAL

## 2023-08-03 DIAGNOSIS — F17.210 NICOTINE DEPENDENCE, CIGARETTES, UNCOMPLICATED: ICD-10-CM

## 2023-08-03 DIAGNOSIS — I25.119 ATHEROSCLEROTIC HEART DISEASE OF NATIVE CORONARY ARTERY WITH UNSPECIFIED ANGINA PECTORIS (CMS-HCC): ICD-10-CM

## 2023-08-03 DIAGNOSIS — Z95.1 PRESENCE OF AORTOCORONARY BYPASS GRAFT: ICD-10-CM

## 2023-08-03 DIAGNOSIS — I25.2 OLD MYOCARDIAL INFARCTION: ICD-10-CM

## 2023-08-03 DIAGNOSIS — I25.10 ATHEROSCLEROTIC HEART DISEASE OF NATIVE CORONARY ARTERY WITHOUT ANGINA PECTORIS: ICD-10-CM

## 2023-08-03 DIAGNOSIS — Z79.84 LONG TERM (CURRENT) USE OF ORAL HYPOGLYCEMIC DRUGS: ICD-10-CM

## 2023-08-03 DIAGNOSIS — E78.2 MIXED HYPERLIPIDEMIA: ICD-10-CM

## 2023-08-03 DIAGNOSIS — E11.9 TYPE 2 DIABETES MELLITUS WITHOUT COMPLICATIONS (MULTI): ICD-10-CM

## 2023-08-03 DIAGNOSIS — I20.9 ANGINA PECTORIS, UNSPECIFIED (CMS-HCC): ICD-10-CM

## 2023-08-03 LAB
ANION GAP IN SER/PLAS: 14 MMOL/L (ref 10–20)
ANION GAP SERPL CALCULATED.3IONS-SCNC: 14 MMOL/L (ref 10–20)
BICARBONATE: 25 MMOL/L (ref 21–32)
CALCIUM (MG/DL) IN SER/PLAS: 9 MG/DL (ref 8.6–10.3)
CALCIUM SERPL-MCNC: 9 MG/DL (ref 8.6–10.3)
CARBON DIOXIDE, TOTAL (MMOL/L) IN SER/PLAS: 25 MMOL/L (ref 21–32)
CHLORIDE (MMOL/L) IN SER/PLAS: 104 MMOL/L (ref 98–107)
CHLORIDE BLD-SCNC: 104 MMOL/L (ref 98–107)
CHORIOGONADOTROPIN (MIU/ML) IN SER/PLAS: <2 MIU/ML
CREAT SERPL-MCNC: 0.79 MG/DL (ref 0.5–1.05)
CREATININE (MG/DL) IN SER/PLAS: 0.79 MG/DL (ref 0.5–1.05)
EGFR FEMALE: >90 ML/MIN/1.73M2
ERYTHROCYTE DISTRIBUTION WIDTH (RATIO) BY AUTOMATED COUNT: 12.6 % (ref 11.5–14.5)
ERYTHROCYTE MEAN CORPUSCULAR HEMOGLOBIN CONCENTRATION (G/DL) BY AUTOMATED: 34.2 G/DL (ref 32–36)
ERYTHROCYTE MEAN CORPUSCULAR VOLUME (FL) BY AUTOMATED COUNT: 92 FL (ref 80–100)
ERYTHROCYTE [DISTWIDTH] IN BLOOD BY AUTOMATED COUNT: 12.6 % (ref 11.5–14.5)
ERYTHROCYTES (10*6/UL) IN BLOOD BY AUTOMATED COUNT: 4.28 X10E12/L (ref 4–5.2)
GFR FEMALE: >90 ML/MIN/1.73M2
GLUCOSE (MG/DL) IN SER/PLAS: 97 MG/DL (ref 74–99)
GLUCOSE BLD-MCNC: 94 MG/DL (ref 74–99)
GLUCOSE: 97 MG/DL (ref 74–99)
HCG QUANTITATIVE: <2 MIU/ML
HCT VFR BLD CALC: 39.2 % (ref 36–46)
HEMATOCRIT (%) IN BLOOD BY AUTOMATED COUNT: 39.2 % (ref 36–46)
HEMOGLOBIN (G/DL) IN BLOOD: 13.4 G/DL (ref 12–16)
HEMOGLOBIN: 13.4 G/DL (ref 12–16)
LEUKOCYTES (10*3/UL) IN BLOOD BY AUTOMATED COUNT: 7.7 X10E9/L (ref 4.4–11.3)
MCHC RBC AUTO-ENTMCNC: 34.2 G/DL (ref 32–36)
MCV RBC AUTO: 92 FL (ref 80–100)
PLATELET # BLD: 260 X10E9/L (ref 150–450)
PLATELETS (10*3/UL) IN BLOOD AUTOMATED COUNT: 260 X10E9/L (ref 150–450)
POCT GLUCOSE: 94 MG/DL (ref 74–99)
POTASSIUM (MMOL/L) IN SER/PLAS: 3.5 MMOL/L (ref 3.5–5.3)
POTASSIUM SERPL-SCNC: 3.5 MMOL/L (ref 3.5–5.3)
RBC # BLD: 4.28 X10E12/L (ref 4–5.2)
SODIUM (MMOL/L) IN SER/PLAS: 139 MMOL/L (ref 136–145)
SODIUM BLD-SCNC: 139 MMOL/L (ref 136–145)
UREA NITROGEN (MG/DL) IN SER/PLAS: 13 MG/DL (ref 6–23)
UREA NITROGEN: 13 MG/DL (ref 6–23)
WBC: 7.7 X10E9/L (ref 4.4–11.3)

## 2023-08-05 LAB
ATRIAL RATE: 96 BPM
P AXIS: 46 DEGREES
P OFFSET: 175 MS
P ONSET: 120 MS
PR INTERVAL: 178 MS
Q ONSET: 209 MS
QRS COUNT: 16 BEATS
QRS DURATION: 120 MS
QT INTERVAL: 398 MS
QTC CALCULATION(BAZETT): 502 MS
QTC FREDERICIA: 465 MS
R AXIS: -12 DEGREES
T AXIS: 64 DEGREES
T OFFSET: 408 MS
VENTRICULAR RATE: 96 BPM

## 2023-08-08 ENCOUNTER — APPOINTMENT (OUTPATIENT)
Dept: PRIMARY CARE | Facility: CLINIC | Age: 48
End: 2023-08-08
Payer: COMMERCIAL

## 2023-08-09 ENCOUNTER — TELEPHONE (OUTPATIENT)
Dept: PRIMARY CARE | Facility: CLINIC | Age: 48
End: 2023-08-09
Payer: COMMERCIAL

## 2023-08-09 DIAGNOSIS — F33.1 MODERATE EPISODE OF RECURRENT MAJOR DEPRESSIVE DISORDER (MULTI): ICD-10-CM

## 2023-08-09 NOTE — TELEPHONE ENCOUNTER
The patient is requesting a referral to a psychiatrist.  She has been seen by the [facility] the last ten years, and states that she need a fresh therapist.    Please advise.  Thank you.

## 2023-08-11 DIAGNOSIS — G89.29 CHRONIC BILATERAL LOW BACK PAIN WITHOUT SCIATICA: ICD-10-CM

## 2023-08-11 DIAGNOSIS — M54.50 CHRONIC BILATERAL LOW BACK PAIN WITHOUT SCIATICA: ICD-10-CM

## 2023-08-11 NOTE — TELEPHONE ENCOUNTER
Received a fax from [facility] requesting refill for the patient's prescription of Mirtazapine. Medication has been pended to the provider for approval.     LV: 7/24/23  NV: 8/21/23

## 2023-08-14 RX ORDER — MIRTAZAPINE 15 MG/1
15 TABLET, FILM COATED ORAL NIGHTLY
Qty: 90 TABLET | Refills: 0 | Status: SHIPPED | OUTPATIENT
Start: 2023-08-14 | End: 2024-01-23 | Stop reason: DRUGHIGH

## 2023-08-21 ENCOUNTER — APPOINTMENT (OUTPATIENT)
Dept: PRIMARY CARE | Facility: CLINIC | Age: 48
End: 2023-08-21
Payer: COMMERCIAL

## 2023-08-23 ENCOUNTER — PATIENT OUTREACH (OUTPATIENT)
Dept: PRIMARY CARE | Facility: CLINIC | Age: 48
End: 2023-08-23
Payer: COMMERCIAL

## 2023-08-24 DIAGNOSIS — E11.9 TYPE 2 DIABETES MELLITUS WITHOUT COMPLICATION, WITH LONG-TERM CURRENT USE OF INSULIN (MULTI): ICD-10-CM

## 2023-08-24 DIAGNOSIS — G56.90 NEUROPATHY OF UPPER EXTREMITY, UNSPECIFIED LATERALITY: ICD-10-CM

## 2023-08-24 DIAGNOSIS — Z79.4 TYPE 2 DIABETES MELLITUS WITHOUT COMPLICATION, WITH LONG-TERM CURRENT USE OF INSULIN (MULTI): ICD-10-CM

## 2023-08-24 NOTE — TELEPHONE ENCOUNTER
Rx Refill Request Telephone Encounter    Name:  Tana Bahena  :  [id number]  Medication Name:  LANTUS, GABAPENTIN   Specific Pharmacy location:  [facility]    Date of last appointment:  23   Date of next appointment:  10/24/23  Best number to reach patient:  [phone]

## 2023-08-25 RX ORDER — INSULIN GLARGINE 100 [IU]/ML
52 INJECTION, SOLUTION SUBCUTANEOUS NIGHTLY
Qty: 10 ML | Refills: 1 | Status: SHIPPED | OUTPATIENT
Start: 2023-08-25 | End: 2023-11-07 | Stop reason: SDUPTHER

## 2023-08-25 RX ORDER — GABAPENTIN 100 MG/1
100 CAPSULE ORAL 3 TIMES DAILY
Qty: 90 CAPSULE | Refills: 0 | Status: SHIPPED | OUTPATIENT
Start: 2023-08-25 | End: 2024-04-11 | Stop reason: SDUPTHER

## 2023-08-29 ENCOUNTER — HOSPITAL ENCOUNTER (OUTPATIENT)
Dept: CARDIOLOGY | Age: 48
Discharge: HOME | End: 2023-08-29
Payer: COMMERCIAL

## 2023-08-29 PROCEDURE — 93283 PRGRMG EVAL IMPLANTABLE DFB: CPT

## 2023-08-30 DIAGNOSIS — F33.1 MODERATE EPISODE OF RECURRENT MAJOR DEPRESSIVE DISORDER (MULTI): ICD-10-CM

## 2023-08-30 NOTE — TELEPHONE ENCOUNTER
Rx Refill Request     Name: Tana Bahena  :  1975   Medication Name:  Trazadone   Specific Pharmacy location:  [facility]   Date of last appointment:  2023  Date of next appointment:  2023  Best number to reach patient:  [phone]        Rx penidng for NK approval.

## 2023-08-31 ENCOUNTER — APPOINTMENT (OUTPATIENT)
Dept: PHARMACY | Facility: HOSPITAL | Age: 48
End: 2023-08-31
Payer: COMMERCIAL

## 2023-09-05 ENCOUNTER — OFFICE VISIT (OUTPATIENT)
Dept: PRIMARY CARE | Facility: CLINIC | Age: 48
End: 2023-09-05
Payer: COMMERCIAL

## 2023-09-05 ENCOUNTER — APPOINTMENT (OUTPATIENT)
Dept: PHARMACY | Facility: HOSPITAL | Age: 48
End: 2023-09-05
Payer: COMMERCIAL

## 2023-09-05 VITALS
SYSTOLIC BLOOD PRESSURE: 124 MMHG | HEART RATE: 111 BPM | DIASTOLIC BLOOD PRESSURE: 70 MMHG | WEIGHT: 201.2 LBS | BODY MASS INDEX: 31.58 KG/M2 | TEMPERATURE: 97.7 F | HEIGHT: 67 IN | OXYGEN SATURATION: 98 %

## 2023-09-05 DIAGNOSIS — G25.81 RESTLESS LEG: ICD-10-CM

## 2023-09-05 DIAGNOSIS — Z79.4 TYPE 2 DIABETES MELLITUS WITHOUT COMPLICATION, WITH LONG-TERM CURRENT USE OF INSULIN (MULTI): ICD-10-CM

## 2023-09-05 DIAGNOSIS — E11.9 TYPE 2 DIABETES MELLITUS WITHOUT COMPLICATION, WITH LONG-TERM CURRENT USE OF INSULIN (MULTI): ICD-10-CM

## 2023-09-05 DIAGNOSIS — F31.12 BIPOLAR AFFECTIVE DISORDER, CURRENTLY MANIC, MODERATE (MULTI): Primary | ICD-10-CM

## 2023-09-05 PROBLEM — J44.9 CHRONIC OBSTRUCTIVE PULMONARY DISEASE, UNSPECIFIED (MULTI): Status: ACTIVE | Noted: 2022-03-30

## 2023-09-05 PROCEDURE — 99214 OFFICE O/P EST MOD 30 MIN: CPT | Performed by: FAMILY MEDICINE

## 2023-09-05 PROCEDURE — 4010F ACE/ARB THERAPY RXD/TAKEN: CPT | Performed by: FAMILY MEDICINE

## 2023-09-05 PROCEDURE — 3074F SYST BP LT 130 MM HG: CPT | Performed by: FAMILY MEDICINE

## 2023-09-05 PROCEDURE — 3078F DIAST BP <80 MM HG: CPT | Performed by: FAMILY MEDICINE

## 2023-09-05 PROCEDURE — 3051F HG A1C>EQUAL 7.0%<8.0%: CPT | Performed by: FAMILY MEDICINE

## 2023-09-05 PROCEDURE — 3008F BODY MASS INDEX DOCD: CPT | Performed by: FAMILY MEDICINE

## 2023-09-05 RX ORDER — QUETIAPINE FUMARATE 100 MG/1
100 TABLET, FILM COATED ORAL NIGHTLY
COMMUNITY
End: 2023-11-13 | Stop reason: SDUPTHER

## 2023-09-05 RX ORDER — SEMAGLUTIDE 0.25 MG/.5ML
0.25 INJECTION, SOLUTION SUBCUTANEOUS
Qty: 2 ML | Refills: 2 | Status: SHIPPED | OUTPATIENT
Start: 2023-09-05 | End: 2023-09-12

## 2023-09-05 RX ORDER — TRAZODONE HYDROCHLORIDE 100 MG/1
300 TABLET ORAL NIGHTLY
Qty: 90 TABLET | Refills: 0 | Status: SHIPPED | OUTPATIENT
Start: 2023-09-05 | End: 2023-10-02 | Stop reason: SDUPTHER

## 2023-09-05 RX ORDER — ROPINIROLE 0.25 MG/1
0.25 TABLET, FILM COATED ORAL 3 TIMES DAILY
Qty: 90 TABLET | Refills: 11 | Status: SHIPPED | OUTPATIENT
Start: 2023-09-05 | End: 2024-03-05 | Stop reason: SDUPTHER

## 2023-09-05 ASSESSMENT — ENCOUNTER SYMPTOMS
HEMATURIA: 0
SHORTNESS OF BREATH: 0
VOMITING: 0
ADENOPATHY: 0
DIFFICULTY URINATING: 0
CHEST TIGHTNESS: 0
ARTHRALGIAS: 0
ACTIVITY CHANGE: 0
NUMBNESS: 0
CONSTIPATION: 0
COUGH: 0
DYSPHORIC MOOD: 0
FATIGUE: 0
WEAKNESS: 0
BRUISES/BLEEDS EASILY: 0
HEADACHES: 0
BACK PAIN: 0
EYE DISCHARGE: 0
NERVOUS/ANXIOUS: 0
BLOOD IN STOOL: 0
DIARRHEA: 0
NECK PAIN: 0
SORE THROAT: 0
NAUSEA: 0
MYALGIAS: 0
ABDOMINAL PAIN: 0
DIZZINESS: 0

## 2023-09-05 NOTE — PROGRESS NOTES
"Subjective   Patient ID: Tana Bahena is a 48 y.o. female who presents for 3 month follow up on Diabetes, Hyperlipidemia, and Hypertension.    Has multiple questions.    Currently taking Lithium, has heard of another medication with less side effects.    Wanting to discuss getting a prescription about Kristiney.     Restless leg syndrome, keeping her awake nightly.     Still hasn't gotten scheduled for brain MRI, due to pacemaker.     HPI  Diabetes stable  Watch blood work    Bipolar ongoing  Keep specialist evaluation for medicine treatment    Would like to lose weight discussed options with Wegovy    Restless legs  Poor sleep  Discussed treatment options    Needs brain MRI but has pacemaker  We will follow with neurology  Review of Systems   Constitutional:  Negative for activity change and fatigue.   HENT:  Negative for congestion and sore throat.    Eyes:  Negative for discharge.   Respiratory:  Negative for cough, chest tightness and shortness of breath.    Cardiovascular:  Negative for chest pain and leg swelling.   Gastrointestinal:  Negative for abdominal pain, blood in stool, constipation, diarrhea, nausea and vomiting.   Endocrine: Negative for cold intolerance and heat intolerance.   Genitourinary:  Negative for difficulty urinating and hematuria.   Musculoskeletal:  Negative for arthralgias, back pain, gait problem, myalgias and neck pain.   Allergic/Immunologic: Negative for environmental allergies.   Neurological:  Negative for dizziness, syncope, weakness, numbness and headaches.   Hematological:  Negative for adenopathy. Does not bruise/bleed easily.   Psychiatric/Behavioral:  Negative for dysphoric mood. The patient is not nervous/anxious.    All other systems reviewed and are negative.      Objective   /70 (BP Location: Left arm, BP Cuff Size: Large adult)   Pulse (!) 111   Temp 36.5 °C (97.7 °F)   Ht 1.702 m (5' 7\")   Wt 91.3 kg (201 lb 3.2 oz)   SpO2 98%   BMI 31.51 kg/m²    Physical " Exam  Vitals and nursing note reviewed.   Constitutional:       General: She is not in acute distress.     Appearance: Normal appearance.   HENT:      Head: Normocephalic and atraumatic.      Right Ear: Tympanic membrane, ear canal and external ear normal.      Left Ear: Tympanic membrane, ear canal and external ear normal.      Nose: Nose normal.      Mouth/Throat:      Mouth: Mucous membranes are moist.      Pharynx: Oropharynx is clear. No oropharyngeal exudate or posterior oropharyngeal erythema.   Eyes:      Extraocular Movements: Extraocular movements intact.      Conjunctiva/sclera: Conjunctivae normal.      Pupils: Pupils are equal, round, and reactive to light.   Cardiovascular:      Rate and Rhythm: Normal rate and regular rhythm.      Pulses: Normal pulses.      Heart sounds: Normal heart sounds. No murmur heard.  Pulmonary:      Effort: Pulmonary effort is normal. No respiratory distress.      Breath sounds: Normal breath sounds. No wheezing or rales.   Abdominal:      General: Abdomen is flat. Bowel sounds are normal. There is no distension.      Palpations: Abdomen is soft. There is no mass.      Tenderness: There is no abdominal tenderness.   Musculoskeletal:         General: No swelling or deformity. Normal range of motion.      Cervical back: Normal range of motion and neck supple.      Right lower leg: No edema.      Left lower leg: No edema.   Lymphadenopathy:      Cervical: No cervical adenopathy.   Skin:     General: Skin is warm and dry.      Capillary Refill: Capillary refill takes less than 2 seconds.      Findings: No lesion or rash.   Neurological:      General: No focal deficit present.      Mental Status: She is alert and oriented to person, place, and time.      Cranial Nerves: No cranial nerve deficit.      Motor: No weakness.   Psychiatric:         Mood and Affect: Mood normal.         Behavior: Behavior normal.         Thought Content: Thought content normal.         Judgment:  Judgment normal.         Assessment/Plan   Problem List Items Addressed This Visit       Bipolar affective disorder, currently manic, moderate (CMS/HCC) - Primary    Relevant Orders    Referral to Psychiatry    Type 2 diabetes mellitus without complication, with long-term current use of insulin (CMS/[facility])    Relevant Medications    semaglutide, weight loss, (Wegovy) 0.25 mg/0.5 mL pen injector     Other Visit Diagnoses       Restless leg        Relevant Medications    rOPINIRole (Requip) 0.25 mg tablet            Patient education provided.  Stay current with age appropriate health maintenance as instructed.  Appointment here or ER with new or worsening symptoms'  Keep appropriate follow-up visit.  Stay current with proper immunizations   Meds as above recheck 3 months and as needed refer to psychiatry  Report suicidal ideation report psychotic or manic symptoms

## 2023-09-12 ENCOUNTER — TELEMEDICINE (OUTPATIENT)
Dept: PHARMACY | Facility: HOSPITAL | Age: 48
End: 2023-09-12
Payer: COMMERCIAL

## 2023-09-12 DIAGNOSIS — Z79.4 TYPE 2 DIABETES MELLITUS WITHOUT COMPLICATION, WITH LONG-TERM CURRENT USE OF INSULIN (MULTI): ICD-10-CM

## 2023-09-12 DIAGNOSIS — E11.9 TYPE 2 DIABETES MELLITUS WITHOUT COMPLICATION, WITH LONG-TERM CURRENT USE OF INSULIN (MULTI): ICD-10-CM

## 2023-09-12 RX ORDER — SEMAGLUTIDE 0.68 MG/ML
0.25 INJECTION, SOLUTION SUBCUTANEOUS
Qty: 3 ML | Refills: 0 | Status: SHIPPED | OUTPATIENT
Start: 2023-09-12 | End: 2023-10-03 | Stop reason: SDUPTHER

## 2023-09-12 RX ORDER — INSULIN ASPART 100 [IU]/ML
INJECTION, SOLUTION INTRAVENOUS; SUBCUTANEOUS
Qty: 10 ML | Refills: 3 | Status: SHIPPED | OUTPATIENT
Start: 2023-09-12 | End: 2023-12-07 | Stop reason: SDUPTHER

## 2023-09-12 NOTE — ASSESSMENT & PLAN NOTE
ASSESSMENT:  -Patient's last A1c is still uncontrolled at 7.4%, though improved from last A1c of 10.6% a year ago in   -Patient's BG is still wildly variable and patient may benefit from further BG control and weight loss through Ozempic  -Ozempic Education:   - Counseled patient on Ozempic MOA, expectations, side effects, duration of therapy, administration, and monitoring parameters.  - Reviewed Ozempic titration schedule, starting with 0.25 mg once weekly for 4 weeks, then continuing on 0.5 mg once weekly. Pt verbalized understanding.  - Counseled patient on the benefits of GLP-1ra, such as cardiovascular risk reduction, glycemic control, and weight loss potential.  - Advised patient that they may experience improved satiety after meals and portion sizes of meals may be reduced as doses of Ozempic increase.  -Discussed dangers of BG dropping low overnight and the risks of overcorrecting. Advised patient of max 14 units daily for mealtime insulin.     RECOMMENDATIONS/PLAN:    START Ozempic 0.25 mg once weekly. Will assist with PA process.  STOP Trulicity.   CONTINUE all other medications for diabetes:  Metformin  m tablets BID  Lantus 50 units at bedtime  Novolog per sliding scale TID, max 14 units daily (refills sent to pharmacy)  Advised patient to get new A1c prior to next appointment with Dr. Thakkar.

## 2023-09-12 NOTE — PROGRESS NOTES
Subjective   Patient ID: Tana Bahena is a 48 y.o. female who presents for No chief complaint on file..    Referring Provider: Jacobo Thakkar MD     Patient presents to Clinical Pharmacy team for follow-up after last visit on 7/27/23. Since last visit, patient has been able to see PCP. Patient discussed wanting to try Wegovy and this was prescribed during visit.     Patient reports that Wegovy was too expensive and not covered through insurance. Currently back on Trulicity and prior authorization for Rybelsus was denied. Did not start Jardiance. Patient interested in trying out Ozempic to see if this is covered, as this can provide better BG control and weight loss than Trulicity.     Patient also notes that her BG tends to drop low (in double digits) during the evening, and she has been experiencing symptoms of hypoglycemia. Has been injecting 20 units of Novolog before dinner. Discussed that maximum units she should be injecting is 14 with each meal.       Objective     There were no vitals taken for this visit.     Labs  Lab Results   Component Value Date    BILITOT 0.3 06/29/2023    CALCIUM 9.0 08/03/2023    CO2 25 08/03/2023     08/03/2023    CREATININE 0.79 08/03/2023    GLUCOSE 97 08/03/2023    ALKPHOS 91 06/29/2023    K 3.5 08/03/2023    PROT 6.9 06/29/2023     08/03/2023    AST 11 06/29/2023    ALT 15 06/29/2023    BUN 13 08/03/2023    ANIONGAP 14 08/03/2023    MG 1.78 06/30/2023    PHOS 3.4 05/02/2023    ALBUMIN 4.0 06/29/2023    LIPASE 15 06/29/2023    GFRF >90 08/03/2023    GFRMALE CANCELED 05/20/2023     Lab Results   Component Value Date    TRIG 118 12/19/2022    CHOL 154 12/19/2022    HDL 51.1 12/19/2022     Lab Results   Component Value Date    HGBA1C 7.4 (A) 05/02/2023       Current Outpatient Medications on File Prior to Visit   Medication Sig Dispense Refill    albuterol 90 mcg/actuation inhaler Inhale 2 puffs 3 times a day as needed.      alcohol swabs (Alcohol Prep Pads)  "pads, medicated USE TO CLEANSE SKIN PRIOR TO INJECTION THREE TIMES DAILY AS DIRECTED 200 each 3    aspirin 81 mg EC tablet Take 1 tablet (81 mg) by mouth once daily.      atorvastatin (Lipitor) 40 mg tablet Take 1 tablet (40 mg) by mouth once daily.      BD Breana 2nd Gen Pen Needle 32 gauge x 5/32\" needle USE TO ADMINISTER INSULIN 3 TIMES A  each 3    blood sugar diagnostic (True Metrix Glucose Test Strip) strip 1 strip once daily.      blood-glucose sensor (Dexcom G7 Sensor) device Use as directed to test blood glucose continuously. Replace every 10 days. 3 each 3    budesonide-formoteroL (Symbicort) 80-4.5 mcg/actuation inhaler Inhale 2 puffs 2 times a day. Rinse mouth after use 10.2 g 3    carBAMazepine (Carbatrol) 200 mg 12 hr capsule Take 1 capsule (200 mg) by mouth twice a day.      carvedilol (Coreg) 6.25 mg tablet Take 1 tablet (6.25 mg) by mouth twice a day.      cyclobenzaprine (Flexeril) 10 mg tablet Take 1 tablet (10 mg) by mouth 3 times a day as needed for muscle spasms. 60 tablet 2    Dexcom G4 platinum  (Dexcom G7 ) misc Use as instructed to test blood sugar continuously 1 each 0    Dexcom G4 platinum transmitter (Dexcom G6 Transmitter) device Use as instructed to test blood sugar 4 times daily. Replace every 90 days. 1 each 3    dulaglutide (Trulicity) 0.75 mg/0.5 mL pen injector Inject 0.75 mg under the skin 1 (one) time per week. 4 each 1    dulaglutide (Trulicity) 0.75 mg/0.5 mL pen injector Inject 0.75 mg under the skin 1 (one) time per week. 2 mL 11    empagliflozin (Jardiance) 10 mg Take 1 tablet (10 mg) by mouth once daily. 30 tablet 0    gabapentin (Neurontin) 100 mg capsule Take 1 capsule (100 mg) by mouth 3 times a day. For 90 days 90 capsule 0    hydrOXYzine HCL (Atarax) 25 mg tablet Take 1 tablet (25 mg) by mouth 3 times a day. prn 60 tablet 2    insulin aspart (NovoLOG U-100 Insulin aspart) 100 unit/mL injection Inject under the skin continuously. Sliding scale: " 151-200 3 units, 301-250- 5 units, 251-300- 8 units, 301-350 10 units. Max dose 14 units daily. 10 mL 3    insulin aspart (NovoLOG, Fiasp) 100 UNIT/ML patient supplied pump Inject under the skin continuously. Sliding scale: 151-200 3 units, 301-250- 5 units, 251-300- 8 units, 301-350 10 units      insulin glargine (Lantus) 100 unit/mL injection Inject 52 Units under the skin once daily at bedtime. 10 mL 1    isosorbide mononitrate ER (Imdur) 60 mg 24 hr tablet Take 1 tablet (60 mg) by mouth once daily. In the morning      lancets 33 gauge misc       lithium 150 mg capsule Take 1 capsule (150 mg) by mouth 2 times a day. 180 capsule 1    losartan (Cozaar) 25 mg tablet Take 1 tablet (25 mg) by mouth once daily.      metFORMIN XR (Glucophage-XR) 500 mg 24 hr tablet Take 2 tablets (1,000 mg) by mouth 2 times a day. 120 tablet 3    mirtazapine (Remeron) 15 mg tablet Take 1 tablet (15 mg) by mouth once daily at bedtime. 90 tablet 0    nitroglycerin (Nitrostat) 0.4 mg SL tablet Place 1 tablet (0.4 mg) under the tongue every 5 minutes if needed. PLACE 1 TABLET UNDER THE TONGUE EVERY 5 MINUTES FOR UP TO 3 DOSES AS NEEDED FOR CHEST PAIN.CALL 911 IF PAIN PERSISTS.      omeprazole (PriLOSEC) 40 mg DR capsule Take 1 capsule (40 mg) by mouth once daily in the morning. Take before meals. Do not crush or chew. 30 capsule 11    PARoxetine (Paxil) 20 mg tablet Take 1 tablet (20 mg) by mouth once daily in the morning. 30 tablet 5    QUEtiapine (SEROquel) 100 mg tablet Take 1 tablet (100 mg) by mouth once daily at bedtime.      rOPINIRole (Requip) 0.25 mg tablet Take 1 tablet (0.25 mg) by mouth 3 times a day. 90 tablet 11    semaglutide, weight loss, (Wegovy) 0.25 mg/0.5 mL pen injector Inject 0.25 mg under the skin every 7 days. 2 mL 2    ticagrelor (Brilinta) 90 mg tablet Take 1 tablet (90 mg) by mouth 2 times a day.      traZODone (Desyrel) 100 mg tablet Take 3 tablets (300 mg) by mouth once daily at bedtime. 90 tablet 0     triamterene-hydrochlorothiazid (Maxzide-25) 37.5-25 mg tablet Take 1 tablet by mouth once daily.      True Metrix Glucose Meter misc USE TO TEST BLOOD SUGAR ONCE DAILY AND AS NEEDED       No current facility-administered medications on file prior to visit.        Assessment/Plan   Problem List Items Addressed This Visit       Type 2 diabetes mellitus without complication, with long-term current use of insulin (CMS/[facility])     ASSESSMENT:  -Patient's last A1c is still uncontrolled at 7.4%, though improved from last A1c of 10.6% a year ago in   -Patient's BG is still wildly variable and patient may benefit from further BG control and weight loss through Ozempic  -Ozempic Education:   - Counseled patient on Ozempic MOA, expectations, side effects, duration of therapy, administration, and monitoring parameters.  - Reviewed Ozempic titration schedule, starting with 0.25 mg once weekly for 4 weeks, then continuing on 0.5 mg once weekly. Pt verbalized understanding.  - Counseled patient on the benefits of GLP-1ra, such as cardiovascular risk reduction, glycemic control, and weight loss potential.  - Advised patient that they may experience improved satiety after meals and portion sizes of meals may be reduced as doses of Ozempic increase.  -Discussed dangers of BG dropping low overnight and the risks of overcorrecting. Advised patient of max 14 units daily for mealtime insulin.     RECOMMENDATIONS/PLAN:    START Ozempic 0.25 mg once weekly. Will assist with PA process.  STOP Trulicity.   CONTINUE all other medications for diabetes:  Metformin  m tablets BID  Lantus 50 units at bedtime  Novolog per sliding scale TID, max 14 units daily (refills sent to pharmacy)  Advised patient to get new A1c prior to next appointment with Dr. Thakkar.            Jackeline Potts, PharmD  Clinical Ambulatory Care Pharmacist  Ph: [phone]    Continue all meds under the continuation of care with the referring provider and  clinical pharmacy team.    Clinical Pharmacist follow up: 10/3/23 or sooner as needed based on clinical intervention  Type of Encounter:  Telephone    Verbal consent to manage patient's drug therapy was obtained from the patient. They were informed they may decline to participate or withdraw from participation in pharmacy services at any time.

## 2023-09-13 ENCOUNTER — TELEPHONE (OUTPATIENT)
Dept: PRIMARY CARE | Facility: CLINIC | Age: 48
End: 2023-09-13
Payer: COMMERCIAL

## 2023-09-13 NOTE — TELEPHONE ENCOUNTER
We received a request for prior authorization on the patient's ozempic from their pharmacy. Prior authorization was submitted to insurance today. We will await their determination.

## 2023-09-18 ENCOUNTER — TELEPHONE (OUTPATIENT)
Dept: PRIMARY CARE | Facility: CLINIC | Age: 48
End: 2023-09-18

## 2023-09-18 ENCOUNTER — TELEPHONE (OUTPATIENT)
Dept: PRIMARY CARE | Facility: CLINIC | Age: 48
End: 2023-09-18
Payer: COMMERCIAL

## 2023-09-18 DIAGNOSIS — Z79.4 TYPE 2 DIABETES MELLITUS WITHOUT COMPLICATION, WITH LONG-TERM CURRENT USE OF INSULIN (MULTI): Primary | ICD-10-CM

## 2023-09-18 DIAGNOSIS — E11.9 TYPE 2 DIABETES MELLITUS WITHOUT COMPLICATION, WITH LONG-TERM CURRENT USE OF INSULIN (MULTI): Primary | ICD-10-CM

## 2023-09-18 RX ORDER — BLOOD-GLUCOSE TRANSMITTER
EACH MISCELLANEOUS
Qty: 1 EACH | Refills: 0 | Status: SHIPPED | OUTPATIENT
Start: 2023-09-18

## 2023-09-18 NOTE — TELEPHONE ENCOUNTER
PATIENT CALLED SHE WANTING TO GET RECORDS FROM HER NEUROLOGIST BUT SHE SAID SHE SUFFERED FROM AMNESIA AND IS HAVING A HARD TIME REMEMBERING WHO THAT WAS AND WAS WONDERING IF DR. ESTRADA KNEW SINCE HE FOLLOWED HER CARE

## 2023-09-18 NOTE — TELEPHONE ENCOUNTER
The patient has been advised by her insurance company to phone our office to request a referral for her Dexcom G4 platinum transmitter (Dexcom G6 Transmitter) device.  The unit was picked up at the pharmacy on Friday. The patient states the device is not syncing properly.  She will need a new prescription for the device.  If any further questions, please contact the patient at [phone].    Thank you.

## 2023-09-22 ENCOUNTER — PATIENT OUTREACH (OUTPATIENT)
Dept: PRIMARY CARE | Facility: CLINIC | Age: 48
End: 2023-09-22
Payer: COMMERCIAL

## 2023-09-22 DIAGNOSIS — Z79.4 TYPE 2 DIABETES MELLITUS WITHOUT COMPLICATION, WITH LONG-TERM CURRENT USE OF INSULIN (MULTI): ICD-10-CM

## 2023-09-22 DIAGNOSIS — E11.9 TYPE 2 DIABETES MELLITUS WITHOUT COMPLICATION, WITH LONG-TERM CURRENT USE OF INSULIN (MULTI): ICD-10-CM

## 2023-09-22 RX ORDER — ISOPROPYL ALCOHOL 70 ML/100ML
SWAB TOPICAL
Qty: 200 EACH | Refills: 3 | Status: SHIPPED | OUTPATIENT
Start: 2023-09-22 | End: 2024-03-04 | Stop reason: SDUPTHER

## 2023-09-22 RX ORDER — BLOOD-GLUCOSE SENSOR
EACH MISCELLANEOUS
Qty: 3 EACH | Refills: 3 | Status: SHIPPED | OUTPATIENT
Start: 2023-09-22 | End: 2023-10-18 | Stop reason: SDUPTHER

## 2023-09-22 NOTE — PROGRESS NOTES
Call placed regarding 90 days post discharge follow up call.  At time of outreach call the patient feels as if their condition has improved since initial visit with PCP or specialist. Patient reports she needs a refill on her dexcom G7 sensors- message sent to office requesting refill.   Questions or concerns regarding recovery period addressed at this time.   Reviewed any PCP or specialists progress notes/labs/radiology reports if applicable and addressed any questions or concerns.

## 2023-09-22 NOTE — TELEPHONE ENCOUNTER
Rx Refill Request Telephone Encounter    Name:  Tana Bahena  :  [id number]  Medication Name:    blood-glucose sensor (Dexcom G7 Sensor) device   alcohol swabs (Alcohol Prep Pads) pads, medicated   Specific Pharmacy location:  [facility]  Date of last appointment:    Date of next appointment:  10/24  Best number to reach patient:  [phone]

## 2023-09-25 ENCOUNTER — TELEPHONE (OUTPATIENT)
Dept: PRIMARY CARE | Facility: CLINIC | Age: 48
End: 2023-09-25
Payer: COMMERCIAL

## 2023-09-25 NOTE — TELEPHONE ENCOUNTER
Please clarify which part of the body she needs the CT of. Pt has had multiple check CT's within the last year.

## 2023-09-25 NOTE — TELEPHONE ENCOUNTER
Called patient to verify got her voicemail left a message asking her to call back to verify what body part she is requesting the CT Scan for.

## 2023-09-25 NOTE — TELEPHONE ENCOUNTER
PATIENT REQUESTING AN ORDER FOR A CT SCAN. SHE IS APPLYING FOR SOCIAL [employer]URITY AND NEEDS AN UPDATED ONE. HER LAST ONE WAS FROM A YEAR AGO.PLEASE ADVISE

## 2023-09-29 VITALS — WEIGHT: 197.97 LBS | BODY MASS INDEX: 31.07 KG/M2 | HEIGHT: 67 IN

## 2023-09-30 NOTE — H&P
History & Physical Reviewed:   Pregnant/Lactating:  ·  Are You Pregnant no   ·  Are You Currently Breastfeeding no     I have reviewed the History and Physical dated:  03-Aug-2023   History and Physical reviewed and relevant findings noted. Patient examined to review pertinent physical  findings.: No significant changes   Home Medications Reviewed: no changes noted   Allergies Reviewed: no changes noted       Airway/Sedation Assessment:  ·  Emotional Status calm   ·  Neurologic alert & oriented x 3   ·  Oropharyngeal Classification Class IV   ·  Sedation Plan moderate sedation       ERAS (Enhanced Recovery After Surgery):  ·  ERAS Patient: no     Consent:   COVID-19 Consent:  ·  COVID-19 Risk Consent Surgeon has reviewed key risks related to the risk of irma COVID-19 and if they contract COVID-19 what the risks are.       Electronic Signatures:  Isma Sykes)  (Signed 03-Aug-2023 06:49)   Authored: History & Physical Reviewed, Airway/Sedation,  ERAS, Consent, Note Completion      Last Updated: 03-Aug-2023 06:49 by Isma Sykes)

## 2023-10-02 DIAGNOSIS — F33.1 MODERATE EPISODE OF RECURRENT MAJOR DEPRESSIVE DISORDER (MULTI): ICD-10-CM

## 2023-10-02 RX ORDER — TRAZODONE HYDROCHLORIDE 100 MG/1
300 TABLET ORAL NIGHTLY
Qty: 90 TABLET | Refills: 0 | Status: SHIPPED | OUTPATIENT
Start: 2023-10-02 | End: 2023-10-24 | Stop reason: SDUPTHER

## 2023-10-02 NOTE — TELEPHONE ENCOUNTER
Rx Refill Request     Name: Tana Bahena  :  1975   Medication Name:  TRAZADONE 100MG  Specific Pharmacy location:  [facility]  Date of last appointment:  2023   Date of next appointment:  10/24/2023   Best number to reach patient:  [phone]

## 2023-10-03 ENCOUNTER — TELEMEDICINE CLINICAL SUPPORT (OUTPATIENT)
Dept: PHARMACY | Facility: HOSPITAL | Age: 48
End: 2023-10-03
Payer: COMMERCIAL

## 2023-10-03 DIAGNOSIS — Z79.4 TYPE 2 DIABETES MELLITUS WITHOUT COMPLICATION, WITH LONG-TERM CURRENT USE OF INSULIN (MULTI): ICD-10-CM

## 2023-10-03 DIAGNOSIS — E11.9 TYPE 2 DIABETES MELLITUS WITHOUT COMPLICATION, WITH LONG-TERM CURRENT USE OF INSULIN (MULTI): ICD-10-CM

## 2023-10-03 RX ORDER — SEMAGLUTIDE 0.68 MG/ML
0.5 INJECTION, SOLUTION SUBCUTANEOUS
Qty: 3 ML | Refills: 1 | Status: SHIPPED | OUTPATIENT
Start: 2023-10-03 | End: 2023-10-18 | Stop reason: SDUPTHER

## 2023-10-03 NOTE — ASSESSMENT & PLAN NOTE
ASSESSMENT:  -Patient's last A1c is still uncontrolled at 7.4%, though improved from last A1c of 10.6% a year ago in   -Patient's BG has shown improvement since starting Ozempic; has tolerated so far    RECOMMENDATIONS/PLAN:    CONTINUE Ozempic 0.5 mg once weekly on  for 3 more weeks  Will plan to increase dose at next follow-up if able  DECREASE Lantus to 47 units at bedtime  CONTINUE all other diabetes medications as prescribed:  Metformin  m tablets BID (refills sent to pharmacy)  Novolog per sliding scale TID (max 14 units daily)

## 2023-10-03 NOTE — PROGRESS NOTES
Pharmacy Post-Discharge Follow-Up Visit  Tana Bahena is a 48 y.o. female was referred to Clinical Pharmacy Team to complete a post-discharge medication optimization and monitoring visit.  The patient was referred for their Diabetes.    Admission Date: 5/12/23  Discharge Date: 5/16/23    Referring Provider: Jacobo Thakkar MD    Subjective   Allergies   Allergen Reactions    Ketorolac Unknown    Tramadol Unknown    Adhesive Tape-Silicones Other       [facility] - [place], [place] - [address]  [address]  [address]  [facility] [address]  Phone: [phone] Fax: [phone]    [facility][id number] [address] AT [facility] & [address] [address]  Phone: [phone] Fax: [phone]      Social History     Social History Narrative    Not on file      Patient presents to Clinical Pharmacy team for follow-up after last visit on 9/12/23. At last visit, patient was started on Ozempic.     Patient has been on Ozempic for ~1 month at time of visit. Recently increased dose to Ozempic 0.5 mg yesterday. Denies any issues with administration or side effects thus far. Reports that BG is improving to low 100s throughout the day.      Discussed that has we increase Ozempic doses, will aim to reduce insulin doses as able to prevent hypoglycemia.       Objective     There were no vitals taken for this visit.     LAB  Lab Results   Component Value Date    BILITOT 0.3 06/29/2023    CALCIUM 9.0 08/03/2023    CO2 25 08/03/2023     08/03/2023    CREATININE 0.79 08/03/2023    GLUCOSE 97 08/03/2023    ALKPHOS 91 06/29/2023    K 3.5 08/03/2023    PROT 6.9 06/29/2023     08/03/2023    AST 11 06/29/2023    ALT 15 06/29/2023    BUN 13 08/03/2023    ANIONGAP 14 08/03/2023    MG 1.78 06/30/2023    PHOS 3.4 05/02/2023    ALBUMIN 4.0 06/29/2023    LIPASE 15 06/29/2023     Lab Results   Component Value Date    TRIG 118 12/19/2022     "CHOL 154 12/19/2022    HDL 51.1 12/19/2022     Lab Results   Component Value Date    HGBA1C 7.4 (A) 05/02/2023         Current Outpatient Medications on File Prior to Visit   Medication Sig Dispense Refill    albuterol 90 mcg/actuation inhaler Inhale 2 puffs 3 times a day as needed.      alcohol swabs (Alcohol Prep Pads) pads, medicated USE TO CLEANSE SKIN PRIOR TO INJECTION THREE TIMES DAILY AS DIRECTED 200 each 3    aspirin 81 mg EC tablet Take 1 tablet (81 mg) by mouth once daily.      atorvastatin (Lipitor) 40 mg tablet Take 1 tablet (40 mg) by mouth once daily.      BD Breana 2nd Gen Pen Needle 32 gauge x 5/32\" needle USE TO ADMINISTER INSULIN 3 TIMES A  each 3    blood sugar diagnostic (True Metrix Glucose Test Strip) strip 1 strip once daily.      blood-glucose sensor (Dexcom G7 Sensor) device Use as directed to test blood glucose continuously. Replace every 10 days. 3 each 3    budesonide-formoteroL (Symbicort) 80-4.5 mcg/actuation inhaler Inhale 2 puffs 2 times a day. Rinse mouth after use 10.2 g 3    carBAMazepine (Carbatrol) 200 mg 12 hr capsule Take 1 capsule (200 mg) by mouth twice a day.      carvedilol (Coreg) 6.25 mg tablet Take 1 tablet (6.25 mg) by mouth twice a day.      cyclobenzaprine (Flexeril) 10 mg tablet Take 1 tablet (10 mg) by mouth 3 times a day as needed for muscle spasms. 60 tablet 2    Dexcom G4 platinum  (Dexcom G7 ) misc Use as instructed to test blood sugar continuously 1 each 0    Dexcom G4 platinum transmitter (Dexcom G6 Transmitter) device Use as instructed to test blood sugar 4 times daily. Replace every 90 days. 1 each 3    Dexcom G4 platinum transmitter (Dexcom G6 Transmitter) device Use as instructed  New order as old machine was faulty. 1 each 0    dulaglutide (Trulicity) 0.75 mg/0.5 mL pen injector Inject 0.75 mg under the skin 1 (one) time per week. 4 each 1    dulaglutide (Trulicity) 0.75 mg/0.5 mL pen injector Inject 0.75 mg under the skin 1 (one) " time per week. 2 mL 11    empagliflozin (Jardiance) 10 mg Take 1 tablet (10 mg) by mouth once daily. 30 tablet 0    gabapentin (Neurontin) 100 mg capsule Take 1 capsule (100 mg) by mouth 3 times a day. For 90 days 90 capsule 0    hydrOXYzine HCL (Atarax) 25 mg tablet Take 1 tablet (25 mg) by mouth 3 times a day. prn 60 tablet 2    insulin aspart (NovoLOG U-100 Insulin aspart) 100 unit/mL injection Inject under the skin continuously. Sliding scale: 151-200 3 units, 301-250- 5 units, 251-300- 8 units, 301-350 10 units. Max dose 14 units daily. 10 mL 3    insulin aspart (NovoLOG, Fiasp) 100 UNIT/ML patient supplied pump Inject under the skin continuously. Sliding scale: 151-200 3 units, 301-250- 5 units, 251-300- 8 units, 301-350 10 units      insulin glargine (Lantus) 100 unit/mL injection Inject 52 Units under the skin once daily at bedtime. 10 mL 1    isosorbide mononitrate ER (Imdur) 60 mg 24 hr tablet Take 1 tablet (60 mg) by mouth once daily. In the morning      lancets 33 gauge misc       lithium 150 mg capsule Take 1 capsule (150 mg) by mouth 2 times a day. 180 capsule 1    losartan (Cozaar) 25 mg tablet Take 1 tablet (25 mg) by mouth once daily.      metFORMIN XR (Glucophage-XR) 500 mg 24 hr tablet Take 2 tablets (1,000 mg) by mouth 2 times a day. 120 tablet 3    mirtazapine (Remeron) 15 mg tablet Take 1 tablet (15 mg) by mouth once daily at bedtime. 90 tablet 0    nitroglycerin (Nitrostat) 0.4 mg SL tablet Place 1 tablet (0.4 mg) under the tongue every 5 minutes if needed. PLACE 1 TABLET UNDER THE TONGUE EVERY 5 MINUTES FOR UP TO 3 DOSES AS NEEDED FOR CHEST PAIN.CALL 911 IF PAIN PERSISTS.      omeprazole (PriLOSEC) 40 mg DR capsule Take 1 capsule (40 mg) by mouth once daily in the morning. Take before meals. Do not crush or chew. 30 capsule 11    PARoxetine (Paxil) 20 mg tablet Take 1 tablet (20 mg) by mouth once daily in the morning. 30 tablet 5    QUEtiapine (SEROquel) 100 mg tablet Take 1 tablet (100 mg)  by mouth once daily at bedtime.      rOPINIRole (Requip) 0.25 mg tablet Take 1 tablet (0.25 mg) by mouth 3 times a day. 90 tablet 11    semaglutide (Ozempic) 0.25 mg or 0.5 mg (2 mg/3 mL) pen injector Inject 0.25 mg under the skin every 7 days. For 4 weeks, then increase to 0.5 mg every week. 3 mL 0    ticagrelor (Brilinta) 90 mg tablet Take 1 tablet (90 mg) by mouth 2 times a day.      traZODone (Desyrel) 100 mg tablet Take 3 tablets (300 mg) by mouth once daily at bedtime. 90 tablet 0    triamterene-hydrochlorothiazid (Maxzide-25) 37.5-25 mg tablet Take 1 tablet by mouth once daily.      True Metrix Glucose Meter misc USE TO TEST BLOOD SUGAR ONCE DAILY AND AS NEEDED      [DISCONTINUED] traZODone (Desyrel) 100 mg tablet Take 3 tablets (300 mg) by mouth once daily at bedtime. 90 tablet 0     No current facility-administered medications on file prior to visit.        HISTORICAL PHARMACOTHERAPY  -Trulicity  -Jardiance    Assessment/Plan   Problem List Items Addressed This Visit       Type 2 diabetes mellitus without complication, with long-term current use of insulin (CMS/[facility])     ASSESSMENT:  -Patient's last A1c is still uncontrolled at 7.4%, though improved from last A1c of 10.6% a year ago in   -Patient's BG has shown improvement since starting Ozempic; has tolerated so far    RECOMMENDATIONS/PLAN:    CONTINUE Ozempic 0.5 mg once weekly on  for 3 more weeks  Will plan to increase dose at next follow-up if able  DECREASE Lantus to 47 units at bedtime  CONTINUE all other diabetes medications as prescribed:  Metformin  m tablets BID (refills sent to pharmacy)  Novolog per sliding scale TID (max 14 units daily)          Clinical Pharmacist follow up: 10/24/23 or sooner as needed based on clinical intervention  Type of Encounter:  Telephone    Jackeline Potts PharmD  Clinical Ambulatory Care Pharmacist  Ph: [phone]    Verbal consent to manage patient's drug therapy was obtained from the  patient. They were informed they may decline to participate or withdraw from participation in pharmacy services at any time.

## 2023-10-04 ENCOUNTER — TELEPHONE (OUTPATIENT)
Dept: PRIMARY CARE | Facility: CLINIC | Age: 48
End: 2023-10-04
Payer: COMMERCIAL

## 2023-10-04 DIAGNOSIS — J43.1 PANLOBULAR EMPHYSEMA (MULTI): Primary | ICD-10-CM

## 2023-10-04 NOTE — TELEPHONE ENCOUNTER
The patient phoned back to inform the doctor the medication prescribed in the past for her COPD treatment is prednisone.

## 2023-10-04 NOTE — TELEPHONE ENCOUNTER
PATIENT CALLED REQUESTING A PRESCRIPTION FOR STEROID, SHE STATED SHE IS HAVING PROBLEMS WITH ASTHMA. SHE STATED SHE HAS HER INHALERS BUT A SHE WOULD BENEFIT FROM A STEROID.  SEND TO [facility] 9/5/23   UPCOMING VISIT 10/24/23

## 2023-10-05 RX ORDER — METHYLPREDNISOLONE 4 MG/1
TABLET ORAL
Qty: 21 TABLET | Refills: 0 | Status: SHIPPED | OUTPATIENT
Start: 2023-10-05 | End: 2023-11-24

## 2023-10-11 ENCOUNTER — TELEPHONE (OUTPATIENT)
Dept: PRIMARY CARE | Facility: CLINIC | Age: 48
End: 2023-10-11
Payer: COMMERCIAL

## 2023-10-11 DIAGNOSIS — G93.40 ENCEPHALOPATHY: Primary | ICD-10-CM
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
## 2023-10-16 ENCOUNTER — TELEPHONE (OUTPATIENT)
Dept: PRIMARY CARE | Facility: CLINIC | Age: 48
End: 2023-10-16
Payer: COMMERCIAL

## 2023-10-16 DIAGNOSIS — G93.40 ENCEPHALOPATHY: Primary | ICD-10-CM

## 2023-10-16 NOTE — TELEPHONE ENCOUNTER
Patient called in stating she is having persistent symptoms and is requesting another round of steroids be called in. I advised patient Dr. Thakkar recommended an appointment for persistent symptoms. She stated she is scheduled for 10/24 and cannot come in sooner than this. Would a provider be willing to send something in for her prior to the appointment?  [facility]  Please Advise

## 2023-10-16 NOTE — TELEPHONE ENCOUNTER
The patient is requesting a MRI of the brain.    She states that she experienced a massive heart attack in 2021, causing her to be without oxygen for several minutes placing her into a coma.    She has noticed recently that she has been having memory issues and is concerned.    She would like to have the MRI performed at [facility].    Please call the patient at [phone] with any further questions.    LOV: 9/5/2023

## 2023-10-16 NOTE — TELEPHONE ENCOUNTER
Please Advise.  Note that the [facility] is closed in [place] tomorrow, Tuesday October 17th. But it will be open in [place] and Roberto Manzano will be in [place] again on Thursday, October 19th.

## 2023-10-17 ENCOUNTER — OFFICE VISIT (OUTPATIENT)
Dept: CARDIOLOGY CLINIC | Age: 48
End: 2023-10-17
Payer: COMMERCIAL

## 2023-10-17 VITALS
SYSTOLIC BLOOD PRESSURE: 126 MMHG | WEIGHT: 196 LBS | DIASTOLIC BLOOD PRESSURE: 84 MMHG | BODY MASS INDEX: 31.64 KG/M2 | HEART RATE: 87 BPM

## 2023-10-17 DIAGNOSIS — E78.2 MIXED HYPERLIPIDEMIA: ICD-10-CM

## 2023-10-17 DIAGNOSIS — I10 PRIMARY HYPERTENSION: ICD-10-CM

## 2023-10-17 DIAGNOSIS — Z95.1 HX OF CABG: ICD-10-CM

## 2023-10-17 DIAGNOSIS — I25.10 CORONARY ARTERY DISEASE INVOLVING NATIVE CORONARY ARTERY OF NATIVE HEART WITHOUT ANGINA PECTORIS: Chronic | ICD-10-CM

## 2023-10-17 DIAGNOSIS — R07.2 PRECORDIAL PAIN: Primary | ICD-10-CM

## 2023-10-17 DIAGNOSIS — R06.02 SHORTNESS OF BREATH: ICD-10-CM

## 2023-10-17 PROCEDURE — 4004F PT TOBACCO SCREEN RCVD TLK: CPT | Performed by: INTERNAL MEDICINE

## 2023-10-17 PROCEDURE — 3074F SYST BP LT 130 MM HG: CPT | Performed by: INTERNAL MEDICINE

## 2023-10-17 PROCEDURE — 3079F DIAST BP 80-89 MM HG: CPT | Performed by: INTERNAL MEDICINE

## 2023-10-17 PROCEDURE — G8417 CALC BMI ABV UP PARAM F/U: HCPCS | Performed by: INTERNAL MEDICINE

## 2023-10-17 PROCEDURE — G8427 DOCREV CUR MEDS BY ELIG CLIN: HCPCS | Performed by: INTERNAL MEDICINE

## 2023-10-17 PROCEDURE — 93000 ELECTROCARDIOGRAM COMPLETE: CPT | Performed by: INTERNAL MEDICINE

## 2023-10-17 PROCEDURE — 99214 OFFICE O/P EST MOD 30 MIN: CPT | Performed by: INTERNAL MEDICINE

## 2023-10-17 PROCEDURE — G8484 FLU IMMUNIZE NO ADMIN: HCPCS | Performed by: INTERNAL MEDICINE

## 2023-10-17 RX ORDER — RANOLAZINE 500 MG/1
500 TABLET, EXTENDED RELEASE ORAL 2 TIMES DAILY
Qty: 60 TABLET | Refills: 11 | Status: SHIPPED | OUTPATIENT
Start: 2023-10-17

## 2023-10-17 ASSESSMENT — ENCOUNTER SYMPTOMS
SHORTNESS OF BREATH: 0
ABDOMINAL PAIN: 0
WHEEZING: 0
VOMITING: 0
GASTROINTESTINAL NEGATIVE: 1
NAUSEA: 0
EYES NEGATIVE: 1

## 2023-10-17 NOTE — TELEPHONE ENCOUNTER
Waiting to see if the NP in [place] is working the [facility] along with Primary Care today before calling the patient.

## 2023-10-17 NOTE — PROGRESS NOTES
10/17/2023        HPI:  Patient presents for follow up medical evaluation. Patient is s/p negative 2Decho and treadmill stress test. Pt denies change in exercise capacity, fatigue, nausea, vomiting, diarrhea, constipation, motor weakness, insomnia, weight loss, syncope, dizziness, lightheadedness, palpitations, PND, orthopnea, or claudication. Patient with some episodes of chest pain, dyspnea, dyspnea on exertion still. Continues to   Smoke, diet could be better controlled. Compliant with meds. 13: as above, patient presents with worsening midsternal CP and SOB. States her CP lasts more than 20 minutes at rest, worse with exertion. Pain radiates to left shoulder. Does not feel like GERD type symptoms. Almost took one of her dad's nitro. Both Mother and father recently  from large MI and CVA. Father with hx of 34 stents with [facility]. Continues to smoke. Under a lot of stress and anxiety. Worried that she may have a heart and die. Patient fatigues very easily. 13: as above, s/p cardiac cath and S/P ZACHARY to mid LAD and distal RCA. Has not been compliant with ASA but takes plavix daily. Has been having numerous midsternal and midepigastric chest pains since cath. Had an episode of sever CP a week ago with associted SOB, Nausea, diaphoresis, and ashy looking per friend. Had a look of impending doom. Has been using numerous SL nitro's with relief. + smoking. Patient complains of LE dicomfort. 13: as above, was in hospital last week for CP. Patient had negative EKG and negative C.E. Continues to smoke, chantix not working per patient. Compliant with meds. BS are still not under very good control. Pt denies dyspnea, dyspnea on exertion, change in exercise capacity, fatigue,  nausea, vomiting, diarrhea, constipation, motor weakness, insomnia, weight loss, syncope, dizziness, lightheadedness, palpitations, PND, orthopnea, or claudication. Still has occasional CP/discomfort. No bleeding issues.  Has not

## 2023-10-18 ENCOUNTER — TELEPHONE (OUTPATIENT)
Dept: PRIMARY CARE | Facility: CLINIC | Age: 48
End: 2023-10-18

## 2023-10-18 DIAGNOSIS — Z79.4 TYPE 2 DIABETES MELLITUS WITHOUT COMPLICATION, WITH LONG-TERM CURRENT USE OF INSULIN (MULTI): ICD-10-CM

## 2023-10-18 DIAGNOSIS — E11.9 TYPE 2 DIABETES MELLITUS WITHOUT COMPLICATION, WITH LONG-TERM CURRENT USE OF INSULIN (MULTI): ICD-10-CM

## 2023-10-18 NOTE — TELEPHONE ENCOUNTER
Rx Refill Request Telephone Encounter    Name:  Tana Bahena  :  [id number]  Medication Name:    semaglutide (Ozempic) 0.25 mg or 0.5 mg (2 mg/3 mL) pen injector   blood-glucose sensor (Dexcom G7 Sensor) device   Specific Pharmacy location:  [facility] [place]  Date of last appointment:    Date of next appointment:  10/24  Best number to reach patient:  [phone]

## 2023-10-19 RX ORDER — BLOOD-GLUCOSE SENSOR
EACH MISCELLANEOUS
Qty: 3 EACH | Refills: 3 | Status: SHIPPED | OUTPATIENT
Start: 2023-10-19 | End: 2024-02-15 | Stop reason: SDUPTHER

## 2023-10-19 RX ORDER — SEMAGLUTIDE 0.68 MG/ML
0.5 INJECTION, SOLUTION SUBCUTANEOUS
Qty: 3 ML | Refills: 1 | Status: SHIPPED | OUTPATIENT
Start: 2023-10-19 | End: 2023-10-24 | Stop reason: SDUPTHER

## 2023-10-20 NOTE — TELEPHONE ENCOUNTER
PATIENT CALLED STATING [facility] DOES NOT HAVE THE MEDICATION AND IS REQUESTING A NEW PRESCRIPTION TO BE SENT TO [facility]

## 2023-10-22 ENCOUNTER — APPOINTMENT (OUTPATIENT)
Dept: CT IMAGING | Age: 48
End: 2023-10-22
Attending: EMERGENCY MEDICINE
Payer: COMMERCIAL

## 2023-10-22 ENCOUNTER — APPOINTMENT (OUTPATIENT)
Dept: GENERAL RADIOLOGY | Age: 48
End: 2023-10-22
Payer: COMMERCIAL

## 2023-10-22 ENCOUNTER — HOSPITAL ENCOUNTER (EMERGENCY)
Age: 48
Discharge: HOME | End: 2023-10-22
Attending: EMERGENCY MEDICINE
Payer: COMMERCIAL

## 2023-10-22 VITALS
HEIGHT: 66 IN | WEIGHT: 196 LBS | BODY MASS INDEX: 31.5 KG/M2 | TEMPERATURE: 97.4 F | SYSTOLIC BLOOD PRESSURE: 103 MMHG | RESPIRATION RATE: 18 BRPM | OXYGEN SATURATION: 98 % | HEART RATE: 86 BPM | DIASTOLIC BLOOD PRESSURE: 71 MMHG

## 2023-10-22 DIAGNOSIS — M25.551 RIGHT HIP PAIN: ICD-10-CM

## 2023-10-22 LAB
ALBUMIN SERPL-MCNC: 4.2 G/DL (ref 3.5–4.6)
ALP SERPL-CCNC: 97 U/L (ref 40–130)
ALT SERPL-CCNC: 12 U/L (ref 0–33)
ANION GAP SERPL CALCULATED.3IONS-SCNC: 8 MEQ/L (ref 9–15)
AST SERPL-CCNC: 15 U/L (ref 0–35)
BASOPHILS # BLD: 0 K/UL (ref 0–0.2)
BASOPHILS NFR BLD: 0.6 %
BILIRUB SERPL-MCNC: <0.2 MG/DL (ref 0.2–0.7)
BUN SERPL-MCNC: 15 MG/DL (ref 6–20)
CALCIUM SERPL-MCNC: 9.3 MG/DL (ref 8.5–9.9)
CHLORIDE SERPL-SCNC: 101 MEQ/L (ref 95–107)
CO2 SERPL-SCNC: 27 MEQ/L (ref 20–31)
CREAT SERPL-MCNC: 0.71 MG/DL (ref 0.5–0.9)
EOSINOPHIL # BLD: 0.1 K/UL (ref 0–0.7)
EOSINOPHIL NFR BLD: 1.5 %
ERYTHROCYTE [DISTWIDTH] IN BLOOD BY AUTOMATED COUNT: 12.6 % (ref 11.5–14.5)
GLOBULIN SER CALC-MCNC: 3.3 G/DL (ref 2.3–3.5)
GLUCOSE SERPL-MCNC: 121 MG/DL (ref 70–99)
HCT VFR BLD AUTO: 46.5 % (ref 37–47)
HGB BLD-MCNC: 15.3 G/DL (ref 12–16)
LYMPHOCYTES # BLD: 2 K/UL (ref 1–4.8)
LYMPHOCYTES NFR BLD: 27.7 %
MAGNESIUM SERPL-MCNC: 2.1 MG/DL (ref 1.7–2.4)
MCH RBC QN AUTO: 29.9 PG (ref 27–31.3)
MCHC RBC AUTO-ENTMCNC: 32.9 % (ref 33–37)
MCV RBC AUTO: 91 FL (ref 79.4–94.8)
MONOCYTES # BLD: 0.4 K/UL (ref 0.2–0.8)
MONOCYTES NFR BLD: 5 %
NEUTROPHILS # BLD: 4.7 K/UL (ref 1.4–6.5)
NEUTS SEG NFR BLD: 64.9 %
PLATELET # BLD AUTO: 303 K/UL (ref 130–400)
POTASSIUM SERPL-SCNC: 4.6 MEQ/L (ref 3.4–4.9)
PROT SERPL-MCNC: 7.5 G/DL (ref 6.3–8)
RBC # BLD AUTO: 5.11 M/UL (ref 4.2–5.4)
SODIUM SERPL-SCNC: 136 MEQ/L (ref 135–144)
TROPONIN, HIGH SENSITIVITY: <6 NG/L (ref 0–19)
WBC # BLD AUTO: 7.2 K/UL (ref 4.8–10.8)

## 2023-10-22 PROCEDURE — 99284 EMERGENCY DEPT VISIT MOD MDM: CPT

## 2023-10-22 PROCEDURE — 85025 COMPLETE CBC W/AUTO DIFF WBC: CPT

## 2023-10-22 PROCEDURE — 36415 COLL VENOUS BLD VENIPUNCTURE: CPT

## 2023-10-22 PROCEDURE — 6360000002 HC RX W HCPCS: Performed by: EMERGENCY MEDICINE

## 2023-10-22 PROCEDURE — 73700 CT LOWER EXTREMITY W/O DYE: CPT

## 2023-10-22 PROCEDURE — 96376 TX/PRO/DX INJ SAME DRUG ADON: CPT

## 2023-10-22 PROCEDURE — 80053 COMPREHEN METABOLIC PANEL: CPT

## 2023-10-22 PROCEDURE — 96374 THER/PROPH/DIAG INJ IV PUSH: CPT

## 2023-10-22 PROCEDURE — 83735 ASSAY OF MAGNESIUM: CPT

## 2023-10-22 PROCEDURE — 73502 X-RAY EXAM HIP UNI 2-3 VIEWS: CPT

## 2023-10-22 PROCEDURE — 70450 CT HEAD/BRAIN W/O DYE: CPT

## 2023-10-22 PROCEDURE — 84484 ASSAY OF TROPONIN QUANT: CPT

## 2023-10-22 RX ORDER — OXYCODONE HYDROCHLORIDE AND ACETAMINOPHEN 5; 325 MG/1; MG/1
1 TABLET ORAL EVERY 4 HOURS PRN
Qty: 14 TABLET | Refills: 0 | Status: SHIPPED | OUTPATIENT
Start: 2023-10-22 | End: 2023-10-25

## 2023-10-22 RX ORDER — HYDROMORPHONE HYDROCHLORIDE 1 MG/ML
0.5 INJECTION, SOLUTION INTRAMUSCULAR; INTRAVENOUS; SUBCUTANEOUS
Status: COMPLETED | OUTPATIENT
Start: 2023-10-22 | End: 2023-10-22

## 2023-10-22 RX ADMIN — HYDROMORPHONE HYDROCHLORIDE 0.5 MG: 1 INJECTION, SOLUTION INTRAMUSCULAR; INTRAVENOUS; SUBCUTANEOUS at 12:37

## 2023-10-22 RX ADMIN — HYDROMORPHONE HYDROCHLORIDE 0.5 MG: 1 INJECTION, SOLUTION INTRAMUSCULAR; INTRAVENOUS; SUBCUTANEOUS at 11:17

## 2023-10-22 ASSESSMENT — PAIN - FUNCTIONAL ASSESSMENT
PAIN_FUNCTIONAL_ASSESSMENT: PREVENTS OR INTERFERES SOME ACTIVE ACTIVITIES AND ADLS
PAIN_FUNCTIONAL_ASSESSMENT: 0-10

## 2023-10-22 ASSESSMENT — LIFESTYLE VARIABLES
HOW MANY STANDARD DRINKS CONTAINING ALCOHOL DO YOU HAVE ON A TYPICAL DAY: PATIENT DOES NOT DRINK
HOW OFTEN DO YOU HAVE A DRINK CONTAINING ALCOHOL: NEVER

## 2023-10-22 ASSESSMENT — PAIN SCALES - GENERAL: PAINLEVEL_OUTOF10: 10

## 2023-10-22 ASSESSMENT — PAIN DESCRIPTION - PAIN TYPE: TYPE: ACUTE PAIN

## 2023-10-22 ASSESSMENT — PAIN DESCRIPTION - ONSET: ONSET: ON-GOING

## 2023-10-22 ASSESSMENT — PAIN DESCRIPTION - LOCATION: LOCATION: HIP

## 2023-10-22 ASSESSMENT — PAIN DESCRIPTION - FREQUENCY: FREQUENCY: INTERMITTENT

## 2023-10-22 ASSESSMENT — PAIN DESCRIPTION - DESCRIPTORS: DESCRIPTORS: ACHING;THROBBING

## 2023-10-22 ASSESSMENT — PAIN DESCRIPTION - ORIENTATION: ORIENTATION: RIGHT

## 2023-10-22 NOTE — ED PROVIDER NOTES
neck supple. Comments: Pain to palpation in the right lateral hip location. No neurovascular deficits distal in either lower extremity. Skin:     General: Skin is warm. Capillary Refill: Capillary refill takes less than 2 seconds. Neurological:      Mental Status: She is alert and oriented to person, place, and time. Mental status is at baseline. Psychiatric:         Mood and Affect: Mood normal.         Behavior: Behavior normal.         DIAGNOSTIC RESULTS     EKG: All EKG's are interpreted by the Emergency Department Physician who either signs or Co-signsthis chart in the absence of a cardiologist.        RADIOLOGY:   [facility] Dear such as CT, Ultrasound and MRI are read by the radiologist. Plain radiographic images are visualized and preliminarily interpreted by the emergency physician with the below findings:        Interpretation per the Radiologist below, if available at the time ofthis note:    XR HIP 2-3 VW W PELVIS RIGHT   Final Result   No acute abnormality of the pelvis and right hip. CT Head W/O Contrast   Final Result   No evidence of acute intracranial hemorrhage or mass effect. CT HIP RIGHT WO CONTRAST   Final Result   1. No fracture or dislocation. 2.  Osteo-degenerative changes, as described above. 3.  Small amount of free fluid in the right adnexa and right cul-de-sac,   which could be physiologic in nature. ED BEDSIDE ULTRASOUND:   Performed by ED Physician - none    LABS:  Labs Reviewed   CBC WITH AUTO DIFFERENTIAL - Abnormal; Notable for the following components:       Result Value    MCHC 32.9 (*)     All other components within normal limits   COMPREHENSIVE METABOLIC PANEL - Abnormal; Notable for the following components:    Anion Gap 8 (*)     Glucose 121 (*)     All other components within normal limits   MAGNESIUM   TROPONIN       All other labs were within normal range or not returned as of this dictation.     EMERGENCY

## 2023-10-22 NOTE — ED TRIAGE NOTES
Pt woke on floor 3 days ago  Pt has had altered mental status since occurrence per    Pt has history of TBI  Pt denies any blurred vision   Ambulation has been worse, pt has stated that she is not able to see  Pt Boyfriend states that pt has \"not been herself at all since awaking on floor\"   Pt is alert and oriented times 3-4

## 2023-10-24 ENCOUNTER — OFFICE VISIT (OUTPATIENT)
Dept: PRIMARY CARE | Facility: CLINIC | Age: 48
End: 2023-10-24
Payer: COMMERCIAL

## 2023-10-24 VITALS
HEIGHT: 67 IN | HEART RATE: 78 BPM | BODY MASS INDEX: 31.14 KG/M2 | WEIGHT: 198.4 LBS | TEMPERATURE: 98.4 F | DIASTOLIC BLOOD PRESSURE: 76 MMHG | SYSTOLIC BLOOD PRESSURE: 122 MMHG

## 2023-10-24 DIAGNOSIS — R55 SYNCOPE, UNSPECIFIED SYNCOPE TYPE: ICD-10-CM

## 2023-10-24 DIAGNOSIS — Z79.4 TYPE 2 DIABETES MELLITUS WITHOUT COMPLICATION, WITH LONG-TERM CURRENT USE OF INSULIN (MULTI): ICD-10-CM

## 2023-10-24 DIAGNOSIS — F51.01 PRIMARY INSOMNIA: Primary | ICD-10-CM

## 2023-10-24 DIAGNOSIS — F33.1 MODERATE EPISODE OF RECURRENT MAJOR DEPRESSIVE DISORDER (MULTI): ICD-10-CM

## 2023-10-24 DIAGNOSIS — E11.9 TYPE 2 DIABETES MELLITUS WITHOUT COMPLICATION, WITH LONG-TERM CURRENT USE OF INSULIN (MULTI): ICD-10-CM

## 2023-10-24 DIAGNOSIS — R30.0 DYSURIA: ICD-10-CM

## 2023-10-24 PROCEDURE — 3078F DIAST BP <80 MM HG: CPT | Performed by: FAMILY MEDICINE

## 2023-10-24 PROCEDURE — 3051F HG A1C>EQUAL 7.0%<8.0%: CPT | Performed by: FAMILY MEDICINE

## 2023-10-24 PROCEDURE — 99214 OFFICE O/P EST MOD 30 MIN: CPT | Performed by: FAMILY MEDICINE

## 2023-10-24 PROCEDURE — 3074F SYST BP LT 130 MM HG: CPT | Performed by: FAMILY MEDICINE

## 2023-10-24 PROCEDURE — 4010F ACE/ARB THERAPY RXD/TAKEN: CPT | Performed by: FAMILY MEDICINE

## 2023-10-24 PROCEDURE — 3008F BODY MASS INDEX DOCD: CPT | Performed by: FAMILY MEDICINE

## 2023-10-24 RX ORDER — TRAZODONE HYDROCHLORIDE 100 MG/1
300 TABLET ORAL NIGHTLY
Qty: 90 TABLET | Refills: 0 | Status: SHIPPED | OUTPATIENT
Start: 2023-10-24 | End: 2023-12-05 | Stop reason: SDUPTHER

## 2023-10-24 RX ORDER — SEMAGLUTIDE 0.68 MG/ML
0.5 INJECTION, SOLUTION SUBCUTANEOUS
Qty: 3 ML | Refills: 1 | Status: SHIPPED | OUTPATIENT
Start: 2023-10-24 | End: 2023-10-27 | Stop reason: DRUGHIGH

## 2023-10-24 RX ORDER — NITROFURANTOIN 25; 75 MG/1; MG/1
100 CAPSULE ORAL 2 TIMES DAILY
Qty: 14 CAPSULE | Refills: 0 | Status: SHIPPED | OUTPATIENT
Start: 2023-10-24 | End: 2023-10-31

## 2023-10-24 ASSESSMENT — ENCOUNTER SYMPTOMS
VOMITING: 0
BACK PAIN: 0
BLOOD IN STOOL: 0
WEAKNESS: 0
CHEST TIGHTNESS: 0
NUMBNESS: 0
NAUSEA: 0
FATIGUE: 0
BRUISES/BLEEDS EASILY: 0
HEADACHES: 0
ADENOPATHY: 0
EYE DISCHARGE: 0
DIFFICULTY URINATING: 0
ABDOMINAL PAIN: 0
DIZZINESS: 0
SORE THROAT: 0
ACTIVITY CHANGE: 0
MYALGIAS: 0
NECK PAIN: 0
ARTHRALGIAS: 0
NERVOUS/ANXIOUS: 0
DYSPHORIC MOOD: 0
SHORTNESS OF BREATH: 0
DIARRHEA: 0
CONSTIPATION: 0
COUGH: 0
HEMATURIA: 0

## 2023-10-24 NOTE — PROGRESS NOTES
"Subjective   Patient ID: Tana Bahena is a 48 y.o. female who presents for Diabetes, Bipolar, and UTI.  HPI    Possible UTI, urine is very cloudy and fruity smelling   Unable to provide specimen today  She would like medication  Has follow-up with cardiology  Needs neuro follow-up    History of seizures  Needs neurology evaluation    ER last week. Had a fall and not sure if she passed out and hurt her hip    Would like follow up on brain injury feels like its getting worse  Needs neurology evaluation    Insomnia stable no progression or worsening    Diabetes with good control follow blood work  Watch diet and sweets    Depressed mood stable  No suicidal ideation no psychotic or manic symptoms      Review of Systems   Constitutional:  Negative for activity change and fatigue.   HENT:  Negative for congestion and sore throat.    Eyes:  Negative for discharge.   Respiratory:  Negative for cough, chest tightness and shortness of breath.    Cardiovascular:  Negative for chest pain and leg swelling.   Gastrointestinal:  Negative for abdominal pain, blood in stool, constipation, diarrhea, nausea and vomiting.   Endocrine: Negative for cold intolerance and heat intolerance.   Genitourinary:  Negative for difficulty urinating and hematuria.   Musculoskeletal:  Negative for arthralgias, back pain, gait problem, myalgias and neck pain.   Allergic/Immunologic: Negative for environmental allergies.   Neurological:  Negative for dizziness, syncope, weakness, numbness and headaches.   Hematological:  Negative for adenopathy. Does not bruise/bleed easily.   Psychiatric/Behavioral:  Negative for dysphoric mood. The patient is not nervous/anxious.    All other systems reviewed and are negative.      Objective   /76 (BP Location: Right arm, BP Cuff Size: Large adult)   Pulse 78   Temp 36.9 °C (98.4 °F) (Temporal)   Ht 1.702 m (5' 7\")   Wt 90 kg (198 lb 6.4 oz)   BMI 31.07 kg/m²    Physical Exam  Vitals and nursing note " reviewed.   Constitutional:       General: She is not in acute distress.     Appearance: Normal appearance. She is obese.   HENT:      Head: Normocephalic and atraumatic.      Right Ear: Tympanic membrane, ear canal and external ear normal.      Left Ear: Tympanic membrane, ear canal and external ear normal.      Nose: Nose normal.      Mouth/Throat:      Mouth: Mucous membranes are moist.      Pharynx: Oropharynx is clear. No oropharyngeal exudate or posterior oropharyngeal erythema.   Eyes:      Extraocular Movements: Extraocular movements intact.      Conjunctiva/sclera: Conjunctivae normal.      Pupils: Pupils are equal, round, and reactive to light.   Cardiovascular:      Rate and Rhythm: Normal rate and regular rhythm.      Pulses: Normal pulses.      Heart sounds: Normal heart sounds. No murmur heard.  Pulmonary:      Effort: Pulmonary effort is normal. No respiratory distress.      Breath sounds: Normal breath sounds. No wheezing or rales.   Abdominal:      General: Abdomen is flat. Bowel sounds are normal. There is no distension.      Palpations: Abdomen is soft. There is no mass.      Tenderness: There is no abdominal tenderness.   Musculoskeletal:         General: No swelling or deformity. Normal range of motion.      Cervical back: Normal range of motion and neck supple.      Right lower leg: No edema.      Left lower leg: No edema.   Lymphadenopathy:      Cervical: No cervical adenopathy.   Skin:     General: Skin is warm and dry.      Capillary Refill: Capillary refill takes less than 2 seconds.      Findings: No lesion or rash.   Neurological:      General: No focal deficit present.      Mental Status: She is alert and oriented to person, place, and time.      Cranial Nerves: No cranial nerve deficit.      Motor: No weakness.   Psychiatric:         Mood and Affect: Mood normal.         Behavior: Behavior normal.         Thought Content: Thought content normal.         Judgment: Judgment normal.          Assessment/Plan   Problem List Items Addressed This Visit       Moderate episode of recurrent major depressive disorder (CMS/[facility])    Relevant Medications    traZODone (Desyrel) 100 mg tablet    Type 2 diabetes mellitus without complication, with long-term current use of insulin (CMS/[facility])    Relevant Medications    semaglutide (Ozempic) 0.25 mg or 0.5 mg (2 mg/3 mL) pen injector    Primary insomnia - Primary     Other Visit Diagnoses       Syncope, unspecified syncope type        Relevant Orders    Follow Up In [facility] - PCP    Referral to Neurology    Dysuria        Relevant Medications    nitrofurantoin, macrocrystal-monohydrate, (Macrobid) 100 mg capsule            Patient education provided.  Stay current with age appropriate health maintenance as instructed.  Appointment here or ER with new or worsening symptoms'  Keep appropriate follow-up visit.  Stay current with proper immunizations   Refills as above  Refer to neurology  ER with new episodes of syncope  Keep cardiac follow-up  Antibiotics for possible UTI  1 month follow-up visit

## 2023-10-26 ENCOUNTER — TELEPHONE (OUTPATIENT)
Dept: PRIMARY CARE | Facility: CLINIC | Age: 48
End: 2023-10-26
Payer: COMMERCIAL

## 2023-10-26 DIAGNOSIS — G93.40 ENCEPHALOPATHY: ICD-10-CM

## 2023-10-26 NOTE — TELEPHONE ENCOUNTER
[facility] CALLED TO SEE ABOUT GETTING HER MRI SCHEDULED, TRIED SCHEDULING HER MRI BUT  STATED THAT THE REFERRAL IS FINALIZED AND IT NEEDS TO HAVE AN ACTIVE STATUS. WILL NEED REFERRAL RE-ORDERED TO SCHEDULE. PLEASE ADVISE.

## 2023-10-27 ENCOUNTER — TELEMEDICINE (OUTPATIENT)
Dept: PHARMACY | Facility: HOSPITAL | Age: 48
End: 2023-10-27
Payer: COMMERCIAL

## 2023-10-27 DIAGNOSIS — Z79.4 TYPE 2 DIABETES MELLITUS WITHOUT COMPLICATION, WITH LONG-TERM CURRENT USE OF INSULIN (MULTI): Primary | ICD-10-CM

## 2023-10-27 DIAGNOSIS — E11.9 TYPE 2 DIABETES MELLITUS WITHOUT COMPLICATION, WITH LONG-TERM CURRENT USE OF INSULIN (MULTI): Primary | ICD-10-CM

## 2023-10-27 RX ORDER — SEMAGLUTIDE 1.34 MG/ML
1 INJECTION, SOLUTION SUBCUTANEOUS
Qty: 3 ML | Refills: 1 | Status: SHIPPED | OUTPATIENT
Start: 2023-10-27 | End: 2023-11-13 | Stop reason: SDUPTHER

## 2023-10-27 NOTE — Clinical Note
Patient still having some SOB and requests another course of steroids. She may benefit from changing her Symbicort inhaler to triple therapy to help with increased SOB if you are agreeable. Thanks!

## 2023-10-27 NOTE — ASSESSMENT & PLAN NOTE
ASSESSMENT:  -Patient's last A1c is still uncontrolled at 7.4%, though improved from last A1c of 10.6% a year ago in   -Patient's BG has shown improvement since starting Ozempic; has tolerated so far  -Has had some instances of hypoglycemia     RECOMMENDATIONS/PLAN:     INCREASE Ozempic to 1 mg mg once weekly on .  DECREASE Lantus to 42 units at bedtime  CONTINUE all other diabetes medications as prescribed:  Metformin  m tablets BID  Novolog per sliding scale TID (max 14 units daily)

## 2023-10-27 NOTE — PROGRESS NOTES
Pharmacy Post-Discharge Follow-Up Visit  Tana Bahena is a 48 y.o. female was referred to Clinical Pharmacy Team to complete a post-discharge medication optimization and monitoring visit.  The patient was referred for their Diabetes.    Admission Date: 5/12/23  Discharge Date: 5/16/23    Referring Provider: Jacobo Thakkar MD    Subjective   Allergies   Allergen Reactions    Ketorolac Unknown    Tramadol Unknown    Adhesive Tape-Silicones Other       [facility] - [place], [place] - [address]  [address]  [address]  [facility] [address]  Phone: [phone] Fax: [phone]    [id number] [place], [place] - [address] [facility] AT [facility]  [address] [facility] [address]  Phone: [phone] Fax: [phone]    [facility] [address] [place] [address] [address] [facility]  [address] [facility] [address]  Phone: [phone] Fax: [phone]      Social History     Social History Narrative    Not on file      Patient presents to Clinical Pharmacy team for follow-up after last visit on 10/3/23. At last visit, patient was continued on Ozempic 0.5 mg once weekly.     Patient has been on Ozempic 0.5 mg for ~1 month. Denies any issues with administration or side effects thus far. Reports that BG is improving to low 100s throughout the day. Has had some occasional lows <100, which is likely due to Ozempic and insulin doses.      Discussed that has we increase Ozempic doses, will aim to reduce insulin doses as able to prevent hypoglycemia.     Of note, patient is also having continued issues with SOB that led to several short term courses of steroids. Is requesting another course of steroids for SOB. Discussed that if this continues she likely needs a step up in her inhaler therapy for COPD. Currently on Symbicort.       Objective     There were no vitals taken for this visit.     LAB  Lab Results   Component Value Date    BILITOT 0.3 06/29/2023     "CALCIUM 9.0 08/03/2023    CO2 25 08/03/2023     08/03/2023    CREATININE 0.79 08/03/2023    GLUCOSE 97 08/03/2023    ALKPHOS 91 06/29/2023    K 3.5 08/03/2023    PROT 6.9 06/29/2023     08/03/2023    AST 11 06/29/2023    ALT 15 06/29/2023    BUN 13 08/03/2023    ANIONGAP 14 08/03/2023    MG 1.78 06/30/2023    PHOS 3.4 05/02/2023    ALBUMIN 4.0 06/29/2023    LIPASE 15 06/29/2023     Lab Results   Component Value Date    TRIG 118 12/19/2022    CHOL 154 12/19/2022    HDL 51.1 12/19/2022     Lab Results   Component Value Date    HGBA1C 7.4 (A) 05/02/2023         Current Outpatient Medications on File Prior to Visit   Medication Sig Dispense Refill    albuterol 90 mcg/actuation inhaler Inhale 2 puffs 3 times a day as needed.      alcohol swabs (Alcohol Prep Pads) pads, medicated USE TO CLEANSE SKIN PRIOR TO INJECTION THREE TIMES DAILY AS DIRECTED 200 each 3    aspirin 81 mg EC tablet Take 1 tablet (81 mg) by mouth once daily.      atorvastatin (Lipitor) 40 mg tablet Take 1 tablet (40 mg) by mouth once daily.      BD Breana 2nd Gen Pen Needle 32 gauge x 5/32\" needle USE TO ADMINISTER INSULIN 3 TIMES A  each 3    blood sugar diagnostic (True Metrix Glucose Test Strip) strip 1 strip once daily.      blood-glucose sensor (Dexcom G7 Sensor) device Use as directed to test blood glucose continuously. Replace every 10 days. 3 each 3    budesonide-formoteroL (Symbicort) 80-4.5 mcg/actuation inhaler Inhale 2 puffs 2 times a day. Rinse mouth after use 10.2 g 3    carBAMazepine (Carbatrol) 200 mg 12 hr capsule Take 1 capsule (200 mg) by mouth twice a day.      carvedilol (Coreg) 6.25 mg tablet Take 1 tablet (6.25 mg) by mouth twice a day.      cyclobenzaprine (Flexeril) 10 mg tablet Take 1 tablet (10 mg) by mouth 3 times a day as needed for muscle spasms. 60 tablet 2    Dexcom G4 platinum  (Dexcom G7 ) misc Use as instructed to test blood sugar continuously 1 each 0    Dexcom G4 platinum transmitter " (Dexcom G6 Transmitter) device Use as instructed to test blood sugar 4 times daily. Replace every 90 days. 1 each 3    Dexcom G4 platinum transmitter (Dexcom G6 Transmitter) device Use as instructed  New order as old machine was faulty. 1 each 0    dulaglutide (Trulicity) 0.75 mg/0.5 mL pen injector Inject 0.75 mg under the skin 1 (one) time per week. 4 each 1    dulaglutide (Trulicity) 0.75 mg/0.5 mL pen injector Inject 0.75 mg under the skin 1 (one) time per week. 2 mL 11    empagliflozin (Jardiance) 10 mg Take 1 tablet (10 mg) by mouth once daily. 30 tablet 0    gabapentin (Neurontin) 100 mg capsule Take 1 capsule (100 mg) by mouth 3 times a day. For 90 days 90 capsule 0    hydrOXYzine HCL (Atarax) 25 mg tablet Take 1 tablet (25 mg) by mouth 3 times a day. prn 60 tablet 2    insulin aspart (NovoLOG U-100 Insulin aspart) 100 unit/mL injection Inject under the skin continuously. Sliding scale: 151-200 3 units, 301-250- 5 units, 251-300- 8 units, 301-350 10 units. Max dose 14 units daily. 10 mL 3    insulin aspart (NovoLOG, Fiasp) 100 UNIT/ML patient supplied pump Inject under the skin continuously. Sliding scale: 151-200 3 units, 301-250- 5 units, 251-300- 8 units, 301-350 10 units      insulin glargine (Lantus) 100 unit/mL injection Inject 52 Units under the skin once daily at bedtime. 10 mL 1    isosorbide mononitrate ER (Imdur) 60 mg 24 hr tablet Take 1 tablet (60 mg) by mouth once daily. In the morning      lancets 33 gauge misc       lithium 150 mg capsule Take 1 capsule (150 mg) by mouth 2 times a day. 180 capsule 1    losartan (Cozaar) 25 mg tablet Take 1 tablet (25 mg) by mouth once daily.      metFORMIN XR (Glucophage-XR) 500 mg 24 hr tablet Take 2 tablets (1,000 mg) by mouth 2 times a day. 120 tablet 3    methylPREDNISolone (Medrol Dospak) 4 mg tablets Take as directed on package. 21 tablet 0    mirtazapine (Remeron) 15 mg tablet Take 1 tablet (15 mg) by mouth once daily at bedtime. 90 tablet 0     nitrofurantoin, macrocrystal-monohydrate, (Macrobid) 100 mg capsule Take 1 capsule (100 mg) by mouth 2 times a day for 7 days. 14 capsule 0    nitroglycerin (Nitrostat) 0.4 mg SL tablet Place 1 tablet (0.4 mg) under the tongue every 5 minutes if needed. PLACE 1 TABLET UNDER THE TONGUE EVERY 5 MINUTES FOR UP TO 3 DOSES AS NEEDED FOR CHEST PAIN.CALL 911 IF PAIN PERSISTS.      omeprazole (PriLOSEC) 40 mg DR capsule Take 1 capsule (40 mg) by mouth once daily in the morning. Take before meals. Do not crush or chew. 30 capsule 11    PARoxetine (Paxil) 20 mg tablet Take 1 tablet (20 mg) by mouth once daily in the morning. 30 tablet 5    QUEtiapine (SEROquel) 100 mg tablet Take 1 tablet (100 mg) by mouth once daily at bedtime.      rOPINIRole (Requip) 0.25 mg tablet Take 1 tablet (0.25 mg) by mouth 3 times a day. 90 tablet 11    semaglutide (Ozempic) 0.25 mg or 0.5 mg (2 mg/3 mL) pen injector Inject 0.5 mg under the skin every 7 days. 3 mL 1    ticagrelor (Brilinta) 90 mg tablet Take 1 tablet (90 mg) by mouth 2 times a day.      traZODone (Desyrel) 100 mg tablet Take 3 tablets (300 mg) by mouth once daily at bedtime. 90 tablet 0    triamterene-hydrochlorothiazid (Maxzide-25) 37.5-25 mg tablet Take 1 tablet by mouth once daily.      True Metrix Glucose Meter misc USE TO TEST BLOOD SUGAR ONCE DAILY AND AS NEEDED      [DISCONTINUED] semaglutide (Ozempic) 0.25 mg or 0.5 mg (2 mg/3 mL) pen injector Inject 0.5 mg under the skin every 7 days. 3 mL 1    [DISCONTINUED] traZODone (Desyrel) 100 mg tablet Take 3 tablets (300 mg) by mouth once daily at bedtime. 90 tablet 0     No current facility-administered medications on file prior to visit.        HISTORICAL PHARMACOTHERAPY  -Trulicity  -Jardiance    Assessment/Plan   Problem List Items Addressed This Visit       Type 2 diabetes mellitus without complication, with long-term current use of insulin (CMS/HCC) - Primary     ASSESSMENT:  -Patient's last A1c is still uncontrolled at 7.4%,  though improved from last A1c of 10.6% a year ago in   -Patient's BG has shown improvement since starting Ozempic; has tolerated so far  -Has had some instances of hypoglycemia     RECOMMENDATIONS/PLAN:     INCREASE Ozempic to 1 mg mg once weekly on .  DECREASE Lantus to 42 units at bedtime  CONTINUE all other diabetes medications as prescribed:  Metformin  m tablets BID  Novolog per sliding scale TID (max 14 units daily)          Clinical Pharmacist follow up: 23 or sooner as needed based on clinical intervention  Type of Encounter:  Telephone    Jackeline Potts PharmD  Clinical Ambulatory Care Pharmacist  Ph: [phone]    Verbal consent to manage patient's drug therapy was obtained from the patient. They were informed they may decline to participate or withdraw from participation in pharmacy services at any time.

## 2023-10-30 ENCOUNTER — TELEPHONE (OUTPATIENT)
Dept: PRIMARY CARE | Facility: CLINIC | Age: 48
End: 2023-10-30
Payer: COMMERCIAL

## 2023-10-30 NOTE — TELEPHONE ENCOUNTER
FAXED A RECORDS REQUEST TO  [facility] FOR RECORDS TO BE SENT TO DR. SUNSHINE NIX IN [facility].

## 2023-11-07 ENCOUNTER — OFFICE VISIT (OUTPATIENT)
Dept: PRIMARY CARE | Facility: CLINIC | Age: 48
End: 2023-11-07
Payer: COMMERCIAL

## 2023-11-07 VITALS
SYSTOLIC BLOOD PRESSURE: 114 MMHG | HEIGHT: 67 IN | WEIGHT: 202.4 LBS | BODY MASS INDEX: 31.77 KG/M2 | DIASTOLIC BLOOD PRESSURE: 78 MMHG | HEART RATE: 83 BPM | TEMPERATURE: 98.4 F | OXYGEN SATURATION: 95 %

## 2023-11-07 DIAGNOSIS — J43.1 PANLOBULAR EMPHYSEMA (MULTI): ICD-10-CM

## 2023-11-07 DIAGNOSIS — F09 ORGANIC BRAIN SYNDROME: ICD-10-CM

## 2023-11-07 DIAGNOSIS — F33.1 MODERATE EPISODE OF RECURRENT MAJOR DEPRESSIVE DISORDER (MULTI): ICD-10-CM

## 2023-11-07 DIAGNOSIS — Z79.4 TYPE 2 DIABETES MELLITUS WITHOUT COMPLICATION, WITH LONG-TERM CURRENT USE OF INSULIN (MULTI): Primary | ICD-10-CM

## 2023-11-07 DIAGNOSIS — Z72.0 TOBACCO ABUSE: ICD-10-CM

## 2023-11-07 DIAGNOSIS — E66.09 CLASS 1 OBESITY DUE TO EXCESS CALORIES WITH SERIOUS COMORBIDITY AND BODY MASS INDEX (BMI) OF 31.0 TO 31.9 IN ADULT: ICD-10-CM

## 2023-11-07 DIAGNOSIS — Z79.4 TYPE 2 DIABETES MELLITUS WITHOUT COMPLICATION, WITH LONG-TERM CURRENT USE OF INSULIN (MULTI): ICD-10-CM

## 2023-11-07 DIAGNOSIS — I25.10 CORONARY ARTERY DISEASE INVOLVING NATIVE CORONARY ARTERY OF NATIVE HEART WITHOUT ANGINA PECTORIS: Primary | ICD-10-CM

## 2023-11-07 DIAGNOSIS — R27.0 ATAXIA: ICD-10-CM

## 2023-11-07 DIAGNOSIS — M54.50 CHRONIC BILATERAL LOW BACK PAIN WITHOUT SCIATICA: ICD-10-CM

## 2023-11-07 DIAGNOSIS — E11.9 TYPE 2 DIABETES MELLITUS WITHOUT COMPLICATION, WITH LONG-TERM CURRENT USE OF INSULIN (MULTI): ICD-10-CM

## 2023-11-07 DIAGNOSIS — G89.29 CHRONIC BILATERAL LOW BACK PAIN WITHOUT SCIATICA: ICD-10-CM

## 2023-11-07 DIAGNOSIS — I10 PRIMARY HYPERTENSION: ICD-10-CM

## 2023-11-07 DIAGNOSIS — E11.9 TYPE 2 DIABETES MELLITUS WITHOUT COMPLICATION, WITH LONG-TERM CURRENT USE OF INSULIN (MULTI): Primary | ICD-10-CM

## 2023-11-07 DIAGNOSIS — F51.01 PRIMARY INSOMNIA: ICD-10-CM

## 2023-11-07 DIAGNOSIS — R55 SYNCOPE, UNSPECIFIED SYNCOPE TYPE: ICD-10-CM

## 2023-11-07 PROCEDURE — 99215 OFFICE O/P EST HI 40 MIN: CPT | Performed by: FAMILY MEDICINE

## 2023-11-07 PROCEDURE — 3074F SYST BP LT 130 MM HG: CPT | Performed by: FAMILY MEDICINE

## 2023-11-07 PROCEDURE — 3051F HG A1C>EQUAL 7.0%<8.0%: CPT | Performed by: FAMILY MEDICINE

## 2023-11-07 PROCEDURE — 87186 SC STD MICRODIL/AGAR DIL: CPT

## 2023-11-07 PROCEDURE — 87086 URINE CULTURE/COLONY COUNT: CPT

## 2023-11-07 PROCEDURE — 4010F ACE/ARB THERAPY RXD/TAKEN: CPT | Performed by: FAMILY MEDICINE

## 2023-11-07 PROCEDURE — 3078F DIAST BP <80 MM HG: CPT | Performed by: FAMILY MEDICINE

## 2023-11-07 PROCEDURE — 3008F BODY MASS INDEX DOCD: CPT | Performed by: FAMILY MEDICINE

## 2023-11-07 RX ORDER — INSULIN GLARGINE 100 [IU]/ML
52 INJECTION, SOLUTION SUBCUTANEOUS NIGHTLY
Qty: 10 ML | Refills: 1 | Status: SHIPPED | OUTPATIENT
Start: 2023-11-07 | End: 2023-11-09 | Stop reason: ENTERED-IN-ERROR

## 2023-11-07 RX ORDER — METHYLPREDNISOLONE 4 MG/1
TABLET ORAL
Qty: 21 TABLET | Refills: 0 | Status: SHIPPED | OUTPATIENT
Start: 2023-11-07 | End: 2023-11-07 | Stop reason: ALTCHOICE

## 2023-11-07 RX ORDER — RANOLAZINE 500 MG/1
TABLET, EXTENDED RELEASE ORAL
COMMUNITY
Start: 2023-10-17

## 2023-11-07 RX ORDER — METHYLPREDNISOLONE 4 MG/1
TABLET ORAL
Qty: 21 TABLET | Refills: 0 | Status: SHIPPED | OUTPATIENT
Start: 2023-11-07 | End: 2023-11-14

## 2023-11-07 RX ORDER — INSULIN GLARGINE 100 [IU]/ML
52 INJECTION, SOLUTION SUBCUTANEOUS NIGHTLY
Qty: 10 ML | Refills: 1 | Status: SHIPPED | OUTPATIENT
Start: 2023-11-07 | End: 2023-11-07 | Stop reason: ALTCHOICE

## 2023-11-07 ASSESSMENT — ENCOUNTER SYMPTOMS
ADENOPATHY: 0
ACTIVITY CHANGE: 0
ARTHRALGIAS: 0
MYALGIAS: 0
DYSPHORIC MOOD: 0
NERVOUS/ANXIOUS: 0
NECK PAIN: 0
COUGH: 0
SHORTNESS OF BREATH: 0
BRUISES/BLEEDS EASILY: 0
EYE DISCHARGE: 0
CONSTIPATION: 0
ABDOMINAL PAIN: 0
FATIGUE: 0
DIZZINESS: 0
SORE THROAT: 0
DIFFICULTY URINATING: 0
WEAKNESS: 0
BLOOD IN STOOL: 0
VOMITING: 0
BACK PAIN: 0
DIARRHEA: 0
NAUSEA: 0
HEMATURIA: 0
NUMBNESS: 0
CHEST TIGHTNESS: 0
HEADACHES: 0

## 2023-11-07 NOTE — PROGRESS NOTES
Subjective   Patient ID: Perlita Bahena is a 48 y.o. female who presents for Follow-up.  HPI    Would like to talk about getting a cane    Falling lot still would like therapy for her legs  Patient with gait ataxia and falls  History of organic brain syndrome and multiple medical issues including diabetes and coronary disease and syncope  Needs neurology evaluation and physical therapy    She smokes and must quit    COPD ongoing  Again she must quit smoking and keep pulmonary follow-up    Insomnia stable    Organic brain stable see neuro    Chronic back pain ongoing  This could contribute to her gait issues  See specialist    Depressed mood stable  No suicidal ideation no psychotic or manic symptoms    Diabetes stable  Follow diet and weight loss    Obesity  Recommend weight loss    Hypertension stable no chest pain    Coronary disease stable no angina  Keep cardiac follow-up    Syncope of uncertain etiology  Recommend neuro and cardiac evaluation for this she will consider    Patient with concerned about possible urinary tract infection would like urine culture        Review of Systems   Constitutional:  Negative for activity change and fatigue.   HENT:  Negative for congestion and sore throat.    Eyes:  Negative for discharge.   Respiratory:  Negative for cough, chest tightness and shortness of breath.    Cardiovascular:  Negative for chest pain and leg swelling.   Gastrointestinal:  Negative for abdominal pain, blood in stool, constipation, diarrhea, nausea and vomiting.   Endocrine: Negative for cold intolerance and heat intolerance.   Genitourinary:  Negative for difficulty urinating and hematuria.   Musculoskeletal:  Negative for arthralgias, back pain, gait problem, myalgias and neck pain.   Allergic/Immunologic: Negative for environmental allergies.   Neurological:  Negative for dizziness, syncope, weakness, numbness and headaches.   Hematological:  Negative for adenopathy. Does not bruise/bleed easily.  "  Psychiatric/Behavioral:  Negative for dysphoric mood. The patient is not nervous/anxious.    All other systems reviewed and are negative.      Objective   /78 (BP Location: Left arm, BP Cuff Size: Large adult)   Pulse 83   Temp 36.9 °C (98.4 °F) (Temporal)   Ht 1.702 m (5' 7\")   Wt 91.8 kg (202 lb 6.4 oz)   SpO2 95%   BMI 31.70 kg/m²    Physical Exam  Vitals and nursing note reviewed.   Constitutional:       General: She is not in acute distress.     Appearance: Normal appearance. She is obese.   HENT:      Head: Normocephalic and atraumatic.      Right Ear: Tympanic membrane, ear canal and external ear normal.      Left Ear: Tympanic membrane, ear canal and external ear normal.      Nose: Nose normal.      Mouth/Throat:      Mouth: Mucous membranes are moist.      Pharynx: Oropharynx is clear. No oropharyngeal exudate or posterior oropharyngeal erythema.   Eyes:      Extraocular Movements: Extraocular movements intact.      Conjunctiva/sclera: Conjunctivae normal.      Pupils: Pupils are equal, round, and reactive to light.   Cardiovascular:      Rate and Rhythm: Normal rate and regular rhythm.      Pulses: Normal pulses.      Heart sounds: Normal heart sounds. No murmur heard.  Pulmonary:      Effort: Pulmonary effort is normal. No respiratory distress.      Breath sounds: Normal breath sounds. No wheezing or rales.   Abdominal:      General: Abdomen is flat. Bowel sounds are normal. There is no distension.      Palpations: Abdomen is soft. There is no mass.      Tenderness: There is no abdominal tenderness.   Musculoskeletal:         General: No swelling or deformity. Normal range of motion.      Cervical back: Normal range of motion and neck supple.      Right lower leg: No edema.      Left lower leg: No edema.   Lymphadenopathy:      Cervical: No cervical adenopathy.   Skin:     General: Skin is warm and dry.      Capillary Refill: Capillary refill takes less than 2 seconds.      Findings: No " lesion or rash.   Neurological:      General: No focal deficit present.      Mental Status: She is alert and oriented to person, place, and time.      Cranial Nerves: No cranial nerve deficit.      Motor: No weakness.   Psychiatric:         Mood and Affect: Mood normal.         Behavior: Behavior normal.         Thought Content: Thought content normal.         Judgment: Judgment normal.         Assessment/Plan   Problem List Items Addressed This Visit       Coronary artery disease involving native coronary artery of native heart without angina pectoris - Primary    Panlobular emphysema (CMS/HCC)    Relevant Medications    methylPREDNISolone (Medrol Dospak) 4 mg tablets    Moderate episode of recurrent major depressive disorder (CMS/HCC)    Type 2 diabetes mellitus without complication, with long-term current use of insulin (CMS/HCC)    Relevant Medications    insulin glargine (Lantus) 100 unit/mL injection    Primary hypertension    Organic brain syndrome    Chronic bilateral low back pain without sciatica    Primary insomnia     Other Visit Diagnoses       Syncope, unspecified syncope type        Ataxia        Relevant Orders    Urine Culture    Referral to Physical Therapy    Referral to Neurology    Follow Up In [facility] - PCP    Disability Placard    Tobacco abuse        Class 1 obesity due to excess calories with serious comorbidity and body mass index (BMI) of 31.0 to 31.9 in adult                Patient education provided.  Stay current with age appropriate health maintenance as instructed.  Appointment here or ER with new or worsening symptoms'  Keep appropriate follow-up visit.  Stay current with proper immunizations       Refill meds as above  Testing and referral as above  Stressed importance of follow-up visit  Handicap sticker as requested

## 2023-11-07 NOTE — TELEPHONE ENCOUNTER
Rx Refill Request     Name: Tana Bahena  :  1975   Medication Name:  Empaglifozin (Jardiance) 10 mg Tablet   Last fill: 10.08.2023 30 days   Specific Pharmacy location:  [facility]   Date of last appointment:  Today appointment, prior to that-- 10.24.2023  Date of next appointment:  N/A  Best number to reach patient:  [phone]         RX PENDED to Иван Jacques as directed by fax.

## 2023-11-08 ENCOUNTER — TELEPHONE (OUTPATIENT)
Dept: PRIMARY CARE | Facility: CLINIC | Age: 48
End: 2023-11-08
Payer: COMMERCIAL

## 2023-11-08 ENCOUNTER — PATIENT MESSAGE (OUTPATIENT)
Dept: PRIMARY CARE | Facility: CLINIC | Age: 48
End: 2023-11-08
Payer: COMMERCIAL

## 2023-11-08 ENCOUNTER — PATIENT MESSAGE (OUTPATIENT)
Dept: CARDIOLOGY CLINIC | Age: 48
End: 2023-11-08

## 2023-11-08 DIAGNOSIS — R27.0 ATAXIA: ICD-10-CM

## 2023-11-08 DIAGNOSIS — E11.9 TYPE 2 DIABETES MELLITUS WITHOUT COMPLICATION, WITH LONG-TERM CURRENT USE OF INSULIN (MULTI): Primary | ICD-10-CM

## 2023-11-08 DIAGNOSIS — Z79.4 TYPE 2 DIABETES MELLITUS WITHOUT COMPLICATION, WITH LONG-TERM CURRENT USE OF INSULIN (MULTI): Primary | ICD-10-CM

## 2023-11-08 DIAGNOSIS — Z87.820 HISTORY OF TRAUMATIC BRAIN INJURY: ICD-10-CM

## 2023-11-08 DIAGNOSIS — Z01.89 ROUTINE LAB DRAW: Primary | ICD-10-CM

## 2023-11-08 NOTE — TELEPHONE ENCOUNTER
----- Message from Tana Bahena sent at 11/8/2023 10:17 AM EST -----  Regarding: Urine test results   Contact: [phone]  Havent gotten my urine results yet. Also wanted to get blood work done , a1C basic testing if possible. Forgot to ask during my appointment. Thank you. Also checking in on my MRI. I requested it be done almost 2 weeks and haven't heard anything yet. Thank you so much !!

## 2023-11-08 NOTE — TELEPHONE ENCOUNTER
DAVID FROM RADIOLOGY SCHEDULING CALLED IN STATING PATIENT WAS ORDERED A BRAIN MRI BUT IS NOT ABLE TO BE SCHEDULED FOR IT DUE TO HAVING A PACEMAKER THAT IS NOT COMPATIBLE WITH MRI. I THEN SPOKE WITH PATIENT AND IS WONDERING IF A CT SCAN CAN BE ORDERED INSTEAD. PLEASE ADVISE

## 2023-11-08 NOTE — TELEPHONE ENCOUNTER
From: Nelly Millard  Sent: 11/8/2023 11:54 AM EST  To: [facility] Clinical Staff  Subject: Pacemaker     I did talk to text so my messages all over the place I apologize I basically want to get this pacemaker removed and implanted with another one that I'm able to get an MRI done of my brain or CAT scan or whatever it is that I can get that I need to get

## 2023-11-09 ENCOUNTER — PATIENT MESSAGE (OUTPATIENT)
Dept: PRIMARY CARE | Facility: CLINIC | Age: 48
End: 2023-11-09
Payer: COMMERCIAL

## 2023-11-09 ENCOUNTER — TELEPHONE (OUTPATIENT)
Dept: PRIMARY CARE | Facility: CLINIC | Age: 48
End: 2023-11-09
Payer: COMMERCIAL

## 2023-11-09 DIAGNOSIS — E11.9 TYPE 2 DIABETES MELLITUS WITHOUT COMPLICATION, WITH LONG-TERM CURRENT USE OF INSULIN (MULTI): ICD-10-CM

## 2023-11-09 DIAGNOSIS — R82.90 FOUL SMELLING URINE: Primary | ICD-10-CM

## 2023-11-09 DIAGNOSIS — Z79.4 TYPE 2 DIABETES MELLITUS WITHOUT COMPLICATION, WITH LONG-TERM CURRENT USE OF INSULIN (MULTI): ICD-10-CM

## 2023-11-09 RX ORDER — INSULIN GLARGINE 100 [IU]/ML
52 INJECTION, SOLUTION SUBCUTANEOUS NIGHTLY
Qty: 9 ML | Refills: 2 | Status: SHIPPED | OUTPATIENT
Start: 2023-11-09 | End: 2023-11-10 | Stop reason: SDUPTHER

## 2023-11-09 NOTE — TELEPHONE ENCOUNTER
Perlita said she went to  her script for lantus and they had a vial ready for her. She said she didn't take it because she doesn't get the vial she gets the lantus pens that are already filled and wanted to know if dr. Thakkar could fix her script and [facility] [place]. Please advise

## 2023-11-09 NOTE — PROGRESS NOTES
Patient would like the antibiotic sent to [facility]. She was made aware that if her symptoms continue after the antibiotic is finished to get a referral to Urology or OBGYN.    RX PENDED to Augustina Ivey as directed by the patient.

## 2023-11-10 ENCOUNTER — PATIENT MESSAGE (OUTPATIENT)
Dept: PRIMARY CARE | Facility: CLINIC | Age: 48
End: 2023-11-10
Payer: COMMERCIAL

## 2023-11-10 DIAGNOSIS — Z79.4 TYPE 2 DIABETES MELLITUS WITHOUT COMPLICATION, WITH LONG-TERM CURRENT USE OF INSULIN (MULTI): Primary | ICD-10-CM

## 2023-11-10 DIAGNOSIS — E11.9 TYPE 2 DIABETES MELLITUS WITHOUT COMPLICATION, WITH LONG-TERM CURRENT USE OF INSULIN (MULTI): Primary | ICD-10-CM

## 2023-11-10 LAB — BACTERIA UR CULT: ABNORMAL

## 2023-11-10 RX ORDER — INSULIN GLARGINE 100 [IU]/ML
52 INJECTION, SOLUTION SUBCUTANEOUS NIGHTLY
Qty: 15 ML | Refills: 1 | Status: SHIPPED | OUTPATIENT
Start: 2023-11-10 | End: 2023-11-21 | Stop reason: WASHOUT

## 2023-11-10 RX ORDER — INSULIN GLARGINE 100 [IU]/ML
52 INJECTION, SOLUTION SUBCUTANEOUS NIGHTLY
Qty: 15 ML | Refills: 2 | Status: SHIPPED | OUTPATIENT
Start: 2023-11-10 | End: 2023-12-11 | Stop reason: SDUPTHER

## 2023-11-10 RX ORDER — NITROFURANTOIN 25; 75 MG/1; MG/1
100 CAPSULE ORAL 2 TIMES DAILY
Qty: 20 CAPSULE | Refills: 0 | Status: SHIPPED | OUTPATIENT
Start: 2023-11-10 | End: 2023-11-20

## 2023-11-10 NOTE — TELEPHONE ENCOUNTER
From: aTna Bahena  To: Jacobo Thakkar MD  Sent: 11/10/2023 10:32 AM EST  Subject: Insulin     This is the third day I am trying to get my lanis insulin PENS sent over to [facility] they're closing soon, and I STILL don't have them filled. Could someone PLEASE send this prescription over to [facility] in [place] immediately so that I don't have to go the entire weekend without it. Thank you so much

## 2023-11-10 NOTE — TELEPHONE ENCOUNTER
Patient called in stating her pharmacy has not received the prescription for the Lantus pens. She is asking for this to be resent to the [facility] in [place] on [address]  Please Advise

## 2023-11-10 NOTE — TELEPHONE ENCOUNTER
Chen from [facility] called in stating the prescription they received for the lantus has a quantity of 9. She is wondering if this can be changed to 15 so they can dispense 2 boxes to the patient?  Please Advise

## 2023-11-10 NOTE — TELEPHONE ENCOUNTER
Spoke to Chen at [facility] pharmacy. PCP cancelled previous script and a new one needed to be sent with a quantity of 15. Patient and pharmacy have been advised.

## 2023-11-10 NOTE — PATIENT COMMUNICATION
Spoke to Chen at [facility]. The entire script was cancelled by PCP so a new script with the quantity of 15 is pended per Chen's instructions for two full boxes of pens.

## 2023-11-10 NOTE — PATIENT COMMUNICATION
Lantus pend come in boxes of 5 pens ( 15 mls). Rx was pended for a box which is a full month supply for her.

## 2023-11-10 NOTE — PATIENT COMMUNICATION
Please clarify what is needed? Message states pt is unable to have a CT and is requesting a MRI due to pace maker. MRI of what for what? Brain?

## 2023-11-11 ENCOUNTER — PATIENT MESSAGE (OUTPATIENT)
Dept: PRIMARY CARE | Facility: CLINIC | Age: 48
End: 2023-11-11
Payer: COMMERCIAL

## 2023-11-11 DIAGNOSIS — F31.12 BIPOLAR AFFECTIVE DISORDER, CURRENTLY MANIC, MODERATE (MULTI): ICD-10-CM

## 2023-11-13 RX ORDER — SEMAGLUTIDE 1.34 MG/ML
1 INJECTION, SOLUTION SUBCUTANEOUS
Qty: 3 ML | Refills: 1 | Status: SHIPPED | OUTPATIENT
Start: 2023-11-13 | End: 2023-12-07 | Stop reason: SDUPTHER

## 2023-11-13 RX ORDER — QUETIAPINE FUMARATE 100 MG/1
100 TABLET, FILM COATED ORAL NIGHTLY
Qty: 30 TABLET | Refills: 1 | Status: SHIPPED | OUTPATIENT
Start: 2023-11-13 | End: 2023-11-24 | Stop reason: SDUPTHER

## 2023-11-13 NOTE — TELEPHONE ENCOUNTER
From: Tana Bahena  To: Jacobo Thakkar MD  Sent: 11/11/2023 12:56 PM EST  Subject: Refill please.     Could we possibly refill my Seroquel At [facility]?? Thank you so much.

## 2023-11-13 NOTE — TELEPHONE ENCOUNTER
----- Message from Tana Bahena sent at 11/13/2023 12:11 PM EST -----  Regarding: Refill please.   Contact: [phone]  So sorry just noticed I need my Ozempic also filled at [facility]. Thanks so much. Have a great day!!!

## 2023-11-13 NOTE — PATIENT COMMUNICATION
Rx Refill Request     Date of last appointment:  11/7/2023   Date of next appointment:  12/5/2023   Best number to reach patient:  [phone]

## 2023-11-17 ENCOUNTER — TELEMEDICINE (OUTPATIENT)
Dept: PHARMACY | Facility: HOSPITAL | Age: 48
End: 2023-11-17
Payer: COMMERCIAL

## 2023-11-17 DIAGNOSIS — Z79.4 TYPE 2 DIABETES MELLITUS WITHOUT COMPLICATION, WITH LONG-TERM CURRENT USE OF INSULIN (MULTI): Primary | ICD-10-CM

## 2023-11-17 DIAGNOSIS — E11.9 TYPE 2 DIABETES MELLITUS WITHOUT COMPLICATION, WITH LONG-TERM CURRENT USE OF INSULIN (MULTI): Primary | ICD-10-CM

## 2023-11-17 NOTE — PROGRESS NOTES
"Pharmacy Post-Discharge Follow-Up Visit  Tana Bahena \"Perlita\" is a 48 y.o. female was referred to Clinical Pharmacy Team to complete a post-discharge medication optimization and monitoring visit.  The patient was referred for their Diabetes.    Admission Date: 5/12/23  Discharge Date: 5/16/23    Referring Provider: Jacobo Thakkar MD    Subjective   Allergies   Allergen Reactions    Ketorolac Unknown    Tramadol Unknown    Adhesive Tape-Silicones Other       [facility] - [place], [place] - [address]  [address]  [address]  [facility] [address]  Phone: [phone] Fax: [phone]    [id number] [place], [place] - [address] [place] [address] AT [facility]  [address] [facility] [address]  Phone: [phone] Fax: [phone]    [facility] [address] [place] [address] [address] [place] [address]  [address] [facility] [address]  Phone: [phone] Fax: [phone]      Social History     Social History Narrative    Not on file      Patient presents to Clinical Pharmacy team for follow-up after last visit on 10/27/23. At last visit, patient's Ozempic was increased to 1 mg once weekly.     Patient denies any issues with administration or side effects thus far. Reports that BG ranges from 55-140s before eating dinner. Has been adjusting her Novolog based on sliding scale to account for these lows; currently using 30 units daily. In addition to mealtime insulin, patient is using 50 units of Lantus. Frequent lows is likely due to Ozempic and insulin doses.     Notes that she does not feel when her blood sugar drops <70 mg/dL.       Objective     There were no vitals taken for this visit.     LAB  Lab Results   Component Value Date    BILITOT 0.3 06/29/2023    CALCIUM 9.0 08/03/2023    CO2 25 08/03/2023     08/03/2023    CREATININE 0.79 08/03/2023    GLUCOSE 97 08/03/2023    ALKPHOS 91 06/29/2023    K 3.5 08/03/2023    PROT 6.9 06/29/2023     08/03/2023 " "   AST 11 06/29/2023    ALT 15 06/29/2023    BUN 13 08/03/2023    ANIONGAP 14 08/03/2023    MG 1.78 06/30/2023    PHOS 3.4 05/02/2023    ALBUMIN 4.0 06/29/2023    LIPASE 15 06/29/2023     Lab Results   Component Value Date    TRIG 118 12/19/2022    CHOL 154 12/19/2022    HDL 51.1 12/19/2022     Lab Results   Component Value Date    HGBA1C 7.4 (A) 05/02/2023         Current Outpatient Medications on File Prior to Visit   Medication Sig Dispense Refill    albuterol 90 mcg/actuation inhaler Inhale 2 puffs 3 times a day as needed.      alcohol swabs (Alcohol Prep Pads) pads, medicated USE TO CLEANSE SKIN PRIOR TO INJECTION THREE TIMES DAILY AS DIRECTED 200 each 3    aspirin 81 mg EC tablet Take 1 tablet (81 mg) by mouth once daily.      atorvastatin (Lipitor) 40 mg tablet Take 1 tablet (40 mg) by mouth once daily.      BD Breana 2nd Gen Pen Needle 32 gauge x 5/32\" needle USE TO ADMINISTER INSULIN 3 TIMES A  each 3    blood sugar diagnostic (True Metrix Glucose Test Strip) strip 1 strip once daily.      blood-glucose sensor (Dexcom G7 Sensor) device Use as directed to test blood glucose continuously. Replace every 10 days. 3 each 3    budesonide-formoteroL (Symbicort) 80-4.5 mcg/actuation inhaler Inhale 2 puffs 2 times a day. Rinse mouth after use 10.2 g 3    carBAMazepine (Carbatrol) 200 mg 12 hr capsule Take 1 capsule (200 mg) by mouth twice a day.      carvedilol (Coreg) 6.25 mg tablet Take 1 tablet (6.25 mg) by mouth twice a day.      cyclobenzaprine (Flexeril) 10 mg tablet Take 1 tablet (10 mg) by mouth 3 times a day as needed for muscle spasms. 60 tablet 2    Dexcom G4 platinum  (Dexcom G7 ) misc Use as instructed to test blood sugar continuously 1 each 0    Dexcom G4 platinum transmitter (Dexcom G6 Transmitter) device Use as instructed to test blood sugar 4 times daily. Replace every 90 days. 1 each 3    Dexcom G4 platinum transmitter (Dexcom G6 Transmitter) device Use as instructed  New order " as old machine was faulty. 1 each 0    empagliflozin (Jardiance) 10 mg Take 1 tablet (10 mg) by mouth once daily. 30 tablet 0    gabapentin (Neurontin) 100 mg capsule Take 1 capsule (100 mg) by mouth 3 times a day. For 90 days 90 capsule 0    hydrOXYzine HCL (Atarax) 25 mg tablet Take 1 tablet (25 mg) by mouth 3 times a day. prn 60 tablet 2    insulin aspart (NovoLOG U-100 Insulin aspart) 100 unit/mL injection Inject under the skin continuously. Sliding scale: 151-200 3 units, 301-250- 5 units, 251-300- 8 units, 301-350 10 units. Max dose 14 units daily. (Patient taking differently: Inject under the skin continuously. Sliding scale: 151-200 3 units, 301-250- 5 units, 251-300- 8 units, 301-350 10 units. Max dose 30 units daily.) 10 mL 3    insulin aspart (NovoLOG, Fiasp) 100 UNIT/ML patient supplied pump Inject under the skin continuously. Sliding scale: 151-200 3 units, 301-250- 5 units, 251-300- 8 units, 301-350 10 units      insulin glargine (Lantus Solostar U-100 Insulin) 100 unit/mL (3 mL) pen Inject 52 Units under the skin once daily at bedtime. Take as directed per insulin instructions. (Patient taking differently: Inject 48 Units under the skin once daily at bedtime. Take as directed per insulin instructions.) 15 mL 2    insulin glargine (Lantus) 100 unit/mL injection Inject 52 Units under the skin once daily at bedtime. 15 mL 1    isosorbide mononitrate ER (Imdur) 60 mg 24 hr tablet Take 1 tablet (60 mg) by mouth once daily. In the morning      lancets 33 gauge misc       lithium 150 mg capsule Take 1 capsule (150 mg) by mouth 2 times a day. 180 capsule 1    losartan (Cozaar) 25 mg tablet Take 1 tablet (25 mg) by mouth once daily.      metFORMIN XR (Glucophage-XR) 500 mg 24 hr tablet Take 2 tablets (1,000 mg) by mouth 2 times a day. 120 tablet 3    methylPREDNISolone (Medrol Dospak) 4 mg tablets Take as directed on package. 21 tablet 0    [] methylPREDNISolone (Medrol Dospak) 4 mg tablets Take as  directed on package. 21 tablet 0    mirtazapine (Remeron) 15 mg tablet Take 1 tablet (15 mg) by mouth once daily at bedtime. 90 tablet 0    nitrofurantoin, macrocrystal-monohydrate, (Macrobid) 100 mg capsule Take 1 capsule (100 mg) by mouth 2 times a day for 10 days. 20 capsule 0    nitroglycerin (Nitrostat) 0.4 mg SL tablet Place 1 tablet (0.4 mg) under the tongue every 5 minutes if needed. PLACE 1 TABLET UNDER THE TONGUE EVERY 5 MINUTES FOR UP TO 3 DOSES AS NEEDED FOR CHEST PAIN.CALL 911 IF PAIN PERSISTS.      omeprazole (PriLOSEC) 40 mg DR capsule Take 1 capsule (40 mg) by mouth once daily in the morning. Take before meals. Do not crush or chew. 30 capsule 11    PARoxetine (Paxil) 20 mg tablet Take 1 tablet (20 mg) by mouth once daily in the morning. 30 tablet 5    QUEtiapine (SEROquel) 100 mg tablet Take 1 tablet (100 mg) by mouth once daily at bedtime. 30 tablet 1    ranolazine (Ranexa) 500 mg 12 hr tablet       rOPINIRole (Requip) 0.25 mg tablet Take 1 tablet (0.25 mg) by mouth 3 times a day. 90 tablet 11    semaglutide (Ozempic) 1 mg/dose (4 mg/3 mL) pen injector Inject 1 mg under the skin every 7 days. 3 mL 1    ticagrelor (Brilinta) 90 mg tablet Take 1 tablet (90 mg) by mouth 2 times a day.      traZODone (Desyrel) 100 mg tablet Take 3 tablets (300 mg) by mouth once daily at bedtime. 90 tablet 0    triamterene-hydrochlorothiazid (Maxzide-25) 37.5-25 mg tablet Take 1 tablet by mouth once daily.      True Metrix Glucose Meter misc USE TO TEST BLOOD SUGAR ONCE DAILY AND AS NEEDED      [DISCONTINUED] dulaglutide (Trulicity) 0.75 mg/0.5 mL pen injector Inject 0.75 mg under the skin 1 (one) time per week. 4 each 1    [DISCONTINUED] dulaglutide (Trulicity) 0.75 mg/0.5 mL pen injector Inject 0.75 mg under the skin 1 (one) time per week. 2 mL 11    [DISCONTINUED] QUEtiapine (SEROquel) 100 mg tablet Take 1 tablet (100 mg) by mouth once daily at bedtime.      [DISCONTINUED] semaglutide (Ozempic) 1 mg/dose (4 mg/3 mL)  pen injector Inject 1 mg under the skin every 7 days. 3 mL 1     No current facility-administered medications on file prior to visit.      Assessment/Plan   Problem List Items Addressed This Visit       Type 2 diabetes mellitus without complication, with long-term current use of insulin (CMS/[facility]) - Primary     ASSESSMENT:  -Patient's last A1c is still uncontrolled at 7.4%, though improved from last A1c of 10.6% a year ago in   -Patient's BG has shown improvement since starting Ozempic; has tolerated so far  -Has had some instances of hypoglycemia before dinner, which she often does not feel     RECOMMENDATIONS/PLAN:     DECREASE Lantus to 48 units at bedtime. Advised patient to further decrease to 45 units if she notices continued low BG.    CONTINUE all other diabetes medications as prescribed:  Metformin  m tablets BID  Ozempic to 1 mg mg once weekly on .  Humalog TID before meals per sliding scale; max 30 units daily (advised patient to continue decreasing by 2 units if continued lows)          Clinical Pharmacist follow up: 23 or sooner as needed based on clinical intervention  Type of Encounter:  Telephone    Jackeline Potts PharmD  Clinical Ambulatory Care Pharmacist  Ph: [phone]    Verbal consent to manage patient's drug therapy was obtained from the patient. They were informed they may decline to participate or withdraw from participation in pharmacy services at any time.

## 2023-11-17 NOTE — ASSESSMENT & PLAN NOTE
ASSESSMENT:  -Patient's last A1c is still uncontrolled at 7.4%, though improved from last A1c of 10.6% a year ago in   -Patient's BG has shown improvement since starting Ozempic; has tolerated so far  -Has had some instances of hypoglycemia before dinner, which she often does not feel     RECOMMENDATIONS/PLAN:     DECREASE Lantus to 48 units at bedtime. Advised patient to further decrease to 45 units if she notices continued low BG.    CONTINUE all other diabetes medications as prescribed:  Metformin  m tablets BID  Ozempic to 1 mg mg once weekly on .  Humalog TID before meals per sliding scale; max 30 units daily (advised patient to continue decreasing by 2 units if continued lows)

## 2023-11-20 ENCOUNTER — TELEPHONE (OUTPATIENT)
Dept: PRIMARY CARE | Facility: CLINIC | Age: 48
End: 2023-11-20

## 2023-11-20 ENCOUNTER — LAB (OUTPATIENT)
Dept: LAB | Facility: LAB | Age: 48
End: 2023-11-20
Payer: COMMERCIAL

## 2023-11-20 DIAGNOSIS — Z01.89 ROUTINE LAB DRAW: ICD-10-CM

## 2023-11-20 DIAGNOSIS — E11.9 TYPE 2 DIABETES MELLITUS WITHOUT COMPLICATION, WITH LONG-TERM CURRENT USE OF INSULIN (MULTI): ICD-10-CM

## 2023-11-20 DIAGNOSIS — G93.40 ENCEPHALOPATHY: ICD-10-CM

## 2023-11-20 DIAGNOSIS — Z79.4 TYPE 2 DIABETES MELLITUS WITHOUT COMPLICATION, WITH LONG-TERM CURRENT USE OF INSULIN (MULTI): ICD-10-CM

## 2023-11-20 LAB
CREAT SERPL-MCNC: 0.72 MG/DL (ref 0.5–1.05)
EST. AVERAGE GLUCOSE BLD GHB EST-MCNC: 143 MG/DL
GFR SERPL CREATININE-BSD FRML MDRD: >90 ML/MIN/1.73M*2
HBA1C MFR BLD: 6.6 %

## 2023-11-20 PROCEDURE — 36415 COLL VENOUS BLD VENIPUNCTURE: CPT

## 2023-11-20 PROCEDURE — 82565 ASSAY OF CREATININE: CPT

## 2023-11-20 PROCEDURE — 83036 HEMOGLOBIN GLYCOSYLATED A1C: CPT
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
## 2023-11-21 ENCOUNTER — APPOINTMENT (OUTPATIENT)
Dept: PRIMARY CARE | Facility: CLINIC | Age: 48
End: 2023-11-21
Payer: COMMERCIAL

## 2023-11-24 DIAGNOSIS — F31.12 BIPOLAR AFFECTIVE DISORDER, CURRENTLY MANIC, MODERATE (MULTI): ICD-10-CM

## 2023-11-24 DIAGNOSIS — J43.1 PANLOBULAR EMPHYSEMA (MULTI): ICD-10-CM

## 2023-11-24 RX ORDER — METHYLPREDNISOLONE 4 MG/1
TABLET ORAL
Qty: 21 TABLET | Refills: 0 | Status: SHIPPED | OUTPATIENT
Start: 2023-11-24 | End: 2024-01-18

## 2023-11-24 NOTE — TELEPHONE ENCOUNTER
Rx Refill Request     Name: Tana Bahena  :  1975   Medication Name:  methylPREDNISolone Oral Tablet Therapy Pack 4 MG   Specific Pharmacy location:  [address]   Date of last appointment:  2023   Date of next appointment:  2023   Best number to reach patient:  [phone]

## 2023-11-24 NOTE — TELEPHONE ENCOUNTER
Rx Refill Request Telephone Encounter    Name:  Tana Bahena  :  [id number]  Medication Name:  Seroquel 100mg tab   Specific Pharmacy location: [facility] [address]   Date of last appointment:  23  Date of next appointment:  23  Best number to reach patient:  [phone]    Originally sent the  but to wrong pharmacy so pt did not . Resend to [facility] pharmacy instead. Please advise.

## 2023-11-27 NOTE — TELEPHONE ENCOUNTER
----- Message from Tana Bahena sent at 11/24/2023  2:12 PM EST -----  Regarding: Concerned??  Contact: [phone]  This is rather embarrassing, but also concerning. I typically have 1 to 2 bowel movements a week. I recently purchased  some kind of flush medication. Taking 2 a day. The dint work either. Currently I have not a bowel movement for six days. I'm rather concerned at this time. Thank you.

## 2023-11-28 ENCOUNTER — DOCUMENTATION (OUTPATIENT)
Dept: SURGERY | Facility: HOSPITAL | Age: 48
End: 2023-11-28
Payer: COMMERCIAL

## 2023-11-28 NOTE — PROGRESS NOTES
Pt called and left a vcml on the FELICITA box asking for a returned call to schedule. I spoke with the patient and she is looking to get scheduled. She is receiving the ready to schedule message. Also, I advised her to avoid submitting multiple questionnaires going forward to prevent delay, she understood. She is interested in seeing Brad at [facility]. Thanks!

## 2023-11-29 DIAGNOSIS — Z79.4 TYPE 2 DIABETES MELLITUS WITHOUT COMPLICATION, WITH LONG-TERM CURRENT USE OF INSULIN (MULTI): ICD-10-CM

## 2023-11-29 DIAGNOSIS — E11.9 TYPE 2 DIABETES MELLITUS WITHOUT COMPLICATION, WITH LONG-TERM CURRENT USE OF INSULIN (MULTI): ICD-10-CM

## 2023-11-29 NOTE — TELEPHONE ENCOUNTER
Patient called in wondering if Dr. Thakkar would be able to send her in a glucometer to the [facility] on [facility]? She stated she has a Dexcom but it doesn't work for her and constantly falls off  Please Advise

## 2023-11-30 RX ORDER — BLOOD-GLUCOSE METER
EACH MISCELLANEOUS
Qty: 400 STRIP | Refills: 3 | Status: SHIPPED | OUTPATIENT
Start: 2023-11-30

## 2023-11-30 RX ORDER — QUETIAPINE FUMARATE 100 MG/1
100 TABLET, FILM COATED ORAL NIGHTLY
Qty: 30 TABLET | Refills: 1 | Status: SHIPPED | OUTPATIENT
Start: 2023-11-30 | End: 2024-01-02 | Stop reason: SDUPTHER

## 2023-11-30 RX ORDER — LANCETS 30 GAUGE
EACH MISCELLANEOUS
Qty: 1 EACH | Refills: 0 | Status: SHIPPED | OUTPATIENT
Start: 2023-11-30

## 2023-11-30 RX ORDER — LANCETS 33 GAUGE
EACH MISCELLANEOUS
Qty: 400 EACH | Refills: 3 | Status: SHIPPED | OUTPATIENT
Start: 2023-11-30 | End: 2024-05-24 | Stop reason: SDUPTHER

## 2023-12-01 ENCOUNTER — TELEMEDICINE (OUTPATIENT)
Dept: PHARMACY | Facility: HOSPITAL | Age: 48
End: 2023-12-01
Payer: COMMERCIAL

## 2023-12-01 DIAGNOSIS — Z79.4 TYPE 2 DIABETES MELLITUS WITHOUT COMPLICATION, WITH LONG-TERM CURRENT USE OF INSULIN (MULTI): ICD-10-CM

## 2023-12-01 DIAGNOSIS — E11.9 TYPE 2 DIABETES MELLITUS WITHOUT COMPLICATION, WITH LONG-TERM CURRENT USE OF INSULIN (MULTI): ICD-10-CM

## 2023-12-01 RX ORDER — METFORMIN HYDROCHLORIDE 500 MG/1
1000 TABLET, EXTENDED RELEASE ORAL 2 TIMES DAILY
Qty: 120 TABLET | Refills: 3 | Status: SHIPPED | OUTPATIENT
Start: 2023-12-01 | End: 2024-03-08 | Stop reason: SDUPTHER

## 2023-12-01 NOTE — PROGRESS NOTES
"Pharmacy Post-Discharge Follow-Up Visit  Tana Bahena \"Perlita\" is a 48 y.o. female was referred to Clinical Pharmacy Team to complete a post-discharge medication optimization and monitoring visit.  The patient was referred for their Diabetes.    Admission Date: 5/12/23  Discharge Date: 5/16/23    Referring Provider: Jacobo Thakkar MD    Subjective   Allergies   Allergen Reactions    Ketorolac Unknown    Tramadol Unknown    Adhesive Tape-Silicones Other       [facility] - [place], [place] - [address]  [address]  [address]  [facility] [address]  Phone: [phone] Fax: [phone]    [facility][id number] [place], [place] - [address] [place] [address] AT [facility]  [address] [facility] [address]  Phone: [phone] Fax: [phone]    [facility] [address] [place] [address] [address] [facility]  [address] [facility] [address]  Phone: [phone] Fax: [phone]      Social History     Social History Narrative    Not on file      Patient presents to Clinical Pharmacy team for follow-up after last visit on 11/17/23. At last visit, patient was continued on current medications.      Patient denies any issues with administration or side effects thus far. Reports that due to having issues with Dexcom she has not been able to measure her blood sugars lately. Was prescribed testing supplies for manual glucose monitoring by PCP but has not picked up yet. In addition to mealtime insulin, patient is using 50 units of Lantus. Denies any s/sx of hypoglycemia.      Notes that she does not feel when her blood sugar drops <70 mg/dL.       Objective     There were no vitals taken for this visit.     LAB  Lab Results   Component Value Date    BILITOT 0.3 06/29/2023    CALCIUM 9.0 08/03/2023    CO2 25 08/03/2023     08/03/2023    CREATININE 0.72 11/20/2023    GLUCOSE 97 08/03/2023    ALKPHOS 91 06/29/2023    K 3.5 08/03/2023    PROT 6.9 06/29/2023     08/03/2023    " "AST 11 06/29/2023    ALT 15 06/29/2023    BUN 13 08/03/2023    ANIONGAP 14 08/03/2023    MG 1.78 06/30/2023    PHOS 3.4 05/02/2023    ALBUMIN 4.0 06/29/2023    LIPASE 15 06/29/2023    EGFR >90 11/20/2023     Lab Results   Component Value Date    TRIG 118 12/19/2022    CHOL 154 12/19/2022    HDL 51.1 12/19/2022     Lab Results   Component Value Date    HGBA1C 6.6 (H) 11/20/2023         Current Outpatient Medications on File Prior to Visit   Medication Sig Dispense Refill    albuterol 90 mcg/actuation inhaler Inhale 2 puffs 3 times a day as needed.      alcohol swabs (Alcohol Prep Pads) pads, medicated USE TO CLEANSE SKIN PRIOR TO INJECTION THREE TIMES DAILY AS DIRECTED 200 each 3    aspirin 81 mg EC tablet Take 1 tablet (81 mg) by mouth once daily.      atorvastatin (Lipitor) 40 mg tablet Take 1 tablet (40 mg) by mouth once daily.      BD Breana 2nd Gen Pen Needle 32 gauge x 5/32\" needle USE TO ADMINISTER INSULIN 3 TIMES A  each 3    blood-glucose sensor (Dexcom G7 Sensor) device Use as directed to test blood glucose continuously. Replace every 10 days. 3 each 3    budesonide-formoteroL (Symbicort) 80-4.5 mcg/actuation inhaler Inhale 2 puffs 2 times a day. Rinse mouth after use 10.2 g 3    carBAMazepine (Carbatrol) 200 mg 12 hr capsule Take 1 capsule (200 mg) by mouth twice a day.      carvedilol (Coreg) 6.25 mg tablet Take 1 tablet (6.25 mg) by mouth twice a day.      cyclobenzaprine (Flexeril) 10 mg tablet Take 1 tablet (10 mg) by mouth 3 times a day as needed for muscle spasms. 60 tablet 2    Dexcom G4 platinum  (Dexcom G7 ) misc Use as instructed to test blood sugar continuously 1 each 0    Dexcom G4 platinum transmitter (Dexcom G6 Transmitter) device Use as instructed to test blood sugar 4 times daily. Replace every 90 days. 1 each 3    Dexcom G4 platinum transmitter (Dexcom G6 Transmitter) device Use as instructed  New order as old machine was faulty. 1 each 0    empagliflozin (Jardiance) " 10 mg Take 1 tablet (10 mg) by mouth once daily. 30 tablet 0    gabapentin (Neurontin) 100 mg capsule Take 1 capsule (100 mg) by mouth 3 times a day. For 90 days 90 capsule 0    hydrOXYzine HCL (Atarax) 25 mg tablet Take 1 tablet (25 mg) by mouth 3 times a day. prn 60 tablet 2    insulin aspart (NovoLOG U-100 Insulin aspart) 100 unit/mL injection Inject under the skin continuously. Sliding scale: 151-200 3 units, 301-250- 5 units, 251-300- 8 units, 301-350 10 units. Max dose 14 units daily. (Patient taking differently: Inject under the skin continuously. Sliding scale: 151-200 3 units, 301-250- 5 units, 251-300- 8 units, 301-350 10 units. Max dose 30 units daily.) 10 mL 3    insulin aspart (NovoLOG, Fiasp) 100 UNIT/ML patient supplied pump Inject under the skin continuously. Sliding scale: 151-200 3 units, 301-250- 5 units, 251-300- 8 units, 301-350 10 units      insulin glargine (Lantus Solostar U-100 Insulin) 100 unit/mL (3 mL) pen Inject 52 Units under the skin once daily at bedtime. Take as directed per insulin instructions. (Patient taking differently: Inject 48 Units under the skin once daily at bedtime. Take as directed per insulin instructions.) 15 mL 2    isosorbide mononitrate ER (Imdur) 60 mg 24 hr tablet Take 1 tablet (60 mg) by mouth once daily. In the morning      lithium 150 mg capsule Take 1 capsule (150 mg) by mouth 2 times a day. 180 capsule 1    losartan (Cozaar) 25 mg tablet Take 1 tablet (25 mg) by mouth once daily.      metFORMIN XR (Glucophage-XR) 500 mg 24 hr tablet Take 2 tablets (1,000 mg) by mouth 2 times a day. 120 tablet 3    methylPREDNISolone (Medrol Dospak) 4 mg tablets TAKE 1 ROW OF TABLETS BY MOUTH EACH DAY AS INSTRUCTED ON THE PACKAGE 21 tablet 0    mirtazapine (Remeron) 15 mg tablet Take 1 tablet (15 mg) by mouth once daily at bedtime. 90 tablet 0    nitroglycerin (Nitrostat) 0.4 mg SL tablet Place 1 tablet (0.4 mg) under the tongue every 5 minutes if needed. PLACE 1 TABLET UNDER  THE TONGUE EVERY 5 MINUTES FOR UP TO 3 DOSES AS NEEDED FOR CHEST PAIN.CALL 911 IF PAIN PERSISTS.      omeprazole (PriLOSEC) 40 mg DR capsule Take 1 capsule (40 mg) by mouth once daily in the morning. Take before meals. Do not crush or chew. 30 capsule 11    OneTouch Delica Plus Lancet 33 gauge misc Test once 4 times daily as needed 400 each 3    OneTouch Verio Flex Start kit Use as needed for glucose testing 1 each 0    OneTouch Verio test strips strip Test glucose 4 times daily 400 strip 3    PARoxetine (Paxil) 20 mg tablet Take 1 tablet (20 mg) by mouth once daily in the morning. 30 tablet 5    QUEtiapine (SEROquel) 100 mg tablet Take 1 tablet (100 mg) by mouth once daily at bedtime. 30 tablet 1    ranolazine (Ranexa) 500 mg 12 hr tablet       rOPINIRole (Requip) 0.25 mg tablet Take 1 tablet (0.25 mg) by mouth 3 times a day. 90 tablet 11    semaglutide (Ozempic) 1 mg/dose (4 mg/3 mL) pen injector Inject 1 mg under the skin every 7 days. 3 mL 1    ticagrelor (Brilinta) 90 mg tablet Take 1 tablet (90 mg) by mouth 2 times a day.      traZODone (Desyrel) 100 mg tablet Take 3 tablets (300 mg) by mouth once daily at bedtime. 90 tablet 0    triamterene-hydrochlorothiazid (Maxzide-25) 37.5-25 mg tablet Take 1 tablet by mouth once daily.      True Metrix Glucose Meter misc USE TO TEST BLOOD SUGAR ONCE DAILY AND AS NEEDED      [DISCONTINUED] blood sugar diagnostic (True Metrix Glucose Test Strip) strip 1 strip once daily.      [DISCONTINUED] lancets 33 gauge misc        No current facility-administered medications on file prior to visit.      Assessment/Plan   Problem List Items Addressed This Visit       Type 2 diabetes mellitus without complication, with long-term current use of insulin (CMS/[facility])     ASSESSMENT:  -Patient's last A1c is still uncontrolled at 7.4%, though improved from last A1c of 10.6% a year ago in June  -Patient's BG has shown improvement since starting Ozempic; has tolerated so far  -Unable to get BG  readings due to Dexcom sensors not functioning; will  testing supplies from pharmacy     RECOMMENDATIONS/PLAN:     CONTINUE Lantus 50 units at bedtime. Advised patient to  testing supplies as soon as she can.   CONTINUE all other diabetes medications as prescribed:  a. Metformin  m tablets BID        b. Ozempic 1 mg mg once weekly on .        c. Humalog TID before meals per sliding scale; max 30 units daily (advised patient to continue decreasing by 2 units if continued lows)          Clinical Pharmacist follow up: 12/15/23 or sooner as needed based on clinical intervention  Type of Encounter:  Telephone    Sabiha GarciaD  Clinical Ambulatory Care Pharmacist  Ph: [phone]    Verbal consent to manage patient's drug therapy was obtained from the patient. They were informed they may decline to participate or withdraw from participation in pharmacy services at any time.

## 2023-12-01 NOTE — ASSESSMENT & PLAN NOTE
ASSESSMENT:  -Patient's last A1c is still uncontrolled at 7.4%, though improved from last A1c of 10.6% a year ago in   -Patient's BG has shown improvement since starting Ozempic; has tolerated so far  -Unable to get BG readings due to Dexcom sensors not functioning; will  testing supplies from pharmacy     RECOMMENDATIONS/PLAN:     CONTINUE Lantus 50 units at bedtime. Advised patient to  testing supplies as soon as she can.   CONTINUE all other diabetes medications as prescribed:  a. Metformin  m tablets BID        b. Ozempic 1 mg mg once weekly on .        c. Humalog TID before meals per sliding scale; max 30 units daily (advised patient to continue decreasing by 2 units if continued lows)

## 2023-12-04 ENCOUNTER — OFFICE VISIT (OUTPATIENT)
Dept: OTOLARYNGOLOGY | Facility: CLINIC | Age: 48
End: 2023-12-04
Payer: COMMERCIAL

## 2023-12-04 VITALS — WEIGHT: 191 LBS | BODY MASS INDEX: 29.98 KG/M2 | HEIGHT: 67 IN

## 2023-12-04 DIAGNOSIS — Z95.1 HX OF CABG: ICD-10-CM

## 2023-12-04 DIAGNOSIS — J44.9 CHRONIC OBSTRUCTIVE PULMONARY DISEASE, UNSPECIFIED COPD TYPE (MULTI): ICD-10-CM

## 2023-12-04 DIAGNOSIS — I25.10 CORONARY ARTERY DISEASE INVOLVING NATIVE CORONARY ARTERY OF NATIVE HEART WITHOUT ANGINA PECTORIS: ICD-10-CM

## 2023-12-04 DIAGNOSIS — J39.8 TRACHEAL STENOSIS: ICD-10-CM

## 2023-12-04 DIAGNOSIS — R06.00 DYSPNEA, UNSPECIFIED TYPE: Primary | ICD-10-CM

## 2023-12-04 PROCEDURE — 3008F BODY MASS INDEX DOCD: CPT | Performed by: OTOLARYNGOLOGY

## 2023-12-04 PROCEDURE — 99215 OFFICE O/P EST HI 40 MIN: CPT | Performed by: OTOLARYNGOLOGY

## 2023-12-04 PROCEDURE — 31622 DX BRONCHOSCOPE/WASH: CPT | Performed by: OTOLARYNGOLOGY

## 2023-12-04 PROCEDURE — 3044F HG A1C LEVEL LT 7.0%: CPT | Performed by: OTOLARYNGOLOGY

## 2023-12-04 PROCEDURE — 4010F ACE/ARB THERAPY RXD/TAKEN: CPT | Performed by: OTOLARYNGOLOGY

## 2023-12-04 ASSESSMENT — PATIENT HEALTH QUESTIONNAIRE - PHQ9
1. LITTLE INTEREST OR PLEASURE IN DOING THINGS: SEVERAL DAYS
SUM OF ALL RESPONSES TO PHQ9 QUESTIONS 1 & 2: 2
2. FEELING DOWN, DEPRESSED OR HOPELESS: SEVERAL DAYS
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
## 2023-12-05 ENCOUNTER — APPOINTMENT (OUTPATIENT)
Dept: PHYSICAL THERAPY | Facility: CLINIC | Age: 48
End: 2023-12-05
Payer: COMMERCIAL

## 2023-12-05 ENCOUNTER — APPOINTMENT (OUTPATIENT)
Dept: PRIMARY CARE | Facility: CLINIC | Age: 48
End: 2023-12-05
Payer: COMMERCIAL

## 2023-12-05 DIAGNOSIS — F33.1 MODERATE EPISODE OF RECURRENT MAJOR DEPRESSIVE DISORDER (MULTI): ICD-10-CM

## 2023-12-05 NOTE — TELEPHONE ENCOUNTER
Rx Refill Request Telephone Encounter    Name:  Tana Bahena  :  [id number]  Medication Name:  trazodone (Desyrel) 100 mg   Specific Pharmacy location:  [facility]  Date of last appointment:    Date of next appointment:    Best number to reach patient:  [phone]

## 2023-12-06 ENCOUNTER — E-VISIT (OUTPATIENT)
Dept: NEUROLOGY | Facility: CLINIC | Age: 48
End: 2023-12-06
Payer: COMMERCIAL

## 2023-12-06 ENCOUNTER — PATIENT MESSAGE (OUTPATIENT)
Dept: PRIMARY CARE | Facility: CLINIC | Age: 48
End: 2023-12-06

## 2023-12-06 DIAGNOSIS — F33.1 MODERATE EPISODE OF RECURRENT MAJOR DEPRESSIVE DISORDER (MULTI): ICD-10-CM

## 2023-12-06 DIAGNOSIS — E11.9 TYPE 2 DIABETES MELLITUS WITHOUT COMPLICATION, WITH LONG-TERM CURRENT USE OF INSULIN (MULTI): Primary | ICD-10-CM

## 2023-12-06 DIAGNOSIS — Z79.4 TYPE 2 DIABETES MELLITUS WITHOUT COMPLICATION, WITH LONG-TERM CURRENT USE OF INSULIN (MULTI): Primary | ICD-10-CM

## 2023-12-07 ENCOUNTER — TELEPHONE (OUTPATIENT)
Dept: PRIMARY CARE | Facility: CLINIC | Age: 48
End: 2023-12-07
Payer: COMMERCIAL

## 2023-12-07 DIAGNOSIS — F51.01 PRIMARY INSOMNIA: ICD-10-CM

## 2023-12-07 RX ORDER — SEMAGLUTIDE 1.34 MG/ML
1 INJECTION, SOLUTION SUBCUTANEOUS
Qty: 3 ML | Refills: 1 | Status: SHIPPED | OUTPATIENT
Start: 2023-12-07 | End: 2024-01-02 | Stop reason: SDUPTHER

## 2023-12-07 RX ORDER — TRAZODONE HYDROCHLORIDE 100 MG/1
300 TABLET ORAL NIGHTLY
Qty: 90 TABLET | Refills: 0 | Status: SHIPPED | OUTPATIENT
Start: 2023-12-07 | End: 2024-01-02 | Stop reason: SDUPTHER

## 2023-12-07 RX ORDER — INSULIN ASPART 100 [IU]/ML
INJECTION, SOLUTION INTRAVENOUS; SUBCUTANEOUS
Qty: 10 ML | Refills: 3 | Status: SHIPPED | OUTPATIENT
Start: 2023-12-07 | End: 2023-12-21 | Stop reason: ALTCHOICE

## 2023-12-07 RX ORDER — TRAZODONE HYDROCHLORIDE 100 MG/1
300 TABLET ORAL NIGHTLY
Qty: 90 TABLET | Refills: 0 | Status: SHIPPED | OUTPATIENT
Start: 2023-12-07 | End: 2023-12-07 | Stop reason: SDUPTHER

## 2023-12-07 NOTE — TELEPHONE ENCOUNTER
Patient called in stating she went to her pharmacy to  her Novolog. She stated the pharmacy gave her vials but this is incorrect. Patient stated she is supposed to receive the pen instead. She is asking for the correct prescription to be sent to the [facility] in [place]  Please Advise

## 2023-12-07 NOTE — TELEPHONE ENCOUNTER
From: Tana Bahena  To: Jacobo Thakkar MD  Sent: 12/6/2023 7:50 PM EST  Subject: Aspart flex pen     I apologize for all of the request but I just noticed all my medications are running out so I also need my flex pen refill and then the trazodone which I already requested and I'm pretty sure that's it for now thank you so much

## 2023-12-09 ENCOUNTER — PATIENT MESSAGE (OUTPATIENT)
Dept: PRIMARY CARE | Facility: CLINIC | Age: 48
End: 2023-12-09
Payer: COMMERCIAL

## 2023-12-10 ENCOUNTER — PATIENT MESSAGE (OUTPATIENT)
Dept: PRIMARY CARE | Facility: CLINIC | Age: 48
End: 2023-12-10
Payer: COMMERCIAL

## 2023-12-10 DIAGNOSIS — E11.9 TYPE 2 DIABETES MELLITUS WITHOUT COMPLICATION, WITH LONG-TERM CURRENT USE OF INSULIN (MULTI): ICD-10-CM

## 2023-12-10 DIAGNOSIS — Z79.4 TYPE 2 DIABETES MELLITUS WITHOUT COMPLICATION, WITH LONG-TERM CURRENT USE OF INSULIN (MULTI): ICD-10-CM

## 2023-12-11 PROCEDURE — 93296 REM INTERROG EVL PM/IDS: CPT | Performed by: INTERNAL MEDICINE

## 2023-12-11 PROCEDURE — 93295 DEV INTERROG REMOTE 1/2/MLT: CPT | Performed by: INTERNAL MEDICINE

## 2023-12-11 RX ORDER — INSULIN ASPART 100 [IU]/ML
INJECTION, SOLUTION INTRAVENOUS; SUBCUTANEOUS
Qty: 9 ML | Refills: 2 | Status: SHIPPED | OUTPATIENT
Start: 2023-12-11 | End: 2023-12-21 | Stop reason: ALTCHOICE

## 2023-12-11 RX ORDER — INSULIN GLARGINE 100 [IU]/ML
52 INJECTION, SOLUTION SUBCUTANEOUS NIGHTLY
Qty: 15 ML | Refills: 1 | Status: SHIPPED | OUTPATIENT
Start: 2023-12-11 | End: 2024-06-10

## 2023-12-11 NOTE — TELEPHONE ENCOUNTER
From: Tana Bahena  To: Jacobo Thakkar MD  Sent: 12/9/2023 6:17 PM EST  Subject: Seroquel     My pharmacy notified me that Dr aVsquez has canceled my Seroquel sleeping men I need to request to that I get that I actually I'm I still I don't sleep still so I really would appreciate something else for sleep like maybe Ambien or something stronger I mean I'm taking trazodone I take four transit tonight and I take one Seroquel and it's I still am up all night so maybe something else possibly

## 2023-12-12 ENCOUNTER — APPOINTMENT (OUTPATIENT)
Dept: PRIMARY CARE | Facility: CLINIC | Age: 48
End: 2023-12-12
Payer: COMMERCIAL

## 2023-12-14 RX ORDER — CYCLOBENZAPRINE HCL 10 MG
10 TABLET ORAL 3 TIMES DAILY PRN
Qty: 60 TABLET | Refills: 2 | Status: SHIPPED | OUTPATIENT
Start: 2023-12-14 | End: 2023-12-15 | Stop reason: SDUPTHER

## 2023-12-14 RX ORDER — CYCLOBENZAPRINE HCL 10 MG
10 TABLET ORAL 3 TIMES DAILY PRN
Qty: 60 TABLET | Refills: 2 | Status: SHIPPED | OUTPATIENT
Start: 2023-12-14 | End: 2023-12-14 | Stop reason: SDUPTHER

## 2023-12-14 RX ORDER — HYDROXYZINE HYDROCHLORIDE 25 MG/1
25 TABLET, FILM COATED ORAL 3 TIMES DAILY
Qty: 60 TABLET | Refills: 2 | Status: SHIPPED | OUTPATIENT
Start: 2023-12-14 | End: 2023-12-15 | Stop reason: SDUPTHER

## 2023-12-14 RX ORDER — HYDROXYZINE HYDROCHLORIDE 25 MG/1
25 TABLET, FILM COATED ORAL 3 TIMES DAILY
Qty: 60 TABLET | Refills: 2 | Status: SHIPPED | OUTPATIENT
Start: 2023-12-14 | End: 2023-12-14 | Stop reason: SDUPTHER

## 2023-12-14 NOTE — TELEPHONE ENCOUNTER
----- Message from Tana Bahena sent at 12/13/2023  4:16 PM EST -----  Regarding: Meds  Contact: [phone]  Hello I need a couple medications refilled sent to [facility] in [place] I need my hydroxyzine and my cyclobenzaprine thank you

## 2023-12-15 ENCOUNTER — PATIENT MESSAGE (OUTPATIENT)
Dept: PRIMARY CARE | Facility: CLINIC | Age: 48
End: 2023-12-15
Payer: COMMERCIAL

## 2023-12-15 DIAGNOSIS — F51.01 PRIMARY INSOMNIA: ICD-10-CM

## 2023-12-15 RX ORDER — HYDROXYZINE HYDROCHLORIDE 25 MG/1
25 TABLET, FILM COATED ORAL 3 TIMES DAILY
Qty: 60 TABLET | Refills: 2 | Status: SHIPPED | OUTPATIENT
Start: 2023-12-15 | End: 2024-02-05 | Stop reason: SDUPTHER

## 2023-12-15 RX ORDER — CYCLOBENZAPRINE HCL 10 MG
10 TABLET ORAL 3 TIMES DAILY PRN
Qty: 60 TABLET | Refills: 2 | Status: SHIPPED | OUTPATIENT
Start: 2023-12-15 | End: 2024-01-23 | Stop reason: SDUPTHER

## 2023-12-19 ENCOUNTER — PATIENT MESSAGE (OUTPATIENT)
Dept: PRIMARY CARE | Facility: CLINIC | Age: 48
End: 2023-12-19

## 2023-12-19 ENCOUNTER — APPOINTMENT (OUTPATIENT)
Dept: PRIMARY CARE | Facility: CLINIC | Age: 48
End: 2023-12-19
Payer: COMMERCIAL

## 2023-12-19 DIAGNOSIS — J43.1 PANLOBULAR EMPHYSEMA (MULTI): ICD-10-CM

## 2023-12-19 RX ORDER — BUDESONIDE AND FORMOTEROL FUMARATE DIHYDRATE 80; 4.5 UG/1; UG/1
2 AEROSOL RESPIRATORY (INHALATION) 2 TIMES DAILY
Qty: 10.2 G | Refills: 3 | Status: SHIPPED | OUTPATIENT
Start: 2023-12-19

## 2023-12-19 NOTE — TELEPHONE ENCOUNTER
From: Tana Bahena  To: Jacobo Thakkar MD  Sent: 12/19/2023 12:42 PM EST  Subject: Inhaler    Symbacort or albuterol. Thank you

## 2023-12-21 ENCOUNTER — TELEMEDICINE (OUTPATIENT)
Dept: PHARMACY | Facility: HOSPITAL | Age: 48
End: 2023-12-21
Payer: COMMERCIAL

## 2023-12-21 DIAGNOSIS — E11.9 TYPE 2 DIABETES MELLITUS WITHOUT COMPLICATION, WITH LONG-TERM CURRENT USE OF INSULIN (MULTI): Primary | ICD-10-CM

## 2023-12-21 DIAGNOSIS — Z79.4 TYPE 2 DIABETES MELLITUS WITHOUT COMPLICATION, WITH LONG-TERM CURRENT USE OF INSULIN (MULTI): Primary | ICD-10-CM

## 2023-12-21 NOTE — ASSESSMENT & PLAN NOTE
ASSESSMENT:  -Patient's last A1c is well controlled at 6.6%, much improved from last A1c of 7.4% back in May  -Patient's BG has shown improvement since starting Ozempic; has tolerated so far, now within target of FBG <130 and PPG <180  -Will begin tapering off of insulin as able; discussed measuring PPG after stopping mealtime insulin     RECOMMENDATIONS/PLAN:     STOP Humalog. Advised patient to start measuring BG at least 2 hours after largest meal to assess control after discontinuation.  CONTINUE all other diabetes medications as prescribed:  a. Metformin  m tablets BID        b. Ozempic 1 mg mg once weekly on .        c. Lantus 50 units at bedtime

## 2023-12-21 NOTE — PROGRESS NOTES
"Pharmacy Post-Discharge Follow-Up Visit     Subjective   Patient ID: Tana Bahena \"Perlita\" is a 48 y.o. female who presents for Diabetes.    Admission Date: 5/12/23  Discharge Date: 5/16/23    Referring Provider: Jacobo Thakkar MD     Patient presents to Clinical Pharmacy team for follow-up after last visit on 12/1/23. At last visit, patient was continued on current medications and encouraged to  testing supplies due to Dexcom sensors malfunctioning.      Patient reports that since last visit her BG has been doing well, around the 100-120s in the morning and at bedtime. Denies any s/sx of hypoglycemia. Most recent A1c is well controlled at 6.6%, now at goal of <7%. Discussed potential tapering off of insulin, of which patient was agreeable.     Notes that she does not feel when her blood sugar drops <70 mg/dL.       Objective     There were no vitals taken for this visit.     Labs  Lab Results   Component Value Date    BILITOT 0.3 06/29/2023    CALCIUM 9.0 08/03/2023    CO2 25 08/03/2023     08/03/2023    CREATININE 0.72 11/20/2023    GLUCOSE 97 08/03/2023    ALKPHOS 91 06/29/2023    K 3.5 08/03/2023    PROT 6.9 06/29/2023     08/03/2023    AST 11 06/29/2023    ALT 15 06/29/2023    BUN 13 08/03/2023    ANIONGAP 14 08/03/2023    MG 1.78 06/30/2023    PHOS 3.4 05/02/2023    ALBUMIN 4.0 06/29/2023    LIPASE 15 06/29/2023    GFRF >90 08/03/2023    GFRMALE CANCELED 05/20/2023     Lab Results   Component Value Date    TRIG 118 12/19/2022    CHOL 154 12/19/2022    HDL 51.1 12/19/2022     Lab Results   Component Value Date    HGBA1C 6.6 (H) 11/20/2023       Current Outpatient Medications on File Prior to Visit   Medication Sig Dispense Refill    albuterol 90 mcg/actuation inhaler Inhale 2 puffs 3 times a day as needed.      alcohol swabs (Alcohol Prep Pads) pads, medicated USE TO CLEANSE SKIN PRIOR TO INJECTION THREE TIMES DAILY AS DIRECTED 200 each 3    aspirin 81 mg EC tablet Take 1 tablet (81 " "mg) by mouth once daily.      atorvastatin (Lipitor) 40 mg tablet Take 1 tablet (40 mg) by mouth once daily.      BD Breana 2nd Gen Pen Needle 32 gauge x 5/32\" needle USE TO ADMINISTER INSULIN 3 TIMES A  each 3    blood-glucose sensor (Dexcom G7 Sensor) device Use as directed to test blood glucose continuously. Replace every 10 days. 3 each 3    budesonide-formoteroL (Symbicort) 80-4.5 mcg/actuation inhaler Inhale 2 puffs 2 times a day. Rinse mouth after use 10.2 g 3    carBAMazepine (Carbatrol) 200 mg 12 hr capsule Take 1 capsule (200 mg) by mouth twice a day.      carvedilol (Coreg) 6.25 mg tablet Take 1 tablet (6.25 mg) by mouth twice a day.      cyclobenzaprine (Flexeril) 10 mg tablet Take 1 tablet (10 mg) by mouth 3 times a day as needed for muscle spasms. 60 tablet 2    Dexcom G4 platinum  (Dexcom G7 ) misc Use as instructed to test blood sugar continuously 1 each 0    Dexcom G4 platinum transmitter (Dexcom G6 Transmitter) device Use as instructed to test blood sugar 4 times daily. Replace every 90 days. 1 each 3    Dexcom G4 platinum transmitter (Dexcom G6 Transmitter) device Use as instructed  New order as old machine was faulty. 1 each 0    empagliflozin (Jardiance) 10 mg Take 1 tablet (10 mg) by mouth once daily. 30 tablet 0    gabapentin (Neurontin) 100 mg capsule Take 1 capsule (100 mg) by mouth 3 times a day. For 90 days 90 capsule 0    hydrOXYzine HCL (Atarax) 25 mg tablet Take 1 tablet (25 mg) by mouth 3 times a day. prn 60 tablet 2    insulin aspart (NovoLOG Flexpen U-100 Insulin) 100 unit/mL (3 mL) pen Inject under the skin continuously. Sliding scale: 151-200 3 units, 301-250- 5 units, 251-300- 8 units, 301-350 10 units. Max dose 14 units daily 9 mL 2    insulin aspart (NovoLOG U-100 Insulin aspart) 100 unit/mL injection Inject under the skin continuously. Sliding scale: 151-200 3 units, 301-250- 5 units, 251-300- 8 units, 301-350 10 units. Max dose 14 units daily. 10 mL 3    " insulin aspart (NovoLOG, Fiasp) 100 UNIT/ML patient supplied pump Inject under the skin continuously. Sliding scale: 151-200 3 units, 301-250- 5 units, 251-300- 8 units, 301-350 10 units      isosorbide mononitrate ER (Imdur) 60 mg 24 hr tablet Take 1 tablet (60 mg) by mouth once daily. In the morning      Lantus Solostar U-100 Insulin 100 unit/mL (3 mL) pen Inject 52 Units under the skin once daily at bedtime. Take as directed per insulin instructions. 15 mL 1    lithium 150 mg capsule Take 1 capsule (150 mg) by mouth 2 times a day. 180 capsule 1    losartan (Cozaar) 25 mg tablet Take 1 tablet (25 mg) by mouth once daily.      metFORMIN  mg 24 hr tablet Take 2 tablets (1,000 mg) by mouth 2 times a day. 120 tablet 3    methylPREDNISolone (Medrol Dospak) 4 mg tablets TAKE 1 ROW OF TABLETS BY MOUTH EACH DAY AS INSTRUCTED ON THE PACKAGE 21 tablet 0    mirtazapine (Remeron) 15 mg tablet Take 1 tablet (15 mg) by mouth once daily at bedtime. 90 tablet 0    nitroglycerin (Nitrostat) 0.4 mg SL tablet Place 1 tablet (0.4 mg) under the tongue every 5 minutes if needed. PLACE 1 TABLET UNDER THE TONGUE EVERY 5 MINUTES FOR UP TO 3 DOSES AS NEEDED FOR CHEST PAIN.CALL 911 IF PAIN PERSISTS.      omeprazole (PriLOSEC) 40 mg DR capsule Take 1 capsule (40 mg) by mouth once daily in the morning. Take before meals. Do not crush or chew. 30 capsule 11    OneTouch Delica Plus Lancet 33 gauge misc Test once 4 times daily as needed 400 each 3    OneTouch Verio Flex Start kit Use as needed for glucose testing 1 each 0    OneTouch Verio test strips strip Test glucose 4 times daily 400 strip 3    PARoxetine (Paxil) 20 mg tablet Take 1 tablet (20 mg) by mouth once daily in the morning. 30 tablet 5    QUEtiapine (SEROquel) 100 mg tablet Take 1 tablet (100 mg) by mouth once daily at bedtime. 30 tablet 1    ranolazine (Ranexa) 500 mg 12 hr tablet       rOPINIRole (Requip) 0.25 mg tablet Take 1 tablet (0.25 mg) by mouth 3 times a day. 90  tablet 11    semaglutide (Ozempic) 1 mg/dose (4 mg/3 mL) pen injector Inject 1 mg under the skin every 7 days. 3 mL 1    ticagrelor (Brilinta) 90 mg tablet Take 1 tablet (90 mg) by mouth 2 times a day.      traZODone (Desyrel) 100 mg tablet Take 3 tablets (300 mg) by mouth once daily at bedtime. 90 tablet 0    triamterene-hydrochlorothiazid (Maxzide-25) 37.5-25 mg tablet Take 1 tablet by mouth once daily.      True Metrix Glucose Meter misc USE TO TEST BLOOD SUGAR ONCE DAILY AND AS NEEDED      [DISCONTINUED] budesonide-formoteroL (Symbicort) 80-4.5 mcg/actuation inhaler Inhale 2 puffs 2 times a day. Rinse mouth after use 10.2 g 3    [DISCONTINUED] cyclobenzaprine (Flexeril) 10 mg tablet Take 1 tablet (10 mg) by mouth 3 times a day as needed for muscle spasms. 60 tablet 2    [DISCONTINUED] hydrOXYzine HCL (Atarax) 25 mg tablet Take 1 tablet (25 mg) by mouth 3 times a day. prn 60 tablet 2     No current facility-administered medications on file prior to visit.        Assessment/Plan   Problem List Items Addressed This Visit             ICD-10-CM    Type 2 diabetes mellitus without complication, with long-term current use of insulin (CMS/[facility]) - Primary E11.9, Z79.4     ASSESSMENT:  -Patient's last A1c is well controlled at 6.6%, much improved from last A1c of 7.4% back in May  -Patient's BG has shown improvement since starting Ozempic; has tolerated so far, now within target of FBG <130 and PPG <180  -Will begin tapering off of insulin as able; discussed measuring PPG after stopping mealtime insulin     RECOMMENDATIONS/PLAN:     STOP Humalog. Advised patient to start measuring BG at least 2 hours after largest meal to assess control after discontinuation.  CONTINUE all other diabetes medications as prescribed:  a. Metformin  m tablets BID        b. Ozempic 1 mg mg once weekly on .        c. Lantus 50 units at bedtime          Jackeline Potts, Jim  Clinical Ambulatory Care Pharmacist  Ph:  [phone]    Continue all meds under the continuation of care with the referring provider and clinical pharmacy team.    Clinical Pharmacist follow up: 1/4/24 or sooner as needed based on clinical intervention  Type of Encounter:  Telephone    Verbal consent to manage patient's drug therapy was obtained from the patient. They were informed they may decline to participate or withdraw from participation in pharmacy services at any time.

## 2023-12-29 ENCOUNTER — PATIENT MESSAGE (OUTPATIENT)
Dept: PRIMARY CARE | Facility: CLINIC | Age: 48
End: 2023-12-29
Payer: COMMERCIAL

## 2023-12-29 DIAGNOSIS — E11.9 TYPE 2 DIABETES MELLITUS WITHOUT COMPLICATION, WITH LONG-TERM CURRENT USE OF INSULIN (MULTI): ICD-10-CM

## 2023-12-29 DIAGNOSIS — F31.12 BIPOLAR AFFECTIVE DISORDER, CURRENTLY MANIC, MODERATE (MULTI): ICD-10-CM

## 2023-12-29 DIAGNOSIS — Z79.4 TYPE 2 DIABETES MELLITUS WITHOUT COMPLICATION, WITH LONG-TERM CURRENT USE OF INSULIN (MULTI): ICD-10-CM

## 2023-12-29 DIAGNOSIS — F33.1 MODERATE EPISODE OF RECURRENT MAJOR DEPRESSIVE DISORDER (MULTI): ICD-10-CM

## 2024-01-02 RX ORDER — SEMAGLUTIDE 1.34 MG/ML
1 INJECTION, SOLUTION SUBCUTANEOUS
Qty: 3 ML | Refills: 1 | Status: SHIPPED | OUTPATIENT
Start: 2024-01-02 | End: 2024-01-23 | Stop reason: DRUGHIGH

## 2024-01-02 RX ORDER — QUETIAPINE FUMARATE 100 MG/1
100 TABLET, FILM COATED ORAL NIGHTLY
Qty: 30 TABLET | Refills: 1 | Status: SHIPPED | OUTPATIENT
Start: 2024-01-02 | End: 2024-01-22 | Stop reason: SDUPTHER

## 2024-01-02 RX ORDER — TRAZODONE HYDROCHLORIDE 100 MG/1
300 TABLET ORAL NIGHTLY
Qty: 90 TABLET | Refills: 0 | Status: SHIPPED | OUTPATIENT
Start: 2024-01-02 | End: 2024-01-18

## 2024-01-06 DIAGNOSIS — J43.1 PANLOBULAR EMPHYSEMA (MULTI): ICD-10-CM

## 2024-01-08 ENCOUNTER — TELEMEDICINE (OUTPATIENT)
Dept: PHARMACY | Facility: HOSPITAL | Age: 49
End: 2024-01-08
Payer: COMMERCIAL

## 2024-01-08 DIAGNOSIS — E11.9 TYPE 2 DIABETES MELLITUS WITHOUT COMPLICATION, WITH LONG-TERM CURRENT USE OF INSULIN (MULTI): ICD-10-CM

## 2024-01-08 DIAGNOSIS — Z79.4 TYPE 2 DIABETES MELLITUS WITHOUT COMPLICATION, WITH LONG-TERM CURRENT USE OF INSULIN (MULTI): ICD-10-CM

## 2024-01-08 RX ORDER — INSULIN LISPRO 100 [IU]/ML
INJECTION, SOLUTION INTRAVENOUS; SUBCUTANEOUS
COMMUNITY

## 2024-01-08 RX ORDER — ALBUTEROL SULFATE 90 UG/1
2 AEROSOL, METERED RESPIRATORY (INHALATION) EVERY 8 HOURS PRN
COMMUNITY

## 2024-01-08 RX ORDER — INSULIN ASPART 100 [IU]/ML
6-8 INJECTION, SOLUTION INTRAVENOUS; SUBCUTANEOUS
COMMUNITY
End: 2024-03-04 | Stop reason: SDUPTHER

## 2024-01-08 NOTE — ASSESSMENT & PLAN NOTE
ASSESSMENT:  -Patient's last A1c is well controlled at 6.6%, much improved from last A1c of 7.4% back in May  -Patient's BG has shown improvement since starting Ozempic; has tolerated so far, now within target of FBG <130 and PPG <180  -Will hold off on tapering insulin for now, as patient has had spikes after stopping Novolog     RECOMMENDATIONS/PLAN:  CONTINUE all diabetes medications as prescribed:  a. Metformin  m tablets BID        b. Ozempic 1 mg mg once weekly on .        c. Lantus 50 units at bedtime    d. Novolog TID per sliding scale (on average injecting 6-8 units); max dose of 30 units per day

## 2024-01-08 NOTE — PROGRESS NOTES
"Pharmacy Post-Discharge Follow-Up Visit  Tana Bahena \"Perlita\" is a 48 y.o. female was referred to Clinical Pharmacy Team to complete a post-discharge medication optimization and monitoring visit.  The patient was referred for their Diabetes.    Admission Date: 5/12/23  Discharge Date: 5/16/23    Referring Provider: Jacobo Thakkar MD    Subjective   Allergies   Allergen Reactions    Ketorolac Unknown    Tramadol Unknown    Adhesive Tape-Silicones Other       [facility] - [place], [place] - [address]  [address]  [address]  [facility] [address]  Phone: [phone] Fax: [phone]    [id number] [place], [place] - [address] [facility] AT [facility]  [address] [facility] [address]  Phone: [phone] Fax: [phone]    [facility] [address] [place] [address] [address] [facility]  [address] [facility] [address]  Phone: [phone] Fax: [phone]    [id number] [address] [address]  Phone: [phone] Fax: [phone]      Social History     Social History Narrative    Not on file      Patient presents to Clinical Pharmacy team for follow-up after last visit on 12/21/23. At last visit, patient was stopped on Humalog and continued on all other medications due to improvement in BG and A1c.      Patient reports that since last visit her BG has been doing well, around the 100-120s in the morning and at bedtime. Denies any s/sx of hypoglycemia. Most recent A1c is well controlled at 6.6%, now at goal of <7%. After stopping Humalog, patient's BG spiked so she has restarted this.     Notes that she does not feel when her blood sugar drops <70 mg/dL.       Objective     There were no vitals taken for this visit.     LAB  Lab Results   Component Value Date    BILITOT 0.3 06/29/2023    CALCIUM 9.0 08/03/2023    CO2 25 08/03/2023     08/03/2023    CREATININE 0.72 11/20/2023    " "GLUCOSE 97 08/03/2023    ALKPHOS 91 06/29/2023    K 3.5 08/03/2023    PROT 6.9 06/29/2023     08/03/2023    AST 11 06/29/2023    ALT 15 06/29/2023    BUN 13 08/03/2023    ANIONGAP 14 08/03/2023    MG 1.78 06/30/2023    PHOS 3.4 05/02/2023    ALBUMIN 4.0 06/29/2023    LIPASE 15 06/29/2023    EGFR >90 11/20/2023     Lab Results   Component Value Date    TRIG 118 12/19/2022    CHOL 154 12/19/2022    HDL 51.1 12/19/2022     Lab Results   Component Value Date    HGBA1C 6.6 (H) 11/20/2023         Current Outpatient Medications on File Prior to Visit   Medication Sig Dispense Refill    albuterol 90 mcg/actuation inhaler Inhale 2 puffs 3 times a day as needed.      alcohol swabs (Alcohol Prep Pads) pads, medicated USE TO CLEANSE SKIN PRIOR TO INJECTION THREE TIMES DAILY AS DIRECTED 200 each 3    aspirin 81 mg EC tablet Take 1 tablet (81 mg) by mouth once daily.      atorvastatin (Lipitor) 40 mg tablet Take 1 tablet (40 mg) by mouth once daily.      BD Breana 2nd Gen Pen Needle 32 gauge x 5/32\" needle USE TO ADMINISTER INSULIN 3 TIMES A  each 3    blood-glucose sensor (Dexcom G7 Sensor) device Use as directed to test blood glucose continuously. Replace every 10 days. 3 each 3    budesonide-formoteroL (Symbicort) 80-4.5 mcg/actuation inhaler Inhale 2 puffs 2 times a day. Rinse mouth after use 10.2 g 3    carBAMazepine (Carbatrol) 200 mg 12 hr capsule Take 1 capsule (200 mg) by mouth twice a day.      carvedilol (Coreg) 6.25 mg tablet Take 1 tablet (6.25 mg) by mouth twice a day.      cyclobenzaprine (Flexeril) 10 mg tablet Take 1 tablet (10 mg) by mouth 3 times a day as needed for muscle spasms. 60 tablet 2    Dexcom G4 platinum  (Dexcom G7 ) misc Use as instructed to test blood sugar continuously 1 each 0    Dexcom G4 platinum transmitter (Dexcom G6 Transmitter) device Use as instructed to test blood sugar 4 times daily. Replace every 90 days. 1 each 3    Dexcom G4 platinum transmitter (Dexcom G6 " Transmitter) device Use as instructed  New order as old machine was faulty. 1 each 0    empagliflozin (Jardiance) 10 mg Take 1 tablet (10 mg) by mouth once daily. 30 tablet 0    gabapentin (Neurontin) 100 mg capsule Take 1 capsule (100 mg) by mouth 3 times a day. For 90 days 90 capsule 0    hydrOXYzine HCL (Atarax) 25 mg tablet Take 1 tablet (25 mg) by mouth 3 times a day. prn 60 tablet 2    isosorbide mononitrate ER (Imdur) 60 mg 24 hr tablet Take 1 tablet (60 mg) by mouth once daily. In the morning      Lantus Solostar U-100 Insulin 100 unit/mL (3 mL) pen Inject 52 Units under the skin once daily at bedtime. Take as directed per insulin instructions. (Patient taking differently: Inject 50 Units under the skin once daily at bedtime. Take as directed per insulin instructions.) 15 mL 1    lithium 150 mg capsule Take 1 capsule (150 mg) by mouth 2 times a day. 180 capsule 1    losartan (Cozaar) 25 mg tablet Take 1 tablet (25 mg) by mouth once daily.      metFORMIN  mg 24 hr tablet Take 2 tablets (1,000 mg) by mouth 2 times a day. 120 tablet 3    methylPREDNISolone (Medrol Dospak) 4 mg tablets TAKE 1 ROW OF TABLETS BY MOUTH EACH DAY AS INSTRUCTED ON THE PACKAGE 21 tablet 0    mirtazapine (Remeron) 15 mg tablet Take 1 tablet (15 mg) by mouth once daily at bedtime. 90 tablet 0    nitroglycerin (Nitrostat) 0.4 mg SL tablet Place 1 tablet (0.4 mg) under the tongue every 5 minutes if needed. PLACE 1 TABLET UNDER THE TONGUE EVERY 5 MINUTES FOR UP TO 3 DOSES AS NEEDED FOR CHEST PAIN.CALL 911 IF PAIN PERSISTS.      omeprazole (PriLOSEC) 40 mg DR capsule Take 1 capsule (40 mg) by mouth once daily in the morning. Take before meals. Do not crush or chew. 30 capsule 11    OneTouch Delica Plus Lancet 33 gauge misc Test once 4 times daily as needed 400 each 3    OneTouch Verio Flex Start kit Use as needed for glucose testing 1 each 0    OneTouch Verio test strips strip Test glucose 4 times daily 400 strip 3    PARoxetine  (Paxil) 20 mg tablet Take 1 tablet (20 mg) by mouth once daily in the morning. 30 tablet 5    QUEtiapine (SEROquel) 100 mg tablet Take 1 tablet (100 mg) by mouth once daily at bedtime. 30 tablet 1    ranolazine (Ranexa) 500 mg 12 hr tablet       rOPINIRole (Requip) 0.25 mg tablet Take 1 tablet (0.25 mg) by mouth 3 times a day. 90 tablet 11    semaglutide (Ozempic) 1 mg/dose (4 mg/3 mL) pen injector Inject 1 mg under the skin every 7 days. 3 mL 1    ticagrelor (Brilinta) 90 mg tablet Take 1 tablet (90 mg) by mouth 2 times a day.      traZODone (Desyrel) 100 mg tablet Take 3 tablets (300 mg) by mouth once daily at bedtime. 90 tablet 0    triamterene-hydrochlorothiazid (Maxzide-25) 37.5-25 mg tablet Take 1 tablet by mouth once daily.      True Metrix Glucose Meter misc USE TO TEST BLOOD SUGAR ONCE DAILY AND AS NEEDED      [DISCONTINUED] QUEtiapine (SEROquel) 100 mg tablet Take 1 tablet (100 mg) by mouth once daily at bedtime. 30 tablet 1    [DISCONTINUED] semaglutide (Ozempic) 1 mg/dose (4 mg/3 mL) pen injector Inject 1 mg under the skin every 7 days. 3 mL 1    [DISCONTINUED] traZODone (Desyrel) 100 mg tablet Take 3 tablets (300 mg) by mouth once daily at bedtime. 90 tablet 0     No current facility-administered medications on file prior to visit.      Assessment/Plan   Problem List Items Addressed This Visit       Type 2 diabetes mellitus without complication, with long-term current use of insulin (CMS/[facility])     ASSESSMENT:  -Patient's last A1c is well controlled at 6.6%, much improved from last A1c of 7.4% back in May  -Patient's BG has shown improvement since starting Ozempic; has tolerated so far, now within target of FBG <130 and PPG <180  -Will hold off on tapering insulin for now, as patient has had spikes after stopping Novolog     RECOMMENDATIONS/PLAN:  CONTINUE all diabetes medications as prescribed:  a. Metformin  m tablets BID        b. Ozempic 1 mg mg once weekly on .        c. Lantus 50  units at bedtime    d. Novolog TID per sliding scale (on average injecting 6-8 units); max dose of 30 units per day          Clinical Pharmacist follow up: 3/4/24 or sooner as needed based on clinical intervention  Type of Encounter:  Telephone    Jackeline Potts PharmD  Clinical Ambulatory Care Pharmacist  Ph: [phone]    Verbal consent to manage patient's drug therapy was obtained from the patient. They were informed they may decline to participate or withdraw from participation in pharmacy services at any time.

## 2024-01-18 DIAGNOSIS — F33.1 MODERATE EPISODE OF RECURRENT MAJOR DEPRESSIVE DISORDER (MULTI): ICD-10-CM

## 2024-01-18 DIAGNOSIS — E11.9 TYPE 2 DIABETES MELLITUS WITHOUT COMPLICATION, WITH LONG-TERM CURRENT USE OF INSULIN (MULTI): ICD-10-CM

## 2024-01-18 DIAGNOSIS — J43.1 PANLOBULAR EMPHYSEMA (MULTI): ICD-10-CM

## 2024-01-18 DIAGNOSIS — Z79.4 TYPE 2 DIABETES MELLITUS WITHOUT COMPLICATION, WITH LONG-TERM CURRENT USE OF INSULIN (MULTI): ICD-10-CM

## 2024-01-18 RX ORDER — METHYLPREDNISOLONE 4 MG/1
TABLET ORAL
Qty: 1 EACH | Refills: 0 | Status: SHIPPED | OUTPATIENT
Start: 2024-01-18 | End: 2024-01-19

## 2024-01-18 RX ORDER — TRAZODONE HYDROCHLORIDE 100 MG/1
TABLET ORAL
Qty: 90 TABLET | Refills: 0 | Status: SHIPPED | OUTPATIENT
Start: 2024-01-18 | End: 2024-01-22 | Stop reason: SDUPTHER

## 2024-01-18 RX ORDER — METHYLPREDNISOLONE 4 MG/1
TABLET ORAL
Qty: 1 EACH | Refills: 0 | Status: SHIPPED | OUTPATIENT
Start: 2024-01-18 | End: 2024-01-18 | Stop reason: SDUPTHER

## 2024-01-18 NOTE — TELEPHONE ENCOUNTER
methylPREDNISolone (Medrol Dospak) 4 mg tablets     [facility] called about above medication and wanted to know if we could send it again because they havent received it.

## 2024-01-18 NOTE — TELEPHONE ENCOUNTER
Rx Refill Request Telephone Encounter    Name:  Tana Bahena  :  [id number]  Medication Name:  trazodone 100MG   Specific Pharmacy location:  [facility]   Date of last appointment:  23  Date of next appointment: 24  Best number to reach patient:  [phone]

## 2024-01-19 RX ORDER — METHYLPREDNISOLONE 4 MG/1
TABLET ORAL
Qty: 1 EACH | Refills: 0 | Status: SHIPPED | OUTPATIENT
Start: 2024-01-19 | End: 2024-03-07 | Stop reason: SDUPTHER

## 2024-01-19 RX ORDER — EMPAGLIFLOZIN 10 MG/1
10 TABLET, FILM COATED ORAL DAILY
Qty: 30 TABLET | Refills: 0 | Status: SHIPPED | OUTPATIENT
Start: 2024-01-19

## 2024-01-19 NOTE — TELEPHONE ENCOUNTER
Rx Refill Request     Name: Tana Bahena  :  1975   Medication Name:  empagliflozin (Jardiance) 10 mg   Specific Pharmacy location:  [address]   Date of last appointment:  2023   Date of next appointment:  2024   Best number to reach patient:  [phone]

## 2024-01-21 ENCOUNTER — PATIENT MESSAGE (OUTPATIENT)
Dept: PRIMARY CARE | Facility: CLINIC | Age: 49
End: 2024-01-21
Payer: COMMERCIAL

## 2024-01-21 DIAGNOSIS — F33.1 MODERATE EPISODE OF RECURRENT MAJOR DEPRESSIVE DISORDER (MULTI): ICD-10-CM

## 2024-01-21 DIAGNOSIS — F31.12 BIPOLAR AFFECTIVE DISORDER, CURRENTLY MANIC, MODERATE (MULTI): ICD-10-CM

## 2024-01-22 ENCOUNTER — DOCUMENTATION (OUTPATIENT)
Dept: SURGERY | Facility: HOSPITAL | Age: 49
End: 2024-01-22
Payer: COMMERCIAL

## 2024-01-22 RX ORDER — TRAZODONE HYDROCHLORIDE 100 MG/1
TABLET ORAL
Qty: 90 TABLET | Refills: 0 | Status: SHIPPED | OUTPATIENT
Start: 2024-01-22 | End: 2024-02-20 | Stop reason: SDUPTHER

## 2024-01-22 RX ORDER — QUETIAPINE FUMARATE 100 MG/1
100 TABLET, FILM COATED ORAL NIGHTLY
Qty: 30 TABLET | Refills: 1 | Status: SHIPPED | OUTPATIENT
Start: 2024-01-22 | End: 2024-03-25 | Stop reason: SDUPTHER

## 2024-01-22 NOTE — PROGRESS NOTES
Received a voicemail from patient (Saurabh crandall). Pt has been waiting to be scheduled with Dr. [place] [facility]. I called the pt, and she was scheduled. Appointment information including np handbook was sent via [employer].

## 2024-01-22 NOTE — PROGRESS NOTES
Surgeon: ***  Patient is considering:   ***    ASSESSMENT:  Current weight:  *** lbs   Ht: 67  in   BMI: ***       Initial start weight: *** lbs  Pre-Op Excess Body Weight (EBW):   *** lbs  Target Post-Op weight goal:  ***    Food allergies/intolerances:    Chewing/Swallowing/Dentition:    Nausea / Vomiting / Hx Gastroparesis:    Diarrhea/ Constipation:   Exercise level:    Smoking/Tobacco use:   Vitamins/Minerals supplements:    Medications:   see list  Past diet attempts:     Hours of sleep/night:     24 HOUR RECALL/DIET HISTORY:  Breakfast:    Snack:    Lunch:   Snack:   Dinner:   Snack:   Beverages:   Alcohol:     Person responsible for cooking & shopping?   How often do you eat sweet snacks?    How often do you eat savory snacks?    How often do you eat out?    Do you feel overly stuffed?    Binge Eating?   Night Eating?    Emotional Eating?         READINESS TO LEARN:  Motivation to learn: Interested        Understanding of instruction: Good  Fair      Poor   Anticipated Compliance: Good       Fair                Poor  Family Support: Unable to assess-family not present          Educational Materials Provided:    Plate Method  High Protein Snack List  High Protein Drink  High Protein food list  Schedules for MSWL class and support group    calorie meal plan   Goals sheet    Patient's weight is self-reported. Patient will scheduled to attend a Virtual Education Class to review the 2 week Pre-op diet, Post op fluid, protein, vitamin/mineral supplementation, exercise goals and post op diet progression.    Patient presents with excessive calorie obesity seeking  ***.    Patient seen today to complete nutrition evaluation for weight loss surgery.      Recommended to begin to eat 3 meals/day, avoid skipping meals. Reviewed balanced meals by using plate method. Encouraged to eat protein rich foods at each meal, balanced with fiber rich foods. Reviewed fluids to eliminate and replace with SF, non-carbonated, caffeine  free fluids. Reviewed postop behaviors and encouraged to begin practicing. Recommended to start daily MVI. Recommended to begin exercising  for *** days/week for ** minutes.     Patient was receptive to nutritional recommendations, asked numerous questions, and verbalized understanding of the weight loss surgery diet.  Patient expressed understanding about the importance of strict dietary compliance post-surgery to avoid nutritional deficiencies and achieve optimal weight loss and verbalized intent to follow dietary recommendations.      Malnutrition Screening:  Significant unintentional weight loss? No  Eating less than 75% of usual intake for more than 2 weeks? No      Nutrition Diagnosis:   1. Overweight/obesity related to excess energy intake as evidenced by BMI = 40 kg/m^2.  2. Food- and nutrition-related knowledge deficit related to lack of prior exposure to surgical weight loss information as evidenced by pt new to surgical program.    Nutrition Interventions:   1. Modify type and amount of food and nutrients within meals and snacks.  2. Comprehensive Nutrition Education    Recommendations:  1. Follow you      calorie meal plan. Record intake daily.   2. Structure meal patterns, eating three meals and 1-2 snacks per day.  3. Have a good source of protein at every meal & snack.  Aim for 60-70 grams/day.  4. Drink 64oz of calorie-free, caffeine-free, and non-carbonated beverages.   5. Practice no drinking 30 minutes before meals, nothing with meals and wait 30 minutes after meals to drink again. Make meals last 30 minutes-chew thoroughly.   6. Limit or omit eating out/sweets/savory snacks to 1-2 times per week.  7. Begin daily multivitamin.  [employer] Complete has everything you need.  8. Increase physical activity by 10-15 minutes as tolerated to an end goal of 60 minutes 5 x per week. Consistency is the key.  9. We will follow up with your progress on *** at ***. You will need to weigh yourself before this  appointment and provide a 24 hour food recall.    Pre-op Goal weight: lose 5% of body weight    Nutrition Monitoring and Evaluation: 1-2 pound weight loss per week  Criteria: weight check  Need for Follow-up:     Patient does meet National Institutes Health guidelines for weight loss surgery, however needs to demonstrate consistent effort in making dietary changes before giving clearance. It is anticipated that the patient will need at least     nutritional follow-up visits prior to clearance for surgery.      Patient meets NIH guidelines for weight loss surgery and has been thoroughly evaluated and educated on good dietary practices. Patient is capable of following these guidelines pre-and post-surgically.  From nutrition standpoint, the patient is cleared for weight loss surgery.  If the patient desires, may continue to follow-up with the dietitian on a monthly basis until all surgical requirements are met.

## 2024-01-22 NOTE — TELEPHONE ENCOUNTER
From: Tana Bahena  To: Jacobo Thakkar MD  Sent: 1/21/2024 7:06 PM EST  Subject: Meds    I scheduled an appointment to come in and talk to the doctor in regards to my sleeping pill as of right now I actually need my sleep I'm I'm out of the Seroquel and just about out of the trazodone I take three of them a night so a bottle of 30 doesn't last me that long but I'm going to come in and face to face and talk to him about something else for sleep because there's days that I'm up for 2 days in a row and I just can't sleep thank you so much oh [facility] and [place] where I go for my meds thank you have a good day

## 2024-01-23 ENCOUNTER — NUTRITION (OUTPATIENT)
Dept: SURGERY | Facility: HOSPITAL | Age: 49
End: 2024-01-23
Payer: COMMERCIAL

## 2024-01-23 ENCOUNTER — TELEMEDICINE (OUTPATIENT)
Dept: PRIMARY CARE | Facility: CLINIC | Age: 49
End: 2024-01-23
Payer: COMMERCIAL

## 2024-01-23 VITALS — BODY MASS INDEX: 29.76 KG/M2 | WEIGHT: 190 LBS

## 2024-01-23 DIAGNOSIS — F09 ORGANIC BRAIN SYNDROME: ICD-10-CM

## 2024-01-23 DIAGNOSIS — E11.9 TYPE 2 DIABETES MELLITUS WITHOUT COMPLICATION, WITH LONG-TERM CURRENT USE OF INSULIN (MULTI): ICD-10-CM

## 2024-01-23 DIAGNOSIS — Z79.4 TYPE 2 DIABETES MELLITUS WITHOUT COMPLICATION, WITH LONG-TERM CURRENT USE OF INSULIN (MULTI): ICD-10-CM

## 2024-01-23 DIAGNOSIS — I25.10 CORONARY ARTERY DISEASE INVOLVING NATIVE CORONARY ARTERY OF NATIVE HEART WITHOUT ANGINA PECTORIS: Primary | ICD-10-CM

## 2024-01-23 DIAGNOSIS — G89.29 CHRONIC BILATERAL LOW BACK PAIN WITHOUT SCIATICA: ICD-10-CM

## 2024-01-23 DIAGNOSIS — E78.2 MIXED HYPERLIPIDEMIA: ICD-10-CM

## 2024-01-23 DIAGNOSIS — F51.01 PRIMARY INSOMNIA: ICD-10-CM

## 2024-01-23 DIAGNOSIS — J45.20 MILD INTERMITTENT ASTHMA, UNSPECIFIED WHETHER COMPLICATED (HHS-HCC): ICD-10-CM

## 2024-01-23 DIAGNOSIS — F33.1 MODERATE EPISODE OF RECURRENT MAJOR DEPRESSIVE DISORDER (MULTI): ICD-10-CM

## 2024-01-23 DIAGNOSIS — M54.50 CHRONIC BILATERAL LOW BACK PAIN WITHOUT SCIATICA: ICD-10-CM

## 2024-01-23 PROCEDURE — 99443 PR PHYS/QHP TELEPHONE EVALUATION 21-30 MIN: CPT | Performed by: FAMILY MEDICINE

## 2024-01-23 RX ORDER — SEMAGLUTIDE 1.34 MG/ML
2 INJECTION, SOLUTION SUBCUTANEOUS
Qty: 3 ML | Refills: 1 | Status: SHIPPED | OUTPATIENT
Start: 2024-01-23 | End: 2024-02-12 | Stop reason: SDUPTHER

## 2024-01-23 RX ORDER — MIRTAZAPINE 30 MG/1
30 TABLET, FILM COATED ORAL NIGHTLY
Qty: 90 TABLET | Refills: 0 | Status: SHIPPED | OUTPATIENT
Start: 2024-01-23 | End: 2024-02-19

## 2024-01-23 RX ORDER — FLUTICASONE PROPIONATE AND SALMETEROL 250; 50 UG/1; UG/1
1 POWDER RESPIRATORY (INHALATION)
Qty: 2 EACH | Refills: 3 | Status: SHIPPED | OUTPATIENT
Start: 2024-01-23 | End: 2025-01-22

## 2024-01-23 RX ORDER — MIRTAZAPINE 30 MG/1
30 TABLET, FILM COATED ORAL NIGHTLY
Qty: 90 TABLET | Refills: 0 | Status: SHIPPED | OUTPATIENT
Start: 2024-01-23 | End: 2024-01-23 | Stop reason: ALTCHOICE

## 2024-01-23 ASSESSMENT — ENCOUNTER SYMPTOMS
BRUISES/BLEEDS EASILY: 0
COUGH: 0
ARTHRALGIAS: 0
CHEST TIGHTNESS: 0
CONSTIPATION: 0
DYSPHORIC MOOD: 0
EYE DISCHARGE: 0
NECK PAIN: 0
DIZZINESS: 0
MYALGIAS: 0
SHORTNESS OF BREATH: 0
WEAKNESS: 0
NUMBNESS: 0
NAUSEA: 0
DIARRHEA: 0
ADENOPATHY: 0
BLOOD IN STOOL: 0
DIFFICULTY URINATING: 0
VOMITING: 0
ABDOMINAL PAIN: 0
SORE THROAT: 0
HEADACHES: 0
FATIGUE: 0
NERVOUS/ANXIOUS: 0
BACK PAIN: 0
ACTIVITY CHANGE: 0
HEMATURIA: 0

## 2024-01-23 NOTE — TELEPHONE ENCOUNTER
----- Message from Tana Bahena sent at 1/23/2024 10:40 AM EST -----  Regarding: Pain med   Contact: [phone]  I just had an appointment, and forget to ask for something  for pain. I still have no use of my right arm. I can't even raise it, or brush my hair or wipe myself. I've never asked for a pain medy, but it's getting ridiculous.  Therapy didn't work at all!!! Thanks so much!!!

## 2024-01-23 NOTE — PROGRESS NOTES
"Subjective   Patient ID: Tana Bahena \"Axel" is a 48 y.o. female who presents for Follow-up.  HPI  Telephone visit    Multiple other issues     discuss memory after injury/medication adjusted     Needs to have neurology evaluation for organic brain syndrome and this will be happening within a matter of some days    Depressed mood and anxiety  Will adjust medications  No suicidal ideation no psychotic or manic symptoms    Asthma stable needs refill of inhaler    High cholesterol stable watch diet    Diabetes with fair control watch diet and adjust medicine as appropriate    Overweight and should lose weight has seen bariatric specialist might benefit from increase of Ozempic    Complains of chronic pain discussed treatment options for pain as well    Review of Systems   Constitutional:  Negative for activity change and fatigue.   HENT:  Negative for congestion and sore throat.    Eyes:  Negative for discharge.   Respiratory:  Negative for cough, chest tightness and shortness of breath.    Cardiovascular:  Negative for chest pain and leg swelling.   Gastrointestinal:  Negative for abdominal pain, blood in stool, constipation, diarrhea, nausea and vomiting.   Endocrine: Negative for cold intolerance and heat intolerance.   Genitourinary:  Negative for difficulty urinating and hematuria.   Musculoskeletal:  Negative for arthralgias, back pain, gait problem, myalgias and neck pain.   Allergic/Immunologic: Negative for environmental allergies.   Neurological:  Negative for dizziness, syncope, weakness, numbness and headaches.   Hematological:  Negative for adenopathy. Does not bruise/bleed easily.   Psychiatric/Behavioral:  Negative for dysphoric mood. The patient is not nervous/anxious.    All other systems reviewed and are negative.      Objective   There were no vitals taken for this visit.   Physical Exam  phone  Assessment/Plan   Problem List Items Addressed This Visit       Asthma    Relevant Medications    " fluticasone propion-salmeteroL (Advair Diskus) 250-50 mcg/dose diskus inhaler    Coronary artery disease involving native coronary artery of native heart without angina pectoris - Primary    Moderate episode of recurrent major depressive disorder (CMS/HCC)    Relevant Orders    Follow Up In Advanced [facility] - PCP    Referral to Psychology    Type 2 diabetes mellitus without complication, with long-term current use of insulin (CMS/HCC)    Relevant Medications    semaglutide (Ozempic) 1 mg/dose (4 mg/3 mL) pen injector    Mixed hyperlipidemia    Organic brain syndrome    Chronic bilateral low back pain without sciatica    Relevant Medications    mirtazapine (Remeron) 30 mg tablet       Patient education provided.  Stay current with age appropriate health maintenance as instructed.  Appointment here or ER with new or worsening symptoms'  Keep appropriate follow-up visit.  Stay current with proper immunizations   1 month recheck  Medications as above  Returning with new or worsening symptoms  Report suicidal ideation report psychotic or manic symptoms  Keep specialist evaluation as well

## 2024-01-25 RX ORDER — CYCLOBENZAPRINE HCL 10 MG
10 TABLET ORAL 3 TIMES DAILY PRN
Qty: 60 TABLET | Refills: 2 | Status: SHIPPED | OUTPATIENT
Start: 2024-01-25 | End: 2024-01-26 | Stop reason: SDUPTHER

## 2024-01-26 DIAGNOSIS — F51.01 PRIMARY INSOMNIA: ICD-10-CM

## 2024-01-26 RX ORDER — CYCLOBENZAPRINE HCL 10 MG
10 TABLET ORAL 3 TIMES DAILY PRN
Qty: 60 TABLET | Refills: 2 | Status: SHIPPED | OUTPATIENT
Start: 2024-01-26 | End: 2024-09-22

## 2024-01-30 ENCOUNTER — PATIENT MESSAGE (OUTPATIENT)
Dept: PRIMARY CARE | Facility: CLINIC | Age: 49
End: 2024-01-30
Payer: COMMERCIAL

## 2024-01-30 DIAGNOSIS — F51.01 PRIMARY INSOMNIA: ICD-10-CM

## 2024-01-30 DIAGNOSIS — I10 PRIMARY HYPERTENSION: Primary | ICD-10-CM

## 2024-01-31 NOTE — TELEPHONE ENCOUNTER
From: Tana Bahena  To: Jacobo Thakkar MD  Sent: 1/30/2024 10:23 PM EST  Subject: Still no luck with sleeping.     Please give me something STRONG I haven't really slept for days. I am begging you. thank you so much

## 2024-02-05 DIAGNOSIS — F51.01 PRIMARY INSOMNIA: ICD-10-CM

## 2024-02-05 RX ORDER — HYDROXYZINE HYDROCHLORIDE 25 MG/1
25 TABLET, FILM COATED ORAL NIGHTLY
Qty: 30 TABLET | Refills: 0 | Status: SHIPPED | OUTPATIENT
Start: 2024-02-05 | End: 2024-04-30

## 2024-02-05 RX ORDER — CARVEDILOL 6.25 MG/1
6.25 TABLET ORAL
Qty: 60 TABLET | Refills: 0 | Status: SHIPPED | OUTPATIENT
Start: 2024-02-05 | End: 2024-03-25 | Stop reason: SDUPTHER

## 2024-02-10 ENCOUNTER — PATIENT MESSAGE (OUTPATIENT)
Dept: PRIMARY CARE | Facility: CLINIC | Age: 49
End: 2024-02-10
Payer: COMMERCIAL

## 2024-02-10 DIAGNOSIS — Z79.4 TYPE 2 DIABETES MELLITUS WITHOUT COMPLICATION, WITH LONG-TERM CURRENT USE OF INSULIN (MULTI): ICD-10-CM

## 2024-02-10 DIAGNOSIS — E11.9 TYPE 2 DIABETES MELLITUS WITHOUT COMPLICATION, WITH LONG-TERM CURRENT USE OF INSULIN (MULTI): ICD-10-CM

## 2024-02-12 RX ORDER — SEMAGLUTIDE 1.34 MG/ML
2 INJECTION, SOLUTION SUBCUTANEOUS
Qty: 3 ML | Refills: 1 | Status: SHIPPED | OUTPATIENT
Start: 2024-02-12 | End: 2024-03-04 | Stop reason: SDUPTHER

## 2024-02-13 ENCOUNTER — PATIENT MESSAGE (OUTPATIENT)
Dept: PRIMARY CARE | Facility: CLINIC | Age: 49
End: 2024-02-13
Payer: COMMERCIAL

## 2024-02-13 DIAGNOSIS — E11.9 TYPE 2 DIABETES MELLITUS WITHOUT COMPLICATION, WITH LONG-TERM CURRENT USE OF INSULIN (MULTI): Primary | ICD-10-CM

## 2024-02-13 DIAGNOSIS — Z79.4 TYPE 2 DIABETES MELLITUS WITHOUT COMPLICATION, WITH LONG-TERM CURRENT USE OF INSULIN (MULTI): Primary | ICD-10-CM

## 2024-02-13 RX ORDER — BLOOD-GLUCOSE,RECEIVER,CONT
EACH MISCELLANEOUS
Qty: 1 EACH | Refills: 0 | Status: SHIPPED | OUTPATIENT
Start: 2024-02-13 | End: 2024-03-18

## 2024-02-13 RX ORDER — BLOOD-GLUCOSE SENSOR
EACH MISCELLANEOUS
Qty: 1 EACH | Refills: 3 | Status: SHIPPED | OUTPATIENT
Start: 2024-02-13 | End: 2024-04-15 | Stop reason: SDUPTHER

## 2024-02-15 RX ORDER — BLOOD-GLUCOSE SENSOR
EACH MISCELLANEOUS
Qty: 3 EACH | Refills: 3 | Status: SHIPPED | OUTPATIENT
Start: 2024-02-15

## 2024-02-16 ENCOUNTER — TELEPHONE (OUTPATIENT)
Dept: PRIMARY CARE | Facility: CLINIC | Age: 49
End: 2024-02-16
Payer: COMMERCIAL

## 2024-02-16 NOTE — TELEPHONE ENCOUNTER
We received a request for prior authorization on the patient's   FreeStyle Jayy 3 [place] misc   from their pharmacy. Prior authorization was submitted to insurance today. We will await their determination.

## 2024-02-18 DIAGNOSIS — G89.29 CHRONIC BILATERAL LOW BACK PAIN WITHOUT SCIATICA: Primary | ICD-10-CM

## 2024-02-18 DIAGNOSIS — M54.50 CHRONIC BILATERAL LOW BACK PAIN WITHOUT SCIATICA: Primary | ICD-10-CM

## 2024-02-19 RX ORDER — MIRTAZAPINE 30 MG/1
30 TABLET, FILM COATED ORAL NIGHTLY
Qty: 90 TABLET | Refills: 0 | Status: SHIPPED | OUTPATIENT
Start: 2024-02-19 | End: 2024-03-07 | Stop reason: DRUGHIGH

## 2024-02-20 ENCOUNTER — TELEPHONE (OUTPATIENT)
Dept: PRIMARY CARE | Facility: CLINIC | Age: 49
End: 2024-02-20

## 2024-02-20 ENCOUNTER — PATIENT MESSAGE (OUTPATIENT)
Dept: PRIMARY CARE | Facility: CLINIC | Age: 49
End: 2024-02-20

## 2024-02-20 DIAGNOSIS — F33.1 MODERATE EPISODE OF RECURRENT MAJOR DEPRESSIVE DISORDER (MULTI): ICD-10-CM

## 2024-02-20 RX ORDER — TRAZODONE HYDROCHLORIDE 100 MG/1
TABLET ORAL
Qty: 90 TABLET | Refills: 0 | Status: SHIPPED | OUTPATIENT
Start: 2024-02-20 | End: 2024-03-07 | Stop reason: ALTCHOICE

## 2024-02-20 NOTE — TELEPHONE ENCOUNTER
Patient called stating that she thought her appt today was a virtual but was told on call that she was and in office appointment.   Patient has been rescheduled to 2/26/24 as a virtual as she can't drive    She would like a short term fill for seroquel 100mg  And trazadone   Sent to pharmacy local pharmacy

## 2024-02-27 ENCOUNTER — APPOINTMENT (OUTPATIENT)
Dept: SURGERY | Facility: HOSPITAL | Age: 49
End: 2024-02-27
Payer: COMMERCIAL

## 2024-02-28 ENCOUNTER — TELEPHONE (OUTPATIENT)
Dept: PRIMARY CARE | Facility: CLINIC | Age: 49
End: 2024-02-28
Payer: COMMERCIAL

## 2024-02-28 NOTE — TELEPHONE ENCOUNTER
Prior Authorization for   semaglutide (Ozempic) 1 mg/dose (4 mg/3 mL) pen injector  has been APPROVED.    Approval good through 09.11.2024   Approval letter scanned into media on 02.28.2024

## 2024-03-04 ENCOUNTER — TELEMEDICINE (OUTPATIENT)
Dept: PHARMACY | Facility: HOSPITAL | Age: 49
End: 2024-03-04
Payer: COMMERCIAL

## 2024-03-04 DIAGNOSIS — E11.9 TYPE 2 DIABETES MELLITUS WITHOUT COMPLICATION, WITH LONG-TERM CURRENT USE OF INSULIN (MULTI): Primary | ICD-10-CM

## 2024-03-04 DIAGNOSIS — Z79.4 TYPE 2 DIABETES MELLITUS WITHOUT COMPLICATION, WITH LONG-TERM CURRENT USE OF INSULIN (MULTI): Primary | ICD-10-CM

## 2024-03-04 RX ORDER — PEN NEEDLE, DIABETIC 30 GX3/16"
NEEDLE, DISPOSABLE MISCELLANEOUS
Qty: 200 EACH | Refills: 3 | Status: SHIPPED | OUTPATIENT
Start: 2024-03-04

## 2024-03-04 RX ORDER — INSULIN ASPART 100 [IU]/ML
6-8 INJECTION, SOLUTION INTRAVENOUS; SUBCUTANEOUS
Qty: 10 ML | Refills: 2 | Status: SHIPPED | OUTPATIENT
Start: 2024-03-04 | End: 2024-05-28

## 2024-03-04 RX ORDER — ISOPROPYL ALCOHOL 70 ML/100ML
SWAB TOPICAL
Qty: 200 EACH | Refills: 3 | Status: SHIPPED | OUTPATIENT
Start: 2024-03-04

## 2024-03-04 RX ORDER — SEMAGLUTIDE 1.34 MG/ML
1 INJECTION, SOLUTION SUBCUTANEOUS
Qty: 3 ML | Refills: 2 | Status: SHIPPED | OUTPATIENT
Start: 2024-03-04 | End: 2024-04-04 | Stop reason: SDUPTHER

## 2024-03-04 NOTE — ASSESSMENT & PLAN NOTE
ASSESSMENT:  -Patient's last A1c is well controlled at 6.6%, much improved from last A1c of 7.4% back in May  -Patient's BG has shown improvement since starting Ozempic; has tolerated so far, now within target of FBG <130 and PPG <180  -Will hold off on tapering insulin for now, as patient has had spikes after stopping Novolog     RECOMMENDATIONS/PLAN:  CONTINUE all diabetes medications as prescribed:  a. Metformin  m tablets BID        b. Ozempic 1 mg mg once weekly on .  Prescription sent to [facility] for mail order due to backorder at patient's home pharmacy.         c. Lantus 50 units at bedtime        d. Novolog TID per sliding scale (on average injecting 6-8 units); max dose of 30 units per day (refills sent to pharmacy)

## 2024-03-04 NOTE — PROGRESS NOTES
"Pharmacy Post-Discharge Follow-Up Visit  Tana Bahena \"Perlita\" is a 48 y.o. female was referred to Clinical Pharmacy Team to complete a post-discharge medication optimization and monitoring visit.  The patient was referred for their Diabetes.    Admission Date: 5/12/23  Discharge Date: 5/16/23    Referring Provider: Jacobo Thakkar MD    Subjective   Allergies   Allergen Reactions    Ketorolac Unknown    Tramadol Unknown    Adhesive Tape-Silicones Other       [id number] - [facility] [address] [address]  Phone: [phone] Fax: [phone]      Social History     Social History Narrative    Not on file      Patient presents to Clinical Pharmacy team for follow-up after last visit on 1/8/24. At last visit, patient was continued on all diabetes medications as prescribed.      Patient reports that since last visit her BG has continued to do well, around the 100-120s in the morning and at bedtime. Denies any s/sx of hypoglycemia. Most recent A1c is well controlled at 6.6%, now at goal of <7%. Notes that she has been out of the Ozempic due to backorder at her pharmacy.    She does not feel when her blood sugar drops <70 mg/dL.     Objective     There were no vitals taken for this visit.     LAB  Lab Results   Component Value Date    BILITOT 0.3 06/29/2023    CALCIUM 9.0 08/03/2023    CO2 25 08/03/2023     08/03/2023    CREATININE 0.72 11/20/2023    GLUCOSE 97 08/03/2023    ALKPHOS 91 06/29/2023    K 3.5 08/03/2023    PROT 6.9 06/29/2023     08/03/2023    AST 11 06/29/2023    ALT 15 06/29/2023    BUN 13 08/03/2023    ANIONGAP 14 08/03/2023    MG 1.78 06/30/2023    PHOS 3.4 05/02/2023    ALBUMIN 4.0 06/29/2023    LIPASE 15 06/29/2023    EGFR >90 11/20/2023     Lab Results   Component Value Date    TRIG 118 12/19/2022    CHOL 154 12/19/2022    HDL 51.1 12/19/2022     Lab Results   Component Value Date    HGBA1C 6.6 (H) 11/20/2023         Current " "Outpatient Medications on File Prior to Visit   Medication Sig Dispense Refill    albuterol 90 mcg/actuation inhaler Inhale 2 puffs every 8 hours if needed for wheezing.      alcohol swabs (Alcohol Prep Pads) pads, medicated USE TO CLEANSE SKIN PRIOR TO INJECTION THREE TIMES DAILY AS DIRECTED 200 each 3    aspirin 81 mg EC tablet Take 1 tablet (81 mg) by mouth once daily.      atorvastatin (Lipitor) 40 mg tablet Take 1 tablet (40 mg) by mouth once daily.      BD Breana 2nd Gen Pen Needle 32 gauge x 5/32\" needle USE TO ADMINISTER INSULIN 3 TIMES A  each 3    blood-glucose sensor (Dexcom G7 Sensor) device Use as directed to test blood glucose continuously. Replace every 10 days. 3 each 3    budesonide-formoteroL (Symbicort) 80-4.5 mcg/actuation inhaler Inhale 2 puffs 2 times a day. Rinse mouth after use 10.2 g 3    carBAMazepine (Carbatrol) 200 mg 12 hr capsule Take 1 capsule (200 mg) by mouth twice a day.      carvedilol (Coreg) 6.25 mg tablet Take 1 tablet (6.25 mg) by mouth 2 times a day with meals. 60 tablet 0    cyclobenzaprine (Flexeril) 10 mg tablet Take 1 tablet (10 mg) by mouth 3 times a day as needed for muscle spasms. 60 tablet 2    Dexcom G4 platinum  (Dexcom G7 ) misc Use as instructed to test blood sugar continuously 1 each 0    Dexcom G4 platinum transmitter (Dexcom G6 Transmitter) device Use as instructed to test blood sugar 4 times daily. Replace every 90 days. 1 each 3    Dexcom G4 platinum transmitter (Dexcom G6 Transmitter) device Use as instructed  New order as old machine was faulty. 1 each 0    fluticasone propion-salmeteroL (Advair Diskus) 250-50 mcg/dose diskus inhaler Inhale 1 puff 2 times a day. Rinse mouth with water after use to reduce aftertaste and incidence of candidiasis. Do not swallow. 2 each 3    FreeStyle Jayy 3 [place] misc Use as instructed 1 each 0    FreeStyle Jayy 3 Sensor device As directed 1 each 3    gabapentin (Neurontin) 100 mg capsule Take 1 capsule " (100 mg) by mouth 3 times a day. For 90 days 90 capsule 0    hydrOXYzine HCL (Atarax) 25 mg tablet Take 1 tablet (25 mg) by mouth once daily at bedtime. 30 tablet 0    insulin aspart (NovoLOG Flexpen U-100 Insulin) 100 unit/mL (3 mL) pen Inject 6-8 Units under the skin 3 times a day before meals. Per sliding scale. Max 30 units per day.      insulin lispro (HumaLOG KwikPen Insulin) 100 unit/mL injection Inject under the skin 3 times a day with meals. Take as directed per insulin instructions.      isosorbide mononitrate ER (Imdur) 60 mg 24 hr tablet Take 1 tablet (60 mg) by mouth once daily. In the morning      Jardiance 10 mg TAKE 1 TABLET BY MOUTH EVERY DAY 30 tablet 0    Lantus Solostar U-100 Insulin 100 unit/mL (3 mL) pen Inject 52 Units under the skin once daily at bedtime. Take as directed per insulin instructions. (Patient taking differently: Inject 50 Units under the skin once daily at bedtime. Take as directed per insulin instructions.) 15 mL 1    lithium 150 mg capsule Take 1 capsule (150 mg) by mouth 2 times a day. 180 capsule 1    losartan (Cozaar) 25 mg tablet Take 1 tablet (25 mg) by mouth once daily.      metFORMIN  mg 24 hr tablet Take 2 tablets (1,000 mg) by mouth 2 times a day. 120 tablet 3    methylPREDNISolone (Medrol Dospak) 4 mg tablets TAKE 1 ROW OF TABLETS BY MOUTH EACH DAY AS INSTRUCTED ON THE PACKAGE 1 each 0    mirtazapine (Remeron) 30 mg tablet take 1 tablet by mouth once daily at bedtime 90 tablet 0    nitroglycerin (Nitrostat) 0.4 mg SL tablet Place 1 tablet (0.4 mg) under the tongue every 5 minutes if needed. PLACE 1 TABLET UNDER THE TONGUE EVERY 5 MINUTES FOR UP TO 3 DOSES AS NEEDED FOR CHEST PAIN.CALL 911 IF PAIN PERSISTS.      omeprazole (PriLOSEC) 40 mg DR capsule Take 1 capsule (40 mg) by mouth once daily in the morning. Take before meals. Do not crush or chew. 30 capsule 11    OneTouch Delica Plus Lancet 33 gauge misc Test once 4 times daily as needed 400 each 3    OneTouch  Verio Flex Start kit Use as needed for glucose testing 1 each 0    OneTouch Verio test strips strip Test glucose 4 times daily 400 strip 3    QUEtiapine (SEROquel) 100 mg tablet Take 1 tablet (100 mg) by mouth once daily at bedtime. 30 tablet 1    ranolazine (Ranexa) 500 mg 12 hr tablet       rOPINIRole (Requip) 0.25 mg tablet Take 1 tablet (0.25 mg) by mouth 3 times a day. 90 tablet 11    semaglutide (Ozempic) 1 mg/dose (4 mg/3 mL) pen injector Inject 2 mg under the skin every 7 days. 3 mL 1    ticagrelor (Brilinta) 90 mg tablet Take 1 tablet (90 mg) by mouth 2 times a day.      traZODone (Desyrel) 100 mg tablet take 3 tablet by mouth at bedtime 90 tablet 0    triamterene-hydrochlorothiazid (Maxzide-25) 37.5-25 mg tablet Take 1 tablet by mouth once daily.      True Metrix Glucose Meter misc USE TO TEST BLOOD SUGAR ONCE DAILY AND AS NEEDED       No current facility-administered medications on file prior to visit.        Assessment/Plan   Problem List Items Addressed This Visit       Type 2 diabetes mellitus without complication, with long-term current use of insulin (CMS/[facility]) - Primary     ASSESSMENT:  -Patient's last A1c is well controlled at 6.6%, much improved from last A1c of 7.4% back in May  -Patient's BG has shown improvement since starting Ozempic; has tolerated so far, now within target of FBG <130 and PPG <180  -Will hold off on tapering insulin for now, as patient has had spikes after stopping Novolog     RECOMMENDATIONS/PLAN:  CONTINUE all diabetes medications as prescribed:  a. Metformin  m tablets BID        b. Ozempic 1 mg mg once weekly on .  Prescription sent to [facility] for mail order due to backorder at patient's home pharmacy.         c. Lantus 50 units at bedtime        d. Novolog TID per sliding scale (on average injecting 6-8 units); max dose of 30 units per day (refills sent to pharmacy)          Clinical Pharmacist follow up: 24 or sooner as needed based on clinical  intervention  Type of Encounter:  Telephone    Jackeline Potts PharmD  Clinical Ambulatory Care Pharmacist  Ph: [phone]    Verbal consent to manage patient's drug therapy was obtained from the patient. They were informed they may decline to participate or withdraw from participation in pharmacy services at any time.

## 2024-03-05 ENCOUNTER — PATIENT MESSAGE (OUTPATIENT)
Dept: PRIMARY CARE | Facility: CLINIC | Age: 49
End: 2024-03-05
Payer: COMMERCIAL

## 2024-03-05 DIAGNOSIS — G25.81 RESTLESS LEG: Primary | ICD-10-CM

## 2024-03-05 RX ORDER — ROPINIROLE 0.25 MG/1
0.25 TABLET, FILM COATED ORAL 3 TIMES DAILY
Qty: 180 TABLET | Refills: 1 | Status: SHIPPED | OUTPATIENT
Start: 2024-03-05 | End: 2024-05-24

## 2024-03-05 NOTE — TELEPHONE ENCOUNTER
From: Tana Bahena  To: Jacobo Thakkar MD  Sent: 3/5/2024 10:23 AM EST  Subject: Ropinerol    [facility] [place]. Thank you so much.

## 2024-03-07 ENCOUNTER — TELEMEDICINE (OUTPATIENT)
Dept: PRIMARY CARE | Facility: CLINIC | Age: 49
End: 2024-03-07
Payer: COMMERCIAL

## 2024-03-07 DIAGNOSIS — F51.01 PRIMARY INSOMNIA: ICD-10-CM

## 2024-03-07 DIAGNOSIS — Z79.4 TYPE 2 DIABETES MELLITUS WITHOUT COMPLICATION, WITH LONG-TERM CURRENT USE OF INSULIN (MULTI): ICD-10-CM

## 2024-03-07 DIAGNOSIS — F33.1 MODERATE EPISODE OF RECURRENT MAJOR DEPRESSIVE DISORDER (MULTI): Primary | ICD-10-CM

## 2024-03-07 DIAGNOSIS — E11.9 TYPE 2 DIABETES MELLITUS WITHOUT COMPLICATION, WITH LONG-TERM CURRENT USE OF INSULIN (MULTI): ICD-10-CM

## 2024-03-07 DIAGNOSIS — J43.1 PANLOBULAR EMPHYSEMA (MULTI): ICD-10-CM

## 2024-03-07 DIAGNOSIS — I25.10 CORONARY ARTERY DISEASE INVOLVING NATIVE CORONARY ARTERY OF NATIVE HEART WITHOUT ANGINA PECTORIS: ICD-10-CM

## 2024-03-07 DIAGNOSIS — M54.50 CHRONIC BILATERAL LOW BACK PAIN WITHOUT SCIATICA: ICD-10-CM

## 2024-03-07 DIAGNOSIS — G89.29 CHRONIC BILATERAL LOW BACK PAIN WITHOUT SCIATICA: ICD-10-CM

## 2024-03-07 DIAGNOSIS — J44.9 CHRONIC OBSTRUCTIVE PULMONARY DISEASE, UNSPECIFIED COPD TYPE (MULTI): ICD-10-CM

## 2024-03-07 PROCEDURE — 4010F ACE/ARB THERAPY RXD/TAKEN: CPT | Performed by: FAMILY MEDICINE

## 2024-03-07 PROCEDURE — 99213 OFFICE O/P EST LOW 20 MIN: CPT | Performed by: FAMILY MEDICINE

## 2024-03-07 PROCEDURE — 3008F BODY MASS INDEX DOCD: CPT | Performed by: FAMILY MEDICINE

## 2024-03-07 RX ORDER — METHYLPREDNISOLONE 4 MG/1
TABLET ORAL
Qty: 1 EACH | Refills: 0 | Status: SHIPPED | OUTPATIENT
Start: 2024-03-07 | End: 2024-03-18

## 2024-03-07 RX ORDER — MIRTAZAPINE 45 MG/1
45 TABLET, FILM COATED ORAL NIGHTLY
Qty: 30 TABLET | Refills: 3 | Status: SHIPPED | OUTPATIENT
Start: 2024-03-07 | End: 2024-03-08 | Stop reason: SDUPTHER

## 2024-03-07 ASSESSMENT — ENCOUNTER SYMPTOMS
SHORTNESS OF BREATH: 0
HEADACHES: 0
BLOOD IN STOOL: 0
FATIGUE: 1
MEMORY LOSS: 1
CLUMSINESS: 1
SORE THROAT: 0
ABDOMINAL PAIN: 0
CONFUSION: 1
NEAR-SYNCOPE: 0
MYALGIAS: 0
ALTERED MENTAL STATUS: 1
PALPITATIONS: 0
FOCAL WEAKNESS: 1
COUGH: 0
EYE DISCHARGE: 0
NECK PAIN: 0
ARTHRALGIAS: 0
FEVER: 0
SLURRED SPEECH: 0
CHEST TIGHTNESS: 0
ACTIVITY CHANGE: 0
NEUROLOGIC COMPLAINT: 1
DIAPHORESIS: 0
VERTIGO: 0
DIFFICULTY URINATING: 0
BACK PAIN: 1
AURA: 0
WEAKNESS: 0
NAUSEA: 0
BOWEL INCONTINENCE: 0
HEMATURIA: 0
DIARRHEA: 0
VISUAL CHANGE: 0
ADENOPATHY: 0
DIZZINESS: 0
DYSPHORIC MOOD: 0
CONSTIPATION: 0
FOCAL SENSORY LOSS: 0
NUMBNESS: 0
VOMITING: 0
LOSS OF BALANCE: 1
NERVOUS/ANXIOUS: 0
BRUISES/BLEEDS EASILY: 0
LIGHT-HEADEDNESS: 0

## 2024-03-07 NOTE — PROGRESS NOTES
"Subjective   Patient ID: Tana Bahena \"Axel" is a 48 y.o. female who presents for Hyperlipidemia, Coronary Artery Disease, and Diabetes.  Video visit      Would like to talk about sleep meds  Has been taking does well but this has not been helpful  She would like to try an adjustment of medicine will increase dose of Remeron    Cough and congestion concerned about exacerbation of her COPD would like refill of Medrol Dosepak  Discussed risks and benefits of steroid use  Keep pulmonary follow-up as well    Chronic low back pain stable  No red flag symptoms reported    Diabetes stable  Watch diet follow blood work    Status post organic brain syndrome keep follow-up with neurology will have MRI soon    GERD stable no red flag symptoms    Depressed mood stable  No suicidal ideation no psychotic or manic symptoms    Coronary disease stable keep cardiac follow-up    Review of Systems   Constitutional:  Positive for fatigue. Negative for activity change, diaphoresis and fever.        + insomnia   HENT:  Negative for congestion and sore throat.    Eyes:  Negative for discharge.   Respiratory:  Negative for cough, chest tightness and shortness of breath.    Cardiovascular:  Negative for chest pain, palpitations and leg swelling.   Gastrointestinal:  Negative for abdominal pain, blood in stool, constipation, diarrhea, nausea and vomiting.   Endocrine: Negative for cold intolerance and heat intolerance.   Genitourinary:  Negative for difficulty urinating and hematuria.   Musculoskeletal:  Positive for back pain. Negative for arthralgias, gait problem, myalgias and neck pain.   Allergic/Immunologic: Negative for environmental allergies.   Neurological:  Negative for dizziness, syncope, weakness, light-headedness, numbness and headaches.   Hematological:  Negative for adenopathy. Does not bruise/bleed easily.   Psychiatric/Behavioral:  Positive for confusion. Negative for dysphoric mood. The patient is not nervous/anxious.  "   All other systems reviewed and are negative.      Objective   There were no vitals taken for this visit.   video    Assessment/Plan   Problem List Items Addressed This Visit       Coronary artery disease involving native coronary artery of native heart without angina pectoris    Panlobular emphysema (CMS/HCC)    Relevant Medications    methylPREDNISolone (Medrol Dospak) 4 mg tablets    Moderate episode of recurrent major depressive disorder (CMS/HCC) - Primary    Type 2 diabetes mellitus without complication, with long-term current use of insulin (CMS/HCC)    Chronic bilateral low back pain without sciatica    Relevant Medications    mirtazapine (Remeron) 45 mg tablet    Primary insomnia    Relevant Orders    Follow Up In Advanced Primary Care - PCP    Chronic obstructive pulmonary disease, unspecified (CMS/HCC)       Patient education provided.  Stay current with age appropriate health maintenance as instructed.  Appointment here or ER with new or worsening symptoms'  Keep appropriate follow-up visit.  Stay current with proper immunizations   Testing as above recheck 3 months return sooner with new or worsening symptoms  Medicine adjustment as noted

## 2024-03-08 ENCOUNTER — HOSPITAL ENCOUNTER (OUTPATIENT)
Dept: NEUROLOGY | Age: 49
Discharge: HOME | End: 2024-03-08
Attending: SPECIALIST
Payer: COMMERCIAL

## 2024-03-08 ENCOUNTER — PATIENT MESSAGE (OUTPATIENT)
Dept: PRIMARY CARE | Facility: CLINIC | Age: 49
End: 2024-03-08
Payer: COMMERCIAL

## 2024-03-08 DIAGNOSIS — G89.29 CHRONIC BILATERAL LOW BACK PAIN WITHOUT SCIATICA: ICD-10-CM

## 2024-03-08 DIAGNOSIS — E11.9 TYPE 2 DIABETES MELLITUS WITHOUT COMPLICATION, WITH LONG-TERM CURRENT USE OF INSULIN (MULTI): ICD-10-CM

## 2024-03-08 DIAGNOSIS — Z79.4 TYPE 2 DIABETES MELLITUS WITHOUT COMPLICATION, WITH LONG-TERM CURRENT USE OF INSULIN (MULTI): ICD-10-CM

## 2024-03-08 DIAGNOSIS — M54.50 CHRONIC BILATERAL LOW BACK PAIN WITHOUT SCIATICA: ICD-10-CM

## 2024-03-08 PROCEDURE — 95816 EEG AWAKE AND DROWSY: CPT

## 2024-03-08 RX ORDER — METFORMIN HYDROCHLORIDE 500 MG/1
1000 TABLET, EXTENDED RELEASE ORAL 2 TIMES DAILY
Qty: 120 TABLET | Refills: 3 | Status: SHIPPED | OUTPATIENT
Start: 2024-03-08

## 2024-03-08 RX ORDER — MIRTAZAPINE 45 MG/1
45 TABLET, FILM COATED ORAL NIGHTLY
Qty: 30 TABLET | Refills: 3 | Status: SHIPPED | OUTPATIENT
Start: 2024-03-08 | End: 2024-07-06

## 2024-03-08 NOTE — TELEPHONE ENCOUNTER
From: Tana Bahena  To: Jacobo Thakkar MD  Sent: 3/8/2024 10:34 AM EST  Subject: Meds     Waiting on metformin and a new sleeping pill

## 2024-03-09 ENCOUNTER — PATIENT MESSAGE (OUTPATIENT)
Dept: PRIMARY CARE | Facility: CLINIC | Age: 49
End: 2024-03-09
Payer: COMMERCIAL

## 2024-03-09 DIAGNOSIS — I25.10 CORONARY ARTERY DISEASE INVOLVING NATIVE CORONARY ARTERY OF NATIVE HEART WITHOUT ANGINA PECTORIS: Primary | ICD-10-CM

## 2024-03-09 NOTE — PROCEDURES
[facility]                   [address] [place] [address]                          ELECTROENCEPHALOGRAM      PATIENT NAME: BREANNE DEE              : 1975  MED REC NO: [id number]                        ROOM:   ACCOUNT NO: [id number]                       ADMIT DATE: 2024  PROVIDER: Sarah Basilio MD      REFERRING PHYSICIAN:  Ravinder Felton MD    REASON FOR STUDY:  The patient had a change in the mental status.    This is a 20-channel EEG.  The patient has background of 10-11 hertz alpha activity which is moderately well developed and moderately well-organized.  Artifacts appear to contribute to the patient's movements and muscle activity, and electrodes.  Cardiac monitor shows a heart rate of 96 beats per minute and irregular.  Eye opening suppressed in the background.  On photic stimulation, poor photic evaluation.  No epileptic discharges were seen during or after the photic stimulation.  During sleep, slowing of the background activity appears.  No epileptic discharges were seen during the record.    IMPRESSION:  This is a normal awake, drowsy, and asleep EEG.  Artifacts limiting the interpretation difficult.  If clinically indicated, we can repeat the study in 3 months to see if any further changes have developed.          SARAH BASILIO MD      D:  2024 16:31:26     T:  2024 19:18:41     PASQUALE/GENE  Job #:  [id number]     Doc#:  [id number]

## 2024-03-11 PROCEDURE — 93295 DEV INTERROG REMOTE 1/2/MLT: CPT | Performed by: INTERNAL MEDICINE

## 2024-03-11 RX ORDER — ISOSORBIDE MONONITRATE 60 MG/1
60 TABLET, EXTENDED RELEASE ORAL DAILY
Qty: 30 TABLET | Refills: 2 | Status: SHIPPED | OUTPATIENT
Start: 2024-03-11 | End: 2024-05-28

## 2024-03-11 NOTE — TELEPHONE ENCOUNTER
From: Tana Bahena  To: Jacobo Thakkar MD  Sent: 3/9/2024 3:43 PM EST  Subject: Meds    I am out of isororbibe monitrate

## 2024-03-15 ENCOUNTER — PATIENT MESSAGE (OUTPATIENT)
Dept: PRIMARY CARE | Facility: CLINIC | Age: 49
End: 2024-03-15
Payer: COMMERCIAL

## 2024-03-15 DIAGNOSIS — F51.01 PRIMARY INSOMNIA: Primary | ICD-10-CM

## 2024-03-15 RX ORDER — ZOLPIDEM TARTRATE 10 MG/1
10 TABLET ORAL NIGHTLY PRN
Qty: 10 TABLET | Refills: 0 | Status: SHIPPED | OUTPATIENT
Start: 2024-03-15 | End: 2024-03-25

## 2024-03-17 DIAGNOSIS — E11.9 TYPE 2 DIABETES MELLITUS WITHOUT COMPLICATION, WITH LONG-TERM CURRENT USE OF INSULIN (MULTI): ICD-10-CM

## 2024-03-17 DIAGNOSIS — J43.1 PANLOBULAR EMPHYSEMA (MULTI): ICD-10-CM

## 2024-03-17 DIAGNOSIS — Z79.4 TYPE 2 DIABETES MELLITUS WITHOUT COMPLICATION, WITH LONG-TERM CURRENT USE OF INSULIN (MULTI): ICD-10-CM

## 2024-03-18 ENCOUNTER — TELEPHONE (OUTPATIENT)
Dept: PRIMARY CARE | Facility: CLINIC | Age: 49
End: 2024-03-18

## 2024-03-18 RX ORDER — BLOOD-GLUCOSE,RECEIVER,CONT
EACH MISCELLANEOUS
Qty: 1 EACH | Refills: 0 | Status: SHIPPED | OUTPATIENT
Start: 2024-03-18

## 2024-03-18 RX ORDER — METHYLPREDNISOLONE 4 MG/1
TABLET ORAL
Qty: 21 TABLET | Refills: 0 | Status: SHIPPED | OUTPATIENT
Start: 2024-03-18

## 2024-03-18 NOTE — TELEPHONE ENCOUNTER
We received a request for prior authorization on the patient's   FreeStyle Jayy 3 [place] misc  from their pharmacy. Prior authorization was submitted to insurance today. We will await their determination.

## 2024-03-18 NOTE — TELEPHONE ENCOUNTER
Recent Visits  Date Type Provider Dept   11/07/23 Office Visit Jacobo Thakkar MD Do [id number] [facility]   10/24/23 Office Visit Jacobo Thakkar MD Do [id number] [facility]   09/05/23 Office Visit Jacobo Thakkar MD Do [id number] [facility]   07/24/23 Office Visit Jacobo Thakkar MD Do [id number] [facility]   06/05/23 Office Visit Jacobo Thakkar MD Do [id number] [facility]   05/08/23 Office Visit Jacobo Thakkar MD Do [id number] [facility]   03/14/23 Office Visit Jacobo Thakkar MD Do [id number] [facility]   Showing recent visits within past 540 days and meeting all other requirements  Future Appointments  Date Type Provider Dept   06/04/24 Appointment Jacobo Thakkar MD Do [id number] [facility]   Showing future appointments within next 180 days and meeting all other requirements

## 2024-03-21 ENCOUNTER — HOSPITAL ENCOUNTER (OUTPATIENT)
Dept: MRI IMAGING | Age: 49
Discharge: HOME | End: 2024-03-23
Attending: SPECIALIST
Payer: COMMERCIAL

## 2024-03-21 VITALS
HEART RATE: 100 BPM | RESPIRATION RATE: 14 BRPM | DIASTOLIC BLOOD PRESSURE: 86 MMHG | SYSTOLIC BLOOD PRESSURE: 134 MMHG | OXYGEN SATURATION: 95 %

## 2024-03-21 PROCEDURE — 70551 MRI BRAIN STEM W/O DYE: CPT

## 2024-03-21 NOTE — FLOWSHEET NOTE
1230 - Outpatient MRI with pacemaker. Sitting up in chair, A&Ox4, calm and cooperative. Name &  verified. Pacemaker placed into SureScan mode by Radha Tran who is on site. Orders for DOO-80 by Dr. New. Patient then placed onto MRI table and VS obtained, stable. On RA.     1238 - MRI exam begun.     1256 - MRI completed. VS remain stable. Patient tolerated well. Pacemaker placed back into original settings by Marc Ashley, who is still on site in department. Electronically signed by Fabi Palumbo RN on 3/21/2024 at 1:12 PM

## 2024-03-23 ENCOUNTER — PATIENT MESSAGE (OUTPATIENT)
Dept: PRIMARY CARE | Facility: CLINIC | Age: 49
End: 2024-03-23
Payer: COMMERCIAL

## 2024-03-23 DIAGNOSIS — F31.12 BIPOLAR AFFECTIVE DISORDER, CURRENTLY MANIC, MODERATE (MULTI): ICD-10-CM

## 2024-03-23 DIAGNOSIS — I10 PRIMARY HYPERTENSION: ICD-10-CM

## 2024-03-25 DIAGNOSIS — F51.01 PRIMARY INSOMNIA: Primary | ICD-10-CM

## 2024-03-25 RX ORDER — TRAZODONE HYDROCHLORIDE 100 MG/1
300 TABLET ORAL NIGHTLY
Qty: 90 TABLET | Refills: 1 | Status: SHIPPED | OUTPATIENT
Start: 2024-03-25 | End: 2024-05-02

## 2024-03-25 RX ORDER — QUETIAPINE FUMARATE 100 MG/1
100 TABLET, FILM COATED ORAL NIGHTLY
Qty: 30 TABLET | Refills: 1 | Status: SHIPPED | OUTPATIENT
Start: 2024-03-25

## 2024-03-25 RX ORDER — CARVEDILOL 6.25 MG/1
6.25 TABLET ORAL
Qty: 60 TABLET | Refills: 1 | Status: SHIPPED | OUTPATIENT
Start: 2024-03-25 | End: 2024-05-24

## 2024-03-25 NOTE — TELEPHONE ENCOUNTER
From: Tana Bahena  To: Jacobo Thakkar MD  Sent: 3/23/2024 5:32 PM EDT  Subject: Meds      I need carvedilol trazadone Seroquel and possibly more Ambien [facility] [place]. Thank you so much. Have a great day

## 2024-04-01 ENCOUNTER — TELEMEDICINE (OUTPATIENT)
Dept: PRIMARY CARE | Facility: CLINIC | Age: 49
End: 2024-04-01
Payer: COMMERCIAL

## 2024-04-01 DIAGNOSIS — E11.9 TYPE 2 DIABETES MELLITUS WITHOUT COMPLICATION, WITH LONG-TERM CURRENT USE OF INSULIN (MULTI): Primary | ICD-10-CM

## 2024-04-01 DIAGNOSIS — Z79.4 TYPE 2 DIABETES MELLITUS WITHOUT COMPLICATION, WITH LONG-TERM CURRENT USE OF INSULIN (MULTI): Primary | ICD-10-CM

## 2024-04-01 DIAGNOSIS — I10 PRIMARY HYPERTENSION: ICD-10-CM

## 2024-04-01 DIAGNOSIS — F33.1 MODERATE EPISODE OF RECURRENT MAJOR DEPRESSIVE DISORDER (MULTI): ICD-10-CM

## 2024-04-01 DIAGNOSIS — F09 ORGANIC BRAIN SYNDROME: ICD-10-CM

## 2024-04-01 PROCEDURE — 3008F BODY MASS INDEX DOCD: CPT | Performed by: FAMILY MEDICINE

## 2024-04-01 PROCEDURE — 99214 OFFICE O/P EST MOD 30 MIN: CPT | Performed by: FAMILY MEDICINE

## 2024-04-01 PROCEDURE — 4010F ACE/ARB THERAPY RXD/TAKEN: CPT | Performed by: FAMILY MEDICINE

## 2024-04-01 ASSESSMENT — ENCOUNTER SYMPTOMS
NAUSEA: 0
ARTHRALGIAS: 0
ACTIVITY CHANGE: 0
COUGH: 0
SORE THROAT: 0
ABDOMINAL PAIN: 0
CHEST TIGHTNESS: 0
DIFFICULTY URINATING: 0
CONSTIPATION: 0
DIARRHEA: 0
HEMATURIA: 0
SHORTNESS OF BREATH: 0
FATIGUE: 0
VOMITING: 0
WEAKNESS: 0
DYSPHORIC MOOD: 0
MYALGIAS: 0
ADENOPATHY: 0
HEADACHES: 0
BACK PAIN: 0
BLOOD IN STOOL: 0
EYE DISCHARGE: 0
NUMBNESS: 0
NERVOUS/ANXIOUS: 0
DIZZINESS: 0
NECK PAIN: 0
BRUISES/BLEEDS EASILY: 0

## 2024-04-01 NOTE — PROGRESS NOTES
"Subjective   Patient ID: Tana Bahena \"Axel" is a 48 y.o. female who presents for Results.  HPI    Go over MRI results   Report was ordered by her neurologist  It shows no acute intracranial abnormality and atrophy  Discussed along with her organic brain syndrome  She is encouraged to keep her neurology follow-up  She has no new issues reported    Diabetes stable  Follow diet watch blood work continue meds    Hypertension stable no chest pain or shortness of breath    Depressed mood stable no suicidal ideation no psychotic or manic symptoms    Organic brain syndrome stable keep follow-up with neurology as above  Review of Systems   Constitutional:  Negative for activity change and fatigue.   HENT:  Negative for congestion and sore throat.    Eyes:  Negative for discharge.   Respiratory:  Negative for cough, chest tightness and shortness of breath.    Cardiovascular:  Negative for chest pain and leg swelling.   Gastrointestinal:  Negative for abdominal pain, blood in stool, constipation, diarrhea, nausea and vomiting.   Endocrine: Negative for cold intolerance and heat intolerance.   Genitourinary:  Negative for difficulty urinating and hematuria.   Musculoskeletal:  Negative for arthralgias, back pain, gait problem, myalgias and neck pain.   Allergic/Immunologic: Negative for environmental allergies.   Neurological:  Negative for dizziness, syncope, weakness, numbness and headaches.   Hematological:  Negative for adenopathy. Does not bruise/bleed easily.   Psychiatric/Behavioral:  Negative for dysphoric mood. The patient is not nervous/anxious.    All other systems reviewed and are negative.      Objective   There were no vitals taken for this visit.   Physical Exam  Video visit  Assessment/Plan   Problem List Items Addressed This Visit       Moderate episode of recurrent major depressive disorder (CMS/HCC)    Type 2 diabetes mellitus without complication, with long-term current use of insulin (CMS/HCC) - " Primary    Relevant Orders    Follow Up In Advanced Primary Care - PCP    Primary hypertension    Organic brain syndrome       Patient education provided.  Stay current with age appropriate health maintenance as instructed.  Appointment here or ER with new or worsening symptoms'  Keep appropriate follow-up visit.  Stay current with proper immunizations   Recheck 3 months and as needed return sooner with new or worsening symptoms stressed importance of specialist follow-up

## 2024-04-04 ENCOUNTER — PATIENT MESSAGE (OUTPATIENT)
Dept: PRIMARY CARE | Facility: CLINIC | Age: 49
End: 2024-04-04
Payer: COMMERCIAL

## 2024-04-04 DIAGNOSIS — Z79.4 TYPE 2 DIABETES MELLITUS WITHOUT COMPLICATION, WITH LONG-TERM CURRENT USE OF INSULIN (MULTI): Primary | ICD-10-CM

## 2024-04-04 DIAGNOSIS — E11.9 TYPE 2 DIABETES MELLITUS WITHOUT COMPLICATION, WITH LONG-TERM CURRENT USE OF INSULIN (MULTI): Primary | ICD-10-CM

## 2024-04-04 RX ORDER — SEMAGLUTIDE 1.34 MG/ML
1 INJECTION, SOLUTION SUBCUTANEOUS
Qty: 3 ML | Refills: 2 | Status: SHIPPED | OUTPATIENT
Start: 2024-04-04

## 2024-04-04 NOTE — TELEPHONE ENCOUNTER
From: Tana Bahena  To: Jacobo Thakkar MD  Sent: 4/4/2024 9:24 AM EDT  Subject: Ozempic     Please send refill to [facility].

## 2024-04-06 ENCOUNTER — PATIENT MESSAGE (OUTPATIENT)
Dept: PRIMARY CARE | Facility: CLINIC | Age: 49
End: 2024-04-06
Payer: COMMERCIAL

## 2024-04-06 DIAGNOSIS — G56.90 NEUROPATHY OF UPPER EXTREMITY, UNSPECIFIED LATERALITY: ICD-10-CM

## 2024-04-08 NOTE — TELEPHONE ENCOUNTER
From: Tana Bahena  To: Jacobo Thakkar MD  Sent: 4/6/2024 10:35 AM EDT  Subject: Meds    Is it possible to give me something for pSin that is NOT an narcotic

## 2024-04-11 RX ORDER — GABAPENTIN 100 MG/1
100 CAPSULE ORAL 3 TIMES DAILY
Qty: 90 CAPSULE | Refills: 0 | Status: SHIPPED | OUTPATIENT
Start: 2024-04-11 | End: 2024-05-24

## 2024-04-12 ENCOUNTER — APPOINTMENT (OUTPATIENT)
Dept: PRIMARY CARE | Facility: CLINIC | Age: 49
End: 2024-04-12
Payer: COMMERCIAL

## 2024-04-13 ENCOUNTER — PATIENT MESSAGE (OUTPATIENT)
Dept: PRIMARY CARE | Facility: CLINIC | Age: 49
End: 2024-04-13
Payer: COMMERCIAL

## 2024-04-13 DIAGNOSIS — I25.10 CORONARY ARTERY DISEASE INVOLVING NATIVE CORONARY ARTERY OF NATIVE HEART WITHOUT ANGINA PECTORIS: Primary | ICD-10-CM

## 2024-04-14 DIAGNOSIS — I25.10 CORONARY ARTERY DISEASE INVOLVING NATIVE CORONARY ARTERY OF NATIVE HEART WITHOUT ANGINA PECTORIS: ICD-10-CM

## 2024-04-15 ENCOUNTER — PATIENT MESSAGE (OUTPATIENT)
Dept: PRIMARY CARE | Facility: CLINIC | Age: 49
End: 2024-04-15
Payer: COMMERCIAL

## 2024-04-15 ENCOUNTER — TELEPHONE (OUTPATIENT)
Dept: PRIMARY CARE | Facility: CLINIC | Age: 49
End: 2024-04-15
Payer: COMMERCIAL

## 2024-04-15 DIAGNOSIS — E11.9 TYPE 2 DIABETES MELLITUS WITHOUT COMPLICATION, WITH LONG-TERM CURRENT USE OF INSULIN (MULTI): ICD-10-CM

## 2024-04-15 DIAGNOSIS — Z79.4 TYPE 2 DIABETES MELLITUS WITHOUT COMPLICATION, WITH LONG-TERM CURRENT USE OF INSULIN (MULTI): ICD-10-CM

## 2024-04-15 RX ORDER — BLOOD-GLUCOSE SENSOR
EACH MISCELLANEOUS
Qty: 1 EACH | Refills: 3 | Status: SHIPPED | OUTPATIENT
Start: 2024-04-15 | End: 2024-06-10

## 2024-04-15 NOTE — TELEPHONE ENCOUNTER
From: Tana Bahena  To: Jacobo Thakkar  Sent: 4/13/2024 11:52 AM EDT  Subject: Brilinta    Could you please refill this medication

## 2024-04-15 NOTE — TELEPHONE ENCOUNTER
From: Tana Bahena  To: Jacobo Thakkar  Sent: 4/15/2024 3:16 PM EDT  Subject: Jayy    I received a G7 this afternoon and that is not what I use I use the Jayy so is it possible to please send the Jayy to the pharmacy

## 2024-04-30 DIAGNOSIS — F51.01 PRIMARY INSOMNIA: Primary | ICD-10-CM

## 2024-04-30 RX ORDER — HYDROXYZINE HYDROCHLORIDE 25 MG/1
25 TABLET, FILM COATED ORAL NIGHTLY
Qty: 30 TABLET | Refills: 0 | Status: SHIPPED | OUTPATIENT
Start: 2024-04-30 | End: 2024-05-28 | Stop reason: SDUPTHER

## 2024-04-30 NOTE — TELEPHONE ENCOUNTER
Rx Refill Request     Name: Tana Bahena  :  1975   Medication Name:    hydrOXYzine HCL (Atarax) 25 mg tablet    Last fill: 2024 30 day supply Q 30 R  0  Specific Pharmacy location:  [facility]  Date of last appointment:  2023   Date of next appointment:  2024   Best number to reach patient:  [phone]       RX PENDED to [facility] as directed by Electronic Correspondence.

## 2024-05-01 ENCOUNTER — PATIENT MESSAGE (OUTPATIENT)
Dept: PRIMARY CARE | Facility: CLINIC | Age: 49
End: 2024-05-01
Payer: COMMERCIAL

## 2024-05-01 DIAGNOSIS — F51.01 PRIMARY INSOMNIA: ICD-10-CM

## 2024-05-01 NOTE — TELEPHONE ENCOUNTER
From: Tana Bahena  To: Jacobo Thakkar  Sent: 5/1/2024 2:02 PM EDT  Subject: Meds    Would it be possible that the doctor could raise the dosage of my Trazadone??? I am barely getting 4 hours of sleep., i also was wondering if there's any kind of medication for my brain injury. It's getting way worse so quickly. There's days I can barely walk. Thank you so much.,

## 2024-05-02 RX ORDER — TRAZODONE HYDROCHLORIDE 100 MG/1
400 TABLET ORAL NIGHTLY
Qty: 120 TABLET | Refills: 0 | Status: SHIPPED | OUTPATIENT
Start: 2024-05-02 | End: 2024-05-28

## 2024-05-13 ENCOUNTER — HOSPITAL ENCOUNTER (OUTPATIENT)
Age: 49
Setting detail: OBSERVATION
Discharge: HOME | End: 2024-05-15
Attending: EMERGENCY MEDICINE | Admitting: INTERNAL MEDICINE
Payer: COMMERCIAL

## 2024-05-13 ENCOUNTER — APPOINTMENT (OUTPATIENT)
Dept: GENERAL RADIOLOGY | Age: 49
End: 2024-05-13
Payer: COMMERCIAL

## 2024-05-13 ENCOUNTER — APPOINTMENT (OUTPATIENT)
Dept: CT IMAGING | Age: 49
End: 2024-05-13
Attending: EMERGENCY MEDICINE
Payer: COMMERCIAL

## 2024-05-13 ENCOUNTER — APPOINTMENT (OUTPATIENT)
Dept: CT IMAGING | Age: 49
End: 2024-05-13
Payer: COMMERCIAL

## 2024-05-13 DIAGNOSIS — I65.29 INTERNAL CAROTID ARTERY STENOSIS, UNSPECIFIED LATERALITY: ICD-10-CM

## 2024-05-13 DIAGNOSIS — I77.1 SUBCLAVIAN ARTERY STENOSIS (HCC): ICD-10-CM

## 2024-05-13 DIAGNOSIS — R41.82 ALTERED MENTAL STATUS, UNSPECIFIED ALTERED MENTAL STATUS TYPE: Primary | ICD-10-CM

## 2024-05-13 DIAGNOSIS — I65.09 VERTEBRAL ARTERY STENOSIS, UNSPECIFIED LATERALITY: ICD-10-CM

## 2024-05-13 PROBLEM — I63.9 ACUTE CVA (CEREBROVASCULAR ACCIDENT) (HCC): Status: ACTIVE | Noted: 2024-05-13

## 2024-05-13 PROBLEM — G45.9 TIA (TRANSIENT ISCHEMIC ATTACK): Status: ACTIVE | Noted: 2024-05-13

## 2024-05-13 LAB
ALBUMIN SERPL-MCNC: 4.2 G/DL (ref 3.5–4.6)
ALP SERPL-CCNC: 99 U/L (ref 40–130)
ALT SERPL-CCNC: 17 U/L (ref 0–33)
AMMONIA PLAS-SCNC: 24 UMOL/L (ref 11–51)
ANION GAP SERPL CALCULATED.3IONS-SCNC: 13 MEQ/L (ref 9–15)
APTT PPP: 26.1 SEC (ref 24.4–36.8)
AST SERPL-CCNC: 22 U/L (ref 0–35)
BASOPHILS # BLD: 0.1 K/UL (ref 0–0.2)
BASOPHILS NFR BLD: 0.6 %
BILIRUB SERPL-MCNC: 0.5 MG/DL (ref 0.2–0.7)
BILIRUB UR QL STRIP: NEGATIVE
BUN SERPL-MCNC: 9 MG/DL (ref 6–20)
CALCIUM SERPL-MCNC: 9.6 MG/DL (ref 8.5–9.9)
CHLORIDE SERPL-SCNC: 104 MEQ/L (ref 95–107)
CLARITY UR: ABNORMAL
CO2 SERPL-SCNC: 24 MEQ/L (ref 20–31)
COLOR UR: YELLOW
CREAT SERPL-MCNC: 0.77 MG/DL (ref 0.5–0.9)
EOSINOPHIL # BLD: 0.3 K/UL (ref 0–0.7)
EOSINOPHIL NFR BLD: 3.8 %
ERYTHROCYTE [DISTWIDTH] IN BLOOD BY AUTOMATED COUNT: 13.2 % (ref 11.5–14.5)
ETHANOL PERCENT: NORMAL G/DL
ETHANOLAMINE SERPL-MCNC: <10 MG/DL (ref 0–0.08)
GLOBULIN SER CALC-MCNC: 3.3 G/DL (ref 2.3–3.5)
GLUCOSE BLD-MCNC: 176 MG/DL (ref 70–99)
GLUCOSE BLD-MCNC: 191 MG/DL (ref 70–99)
GLUCOSE BLD-MCNC: 268 MG/DL (ref 70–99)
GLUCOSE SERPL-MCNC: 196 MG/DL (ref 70–99)
GLUCOSE UR STRIP-MCNC: NEGATIVE MG/DL
HCT VFR BLD AUTO: 55.8 % (ref 37–47)
HGB BLD-MCNC: 18.2 G/DL (ref 12–16)
HGB UR QL STRIP: NEGATIVE
INR PPP: 1
KETONES UR STRIP-MCNC: NEGATIVE MG/DL
LACTIC ACID, SEPSIS: 2.3 MMOL/L (ref 0.5–1.9)
LEUKOCYTE ESTERASE UR QL STRIP: NEGATIVE
LYMPHOCYTES # BLD: 2.6 K/UL (ref 1–4.8)
LYMPHOCYTES NFR BLD: 33.2 %
MAGNESIUM SERPL-MCNC: 1.8 MG/DL (ref 1.7–2.4)
MCH RBC QN AUTO: 31.2 PG (ref 27–31.3)
MCHC RBC AUTO-ENTMCNC: 32.6 % (ref 33–37)
MCV RBC AUTO: 95.5 FL (ref 79.4–94.8)
MONOCYTES # BLD: 0.5 K/UL (ref 0.2–0.8)
MONOCYTES NFR BLD: 5.9 %
NEUTROPHILS # BLD: 4.4 K/UL (ref 1.4–6.5)
NEUTS SEG NFR BLD: 56.2 %
NITRITE UR QL STRIP: NEGATIVE
PERFORMED ON: ABNORMAL
PH UR STRIP: 5.5 [PH] (ref 5–9)
PLATELET # BLD AUTO: 338 K/UL (ref 130–400)
POC CREATININE WHOLE BLOOD: 0.8
POTASSIUM SERPL-SCNC: 4.8 MEQ/L (ref 3.4–4.9)
PROCALCITONIN SERPL IA-MCNC: <0.02 NG/ML (ref 0–0.15)
PROT SERPL-MCNC: 7.5 G/DL (ref 6.3–8)
PROT UR STRIP-MCNC: ABNORMAL MG/DL
PROTHROMBIN TIME: 13.6 SEC (ref 12.3–14.9)
RBC # BLD AUTO: 5.84 M/UL (ref 4.2–5.4)
SODIUM SERPL-SCNC: 141 MEQ/L (ref 135–144)
SP GR UR STRIP: 1.08 (ref 1–1.03)
TROPONIN, HIGH SENSITIVITY: 7 NG/L (ref 0–19)
URINE REFLEX TO CULTURE: ABNORMAL
UROBILINOGEN UR STRIP-ACNC: 0.2 E.U./DL
WBC # BLD AUTO: 7.8 K/UL (ref 4.8–10.8)

## 2024-05-13 PROCEDURE — 96361 HYDRATE IV INFUSION ADD-ON: CPT

## 2024-05-13 PROCEDURE — 71045 X-RAY EXAM CHEST 1 VIEW: CPT

## 2024-05-13 PROCEDURE — 93005 ELECTROCARDIOGRAM TRACING: CPT | Performed by: EMERGENCY MEDICINE

## 2024-05-13 PROCEDURE — 2580000003 HC RX 258: Performed by: INTERNAL MEDICINE

## 2024-05-13 PROCEDURE — 85025 COMPLETE CBC W/AUTO DIFF WBC: CPT

## 2024-05-13 PROCEDURE — 6360000002 HC RX W HCPCS: Performed by: INTERNAL MEDICINE

## 2024-05-13 PROCEDURE — G0378 HOSPITAL OBSERVATION PER HR: HCPCS

## 2024-05-13 PROCEDURE — 6370000000 HC RX 637 (ALT 250 FOR IP): Performed by: INTERNAL MEDICINE

## 2024-05-13 PROCEDURE — 96372 THER/PROPH/DIAG INJ SC/IM: CPT

## 2024-05-13 PROCEDURE — P9612 CATHETERIZE FOR URINE SPEC: HCPCS

## 2024-05-13 PROCEDURE — 83605 ASSAY OF LACTIC ACID: CPT

## 2024-05-13 PROCEDURE — 81003 URINALYSIS AUTO W/O SCOPE: CPT

## 2024-05-13 PROCEDURE — 85610 PROTHROMBIN TIME: CPT

## 2024-05-13 PROCEDURE — 70450 CT HEAD/BRAIN W/O DYE: CPT

## 2024-05-13 PROCEDURE — 70498 CT ANGIOGRAPHY NECK: CPT

## 2024-05-13 PROCEDURE — 84484 ASSAY OF TROPONIN QUANT: CPT

## 2024-05-13 PROCEDURE — 85730 THROMBOPLASTIN TIME PARTIAL: CPT

## 2024-05-13 PROCEDURE — 80053 COMPREHEN METABOLIC PANEL: CPT

## 2024-05-13 PROCEDURE — 82140 ASSAY OF AMMONIA: CPT

## 2024-05-13 PROCEDURE — 36415 COLL VENOUS BLD VENIPUNCTURE: CPT

## 2024-05-13 PROCEDURE — 70496 CT ANGIOGRAPHY HEAD: CPT

## 2024-05-13 PROCEDURE — 84145 PROCALCITONIN (PCT): CPT

## 2024-05-13 PROCEDURE — 99285 EMERGENCY DEPT VISIT HI MDM: CPT

## 2024-05-13 PROCEDURE — 82077 ASSAY SPEC XCP UR&BREATH IA: CPT

## 2024-05-13 PROCEDURE — 83735 ASSAY OF MAGNESIUM: CPT

## 2024-05-13 PROCEDURE — 6360000004 HC RX CONTRAST MEDICATION: Performed by: EMERGENCY MEDICINE

## 2024-05-13 PROCEDURE — 2580000003 HC RX 258: Performed by: EMERGENCY MEDICINE

## 2024-05-13 RX ORDER — ACETAMINOPHEN 325 MG/1
650 TABLET ORAL EVERY 4 HOURS PRN
Status: DISCONTINUED | OUTPATIENT
Start: 2024-05-13 | End: 2024-05-15 | Stop reason: HOSPADM

## 2024-05-13 RX ORDER — LOSARTAN POTASSIUM 25 MG/1
25 TABLET ORAL DAILY
Status: DISCONTINUED | OUTPATIENT
Start: 2024-05-13 | End: 2024-05-15 | Stop reason: HOSPADM

## 2024-05-13 RX ORDER — TRAZODONE HYDROCHLORIDE 150 MG/1
150 TABLET ORAL 2 TIMES DAILY
Status: DISCONTINUED | OUTPATIENT
Start: 2024-05-13 | End: 2024-05-15 | Stop reason: HOSPADM

## 2024-05-13 RX ORDER — PAROXETINE 10 MG/1
10 TABLET, FILM COATED ORAL EVERY MORNING
Status: DISCONTINUED | OUTPATIENT
Start: 2024-05-14 | End: 2024-05-15 | Stop reason: HOSPADM

## 2024-05-13 RX ORDER — ONDANSETRON 2 MG/ML
4 INJECTION INTRAMUSCULAR; INTRAVENOUS EVERY 6 HOURS PRN
Status: DISCONTINUED | OUTPATIENT
Start: 2024-05-13 | End: 2024-05-15 | Stop reason: HOSPADM

## 2024-05-13 RX ORDER — ASPIRIN 300 MG/1
300 SUPPOSITORY RECTAL DAILY
Status: DISCONTINUED | OUTPATIENT
Start: 2024-05-13 | End: 2024-05-15 | Stop reason: HOSPADM

## 2024-05-13 RX ORDER — CARVEDILOL 12.5 MG/1
6.25 TABLET ORAL 2 TIMES DAILY
Status: DISCONTINUED | OUTPATIENT
Start: 2024-05-13 | End: 2024-05-15 | Stop reason: HOSPADM

## 2024-05-13 RX ORDER — ATORVASTATIN CALCIUM 40 MG/1
40 TABLET, FILM COATED ORAL NIGHTLY
Status: DISCONTINUED | OUTPATIENT
Start: 2024-05-13 | End: 2024-05-13 | Stop reason: ALTCHOICE

## 2024-05-13 RX ORDER — ISOSORBIDE MONONITRATE 60 MG/1
60 TABLET, EXTENDED RELEASE ORAL DAILY
Status: DISCONTINUED | OUTPATIENT
Start: 2024-05-13 | End: 2024-05-15 | Stop reason: HOSPADM

## 2024-05-13 RX ORDER — INSULIN GLARGINE 100 [IU]/ML
50 INJECTION, SOLUTION SUBCUTANEOUS NIGHTLY
Status: DISCONTINUED | OUTPATIENT
Start: 2024-05-13 | End: 2024-05-15 | Stop reason: HOSPADM

## 2024-05-13 RX ORDER — SODIUM CHLORIDE 0.9 % (FLUSH) 0.9 %
5-40 SYRINGE (ML) INJECTION PRN
Status: DISCONTINUED | OUTPATIENT
Start: 2024-05-13 | End: 2024-05-15 | Stop reason: HOSPADM

## 2024-05-13 RX ORDER — GLUCAGON 1 MG/ML
1 KIT INJECTION PRN
Status: DISCONTINUED | OUTPATIENT
Start: 2024-05-13 | End: 2024-05-15 | Stop reason: HOSPADM

## 2024-05-13 RX ORDER — SODIUM CHLORIDE 9 MG/ML
INJECTION, SOLUTION INTRAVENOUS PRN
Status: DISCONTINUED | OUTPATIENT
Start: 2024-05-13 | End: 2024-05-15 | Stop reason: HOSPADM

## 2024-05-13 RX ORDER — QUETIAPINE FUMARATE 100 MG/1
100 TABLET, FILM COATED ORAL NIGHTLY
Status: DISCONTINUED | OUTPATIENT
Start: 2024-05-13 | End: 2024-05-15 | Stop reason: HOSPADM

## 2024-05-13 RX ORDER — ASPIRIN 81 MG/1
81 TABLET, CHEWABLE ORAL DAILY
Status: DISCONTINUED | OUTPATIENT
Start: 2024-05-13 | End: 2024-05-15 | Stop reason: HOSPADM

## 2024-05-13 RX ORDER — ATORVASTATIN CALCIUM 80 MG/1
80 TABLET, FILM COATED ORAL NIGHTLY
Status: DISCONTINUED | OUTPATIENT
Start: 2024-05-13 | End: 2024-05-15 | Stop reason: HOSPADM

## 2024-05-13 RX ORDER — INSULIN GLARGINE 100 [IU]/ML
50 INJECTION, SOLUTION SUBCUTANEOUS NIGHTLY
COMMUNITY

## 2024-05-13 RX ORDER — ENOXAPARIN SODIUM 100 MG/ML
40 INJECTION SUBCUTANEOUS DAILY
Status: DISCONTINUED | OUTPATIENT
Start: 2024-05-13 | End: 2024-05-15 | Stop reason: HOSPADM

## 2024-05-13 RX ORDER — SODIUM CHLORIDE 0.9 % (FLUSH) 0.9 %
5-40 SYRINGE (ML) INJECTION EVERY 12 HOURS SCHEDULED
Status: DISCONTINUED | OUTPATIENT
Start: 2024-05-13 | End: 2024-05-15 | Stop reason: HOSPADM

## 2024-05-13 RX ORDER — CARBAMAZEPINE 300 MG/1
300 CAPSULE, EXTENDED RELEASE ORAL 2 TIMES DAILY
Status: DISCONTINUED | OUTPATIENT
Start: 2024-05-13 | End: 2024-05-15 | Stop reason: HOSPADM

## 2024-05-13 RX ORDER — DEXTROSE MONOHYDRATE 100 MG/ML
INJECTION, SOLUTION INTRAVENOUS CONTINUOUS PRN
Status: DISCONTINUED | OUTPATIENT
Start: 2024-05-13 | End: 2024-05-15 | Stop reason: HOSPADM

## 2024-05-13 RX ORDER — POLYETHYLENE GLYCOL 3350 17 G/17G
17 POWDER, FOR SOLUTION ORAL DAILY PRN
Status: DISCONTINUED | OUTPATIENT
Start: 2024-05-13 | End: 2024-05-15 | Stop reason: HOSPADM

## 2024-05-13 RX ORDER — ONDANSETRON 4 MG/1
4 TABLET, ORALLY DISINTEGRATING ORAL EVERY 8 HOURS PRN
Status: DISCONTINUED | OUTPATIENT
Start: 2024-05-13 | End: 2024-05-15 | Stop reason: HOSPADM

## 2024-05-13 RX ORDER — 0.9 % SODIUM CHLORIDE 0.9 %
1000 INTRAVENOUS SOLUTION INTRAVENOUS ONCE
Status: COMPLETED | OUTPATIENT
Start: 2024-05-13 | End: 2024-05-13

## 2024-05-13 RX ADMIN — ASPIRIN 81 MG 81 MG: 81 TABLET ORAL at 15:20

## 2024-05-13 RX ADMIN — IOPAMIDOL 75 ML: 755 INJECTION, SOLUTION INTRAVENOUS at 12:53

## 2024-05-13 RX ADMIN — SODIUM CHLORIDE, PRESERVATIVE FREE 10 ML: 5 INJECTION INTRAVENOUS at 20:18

## 2024-05-13 RX ADMIN — INSULIN GLARGINE 50 UNITS: 100 INJECTION, SOLUTION SUBCUTANEOUS at 20:33

## 2024-05-13 RX ADMIN — ENOXAPARIN SODIUM 40 MG: 100 INJECTION SUBCUTANEOUS at 15:20

## 2024-05-13 RX ADMIN — SODIUM CHLORIDE 1000 ML: 9 INJECTION, SOLUTION INTRAVENOUS at 14:34

## 2024-05-13 RX ADMIN — TICAGRELOR 90 MG: 90 TABLET ORAL at 20:17

## 2024-05-13 RX ADMIN — ISOSORBIDE MONONITRATE 60 MG: 60 TABLET, EXTENDED RELEASE ORAL at 17:34

## 2024-05-13 RX ADMIN — TRAZODONE HYDROCHLORIDE 150 MG: 150 TABLET ORAL at 20:17

## 2024-05-13 RX ADMIN — LOSARTAN POTASSIUM 25 MG: 25 TABLET, FILM COATED ORAL at 17:34

## 2024-05-13 RX ADMIN — CARBAMAZEPINE 300 MG: 300 CAPSULE, EXTENDED RELEASE ORAL at 23:38

## 2024-05-13 RX ADMIN — ATORVASTATIN CALCIUM 80 MG: 80 TABLET, FILM COATED ORAL at 20:17

## 2024-05-13 RX ADMIN — CARVEDILOL 6.25 MG: 12.5 TABLET, FILM COATED ORAL at 20:18

## 2024-05-13 RX ADMIN — QUETIAPINE FUMARATE 100 MG: 100 TABLET ORAL at 20:17

## 2024-05-13 ASSESSMENT — ENCOUNTER SYMPTOMS
ABDOMINAL PAIN: 0
NAUSEA: 1
SHORTNESS OF BREATH: 0
VOMITING: 1

## 2024-05-13 ASSESSMENT — PAIN - FUNCTIONAL ASSESSMENT: PAIN_FUNCTIONAL_ASSESSMENT: NONE - DENIES PAIN

## 2024-05-13 NOTE — PROGRESS NOTES
DVT / VTE PROPHYLAXIS EVALUATION    Recent Labs     05/13/24  1210 05/13/24  1213 05/13/24  1214   BUN 9  --   --    CREATININE 0.77  --   --    PLT  --   --  338   HGB  --   --  18.2*   HCT  --   --  55.8*   INR  --  1.0  --      ADMITTING DX OR CHIEF COMPLAINT? CVA  WARFARIN? DOAC'S? no  ANY APPARENT BLEEDING? no  SCHEDULED SURGERY? no     If yes to following, excluded from auto adjustment in Table 1 of policy - please contact provider with recommendations as appropriate.  Include condition/exception in scratch notes. Yes No   Trauma Service or Ortho Surgery []  []    COVID []  []    Pregnancy []  []        Current order:  Enoxaparin 40 mg SUBQ once daily       , Weight - Scale: 97.8 kg (215 lb 9.8 oz)  Estimated Creatinine Clearance: 108 mL/min (based on SCr of 0.77 mg/dL).    Plan:  No intervention recommended, continue current VTE prophylaxis as ordered     Patient Weight (kg)      50.9 and below .9 101-150.9 151-174.9 175 or greater   Estimated   CrCl  (ml/min) 30 or greater []   30 mg   SUBQ daily   [x]   40 mg   SUBQ daily (or 30 mg BID for orthopedic cases) []  30 mg SUBQ   BID  []  40 mg   SUBQ   BID []  60mg SUBQ BID    15-29.9 []  UFH 5000   units SUBQ BID []  30 mg   SUBQ daily [] 30 mg SUBQ   daily []  40 mg SUBQ   daily [] 60 mg SUBQ   daily    Less than 15 or dialysis []  UFH 5000   units SUBQ BID [] UFH 5000 units SUBQ TID []  UFH 7500   units   SUBQ TID         Buffy Pitt, H PharmD

## 2024-05-13 NOTE — PROGRESS NOTES
Spiritual Care Services     Summary of Visit:   responded to a Code BAT in ED. Patient sitting in bed. Patient said she had no spiritual care needs at thee  moment and that she was waiting on her significant other to arrive to provide support.     Encounter Summary  Encounter Overview/Reason: Crisis  Service Provided For: Patient  Referral/Consult From: Multi-disciplinary team  Support System: Significant other, Children  Complexity of Encounter: High  Begin Time: 1205  End Time : 1225  Total Time Calculated: 20 min     Crisis  Type: Code Stroke                         Spiritual Assessment/Intervention/Outcomes:    Assessment: Calm    Intervention: Sustaining Presence/Ministry of presence    Outcome: Receptive      Care Plan:    Plan and Referrals  Plan/Referrals: Continue Support (comment)          Spiritual Care Services   Electronically signed by Chaplain Soumya on 5/13/2024 at 1:33 PM.    To reach a  for emotional and spiritual support, place an EPIC consult request.   If a  is needed immediately, dial “0” and ask to page the on-call .

## 2024-05-13 NOTE — H&P
Patient 48-year-old woman with past medical history of COPD, coronary disease, bipolar, depression, asthma comes in here for altered mental status.  Altered mental has been resolved.  Patient was brought in by EMS for evaluation of altered mental status patient was at baseline as per her boyfriend.  Patient woke up felt dizzy with nausea and vomiting at night.  Patient denies any complaints at this time denies any headache or blurry vision.  Denies any neck or jaw pain.  Denies any chest pain or shortness of breath.  Patient would like to go home.  The dizziness confusion nausea vomiting has been resolved.  Patient admits smoking marijuana.  CT angio head and neck shows stenosis 1. Stenosis involving intracranial right vertebral artery.  2. Stenosis involving supraclinoid segment of right internal carotid artery.  3. No stenosis involving the carotid arteries.  4. Approximately 70% stenosis involving proximal left subclavian artery.      Past Medical History:   Diagnosis Date    Asthma     Bipolar disease, manic ([facility])     CAD (coronary artery disease)     COPD (chronic obstructive pulmonary disease) ([facility])     Depression     Diabetes ([facility])     DM (diabetes mellitus) ([facility])     Dyspnea     Hx of CABG 3/31/2020    Hx of CABG 3/31/2020    Hyperlipidemia     Hypertension     Suicide attempt by multiple drug overdose ([facility]) 2009    TMJ (dislocation of temporomandibular joint)      Past Surgical History:   Procedure Laterality Date    APPENDECTOMY  2010    CARDIAC CATHETERIZATION  07/11/2013    CARDIAC CATHETERIZATION  10/16/2013    DR HERNÁNDEZ    CORONARY ARTERY BYPASS GRAFT  01/2020    CORONARY STENT PLACEMENT  05/2022    TUBAL LIGATION       Social History       Tobacco History       Smoking Status  Light Smoker Smoking Start Date  1/1/1994 Current Packs/Day  1 pack/day Average Packs/Day  1 pack/day for 30.4 years (30.4 ttl pk-yrs) Smoking Tobacco Type  Cigarettes since 1/1/1994   Pack Year History     Packs/Day From To

## 2024-05-13 NOTE — ED PROVIDER NOTES
Psychiatric/Behavioral:  Positive for confusion.    All other systems reviewed and are negative.      Except as noted above the remainder of the review of systems was reviewed and negative.       PAST MEDICAL HISTORY     Past Medical History:   Diagnosis Date    Asthma     Bipolar disease, manic ([facility])     CAD (coronary artery disease)     COPD (chronic obstructive pulmonary disease) ([facility])     Depression     Diabetes ([facility])     DM (diabetes mellitus) ([facility])     Dyspnea     Hx of CABG 3/31/2020    Hx of CABG 3/31/2020    Hyperlipidemia     Hypertension     Suicide attempt by multiple drug overdose ([facility]) 2009    TMJ (dislocation of temporomandibular joint)          SURGICAL HISTORY       Past Surgical History:   Procedure Laterality Date    APPENDECTOMY  2010    CARDIAC CATHETERIZATION  07/11/2013    CARDIAC CATHETERIZATION  10/16/2013    DR HERNÁNDEZ    CORONARY ARTERY BYPASS GRAFT  01/2020    CORONARY STENT PLACEMENT  05/2022    TUBAL LIGATION           CURRENT MEDICATIONS       Previous Medications    ASPIRIN 81 MG EC TABLET    Take 2 tablets by mouth daily    ATORVASTATIN (LIPITOR) 40 MG TABLET    Take 1 tablet by mouth nightly    CARBAMAZEPINE (CARBATROL) 300 MG EXTENDED RELEASE CAPSULE    Take 1 capsule by mouth 2 times daily    CARVEDILOL (COREG) 6.25 MG TABLET    TAKE 1 TABLET BY MOUTH TWICE DAILY    CHOLECALCIFEROL (VITAMIN D) 50 MCG (2000 UT) CAPS CAPSULE    Take 4,000 Units by mouth daily    INSULIN ASPART (NOVOLOG) 100 UNIT/ML INJECTION PEN    Inject into the skin 3 times daily (before meals) Sliding scale    ISOSORBIDE MONONITRATE (IMDUR) 60 MG EXTENDED RELEASE TABLET    Take 1 tablet by mouth daily    LOSARTAN (COZAAR) 25 MG TABLET    Take 1 tablet by mouth daily    METFORMIN (GLUCOPHAGE-XR) 500 MG EXTENDED RELEASE TABLET        NITROGLYCERIN (NITROSTAT) 0.4 MG SL TABLET    Place 1 tablet under the tongue every 5 minutes as needed for Chest pain PLACE 1 TAB UNDER THE TONGUE EVERY 5MIN IF NEEDED FOR CHEST PAIN

## 2024-05-14 ENCOUNTER — APPOINTMENT (OUTPATIENT)
Dept: MRI IMAGING | Age: 49
End: 2024-05-14
Payer: COMMERCIAL

## 2024-05-14 LAB
CHOLEST SERPL-MCNC: 222 MG/DL (ref 0–199)
EKG ATRIAL RATE: 96 BPM
EKG P AXIS: 47 DEGREES
EKG P-R INTERVAL: 176 MS
EKG Q-T INTERVAL: 380 MS
EKG QRS DURATION: 110 MS
EKG QTC CALCULATION (BAZETT): 480 MS
EKG R AXIS: -46 DEGREES
EKG T AXIS: 81 DEGREES
EKG VENTRICULAR RATE: 96 BPM
ERYTHROCYTE [DISTWIDTH] IN BLOOD BY AUTOMATED COUNT: 13 % (ref 11.5–14.5)
ESTIMATED AVERAGE GLUCOSE: 171 MG/DL
GLUCOSE BLD-MCNC: 144 MG/DL (ref 70–99)
GLUCOSE BLD-MCNC: 211 MG/DL (ref 70–99)
GLUCOSE BLD-MCNC: 254 MG/DL (ref 70–99)
GLUCOSE BLD-MCNC: 277 MG/DL (ref 70–99)
HBA1C MFR BLD: 7.6 % (ref 4–6)
HCT VFR BLD AUTO: 44.5 % (ref 37–47)
HDLC SERPL-MCNC: 34 MG/DL (ref 40–59)
HGB BLD-MCNC: 15.1 G/DL (ref 12–16)
LACTIC ACID, SEPSIS: 1.1 MMOL/L (ref 0.5–1.9)
LDLC SERPL CALC-MCNC: 155 MG/DL (ref 0–129)
MCH RBC QN AUTO: 30.9 PG (ref 27–31.3)
MCHC RBC AUTO-ENTMCNC: 33.9 % (ref 33–37)
MCV RBC AUTO: 91.2 FL (ref 79.4–94.8)
PERFORMED ON: ABNORMAL
PLATELET # BLD AUTO: 265 K/UL (ref 130–400)
RBC # BLD AUTO: 4.88 M/UL (ref 4.2–5.4)
TRIGL SERPL-MCNC: 167 MG/DL (ref 0–150)
TSH REFLEX: 1.06 UIU/ML (ref 0.44–3.86)
WBC # BLD AUTO: 6.3 K/UL (ref 4.8–10.8)

## 2024-05-14 PROCEDURE — 96372 THER/PROPH/DIAG INJ SC/IM: CPT

## 2024-05-14 PROCEDURE — 6370000000 HC RX 637 (ALT 250 FOR IP): Performed by: INTERNAL MEDICINE

## 2024-05-14 PROCEDURE — 70551 MRI BRAIN STEM W/O DYE: CPT

## 2024-05-14 PROCEDURE — 97161 PT EVAL LOW COMPLEX 20 MIN: CPT

## 2024-05-14 PROCEDURE — 6360000002 HC RX W HCPCS: Performed by: INTERNAL MEDICINE

## 2024-05-14 PROCEDURE — 92610 EVALUATE SWALLOWING FUNCTION: CPT

## 2024-05-14 PROCEDURE — 97165 OT EVAL LOW COMPLEX 30 MIN: CPT

## 2024-05-14 PROCEDURE — G0378 HOSPITAL OBSERVATION PER HR: HCPCS

## 2024-05-14 PROCEDURE — 85027 COMPLETE CBC AUTOMATED: CPT

## 2024-05-14 PROCEDURE — 80061 LIPID PANEL: CPT

## 2024-05-14 PROCEDURE — 87040 BLOOD CULTURE FOR BACTERIA: CPT

## 2024-05-14 PROCEDURE — 96374 THER/PROPH/DIAG INJ IV PUSH: CPT

## 2024-05-14 PROCEDURE — 36415 COLL VENOUS BLD VENIPUNCTURE: CPT

## 2024-05-14 PROCEDURE — 83036 HEMOGLOBIN GLYCOSYLATED A1C: CPT

## 2024-05-14 PROCEDURE — 93010 ELECTROCARDIOGRAM REPORT: CPT | Performed by: INTERNAL MEDICINE

## 2024-05-14 PROCEDURE — 84443 ASSAY THYROID STIM HORMONE: CPT

## 2024-05-14 PROCEDURE — 83605 ASSAY OF LACTIC ACID: CPT

## 2024-05-14 PROCEDURE — 92523 SPEECH SOUND LANG COMPREHEN: CPT

## 2024-05-14 PROCEDURE — 2580000003 HC RX 258: Performed by: INTERNAL MEDICINE

## 2024-05-14 RX ORDER — MIRTAZAPINE 30 MG/1
45 TABLET, FILM COATED ORAL NIGHTLY
COMMUNITY

## 2024-05-14 RX ORDER — LORAZEPAM 2 MG/ML
2 INJECTION INTRAMUSCULAR
Status: COMPLETED | OUTPATIENT
Start: 2024-05-14 | End: 2024-05-14

## 2024-05-14 RX ORDER — MIRTAZAPINE 15 MG/1
45 TABLET, ORALLY DISINTEGRATING ORAL NIGHTLY
Status: DISCONTINUED | OUTPATIENT
Start: 2024-05-14 | End: 2024-05-15 | Stop reason: HOSPADM

## 2024-05-14 RX ADMIN — QUETIAPINE FUMARATE 100 MG: 100 TABLET ORAL at 20:02

## 2024-05-14 RX ADMIN — ENOXAPARIN SODIUM 40 MG: 100 INJECTION SUBCUTANEOUS at 07:51

## 2024-05-14 RX ADMIN — ASPIRIN 81 MG 81 MG: 81 TABLET ORAL at 07:51

## 2024-05-14 RX ADMIN — ATORVASTATIN CALCIUM 80 MG: 80 TABLET, FILM COATED ORAL at 20:02

## 2024-05-14 RX ADMIN — LOSARTAN POTASSIUM 25 MG: 25 TABLET, FILM COATED ORAL at 07:50

## 2024-05-14 RX ADMIN — MIRTAZAPINE 45 MG: 15 TABLET, ORALLY DISINTEGRATING ORAL at 20:02

## 2024-05-14 RX ADMIN — SODIUM CHLORIDE, PRESERVATIVE FREE 10 ML: 5 INJECTION INTRAVENOUS at 07:56

## 2024-05-14 RX ADMIN — TRAZODONE HYDROCHLORIDE 150 MG: 150 TABLET ORAL at 07:50

## 2024-05-14 RX ADMIN — INSULIN GLARGINE 50 UNITS: 100 INJECTION, SOLUTION SUBCUTANEOUS at 20:18

## 2024-05-14 RX ADMIN — CARBAMAZEPINE 300 MG: 300 CAPSULE, EXTENDED RELEASE ORAL at 07:51

## 2024-05-14 RX ADMIN — PAROXETINE 10 MG: 10 TABLET, FILM COATED ORAL at 07:51

## 2024-05-14 RX ADMIN — Medication 2 MG: at 13:11

## 2024-05-14 RX ADMIN — CARVEDILOL 6.25 MG: 12.5 TABLET, FILM COATED ORAL at 20:01

## 2024-05-14 RX ADMIN — SODIUM CHLORIDE, PRESERVATIVE FREE 10 ML: 5 INJECTION INTRAVENOUS at 20:23

## 2024-05-14 RX ADMIN — ISOSORBIDE MONONITRATE 60 MG: 60 TABLET, EXTENDED RELEASE ORAL at 07:50

## 2024-05-14 RX ADMIN — CARVEDILOL 6.25 MG: 12.5 TABLET, FILM COATED ORAL at 07:51

## 2024-05-14 RX ADMIN — CARBAMAZEPINE 300 MG: 300 CAPSULE, EXTENDED RELEASE ORAL at 20:01

## 2024-05-14 RX ADMIN — TICAGRELOR 90 MG: 90 TABLET ORAL at 07:51

## 2024-05-14 RX ADMIN — TICAGRELOR 90 MG: 90 TABLET ORAL at 20:01

## 2024-05-14 RX ADMIN — TRAZODONE HYDROCHLORIDE 150 MG: 150 TABLET ORAL at 20:02

## 2024-05-14 ASSESSMENT — ENCOUNTER SYMPTOMS
SHORTNESS OF BREATH: 0
DIARRHEA: 0
COUGH: 0
VOMITING: 0
NAUSEA: 0

## 2024-05-14 NOTE — CONSULTS
patient.     Will continue to follow.    Please call if questions or concerns arise.    Electronically signed by Juana Basilio MD on 5/14/2024 at 4:50 PM

## 2024-05-14 NOTE — PLAN OF CARE
See OT evaluation for all goals and OT POC. Electronically signed by WENDI Jalloh/L on 5/14/2024 at 10:35 AM

## 2024-05-14 NOTE — PROGRESS NOTES
Physical Therapy Med Surg Initial Assessment  Facility/Department: [address] ORTHO TELE  Room: [facility]       NAME: Radha Malloy  : 1975 (48 y.o.)  MRN: [id number]  CODE STATUS: Full Code    Date of Service: 2024    Patient Diagnosis(es): Subclavian artery stenosis (HCC) [I77.1]  Internal carotid artery stenosis, unspecified laterality [I65.29]  Vertebral artery stenosis, unspecified laterality [I65.09]  Acute CVA (cerebrovascular accident) ([facility]) [I63.9]  Altered mental status, unspecified altered mental status type [R41.82]  TIA (transient ischemic attack) [G45.9]   Chief Complaint   Patient presents with    Altered Mental Status     Patient Active Problem List    Diagnosis Date Noted    Hx of CABG 2020    ICD (implantable cardioverter-defibrillator) in place 2022    Acute CVA (cerebrovascular accident) ([facility]) 2024    TIA (transient ischemic attack) 2024    Non compliance with medical treatment 2016    DM type 2 (diabetes mellitus, type 2) ([facility]) 2014    Hypertension     Bipolar disease, manic ([facility])     Depression     Hyperlipidemia     CAD (coronary artery disease) 2013    Family history of premature CAD 2013    Asthma     DM (diabetes mellitus) (HCC)     COPD (chronic obstructive pulmonary disease) ([facility])     Dyspnea     TMJ (dislocation of temporomandibular joint)         Past Medical History:   Diagnosis Date    Asthma     Bipolar disease, manic (HCC)     CAD (coronary artery disease)     COPD (chronic obstructive pulmonary disease) ([facility])     Depression     Diabetes (HCC)     DM (diabetes mellitus) ([facility])     Dyspnea     Hx of CABG 3/31/2020    Hx of CABG 3/31/2020    Hyperlipidemia     Hypertension     Suicide attempt by multiple drug overdose (HCC) 2009    TMJ (dislocation of temporomandibular joint)      Past Surgical History:   Procedure Laterality Date    APPENDECTOMY  2010    CARDIAC CATHETERIZATION  2013    CARDIAC CATHETERIZATION

## 2024-05-14 NOTE — CARE COORDINATION
Case Management Assessment  Initial Evaluation    Date/Time of Evaluation: 5/14/2024 11:49 AM  Assessment Completed by: Bianca Ames RN    If patient is discharged prior to next notation, then this note serves as note for discharge by case management.    Patient Name: Radha Malloy                   YOB: 1975  Diagnosis: Subclavian artery stenosis (HCC) [I77.1]  Internal carotid artery stenosis, unspecified laterality [I65.29]  Vertebral artery stenosis, unspecified laterality [I65.09]  Acute CVA (cerebrovascular accident) (HCC) [I63.9]  Altered mental status, unspecified altered mental status type [R41.82]  TIA (transient ischemic attack) [G45.9]                   Date / Time: 5/13/2024 11:52 AM    Patient Admission Status: Observation   Readmission Risk (Low < 19, Mod (19-27), High > 27): Readmission Risk Score: 7.8    Current PCP: Ravinder Felton MD  PCP verified by CM? Yes    Chart Reviewed: Yes      History Provided by: Patient  Patient Orientation: Alert and Oriented, Person, Place, Situation, Self    Patient Cognition: Alert    Hospitalization in the last 30 days (Readmission):  No    If yes, Readmission Assessment in CM Navigator will be completed.    Advance Directives:      Code Status: Full Code   Patient's Primary Decision Maker is: Legal Next of Kin      Discharge Planning:    Patient lives with:   Type of Home:    Primary Care Giver: Self  Patient Support Systems include: Spouse/Significant Other, Children   Current Financial resources:    Current community resources:    Current services prior to admission:              Current DME:              Type of Home Care services:       ADLS  Prior functional level: Independent in ADLs/IADLs  Current functional level: Independent in ADLs/IADLs    PT AM-PAC: 24 /24  OT AM-PAC: 24 /24    Family can provide assistance at DC: Yes  Would you like Case Management to discuss the discharge plan with any other family members/significant others,

## 2024-05-14 NOTE — PROGRESS NOTES
Progress Note  Date:2024       Room:W274/W274-01  Patient Name:Radha Malloy     YOB: 1975     Age:48 y.o.        Subjective    Subjective:  Symptoms:  No shortness of breath, malaise, cough, chest pain, weakness, headache, chest pressure, anorexia, diarrhea or anxiety.    Diet:  No nausea or vomiting.       Review of Systems   Respiratory:  Negative for cough and shortness of breath.    Cardiovascular:  Negative for chest pain.   Gastrointestinal:  Negative for anorexia, diarrhea, nausea and vomiting.   Neurological:  Negative for weakness.     Objective         Vitals Last 24 Hours:  TEMPERATURE:  Temp  Av.1 °F (36.7 °C)  Min: 97.5 °F (36.4 °C)  Max: 99 °F (37.2 °C)  RESPIRATIONS RANGE: Resp  Av.2  Min: 16  Max: 18  PULSE OXIMETRY RANGE: SpO2  Av.8 %  Min: 94 %  Max: 100 %  PULSE RANGE: Pulse  Av  Min: 78  Max: 97  BLOOD PRESSURE RANGE: Systolic (24hrs), Av , Min:99 , Max:145   ; Diastolic (24hrs), Av, Min:73, Max:99    I/O (24Hr):    Intake/Output Summary (Last 24 hours) at 2024 1301  Last data filed at 2024 2018  Gross per 24 hour   Intake 1005 ml   Output --   Net 1005 ml     Objective:  General Appearance:  Comfortable, well-appearing and in no acute distress.    Vital signs: (most recent): Blood pressure 99/73, pulse 96, temperature 97.5 °F (36.4 °C), temperature source Oral, resp. rate 18, weight 97.8 kg (215 lb 9.8 oz), SpO2 94 %, not currently breastfeeding.    HEENT: Normal HEENT exam.    Lungs:  There are decreased breath sounds.    Heart: S1 normal and S2 normal.    Abdomen: Abdomen is soft.  Bowel sounds are normal.   There is no epigastric area tenderness.     Pulses: Distal pulses are intact.    Neurological: Patient is alert.    Pupils:  Pupils are equal, round, and reactive to light.    Skin:  Warm.      Labs/Imaging/Diagnostics    Labs:  CBC:  Recent Labs     24  1214 24  0453   WBC 7.8 6.3   RBC 5.84* 4.88   HGB 18.2* 15.1

## 2024-05-14 NOTE — PROGRESS NOTES
[facility]   Facility/Department: [address] TELE  Speech Language Pathology  Initial Speech/Language/Cognitive Assessment    NAME:Radha Malloy  : 1975 (48 y.o.)   [x]   confirmed    MRN: [id number]  ROOM: [place] [id number]  ADMISSION DATE: 2024  PATIENT DIAGNOSIS(ES): Subclavian artery stenosis ([facility]) [I77.1]  Internal carotid artery stenosis, unspecified laterality [I65.29]  Vertebral artery stenosis, unspecified laterality [I65.09]  Acute CVA (cerebrovascular accident) ([facility]) [I63.9]  Altered mental status, unspecified altered mental status type [R41.82]  TIA (transient ischemic attack) [G45.9]  Chief Complaint   Patient presents with    Altered Mental Status     Patient Active Problem List    Diagnosis Date Noted    Hx of CABG 2020    ICD (implantable cardioverter-defibrillator) in place 2022    Acute CVA (cerebrovascular accident) ([facility]) 2024    TIA (transient ischemic attack) 2024    Non compliance with medical treatment 2016    DM type 2 (diabetes mellitus, type 2) ([facility]) 2014    Hypertension     Bipolar disease, manic ([facility])     Depression     Hyperlipidemia     CAD (coronary artery disease) 2013    Family history of premature CAD 2013    Asthma     DM (diabetes mellitus) ([facility])     COPD (chronic obstructive pulmonary disease) ([facility])     Dyspnea     TMJ (dislocation of temporomandibular joint)      Past Medical History:   Diagnosis Date    Asthma     Bipolar disease, manic (HCC)     CAD (coronary artery disease)     COPD (chronic obstructive pulmonary disease) ([facility])     Depression     Diabetes ([facility])     DM (diabetes mellitus) ([facility])     Dyspnea     Hx of CABG 3/31/2020    Hx of CABG 3/31/2020    Hyperlipidemia     Hypertension     Suicide attempt by multiple drug overdose ([facility]) 2009    TMJ (dislocation of temporomandibular joint)      Past Surgical History:   Procedure Laterality Date    APPENDECTOMY  2010    CARDIAC CATHETERIZATION  2013

## 2024-05-14 NOTE — PROGRESS NOTES
1320 Patient here outside of MRI suite. Patient placed into ODO mode of 102 by [employer] representative. Patient attached to monitors. Emotional support given.    1330 Patient taken into actual MRI testing suite. Testing occurring. Patient tolerating.    1340 Testing continues. Patient tolerating testing.     1344 Testing completed. Patient removed from actual MRI suite. Monitors removed. Patient tolerated well. Patient to be placed back into regular pacing mode by [employer] representative.

## 2024-05-14 NOTE — PROGRESS NOTES
[facility]   Facility/Department: [address] TELE  Speech Language Pathology  Clinical Bedside Swallow Evaluation    NAME:Radha Malloy  : 1975 (48 y.o.)   [x]   confirmed    MRN: [id number]  ROOM: [place] [address]  ADMISSION DATE: 2024  PATIENT DIAGNOSIS(ES): Subclavian artery stenosis (HCC) [I77.1]  Internal carotid artery stenosis, unspecified laterality [I65.29]  Vertebral artery stenosis, unspecified laterality [I65.09]  Acute CVA (cerebrovascular accident) ([facility]) [I63.9]  Altered mental status, unspecified altered mental status type [R41.82]  TIA (transient ischemic attack) [G45.9]  Chief Complaint   Patient presents with    Altered Mental Status     Patient Active Problem List    Diagnosis Date Noted    Hx of CABG 2020    ICD (implantable cardioverter-defibrillator) in place 2022    Acute CVA (cerebrovascular accident) ([facility]) 2024    TIA (transient ischemic attack) 2024    Non compliance with medical treatment 2016    DM type 2 (diabetes mellitus, type 2) ([facility]) 2014    Hypertension     Bipolar disease, manic ([facility])     Depression     Hyperlipidemia     CAD (coronary artery disease) 2013    Family history of premature CAD 2013    Asthma     DM (diabetes mellitus) ([facility])     COPD (chronic obstructive pulmonary disease) ([facility])     Dyspnea     TMJ (dislocation of temporomandibular joint)      Past Medical History:   Diagnosis Date    Asthma     Bipolar disease, manic (HCC)     CAD (coronary artery disease)     COPD (chronic obstructive pulmonary disease) ([facility])     Depression     Diabetes (HCC)     DM (diabetes mellitus) ([facility])     Dyspnea     Hx of CABG 3/31/2020    Hx of CABG 3/31/2020    Hyperlipidemia     Hypertension     Suicide attempt by multiple drug overdose ([facility]) 2009    TMJ (dislocation of temporomandibular joint)      Past Surgical History:   Procedure Laterality Date    APPENDECTOMY      CARDIAC CATHETERIZATION  2013    CARDIAC

## 2024-05-15 VITALS
SYSTOLIC BLOOD PRESSURE: 103 MMHG | DIASTOLIC BLOOD PRESSURE: 59 MMHG | TEMPERATURE: 97.9 F | HEART RATE: 89 BPM | BODY MASS INDEX: 34.8 KG/M2 | RESPIRATION RATE: 18 BRPM | WEIGHT: 215.61 LBS | OXYGEN SATURATION: 96 %

## 2024-05-15 LAB
GLUCOSE BLD-MCNC: 103 MG/DL (ref 70–99)
GLUCOSE BLD-MCNC: 137 MG/DL (ref 70–99)
PERFORMED ON: ABNORMAL
PERFORMED ON: ABNORMAL
PERFORMED ON: NORMAL
POC CREATININE: 0.8 MG/DL (ref 0.6–1.2)
POC SAMPLE TYPE: NORMAL

## 2024-05-15 PROCEDURE — 6360000002 HC RX W HCPCS: Performed by: INTERNAL MEDICINE

## 2024-05-15 PROCEDURE — G0378 HOSPITAL OBSERVATION PER HR: HCPCS

## 2024-05-15 PROCEDURE — 6370000000 HC RX 637 (ALT 250 FOR IP): Performed by: INTERNAL MEDICINE

## 2024-05-15 PROCEDURE — 96372 THER/PROPH/DIAG INJ SC/IM: CPT

## 2024-05-15 PROCEDURE — 95816 EEG AWAKE AND DROWSY: CPT

## 2024-05-15 RX ORDER — INSULIN LISPRO 100 [IU]/ML
0-8 INJECTION, SOLUTION INTRAVENOUS; SUBCUTANEOUS
Status: DISCONTINUED | OUTPATIENT
Start: 2024-05-15 | End: 2024-05-15 | Stop reason: HOSPADM

## 2024-05-15 RX ORDER — INSULIN LISPRO 100 [IU]/ML
0-4 INJECTION, SOLUTION INTRAVENOUS; SUBCUTANEOUS NIGHTLY
Status: DISCONTINUED | OUTPATIENT
Start: 2024-05-15 | End: 2024-05-15 | Stop reason: HOSPADM

## 2024-05-15 RX ORDER — HYDROXYZINE HYDROCHLORIDE 10 MG/1
25 TABLET, FILM COATED ORAL 3 TIMES DAILY PRN
Status: DISCONTINUED | OUTPATIENT
Start: 2024-05-15 | End: 2024-05-15 | Stop reason: HOSPADM

## 2024-05-15 RX ADMIN — ENOXAPARIN SODIUM 40 MG: 100 INJECTION SUBCUTANEOUS at 09:14

## 2024-05-15 RX ADMIN — TICAGRELOR 90 MG: 90 TABLET ORAL at 09:14

## 2024-05-15 RX ADMIN — CARVEDILOL 6.25 MG: 12.5 TABLET, FILM COATED ORAL at 09:15

## 2024-05-15 RX ADMIN — HYDROXYZINE HYDROCHLORIDE 25 MG: 10 TABLET ORAL at 10:11

## 2024-05-15 RX ADMIN — ISOSORBIDE MONONITRATE 60 MG: 60 TABLET, EXTENDED RELEASE ORAL at 09:15

## 2024-05-15 RX ADMIN — CARBAMAZEPINE 300 MG: 300 CAPSULE, EXTENDED RELEASE ORAL at 09:14

## 2024-05-15 RX ADMIN — PAROXETINE 10 MG: 10 TABLET, FILM COATED ORAL at 09:14

## 2024-05-15 RX ADMIN — LOSARTAN POTASSIUM 25 MG: 25 TABLET, FILM COATED ORAL at 09:14

## 2024-05-15 RX ADMIN — TRAZODONE HYDROCHLORIDE 150 MG: 150 TABLET ORAL at 09:14

## 2024-05-15 RX ADMIN — ASPIRIN 81 MG 81 MG: 81 TABLET ORAL at 09:15

## 2024-05-15 ASSESSMENT — ENCOUNTER SYMPTOMS
COUGH: 0
DIARRHEA: 0
SHORTNESS OF BREATH: 0
VOMITING: 0

## 2024-05-15 NOTE — FLOWSHEET NOTE
Pt resting in bed did not wake up during report. .Electronically signed by Gabriella Grissom LPN on 5/15/2024 at 7:56 AM  Pt has been resting in bed most of the day . Discharge instruction reviewed with Pt and IV's were removed.  And pt going down with transport where her daughter is waiting.Electronically signed by Gabriella Grissom LPN on 5/15/2024 at 3:03 PM

## 2024-05-15 NOTE — PROCEDURES
[facility]                   [address] [place] [address]                          ELECTROENCEPHALOGRAM      PATIENT NAME: BREANNE DEE              : 1975  MED REC NO: [id number]                        ROOM: W274  ACCOUNT NO: [id number]                       ADMIT DATE: 2024  PROVIDER: Sarah Basilio MD      REASON FOR STUDY:  The patient had change in mental status.    She has a history of strokes in the past.    TECHNIQUE:  This is a 20-channel EEG.  The patient has a background of 8-9 hertz alpha activity, which is moderately well developed and moderately well organized.  Cardiac monitor shows a heart rate of 100 beats per minute and is regular.  Artifacts appear during the record due to the patient's movements, muscle activity, electrical interference, and electrode artifacts.    On hyperventilation, no significant abnormalities were elicited during or after the hyperventilation.  On photic stimulation, good photic responses seen.  No epileptic discharges were seen during or after the photic stimulation.  During sleep, slowing of the background activity appears.    IMPRESSION:  This is an essentially normal awake, drowsy, and asleep EEG.  No epileptic discharges were seen during the record.  If clinically indicated, we may repeat the study with sleep deprivation to see if any further changes have developed.  Clinical correlation should be made.          SARAH BASILIO MD      D:  05/15/2024 16:31:22     T:  05/15/2024 18:23:11     PASQUALE/GENE  Job #:  [id number]     Doc#:  [id number]

## 2024-05-15 NOTE — PROGRESS NOTES
Progress Note  Date:5/15/2024       Room:W274/W274-01  Patient Name:Radha Malloy     YOB: 1975     Age:48 y.o.        Subjective    Subjective:  Symptoms:  No shortness of breath, malaise, cough, chest pain, weakness, headache, chest pressure, anorexia, diarrhea or anxiety.    Diet:  No nausea or vomiting.       Review of Systems   Respiratory:  Negative for cough and shortness of breath.    Cardiovascular:  Negative for chest pain.   Gastrointestinal:  Negative for anorexia, diarrhea, nausea and vomiting.   Neurological:  Negative for weakness.     Objective         Vitals Last 24 Hours:  TEMPERATURE:  Temp  Av.8 °F (36.6 °C)  Min: 97.7 °F (36.5 °C)  Max: 97.9 °F (36.6 °C)  RESPIRATIONS RANGE: Resp  Av  Min: 18  Max: 18  PULSE OXIMETRY RANGE: SpO2  Av %  Min: 96 %  Max: 98 %  PULSE RANGE: Pulse  Av.7  Min: 87  Max: 90  BLOOD PRESSURE RANGE: Systolic (24hrs), Av , Min:100 , Max:111   ; Diastolic (24hrs), Av, Min:59, Max:73    I/O (24Hr):  No intake or output data in the 24 hours ending 05/15/24 1719    Objective:  General Appearance:  Comfortable, well-appearing and in no acute distress.    Vital signs: (most recent): Blood pressure (!) 103/59, pulse 89, temperature 97.9 °F (36.6 °C), temperature source Oral, resp. rate 18, weight 97.8 kg (215 lb 9.8 oz), SpO2 96 %, not currently breastfeeding.    HEENT: Normal HEENT exam.    Lungs:  There are decreased breath sounds.    Heart: S1 normal and S2 normal.    Abdomen: Abdomen is soft.  Bowel sounds are normal.   There is no epigastric area tenderness.     Pulses: Distal pulses are intact.    Neurological: Patient is alert.    Pupils:  Pupils are equal, round, and reactive to light.    Skin:  Warm.      Labs/Imaging/Diagnostics    Labs:  CBC:  Recent Labs     24  1214 24  0453   WBC 7.8 6.3   RBC 5.84* 4.88   HGB 18.2* 15.1   HCT 55.8* 44.5   MCV 95.5* 91.2   RDW 13.2 13.0    265       CHEMISTRIES:  Recent

## 2024-05-17 ENCOUNTER — TELEMEDICINE (OUTPATIENT)
Dept: PHARMACY | Facility: HOSPITAL | Age: 49
End: 2024-05-17
Payer: COMMERCIAL

## 2024-05-17 DIAGNOSIS — E11.9 TYPE 2 DIABETES MELLITUS WITHOUT COMPLICATION, WITH LONG-TERM CURRENT USE OF INSULIN (MULTI): Primary | ICD-10-CM

## 2024-05-17 DIAGNOSIS — Z79.4 TYPE 2 DIABETES MELLITUS WITHOUT COMPLICATION, WITH LONG-TERM CURRENT USE OF INSULIN (MULTI): Primary | ICD-10-CM

## 2024-05-17 NOTE — ASSESSMENT & PLAN NOTE
ASSESSMENT:  -Patient's last A1c is well controlled at 6.6%, much improved from last A1c of 7.4% back in May  -Patient's BG has shown improvement since starting Ozempic; has tolerated so far, now within target of FBG <130 and PPG <180  -Will hold off on tapering insulin for now, as patient has had spikes after stopping Novolog     RECOMMENDATIONS/PLAN:  CONTINUE all diabetes medications as prescribed:  a. Metformin  m tablets BID        b. Ozempic 1 mg mg once weekly on .          c. Lantus 50 units at bedtime        d. Novolog TID per sliding scale (on average injecting 6-8 units); max dose of 30 units per day (refills sent to pharmacy)

## 2024-05-17 NOTE — PROGRESS NOTES
"Pharmacy Post-Discharge Follow-Up Visit  Tana Bahena \"Perlita\" is a 48 y.o. female was referred to Clinical Pharmacy Team to complete a post-discharge medication optimization and monitoring visit.  The patient was referred for their Diabetes.    Admission Date: 5/12/23  Discharge Date: 5/16/23    Referring Provider: Jacobo Thakkar MD    Subjective   Allergies   Allergen Reactions    Ketorolac Unknown    Tramadol Unknown    Adhesive Tape-Silicones Other       [id number] - [facility] [address] [facility]  [address] [facility] [address]  Phone: [phone] Fax: [phone]    [facility]  [address], [address]  [facility] [address]  Phone: [phone] Fax: [phone]      Social History     Social History Narrative    Not on file      Patient presents to Clinical Pharmacy team for follow-up after last visit on 3/4/24. At last visit, patient was continued on all diabetes medications as prescribed.      Patient reports that since last visit her BG has continued to do well, around the 100-120s in the morning and at bedtime. Denies any s/sx of hypoglycemia. Most recent A1c is well controlled at 6.6%, now at goal of <7%.     Of note, she does not feel when her blood sugar drops <70 mg/dL.     Objective     There were no vitals taken for this visit.     LAB  Lab Results   Component Value Date    BILITOT 0.3 06/29/2023    CALCIUM 9.0 08/03/2023    CO2 25 08/03/2023     08/03/2023    CREATININE 0.72 11/20/2023    GLUCOSE 97 08/03/2023    ALKPHOS 91 06/29/2023    K 3.5 08/03/2023    PROT 6.9 06/29/2023     08/03/2023    AST 11 06/29/2023    ALT 15 06/29/2023    BUN 13 08/03/2023    ANIONGAP 14 08/03/2023    MG 1.78 06/30/2023    PHOS 3.4 05/02/2023    ALBUMIN 4.0 06/29/2023    LIPASE 15 06/29/2023    EGFR >90 11/20/2023     Lab Results   Component Value Date    TRIG 118 12/19/2022    CHOL 154 12/19/2022    HDL 51.1 12/19/2022     Lab Results   Component " Value Date    HGBA1C 7.6 (H) 05/14/2024         Current Outpatient Medications on File Prior to Visit   Medication Sig Dispense Refill    albuterol 90 mcg/actuation inhaler Inhale 2 puffs every 8 hours if needed for wheezing.      alcohol swabs (Alcohol Prep Pads) pads, medicated USE TO CLEANSE SKIN PRIOR TO INJECTION FOUR TIMES DAILY AS DIRECTED 200 each 3    aspirin 81 mg EC tablet Take 1 tablet (81 mg) by mouth once daily.      atorvastatin (Lipitor) 40 mg tablet Take 1 tablet (40 mg) by mouth once daily.      blood-glucose sensor (Dexcom G7 Sensor) device Use as directed to test blood glucose continuously. Replace every 10 days. 3 each 3    budesonide-formoteroL (Symbicort) 80-4.5 mcg/actuation inhaler Inhale 2 puffs 2 times a day. Rinse mouth after use 10.2 g 3    carBAMazepine (Carbatrol) 200 mg 12 hr capsule Take 1 capsule (200 mg) by mouth twice a day.      carvedilol (Coreg) 6.25 mg tablet Take 1 tablet (6.25 mg) by mouth 2 times a day with meals. 60 tablet 1    cyclobenzaprine (Flexeril) 10 mg tablet Take 1 tablet (10 mg) by mouth 3 times a day as needed for muscle spasms. 60 tablet 2    Dexcom G4 platinum  (Dexcom G7 ) misc Use as instructed to test blood sugar continuously 1 each 0    Dexcom G4 platinum transmitter (Dexcom G6 Transmitter) device Use as instructed to test blood sugar 4 times daily. Replace every 90 days. 1 each 3    Dexcom G4 platinum transmitter (Dexcom G6 Transmitter) device Use as instructed  New order as old machine was faulty. 1 each 0    fluticasone propion-salmeteroL (Advair Diskus) 250-50 mcg/dose diskus inhaler Inhale 1 puff 2 times a day. Rinse mouth with water after use to reduce aftertaste and incidence of candidiasis. Do not swallow. 2 each 3    FreeStyle Jayy 3 Irrigon misc USE AS INSTRUCTED FOR CONTINUOUS BLOOD GLUCOSE MONITORING 1 each 0    FreeStyle Jayy 3 Sensor device As directed 1 each 3    gabapentin (Neurontin) 100 mg capsule Take 1 capsule (100 mg)  by mouth 3 times a day. For 90 days 90 capsule 0    hydrOXYzine HCL (Atarax) 25 mg tablet Take 1 tablet (25 mg) by mouth once daily at bedtime. 30 tablet 0    insulin aspart (NovoLOG Flexpen U-100 Insulin) 100 unit/mL (3 mL) pen Inject 6-8 Units under the skin 3 times a day before meals. Per sliding scale. Max 30 units per day. 10 mL 2    insulin lispro (HumaLOG KwikPen Insulin) 100 unit/mL injection Inject under the skin 3 times a day with meals. Take as directed per insulin instructions.      isosorbide mononitrate ER (Imdur) 60 mg 24 hr tablet Take 1 tablet (60 mg) by mouth once daily. In the morning 30 tablet 2    Jardiance 10 mg TAKE 1 TABLET BY MOUTH EVERY DAY 30 tablet 0    Lantus Solostar U-100 Insulin 100 unit/mL (3 mL) pen Inject 52 Units under the skin once daily at bedtime. Take as directed per insulin instructions. (Patient taking differently: Inject 50 Units under the skin once daily at bedtime. Take as directed per insulin instructions.) 15 mL 1    losartan (Cozaar) 25 mg tablet Take 1 tablet (25 mg) by mouth once daily.      metFORMIN  mg 24 hr tablet Take 2 tablets (1,000 mg) by mouth 2 times a day. 120 tablet 3    methylPREDNISolone (Medrol Dospak) 4 mg tablets use as directed FOLLOW DIRECTIONS ON BACK OF FOIL PACK 21 tablet 0    mirtazapine (Remeron) 45 mg tablet Take 1 tablet (45 mg) by mouth once daily at bedtime. 30 tablet 3    nitroglycerin (Nitrostat) 0.4 mg SL tablet Place 1 tablet (0.4 mg) under the tongue every 5 minutes if needed. PLACE 1 TABLET UNDER THE TONGUE EVERY 5 MINUTES FOR UP TO 3 DOSES AS NEEDED FOR CHEST PAIN.CALL 911 IF PAIN PERSISTS.      omeprazole (PriLOSEC) 40 mg DR capsule Take 1 capsule (40 mg) by mouth once daily in the morning. Take before meals. Do not crush or chew. 30 capsule 11    OneTouch Delica Plus Lancet 33 gauge misc Test once 4 times daily as needed 400 each 3    OneTouch Verio Flex Start kit Use as needed for glucose testing 1 each 0    OneTouch Verio  "test strips strip Test glucose 4 times daily 400 strip 3    pen needle, diabetic (BD Breana 2nd Gen Pen Needle) 32 gauge x 5/32\" needle USE TO ADMINISTER INSULIN 4 TIMES A  each 3    QUEtiapine (SEROquel) 100 mg tablet Take 1 tablet (100 mg) by mouth once daily at bedtime. 30 tablet 1    ranolazine (Ranexa) 500 mg 12 hr tablet       rOPINIRole (Requip) 0.25 mg tablet Take 1 tablet (0.25 mg) by mouth 3 times a day. 180 tablet 1    semaglutide (Ozempic) 1 mg/dose (4 mg/3 mL) pen injector Inject 1 mg under the skin every 7 days. 3 mL 2    ticagrelor (Brilinta) 90 mg tablet Take 1 tablet (90 mg) by mouth 2 times a day. 60 tablet 2    traZODone (Desyrel) 100 mg tablet Take 4 tablets (400 mg) by mouth once daily at bedtime. 120 tablet 0    triamterene-hydrochlorothiazid (Maxzide-25) 37.5-25 mg tablet Take 1 tablet by mouth once daily.      True Metrix Glucose Meter misc USE TO TEST BLOOD SUGAR ONCE DAILY AND AS NEEDED      zolpidem (Ambien) 10 mg tablet Take 1 tablet (10 mg) by mouth as needed at bedtime for sleep for up to 10 days. 10 tablet 0     Current Facility-Administered Medications on File Prior to Visit   Medication Dose Route Frequency Provider Last Rate Last Admin    [DISCONTINUED] acetaminophen (Tylenol) tablet  650 mg oral  External Data Provider Generic        [DISCONTINUED] carBAMazepine (Carbatrol) 12 hr capsule  300 mg oral  External Data Provider Generic        [DISCONTINUED] carvedilol (Coreg) tablet  6.25 mg oral  External Data Provider Generic        [DISCONTINUED] dextrose 10 % in water (D10W) infusion   intravenous  External Data Provider Generic        [DISCONTINUED] GENERIC EXTERNAL MEDICATION     External Data Provider Generic        [DISCONTINUED] GENERIC EXTERNAL MEDICATION     External Data Provider Generic        [DISCONTINUED] GENERIC EXTERNAL MEDICATION     External Data Provider Generic        [DISCONTINUED] glucagon (Glucagen) injection  1 mg subcutaneous  External Data Provider " Generic        [DISCONTINUED] glucose chewable tablet  4 tablet oral  External Data Provider Generic        [DISCONTINUED] hydrOXYzine HCL (Atarax) tablet  25 mg oral  External Data Provider Generic        [DISCONTINUED] insulin glargine (Lantus) injection  50 Units subcutaneous  External Data Provider Generic        [DISCONTINUED] insulin lispro (HumaLOG) injection  0-8 Units subcutaneous  External Data Provider Generic        [DISCONTINUED] insulin lispro (HumaLOG) injection  0-4 Units subcutaneous  External Data Provider Generic        [DISCONTINUED] isosorbide mononitrate ER (Imdur) 24 hr tablet  60 mg oral  External Data Provider Generic        [DISCONTINUED] losartan (Cozaar) tablet  25 mg oral  External Data Provider Generic        [DISCONTINUED] mirtazapine (Remeron Sol-Tab) disintegrating tablet  45 mg oral  External Data Provider Generic        [DISCONTINUED] PARoxetine (Paxil) tablet  10 mg oral  External Data Provider Generic        [DISCONTINUED] QUEtiapine (SEROquel) tablet  100 mg oral  External Data Provider Generic        [DISCONTINUED] ticagrelor (Brilinta) tablet  90 mg oral  External Data Provider Generic        [DISCONTINUED] traZODone (Desyrel) tablet  150 mg oral BID External Data Provider Generic          Assessment/Plan   Problem List Items Addressed This Visit       Type 2 diabetes mellitus without complication, with long-term current use of insulin (Multi) - Primary     ASSESSMENT:  -Patient's last A1c is well controlled at 6.6%, much improved from last A1c of 7.4% back in May  -Patient's BG has shown improvement since starting Ozempic; has tolerated so far, now within target of FBG <130 and PPG <180  -Will hold off on tapering insulin for now, as patient has had spikes after stopping Novolog     RECOMMENDATIONS/PLAN:  CONTINUE all diabetes medications as prescribed:  a. Metformin  m tablets BID        b. Ozempic 1 mg mg once weekly on .          c. Lantus 50 units at  bedtime        d. Novolog TID per sliding scale (on average injecting 6-8 units); max dose of 30 units per day (refills sent to pharmacy)          Clinical Pharmacist follow up: ~3 months or sooner as needed based on clinical intervention  Type of Encounter:  Telephone    Jackeline Potts PharmD  Clinical Ambulatory Care Pharmacist  Ph: [phone]    Verbal consent to manage patient's drug therapy was obtained from the patient. They were informed they may decline to participate or withdraw from participation in pharmacy services at any time.

## 2024-05-19 LAB
BACTERIA BLD CULT ORG #2: NORMAL
BACTERIA BLD CULT: NORMAL

## 2024-05-22 ENCOUNTER — HOSPITAL ENCOUNTER (OUTPATIENT)
Dept: CARDIOLOGY | Age: 49
Discharge: HOME | End: 2024-05-22
Payer: COMMERCIAL

## 2024-05-22 DIAGNOSIS — Z95.810 AICD (AUTOMATIC CARDIOVERTER/DEFIBRILLATOR) PRESENT: Primary | ICD-10-CM

## 2024-05-22 PROCEDURE — 93296 REM INTERROG EVL PM/IDS: CPT

## 2024-05-24 ENCOUNTER — PATIENT MESSAGE (OUTPATIENT)
Dept: PRIMARY CARE | Facility: CLINIC | Age: 49
End: 2024-05-24
Payer: COMMERCIAL

## 2024-05-24 DIAGNOSIS — Z79.4 TYPE 2 DIABETES MELLITUS WITHOUT COMPLICATION, WITH LONG-TERM CURRENT USE OF INSULIN (MULTI): Primary | ICD-10-CM

## 2024-05-24 DIAGNOSIS — E11.9 TYPE 2 DIABETES MELLITUS WITHOUT COMPLICATION, WITH LONG-TERM CURRENT USE OF INSULIN (MULTI): ICD-10-CM

## 2024-05-24 DIAGNOSIS — E11.9 TYPE 2 DIABETES MELLITUS WITHOUT COMPLICATION, WITH LONG-TERM CURRENT USE OF INSULIN (MULTI): Primary | ICD-10-CM

## 2024-05-24 DIAGNOSIS — G56.90 NEUROPATHY OF UPPER EXTREMITY, UNSPECIFIED LATERALITY: ICD-10-CM

## 2024-05-24 DIAGNOSIS — I10 PRIMARY HYPERTENSION: ICD-10-CM

## 2024-05-24 DIAGNOSIS — G25.81 RESTLESS LEG: ICD-10-CM

## 2024-05-24 DIAGNOSIS — Z79.4 TYPE 2 DIABETES MELLITUS WITHOUT COMPLICATION, WITH LONG-TERM CURRENT USE OF INSULIN (MULTI): ICD-10-CM

## 2024-05-24 RX ORDER — ROPINIROLE 0.25 MG/1
0.25 TABLET, FILM COATED ORAL 3 TIMES DAILY
Qty: 180 TABLET | Refills: 1 | Status: SHIPPED | OUTPATIENT
Start: 2024-05-24

## 2024-05-24 RX ORDER — GABAPENTIN 100 MG/1
100 CAPSULE ORAL 3 TIMES DAILY
Qty: 90 CAPSULE | Refills: 0 | Status: SHIPPED | OUTPATIENT
Start: 2024-05-24 | End: 2024-05-28

## 2024-05-24 RX ORDER — CARVEDILOL 6.25 MG/1
6.25 TABLET ORAL
Qty: 60 TABLET | Refills: 1 | Status: SHIPPED | OUTPATIENT
Start: 2024-05-24

## 2024-05-24 RX ORDER — LANCETS 33 GAUGE
EACH MISCELLANEOUS
Qty: 400 EACH | Refills: 3 | Status: SHIPPED | OUTPATIENT
Start: 2024-05-24

## 2024-05-24 NOTE — TELEPHONE ENCOUNTER
Rx Refill Request     Name: Tana Bahena  :  1975   Medication Name:  rOPINIRole (Requip) 0.25 mg tablet      gabapentin (Neurontin) 100 mg capsule      carvedilol (Coreg) 6.25 mg tablet     Specific Pharmacy location:  [id number] [place], [place]   Date of last appointment:  2023   Date of next appointment:  2024   Best number to reach patient:  [phone]

## 2024-05-26 DIAGNOSIS — F51.01 PRIMARY INSOMNIA: ICD-10-CM

## 2024-05-26 DIAGNOSIS — G56.90 NEUROPATHY OF UPPER EXTREMITY, UNSPECIFIED LATERALITY: ICD-10-CM

## 2024-05-26 DIAGNOSIS — I25.10 CORONARY ARTERY DISEASE INVOLVING NATIVE CORONARY ARTERY OF NATIVE HEART WITHOUT ANGINA PECTORIS: ICD-10-CM

## 2024-05-26 DIAGNOSIS — Z79.4 TYPE 2 DIABETES MELLITUS WITHOUT COMPLICATION, WITH LONG-TERM CURRENT USE OF INSULIN (MULTI): ICD-10-CM

## 2024-05-26 DIAGNOSIS — E11.9 TYPE 2 DIABETES MELLITUS WITHOUT COMPLICATION, WITH LONG-TERM CURRENT USE OF INSULIN (MULTI): ICD-10-CM

## 2024-05-28 DIAGNOSIS — F51.01 PRIMARY INSOMNIA: ICD-10-CM

## 2024-05-28 RX ORDER — LANCETS
EACH MISCELLANEOUS
Qty: 100 EACH | Refills: 3 | Status: SHIPPED | OUTPATIENT
Start: 2024-05-28

## 2024-05-28 RX ORDER — TRAZODONE HYDROCHLORIDE 100 MG/1
TABLET ORAL
Qty: 120 TABLET | Refills: 0 | Status: SHIPPED | OUTPATIENT
Start: 2024-05-28

## 2024-05-28 RX ORDER — GABAPENTIN 100 MG/1
100 CAPSULE ORAL 3 TIMES DAILY
Qty: 90 CAPSULE | Refills: 0 | Status: SHIPPED | OUTPATIENT
Start: 2024-05-28

## 2024-05-28 RX ORDER — INSULIN ASPART 100 [IU]/ML
INJECTION, SOLUTION INTRAVENOUS; SUBCUTANEOUS
Qty: 9 ML | Refills: 3 | Status: SHIPPED | OUTPATIENT
Start: 2024-05-28

## 2024-05-28 RX ORDER — ISOSORBIDE MONONITRATE 60 MG/1
60 TABLET, EXTENDED RELEASE ORAL
Qty: 30 TABLET | Refills: 2 | Status: SHIPPED | OUTPATIENT
Start: 2024-05-28

## 2024-05-28 RX ORDER — HYDROXYZINE HYDROCHLORIDE 25 MG/1
25 TABLET, FILM COATED ORAL NIGHTLY
Qty: 30 TABLET | Refills: 0 | Status: SHIPPED | OUTPATIENT
Start: 2024-05-28 | End: 2024-06-27

## 2024-05-28 NOTE — TELEPHONE ENCOUNTER
Rx Refill Request     Name: Tana DAO Shruti  :  1975     Date of last appointment:  2023   Date of next appointment:  2024   Best number to reach patient:  [phone]

## 2024-05-28 NOTE — TELEPHONE ENCOUNTER
Rx Refill Request     Name: Tana FELIBERTO Bahena  :  1975   Medication Name:  hydroxyzine   Specific Pharmacy location:  [facility]   Date of last appointment:  23  Date of next appointment:  24  Best number to reach patient:  [phone]

## 2024-05-31 ENCOUNTER — APPOINTMENT (OUTPATIENT)
Dept: CT IMAGING | Age: 49
End: 2024-05-31
Payer: COMMERCIAL

## 2024-05-31 ENCOUNTER — APPOINTMENT (OUTPATIENT)
Dept: GENERAL RADIOLOGY | Age: 49
End: 2024-05-31
Payer: COMMERCIAL

## 2024-05-31 ENCOUNTER — HOSPITAL ENCOUNTER (OUTPATIENT)
Age: 49
Setting detail: OBSERVATION
Discharge: HOME | End: 2024-06-01
Attending: INTERNAL MEDICINE | Admitting: INTERNAL MEDICINE
Payer: COMMERCIAL

## 2024-05-31 DIAGNOSIS — G47.33 OSA (OBSTRUCTIVE SLEEP APNEA): ICD-10-CM

## 2024-05-31 DIAGNOSIS — R41.82 ALTERED MENTAL STATUS, UNSPECIFIED ALTERED MENTAL STATUS TYPE: Primary | ICD-10-CM

## 2024-05-31 DIAGNOSIS — R41.0 DISORIENTATION: ICD-10-CM

## 2024-05-31 PROBLEM — G92.9 TOXIC ENCEPHALOPATHY: Status: ACTIVE | Noted: 2024-05-31

## 2024-05-31 LAB
ALBUMIN SERPL-MCNC: 4.3 G/DL (ref 3.5–4.6)
ALP SERPL-CCNC: 104 U/L (ref 40–130)
ALT SERPL-CCNC: 15 U/L (ref 0–33)
AMPHET UR QL SCN: ABNORMAL
ANION GAP SERPL CALCULATED.3IONS-SCNC: 14 MEQ/L (ref 9–15)
AST SERPL-CCNC: 15 U/L (ref 0–35)
BARBITURATES UR QL SCN: ABNORMAL
BASOPHILS # BLD: 0.1 K/UL (ref 0–0.2)
BASOPHILS NFR BLD: 0.6 %
BENZODIAZ UR QL SCN: ABNORMAL
BILIRUB SERPL-MCNC: <0.2 MG/DL (ref 0.2–0.7)
BILIRUB UR QL STRIP: NEGATIVE
BUN SERPL-MCNC: 7 MG/DL (ref 6–20)
CALCIUM SERPL-MCNC: 9.5 MG/DL (ref 8.5–9.9)
CANNABINOIDS UR QL SCN: POSITIVE
CARBAMAZEPINE SERPL-MCNC: <2 UG/ML (ref 4–10)
CHLORIDE SERPL-SCNC: 104 MEQ/L (ref 95–107)
CLARITY UR: CLEAR
CO2 SERPL-SCNC: 22 MEQ/L (ref 20–31)
COCAINE UR QL SCN: ABNORMAL
COLOR UR: YELLOW
CREAT SERPL-MCNC: 0.65 MG/DL (ref 0.5–0.9)
DRUG SCREEN COMMENT UR-IMP: ABNORMAL
EOSINOPHIL # BLD: 0.1 K/UL (ref 0–0.7)
EOSINOPHIL NFR BLD: 0.9 %
ERYTHROCYTE [DISTWIDTH] IN BLOOD BY AUTOMATED COUNT: 12.8 % (ref 11.5–14.5)
ETHANOL PERCENT: NORMAL G/DL
ETHANOLAMINE SERPL-MCNC: <10 MG/DL (ref 0–0.08)
FENTANYL SCREEN, URINE: ABNORMAL
GLOBULIN SER CALC-MCNC: 3.7 G/DL (ref 2.3–3.5)
GLUCOSE BLD-MCNC: 115 MG/DL (ref 70–99)
GLUCOSE BLD-MCNC: 117 MG/DL (ref 70–99)
GLUCOSE BLD-MCNC: 124 MG/DL (ref 70–99)
GLUCOSE BLD-MCNC: 193 MG/DL (ref 70–99)
GLUCOSE SERPL-MCNC: 172 MG/DL (ref 70–99)
GLUCOSE UR STRIP-MCNC: NEGATIVE MG/DL
HCG, URINE, POC: NEGATIVE
HCT VFR BLD AUTO: 56.3 % (ref 37–47)
HGB BLD-MCNC: 19.2 G/DL (ref 12–16)
HGB UR QL STRIP: NEGATIVE
KETONES UR STRIP-MCNC: NEGATIVE MG/DL
LACTATE BLDV-SCNC: 2.9 MMOL/L (ref 0.5–2.2)
LEUKOCYTE ESTERASE UR QL STRIP: NEGATIVE
LYMPHOCYTES # BLD: 1.8 K/UL (ref 1–4.8)
LYMPHOCYTES NFR BLD: 15.1 %
Lab: NORMAL
MCH RBC QN AUTO: 31.4 PG (ref 27–31.3)
MCHC RBC AUTO-ENTMCNC: 34.1 % (ref 33–37)
MCV RBC AUTO: 92 FL (ref 79.4–94.8)
METHADONE UR QL SCN: ABNORMAL
MONOCYTES # BLD: 0.4 K/UL (ref 0.2–0.8)
MONOCYTES NFR BLD: 3.3 %
NEGATIVE QC PASS/FAIL: NORMAL
NEUTROPHILS # BLD: 9.6 K/UL (ref 1.4–6.5)
NEUTS SEG NFR BLD: 79.7 %
NITRITE UR QL STRIP: NEGATIVE
OPIATES UR QL SCN: ABNORMAL
OXYCODONE UR QL SCN: ABNORMAL
PCP UR QL SCN: ABNORMAL
PERFORMED ON: ABNORMAL
PH UR STRIP: 7.5 [PH] (ref 5–9)
PLATELET # BLD AUTO: 301 K/UL (ref 130–400)
POSITIVE QC PASS/FAIL: NORMAL
POTASSIUM SERPL-SCNC: 4.8 MEQ/L (ref 3.4–4.9)
PROPOXYPH UR QL SCN: ABNORMAL
PROT SERPL-MCNC: 8 G/DL (ref 6.3–8)
PROT UR STRIP-MCNC: NEGATIVE MG/DL
RBC # BLD AUTO: 6.12 M/UL (ref 4.2–5.4)
SODIUM SERPL-SCNC: 140 MEQ/L (ref 135–144)
SP GR UR STRIP: 1.01 (ref 1–1.03)
TROPONIN, HIGH SENSITIVITY: 7 NG/L (ref 0–19)
TROPONIN, HIGH SENSITIVITY: <6 NG/L (ref 0–19)
URINE REFLEX TO CULTURE: NORMAL
UROBILINOGEN UR STRIP-ACNC: 0.2 E.U./DL
WBC # BLD AUTO: 12 K/UL (ref 4.8–10.8)

## 2024-05-31 PROCEDURE — 70450 CT HEAD/BRAIN W/O DYE: CPT

## 2024-05-31 PROCEDURE — 83605 ASSAY OF LACTIC ACID: CPT

## 2024-05-31 PROCEDURE — 36415 COLL VENOUS BLD VENIPUNCTURE: CPT

## 2024-05-31 PROCEDURE — 6370000000 HC RX 637 (ALT 250 FOR IP): Performed by: INTERNAL MEDICINE

## 2024-05-31 PROCEDURE — G0378 HOSPITAL OBSERVATION PER HR: HCPCS

## 2024-05-31 PROCEDURE — 2580000003 HC RX 258: Performed by: INTERNAL MEDICINE

## 2024-05-31 PROCEDURE — 99285 EMERGENCY DEPT VISIT HI MDM: CPT

## 2024-05-31 PROCEDURE — 80307 DRUG TEST PRSMV CHEM ANLYZR: CPT

## 2024-05-31 PROCEDURE — 93005 ELECTROCARDIOGRAM TRACING: CPT | Performed by: PHYSICIAN ASSISTANT

## 2024-05-31 PROCEDURE — 81003 URINALYSIS AUTO W/O SCOPE: CPT

## 2024-05-31 PROCEDURE — 6360000002 HC RX W HCPCS: Performed by: PHYSICIAN ASSISTANT

## 2024-05-31 PROCEDURE — 6370000000 HC RX 637 (ALT 250 FOR IP): Performed by: PHYSICIAN ASSISTANT

## 2024-05-31 PROCEDURE — 85025 COMPLETE CBC W/AUTO DIFF WBC: CPT

## 2024-05-31 PROCEDURE — 96372 THER/PROPH/DIAG INJ SC/IM: CPT

## 2024-05-31 PROCEDURE — 82077 ASSAY SPEC XCP UR&BREATH IA: CPT

## 2024-05-31 PROCEDURE — 84484 ASSAY OF TROPONIN QUANT: CPT

## 2024-05-31 PROCEDURE — 80156 ASSAY CARBAMAZEPINE TOTAL: CPT

## 2024-05-31 PROCEDURE — 6360000002 HC RX W HCPCS: Performed by: INTERNAL MEDICINE

## 2024-05-31 PROCEDURE — 71045 X-RAY EXAM CHEST 1 VIEW: CPT

## 2024-05-31 PROCEDURE — 80053 COMPREHEN METABOLIC PANEL: CPT

## 2024-05-31 RX ORDER — HYDROXYZINE HYDROCHLORIDE 25 MG/1
25 TABLET, FILM COATED ORAL
Status: ON HOLD | COMMUNITY
End: 2024-06-01 | Stop reason: HOSPADM

## 2024-05-31 RX ORDER — POTASSIUM CHLORIDE 20 MEQ/1
40 TABLET, EXTENDED RELEASE ORAL PRN
Status: DISCONTINUED | OUTPATIENT
Start: 2024-05-31 | End: 2024-06-01 | Stop reason: HOSPADM

## 2024-05-31 RX ORDER — LORAZEPAM 2 MG/ML
2 INJECTION INTRAMUSCULAR ONCE
Status: COMPLETED | OUTPATIENT
Start: 2024-05-31 | End: 2024-05-31

## 2024-05-31 RX ORDER — ONDANSETRON 4 MG/1
4 TABLET, ORALLY DISINTEGRATING ORAL EVERY 8 HOURS PRN
Status: DISCONTINUED | OUTPATIENT
Start: 2024-05-31 | End: 2024-06-01 | Stop reason: HOSPADM

## 2024-05-31 RX ORDER — INSULIN LISPRO 100 [IU]/ML
0-4 INJECTION, SOLUTION INTRAVENOUS; SUBCUTANEOUS NIGHTLY
Status: DISCONTINUED | OUTPATIENT
Start: 2024-05-31 | End: 2024-06-01 | Stop reason: HOSPADM

## 2024-05-31 RX ORDER — CARVEDILOL 6.25 MG/1
6.25 TABLET ORAL 2 TIMES DAILY WITH MEALS
Status: DISCONTINUED | OUTPATIENT
Start: 2024-05-31 | End: 2024-06-01

## 2024-05-31 RX ORDER — GLUCAGON 1 MG/ML
1 KIT INJECTION PRN
Status: DISCONTINUED | OUTPATIENT
Start: 2024-05-31 | End: 2024-06-01 | Stop reason: HOSPADM

## 2024-05-31 RX ORDER — ACETAMINOPHEN 325 MG/1
650 TABLET ORAL EVERY 6 HOURS PRN
Status: DISCONTINUED | OUTPATIENT
Start: 2024-05-31 | End: 2024-06-01 | Stop reason: HOSPADM

## 2024-05-31 RX ORDER — GABAPENTIN 100 MG/1
100 CAPSULE ORAL 3 TIMES DAILY
Status: ON HOLD | COMMUNITY
End: 2024-06-01 | Stop reason: HOSPADM

## 2024-05-31 RX ORDER — ONDANSETRON 4 MG/1
4 TABLET, ORALLY DISINTEGRATING ORAL ONCE
Status: COMPLETED | OUTPATIENT
Start: 2024-05-31 | End: 2024-05-31

## 2024-05-31 RX ORDER — POTASSIUM CHLORIDE 7.45 MG/ML
10 INJECTION INTRAVENOUS PRN
Status: DISCONTINUED | OUTPATIENT
Start: 2024-05-31 | End: 2024-06-01 | Stop reason: HOSPADM

## 2024-05-31 RX ORDER — TRAZODONE HYDROCHLORIDE 100 MG/1
400 TABLET ORAL NIGHTLY
Status: ON HOLD | COMMUNITY
End: 2024-06-01

## 2024-05-31 RX ORDER — ONDANSETRON 2 MG/ML
4 INJECTION INTRAMUSCULAR; INTRAVENOUS EVERY 6 HOURS PRN
Status: DISCONTINUED | OUTPATIENT
Start: 2024-05-31 | End: 2024-06-01 | Stop reason: HOSPADM

## 2024-05-31 RX ORDER — ACETAMINOPHEN 650 MG/1
650 SUPPOSITORY RECTAL EVERY 6 HOURS PRN
Status: DISCONTINUED | OUTPATIENT
Start: 2024-05-31 | End: 2024-06-01 | Stop reason: HOSPADM

## 2024-05-31 RX ORDER — CYCLOBENZAPRINE HCL 10 MG
10 TABLET ORAL 3 TIMES DAILY PRN
Status: ON HOLD | COMMUNITY
End: 2024-06-01 | Stop reason: HOSPADM

## 2024-05-31 RX ORDER — ONDANSETRON 2 MG/ML
4 INJECTION INTRAMUSCULAR; INTRAVENOUS ONCE
Status: DISCONTINUED | OUTPATIENT
Start: 2024-05-31 | End: 2024-06-01 | Stop reason: HOSPADM

## 2024-05-31 RX ORDER — SODIUM CHLORIDE, SODIUM LACTATE, POTASSIUM CHLORIDE, AND CALCIUM CHLORIDE .6; .31; .03; .02 G/100ML; G/100ML; G/100ML; G/100ML
1000 INJECTION, SOLUTION INTRAVENOUS ONCE
Status: COMPLETED | OUTPATIENT
Start: 2024-05-31 | End: 2024-05-31

## 2024-05-31 RX ORDER — INSULIN LISPRO 100 [IU]/ML
4 INJECTION, SOLUTION INTRAVENOUS; SUBCUTANEOUS
Status: DISCONTINUED | OUTPATIENT
Start: 2024-05-31 | End: 2024-06-01 | Stop reason: HOSPADM

## 2024-05-31 RX ORDER — INSULIN GLARGINE 100 [IU]/ML
20 INJECTION, SOLUTION SUBCUTANEOUS NIGHTLY
Status: DISCONTINUED | OUTPATIENT
Start: 2024-05-31 | End: 2024-06-01 | Stop reason: HOSPADM

## 2024-05-31 RX ORDER — ROPINIROLE 0.25 MG/1
0.25 TABLET, FILM COATED ORAL 3 TIMES DAILY
COMMUNITY

## 2024-05-31 RX ORDER — SODIUM CHLORIDE 9 MG/ML
INJECTION, SOLUTION INTRAVENOUS PRN
Status: DISCONTINUED | OUTPATIENT
Start: 2024-05-31 | End: 2024-06-01 | Stop reason: HOSPADM

## 2024-05-31 RX ORDER — SODIUM CHLORIDE 0.9 % (FLUSH) 0.9 %
5-40 SYRINGE (ML) INJECTION PRN
Status: DISCONTINUED | OUTPATIENT
Start: 2024-05-31 | End: 2024-06-01 | Stop reason: HOSPADM

## 2024-05-31 RX ORDER — DEXTROSE MONOHYDRATE 100 MG/ML
INJECTION, SOLUTION INTRAVENOUS CONTINUOUS PRN
Status: DISCONTINUED | OUTPATIENT
Start: 2024-05-31 | End: 2024-06-01 | Stop reason: HOSPADM

## 2024-05-31 RX ORDER — INSULIN LISPRO 100 [IU]/ML
0-8 INJECTION, SOLUTION INTRAVENOUS; SUBCUTANEOUS
Status: DISCONTINUED | OUTPATIENT
Start: 2024-05-31 | End: 2024-06-01 | Stop reason: HOSPADM

## 2024-05-31 RX ORDER — ENOXAPARIN SODIUM 100 MG/ML
40 INJECTION SUBCUTANEOUS DAILY
Status: DISCONTINUED | OUTPATIENT
Start: 2024-05-31 | End: 2024-06-01 | Stop reason: HOSPADM

## 2024-05-31 RX ORDER — SODIUM CHLORIDE, SODIUM LACTATE, POTASSIUM CHLORIDE, CALCIUM CHLORIDE 600; 310; 30; 20 MG/100ML; MG/100ML; MG/100ML; MG/100ML
INJECTION, SOLUTION INTRAVENOUS CONTINUOUS
Status: ACTIVE | OUTPATIENT
Start: 2024-05-31 | End: 2024-05-31

## 2024-05-31 RX ORDER — MAGNESIUM SULFATE IN WATER 40 MG/ML
2000 INJECTION, SOLUTION INTRAVENOUS PRN
Status: DISCONTINUED | OUTPATIENT
Start: 2024-05-31 | End: 2024-06-01 | Stop reason: HOSPADM

## 2024-05-31 RX ORDER — POLYETHYLENE GLYCOL 3350 17 G/17G
17 POWDER, FOR SOLUTION ORAL DAILY PRN
Status: DISCONTINUED | OUTPATIENT
Start: 2024-05-31 | End: 2024-06-01 | Stop reason: HOSPADM

## 2024-05-31 RX ORDER — SODIUM CHLORIDE 0.9 % (FLUSH) 0.9 %
5-40 SYRINGE (ML) INJECTION EVERY 12 HOURS SCHEDULED
Status: DISCONTINUED | OUTPATIENT
Start: 2024-05-31 | End: 2024-06-01 | Stop reason: HOSPADM

## 2024-05-31 RX ORDER — ASPIRIN 81 MG/1
81 TABLET, CHEWABLE ORAL DAILY
Status: DISCONTINUED | OUTPATIENT
Start: 2024-05-31 | End: 2024-06-01 | Stop reason: HOSPADM

## 2024-05-31 RX ADMIN — TICAGRELOR 90 MG: 90 TABLET ORAL at 22:58

## 2024-05-31 RX ADMIN — Medication 2 MG: at 08:59

## 2024-05-31 RX ADMIN — SODIUM CHLORIDE, PRESERVATIVE FREE 10 ML: 5 INJECTION INTRAVENOUS at 22:58

## 2024-05-31 RX ADMIN — INSULIN GLARGINE 20 UNITS: 100 INJECTION, SOLUTION SUBCUTANEOUS at 22:59

## 2024-05-31 RX ADMIN — ENOXAPARIN SODIUM 40 MG: 100 INJECTION SUBCUTANEOUS at 14:32

## 2024-05-31 RX ADMIN — SODIUM CHLORIDE, POTASSIUM CHLORIDE, SODIUM LACTATE AND CALCIUM CHLORIDE 1000 ML: 600; 310; 30; 20 INJECTION, SOLUTION INTRAVENOUS at 14:33

## 2024-05-31 RX ADMIN — INSULIN LISPRO 4 UNITS: 100 INJECTION, SOLUTION INTRAVENOUS; SUBCUTANEOUS at 19:05

## 2024-05-31 RX ADMIN — ONDANSETRON 4 MG: 4 TABLET, ORALLY DISINTEGRATING ORAL at 08:28

## 2024-05-31 RX ADMIN — CARVEDILOL 6.25 MG: 12.5 TABLET, FILM COATED ORAL at 19:07

## 2024-05-31 RX ADMIN — TICAGRELOR 90 MG: 90 TABLET ORAL at 16:14

## 2024-05-31 RX ADMIN — ASPIRIN 81 MG 81 MG: 81 TABLET ORAL at 16:14

## 2024-05-31 ASSESSMENT — PAIN - FUNCTIONAL ASSESSMENT
PAIN_FUNCTIONAL_ASSESSMENT: NONE - DENIES PAIN
PAIN_FUNCTIONAL_ASSESSMENT: NONE - DENIES PAIN

## 2024-05-31 ASSESSMENT — ENCOUNTER SYMPTOMS
VOMITING: 1
ABDOMINAL PAIN: 0
SHORTNESS OF BREATH: 0
NAUSEA: 1
SORE THROAT: 0
COLOR CHANGE: 0
RHINORRHEA: 0
EYE DISCHARGE: 0
CONSTIPATION: 0
ABDOMINAL DISTENTION: 0

## 2024-05-31 NOTE — ED NOTES
Straight cath completed, urine sent to lab, they will send some back to me so I can run the POC preg on her.   Repeat trop collected and sent by [facility]quez medic. Extra labs in the patients room from IV access.   Sitter at the door, patient sitting up in the bed with lights on, pur wik in place

## 2024-05-31 NOTE — ED NOTES
Blood glucose 117.  Pt resting in bed with eye closed, responds to voice commands, skin w/d/pink, pulses palp, pt drowsy.   Martínez pa aware of it, 0 new orders.   Pt remains 1:1 obs.

## 2024-05-31 NOTE — ED NOTES
Patient sitting up in the bed, taking off all the monitor cords, and her gown, climbing out of bed. Patient redirected to keep everything on, and patient scooted back in the bed, and all monitor leads reapplied.  Patient verbalized understanding to keep items on.   Immediately patient starting to remove her gown and monitor leads. Provider aware and sitter placed at the door.   Provider aware and ordered Ativan to help and Medic at the bedside to try another IV line.   Patient is able to tell her name and , as well as being at the hospital

## 2024-05-31 NOTE — ED PROVIDER NOTES
glucose 172 mg/dL patient will be readmitted to the hospital for altered mental status undetermined.    Amount and/or Complexity of Data Reviewed  Labs: ordered.  Radiology: ordered.  ECG/medicine tests: ordered.    Risk  Prescription drug management.  Decision regarding hospitalization.            REASSESSMENT          CRITICAL CARE TIME   Total Critical Care time was  minutes, excluding separately reportable procedures.  There was a high probability of clinically significant/life threatening deterioration in the patient's condition which required my urgent intervention.      CONSULTS:  None    PROCEDURES:  Unless otherwise noted below, none     Procedures        FINAL IMPRESSION      1. Altered mental status, unspecified altered mental status type    2. Disorientation          DISPOSITION/PLAN   DISPOSITION Decision To Admit 05/31/2024 10:22:09 AM      PATIENT REFERRED TO:  No follow-up provider specified.    DISCHARGE MEDICATIONS:  New Prescriptions    No medications on file     Controlled Substances Monitoring:     RX Monitoring Attestation Periodic Controlled Substance Monitoring   1/29/2016   5:11 PM The Prescription Monitoring Report for this patient was reviewed today. Possible medication side effects, risk of tolerance and/or dependence, and alternative treatments discussed;No signs of potential drug abuse or diversion identified.       (Please note that portions of this note were completed with a voice recognition program.  Efforts were made to edit the dictations but occasionally words are mis-transcribed.)    Lenin Guardado PA-C (electronically signed)  Attending Emergency Physician    Supervising Attending Physician: Dr. Zayas.      Lenin Guardado PA-C  05/31/24 1050

## 2024-05-31 NOTE — CARE COORDINATION
Pt unable to participate in CM assessment. She awakened enough w sternal rub allowing me to call her daughter Kelli is only emergency contact which I did, solange v.m. message left to return call.     I could not locate a phone number for \"boyfriend\" in EMR and no contact information left with nursing.     I called [facility] spoke with Elinor, confirmed pt is not a client there.     Electronically signed by Whitney Maria, RN, BSN on 5/31/2024 at 2:30 PM

## 2024-05-31 NOTE — ED NOTES
CT completed, lab at the bedside to collect labs. Provider aware that minimal blood obtained for collection, but will send to lab. Order changed to sublingual zofran due to no IV access for IV push zofran.

## 2024-05-31 NOTE — FLOWSHEET NOTE
5/31/24 @ 1702 Notified [facility] (Lupe) of  consult # [id number]   Surgery Progress Note    Subjective/Interval History:  Temporary need for dopamine drip, now off. Adequate UOP. Mucus out of ostomy.      Objective:  Temp:  [96.8  F (36  C)-100.4  F (38  C)] 98.8  F (37.1  C)  Heart Rate:  [114-146] 128  Resp:  [45-48] 48  BP: (81-88)/(34-37) 81/34  MAP:  [40 mmHg-75 mmHg] 44 mmHg  Arterial Line BP: ()/(27-49) 72/31  FiO2 (%):  [35 %-40 %] 35 %  SpO2:  [86 %-100 %] 100 %    General appearance: Anasarca, sedated, and intubated.   CV: Requires pressor support.    Pulm: intubated and mechanically ventilated.  Abd: distended, incision intact, stomas viable covered with Xeroform.  Extremities: moderate edema  Skin: warm and well-perfused.     Assessment/Plan:   Cliff Bradley is a 2 month old male with a past medical history of duodenal atresia as well as right inguinal hernia and left hydrocele s/p repair on 1/20 who is admitted for bilious vomiting that started 3/7. Now s/p exploratory laparotomy, SBR, end-ileostomy and mucus fistula creation (end ileostomy to the left; mucus fistula to the right) 3/12.      - No feeds until at least a week post op.   - NG to LIS  - Cover ostomies with bacitracin qshift. Cover with xeroform.   - Continue abx   - Appreciate critical cares by PICU.    Staff: Dr. Elizabeth covering for Dr. Rafa Brown, PGY2  Pediatric Surgery  [phone]

## 2024-05-31 NOTE — ED TRIAGE NOTES
Pt to ER via EMS from home for c/o N/V and AMS, unknown last known well, pt last per normal before she went to bed last night, pt does not know when she went to bed, per boyfriend pt might have taken too many trazodone

## 2024-05-31 NOTE — CARE COORDINATION
Readmission Assessment  Number of Days since last admission?: 8-30 days  Previous Disposition: Other (comment) (HOME W BOYFRIEND NO NEEDS)  Who is being Interviewed: Unable to Complete ([facility] EMR)  What was the patient's/caregiver's perception as to why they think they needed to return back to the hospital?: Other (Comment) (PT PRESENTED W ENCEPHALAPATHY SIMILIAR TO LAST ADMISSION R/T POLYPHARMACY)  Did you visit your Primary Care Physician after you left the hospital, before you returned this time?: Yes (PER EMR TELEVISIT 5/17/24 )  Did you see a specialist, such as Cardiac, Pulmonary, Orthopedic Physician, etc. after you left the hospital?: Other (Comment) (CARDIOLOGY DEVICE CHECK PER EMR)  Who advised the patient to return to the hospital?: Other (Comment) (BOYFRIEND CALLED [facility] HE FELT SHE WAS ALTERED H/O TBI)  Does the patient report anything that got in the way of taking their medications?:  (PT TAKES SEVERAL SEDATING MEDICATIONS)  In our efforts to provide the best possible care to you and others like you, can you think of anything that we could have done to help you after you left the hospital the first time, so that you might not have needed to return so soon?: Other (Comment) (IS FOLLOWED BY  HIGH RISK READMISSION TEAM IS NOT A CLIENT OF [facility])     Electronically signed by Whitney Maria, RN, BSN on 5/31/2024 at 2:36 PM

## 2024-05-31 NOTE — H&P
head was performed without the administration of intravenous contrast. Automated exposure control, iterative reconstruction, and/or weight based adjustment of the mA/kV was utilized to reduce the radiation dose to as low as reasonably achievable. COMPARISON: None. HISTORY: ORDERING SYSTEM PROVIDED HISTORY: confusion TECHNOLOGIST PROVIDED HISTORY: Reason for exam:->confusion Has a \"code stroke\" or \"stroke alert\" been called?->No Decision Support Exception - unselect if not a suspected or confirmed emergency medical condition->Emergency Medical Condition (MA) What reading provider will be dictating this exam?->CRC FINDINGS: BRAIN/VENTRICLES: There is no acute intracranial hemorrhage, mass effect or midline shift.  No abnormal extra-axial fluid collection.  The gray-white differentiation is maintained without evidence of an acute infarct.  There is no evidence of hydrocephalus. The ventricles, cisterns and sulci are prominent consistent with atrophy out of proportion to the patient's age.. ORBITS: The visualized portion of the orbits demonstrate no acute abnormality. SINUSES: The visualized paranasal sinuses and mastoid air cells demonstrate no acute abnormality. SOFT TISSUES/SKULL:  No acute abnormality of the visualized skull or soft tissues.     1. There is no acute intracranial hemorrhage or acute intracranial abnormality 2. The ventricles, cisterns and sulci are prominent consistent with atrophy which is advanced for the patient's age of 48 years of age. .     XR CHEST PORTABLE    Result Date: 5/31/2024  EXAMINATION: ONE XRAY VIEW OF THE CHEST 5/31/2024 7:18 am COMPARISON: 05/13/2024 HISTORY: ORDERING SYSTEM PROVIDED HISTORY: confusion TECHNOLOGIST PROVIDED HISTORY: Reason for exam:->confusion Is the patient pregnant?->No What reading provider will be dictating this exam?->CRC FINDINGS: The cardiac generator and leads are stable in position.  The lungs appear clear.  The contour of the mediastinum heart size  appears within the normal range.  There are no signs of pneumothorax, pleural effusion or congestion.     No signs of an acute cardiopulmonary process.           Assessment and Plan     1.  Toxic encephalopathy secondary to polypharmacy.  Reviewed patient's pills which her partner brought-medications include ropinirole, cyclobenzaprine, hydroxyzine, trazodone, gabapentin, Seroquel.  There are multiple bottles of some of these medications.   -Stop all of the above medications and monitor.  Ask psychiatry to see which of these are absolutely necessary to continue  -Recommend outpatient polysomnogram    CAD  Hypertension  Type 2 diabetes  Obesity, class II    Lovenox prophylaxis  Full code    Kunal Vital DO  Admitting Hospitalist

## 2024-05-31 NOTE — ED NOTES
Patient standing at end of bed, naked, urinating on floor. Patient guided back to bed, cleaned up. Martínez JC aware.

## 2024-06-01 VITALS
RESPIRATION RATE: 18 BRPM | TEMPERATURE: 98.2 F | DIASTOLIC BLOOD PRESSURE: 85 MMHG | BODY MASS INDEX: 34.55 KG/M2 | SYSTOLIC BLOOD PRESSURE: 120 MMHG | HEART RATE: 84 BPM | OXYGEN SATURATION: 97 % | WEIGHT: 215 LBS | HEIGHT: 66 IN

## 2024-06-01 LAB
ANION GAP SERPL CALCULATED.3IONS-SCNC: 13 MEQ/L (ref 9–15)
BASOPHILS # BLD: 0.1 K/UL (ref 0–0.2)
BASOPHILS NFR BLD: 0.8 %
BUN SERPL-MCNC: 8 MG/DL (ref 6–20)
CALCIUM SERPL-MCNC: 8.7 MG/DL (ref 8.5–9.9)
CHLORIDE SERPL-SCNC: 107 MEQ/L (ref 95–107)
CO2 SERPL-SCNC: 22 MEQ/L (ref 20–31)
CREAT SERPL-MCNC: 0.65 MG/DL (ref 0.5–0.9)
EOSINOPHIL # BLD: 0.2 K/UL (ref 0–0.7)
EOSINOPHIL NFR BLD: 2.9 %
ERYTHROCYTE [DISTWIDTH] IN BLOOD BY AUTOMATED COUNT: 12.8 % (ref 11.5–14.5)
GLUCOSE BLD-MCNC: 109 MG/DL (ref 70–99)
GLUCOSE BLD-MCNC: 112 MG/DL (ref 70–99)
GLUCOSE BLD-MCNC: 114 MG/DL (ref 70–99)
GLUCOSE SERPL-MCNC: 115 MG/DL (ref 70–99)
HCT VFR BLD AUTO: 44.4 % (ref 37–47)
HGB BLD-MCNC: 15.1 G/DL (ref 12–16)
LYMPHOCYTES # BLD: 2 K/UL (ref 1–4.8)
LYMPHOCYTES NFR BLD: 30.9 %
MCH RBC QN AUTO: 31 PG (ref 27–31.3)
MCHC RBC AUTO-ENTMCNC: 34 % (ref 33–37)
MCV RBC AUTO: 91.2 FL (ref 79.4–94.8)
MONOCYTES # BLD: 0.4 K/UL (ref 0.2–0.8)
MONOCYTES NFR BLD: 5.4 %
NEUTROPHILS # BLD: 3.9 K/UL (ref 1.4–6.5)
NEUTS SEG NFR BLD: 59.7 %
PERFORMED ON: ABNORMAL
PHOSPHATE SERPL-MCNC: 3.9 MG/DL (ref 2.3–4.8)
PLATELET # BLD AUTO: 269 K/UL (ref 130–400)
POTASSIUM SERPL-SCNC: 3.6 MEQ/L (ref 3.4–4.9)
RBC # BLD AUTO: 4.87 M/UL (ref 4.2–5.4)
SODIUM SERPL-SCNC: 142 MEQ/L (ref 135–144)
WBC # BLD AUTO: 6.5 K/UL (ref 4.8–10.8)

## 2024-06-01 PROCEDURE — 80048 BASIC METABOLIC PNL TOTAL CA: CPT

## 2024-06-01 PROCEDURE — 2580000003 HC RX 258: Performed by: INTERNAL MEDICINE

## 2024-06-01 PROCEDURE — 96372 THER/PROPH/DIAG INJ SC/IM: CPT

## 2024-06-01 PROCEDURE — G0378 HOSPITAL OBSERVATION PER HR: HCPCS

## 2024-06-01 PROCEDURE — 6360000002 HC RX W HCPCS: Performed by: INTERNAL MEDICINE

## 2024-06-01 PROCEDURE — 84100 ASSAY OF PHOSPHORUS: CPT

## 2024-06-01 PROCEDURE — 85025 COMPLETE CBC W/AUTO DIFF WBC: CPT

## 2024-06-01 PROCEDURE — 6370000000 HC RX 637 (ALT 250 FOR IP): Performed by: INTERNAL MEDICINE

## 2024-06-01 PROCEDURE — 90792 PSYCH DIAG EVAL W/MED SRVCS: CPT | Performed by: PSYCHIATRY & NEUROLOGY

## 2024-06-01 PROCEDURE — 36415 COLL VENOUS BLD VENIPUNCTURE: CPT

## 2024-06-01 RX ORDER — CARVEDILOL 6.25 MG/1
6.25 TABLET ORAL 2 TIMES DAILY WITH MEALS
Status: DISCONTINUED | OUTPATIENT
Start: 2024-06-01 | End: 2024-06-01 | Stop reason: HOSPADM

## 2024-06-01 RX ORDER — TRAZODONE HYDROCHLORIDE 100 MG/1
100 TABLET ORAL NIGHTLY
COMMUNITY
Start: 2024-06-01

## 2024-06-01 RX ORDER — NICOTINE 21 MG/24HR
1 PATCH, TRANSDERMAL 24 HOURS TRANSDERMAL DAILY
Status: DISCONTINUED | OUTPATIENT
Start: 2024-06-01 | End: 2024-06-01 | Stop reason: HOSPADM

## 2024-06-01 RX ADMIN — ASPIRIN 81 MG 81 MG: 81 TABLET ORAL at 09:20

## 2024-06-01 RX ADMIN — ENOXAPARIN SODIUM 40 MG: 100 INJECTION SUBCUTANEOUS at 09:20

## 2024-06-01 RX ADMIN — INSULIN LISPRO 4 UNITS: 100 INJECTION, SOLUTION INTRAVENOUS; SUBCUTANEOUS at 09:20

## 2024-06-01 RX ADMIN — SODIUM CHLORIDE, PRESERVATIVE FREE 10 ML: 5 INJECTION INTRAVENOUS at 09:21

## 2024-06-01 NOTE — PROGRESS NOTES
Patient is becoming agitated, restless, demanding, impulsive.  She is pulling at tele leads and picking at IV.  Relates to this nurse that she has to have her Trazodone and Seroquel or she will not be able to sleep.  Patient has demanded this multiple times to this nurse and 1:1 staff.  Patient also non-compliant with DM diet and insists on drinking regular Pepsi vs diet Pepsi.  States that she drinks regular Pepsi at home.  Patient also requesting Nicotine patch.  Call placed to pharmacy because patients boyfriend brought patient medication to ED which they sent to pharmacy.  Patient is confused and unable to assist in medication reconciliation, nor is boyfriend or daughter.  Pharmacist was able to verify Trazodone and Seroquel doses and will reconcile the rest of the medications.  Secure message to Dr. Terrazas to inform of patients requests at this time.

## 2024-06-01 NOTE — PROGRESS NOTES
Pt discharged home. Dr. Mehta made aware. Ride set up for pt. Pt left with transport at 1319. AVS papers reviewed with pt. IV and tele pack removed. Belongings reconciled and with pt.

## 2024-06-01 NOTE — DISCHARGE SUMMARY
[facility] Hospital Medicine Discharge Summary    Radha Malloy  :  1975  MRN:  [id number]    Admit date:  2024  Discharge date:  2024    Admitting Physician:  Kunal Vital DO  Primary Care Physician:  Ravinder Felton MD    Discharge Diagnoses:    Principal Problem:    Toxic encephalopathy  Resolved Problems:    * No resolved hospital problems. *    Chief Complaint   Patient presents with    Altered Mental Status    Emesis       Condition: improved  Activity: no restrictions  Diet: diabetic  Disposition: home  Functional Status: ambulatory    Significant Findings:     Ct head:  1. There is no acute intracranial hemorrhage or acute intracranial abnormality  2. The ventricles, cisterns and sulci are prominent consistent with atrophy which is advanced for the patient's age of 48 years of age.  .    Hospital Course:   48-year-old female with CAD, psychiatric issues was brought in by her boyfriend with confusion.  This is her second hospitalization for this issue in the last month.    She was found to have toxic encephalopathy secondary to polypharmacy.  This was her second time taking a powder called phenibut (CNS depressant that acts on WANDY receptors) and the first time she took this she was also hospitalized for the same symptoms.  She was instructed never take this powder again.    She was also on cyclobenzaprine, hydroxyzine, Seroquel, gabapentin-these were all discontinued.  Mirtazapine was listed on her home medication list but I did not see any bottles in the bag she brought with her-if she takes this, it should be discontinued.  She will remain on ropinirole as she reports this helps her restless leg syndrome greatly.  She will also stay on trazodone however on a much reduced dose-she was previously taking 400 mg nightly.  She will now take 100 mg nightly.    She was educated on appropriate sleep hygiene.  She admits that the TV is on in her room and she drinks a lot of
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
## 2024-06-01 NOTE — PROGRESS NOTES
Secure message read by Dr. Terrazas with no response.  Patient is noted to be soundly sleeping in bed at this time.  Allowed to sleep.  Avysys now in place for safety.  Pharmacist completed med reconciliation.  Will continue to monitor for further behavior issues.

## 2024-06-01 NOTE — DISCHARGE INSTRUCTIONS
Please never take phenibut again.    STOP the following meds: gabapentin, cyclobenzaprine, hydroxyzine, seroquel, mirtazepine    Please REDUCE your dose of trazodone from 400mg to 100mg at bedtime    I recommend you stop smoking, start exercising, and get a sleep study. These interventions will help with your sleep.           Sleep Problems: Getting Past Barriers to Powering Down (02:42)  Your health professional recommends that you watch this short online health video.  Learn how to reduce technology use before bed for better sleep.  Purpose:  Gives examples of barriers to reducing technology use before bed and solutions for overcoming them.  Goal:  The user will identify and think about ways to solve common barriers to reducing technology use before bed.     How to watch the video    Scan the QR code   OR Visit the website    [email or web address]   Current as of: July 10, 2023               Content Version: 14.0  © 2006-2024 [employer].   Care instructions adapted under license by [employer]. If you have questions about a medical condition or this instruction, always ask your healthcare professional. [employer] disclaims any warranty or liability for your use of this information.

## 2024-06-02 LAB
EKG ATRIAL RATE: 83 BPM
EKG P AXIS: 29 DEGREES
EKG P-R INTERVAL: 198 MS
EKG Q-T INTERVAL: 420 MS
EKG QRS DURATION: 112 MS
EKG QTC CALCULATION (BAZETT): 493 MS
EKG R AXIS: -52 DEGREES
EKG T AXIS: 85 DEGREES
EKG VENTRICULAR RATE: 83 BPM

## 2024-06-02 ASSESSMENT — ENCOUNTER SYMPTOMS
PARESTHESIAS: 0
PARESIS: 0
ABDOMINAL PAIN: 0
TINGLING: 0
BACK PAIN: 1
WEIGHT LOSS: 0
NUMBNESS: 0
LEG PAIN: 0
WEAKNESS: 1
PERIANAL NUMBNESS: 0
DYSURIA: 0
FEVER: 0
HEADACHES: 0
BOWEL INCONTINENCE: 0

## 2024-06-04 ENCOUNTER — TELEPHONE (OUTPATIENT)
Dept: PRIMARY CARE | Facility: CLINIC | Age: 49
End: 2024-06-04

## 2024-06-04 ENCOUNTER — TELEMEDICINE (OUTPATIENT)
Dept: PRIMARY CARE | Facility: CLINIC | Age: 49
End: 2024-06-04
Payer: COMMERCIAL

## 2024-06-04 DIAGNOSIS — F33.1 MODERATE EPISODE OF RECURRENT MAJOR DEPRESSIVE DISORDER (MULTI): ICD-10-CM

## 2024-06-04 DIAGNOSIS — Z79.4 TYPE 2 DIABETES MELLITUS WITHOUT COMPLICATION, WITH LONG-TERM CURRENT USE OF INSULIN (MULTI): ICD-10-CM

## 2024-06-04 DIAGNOSIS — G89.29 CHRONIC BILATERAL LOW BACK PAIN WITHOUT SCIATICA: ICD-10-CM

## 2024-06-04 DIAGNOSIS — I10 PRIMARY HYPERTENSION: ICD-10-CM

## 2024-06-04 DIAGNOSIS — F51.01 PRIMARY INSOMNIA: Primary | ICD-10-CM

## 2024-06-04 DIAGNOSIS — E11.9 TYPE 2 DIABETES MELLITUS WITHOUT COMPLICATION, WITH LONG-TERM CURRENT USE OF INSULIN (MULTI): ICD-10-CM

## 2024-06-04 DIAGNOSIS — F31.12 BIPOLAR AFFECTIVE DISORDER, CURRENTLY MANIC, MODERATE (MULTI): ICD-10-CM

## 2024-06-04 DIAGNOSIS — I25.10 CORONARY ARTERY DISEASE INVOLVING NATIVE CORONARY ARTERY OF NATIVE HEART WITHOUT ANGINA PECTORIS: ICD-10-CM

## 2024-06-04 DIAGNOSIS — M54.50 CHRONIC BILATERAL LOW BACK PAIN WITHOUT SCIATICA: ICD-10-CM

## 2024-06-04 DIAGNOSIS — J43.1 PANLOBULAR EMPHYSEMA (MULTI): ICD-10-CM

## 2024-06-04 DIAGNOSIS — E78.2 MIXED HYPERLIPIDEMIA: ICD-10-CM

## 2024-06-04 PROCEDURE — 99213 OFFICE O/P EST LOW 20 MIN: CPT | Performed by: FAMILY MEDICINE

## 2024-06-04 PROCEDURE — 3008F BODY MASS INDEX DOCD: CPT | Performed by: FAMILY MEDICINE

## 2024-06-04 PROCEDURE — 4010F ACE/ARB THERAPY RXD/TAKEN: CPT | Performed by: FAMILY MEDICINE

## 2024-06-04 ASSESSMENT — ENCOUNTER SYMPTOMS
CHEST TIGHTNESS: 0
ABDOMINAL PAIN: 0
EYE DISCHARGE: 0
BLOOD IN STOOL: 0
SHORTNESS OF BREATH: 0
PARESIS: 0
TINGLING: 0
WEAKNESS: 1
HEMATURIA: 0
FEVER: 0
COUGH: 0
LEG PAIN: 0
WEIGHT LOSS: 0
ACTIVITY CHANGE: 0
ARTHRALGIAS: 0
SORE THROAT: 0
DIARRHEA: 0
VOMITING: 0
DYSURIA: 0
MYALGIAS: 0
BRUISES/BLEEDS EASILY: 0
NAUSEA: 0
ADENOPATHY: 0
FATIGUE: 0
NERVOUS/ANXIOUS: 0
CONSTIPATION: 0
DYSPHORIC MOOD: 0
PERIANAL NUMBNESS: 0
PARESTHESIAS: 0
NUMBNESS: 0
DIFFICULTY URINATING: 0
BOWEL INCONTINENCE: 0
NECK PAIN: 0
HEADACHES: 0
BACK PAIN: 1
DIZZINESS: 0

## 2024-06-04 NOTE — TELEPHONE ENCOUNTER
Patient stopped in office stating Dr. Thakkar was writing a letter stating she is not able to work anymore and was told she could pick it up this afternoon. I was not able to find a letter that was ready for patient. Patient left and is aware we will call her back when/if it's written. Please advise.

## 2024-06-04 NOTE — LETTER
Margarita 10, 2024     Patient: Tana Bahena   YOB: 1975   Date of Visit: 6/4/2024       To Whom It May Concern:    Tana Bahena was seen in my clinic on 6/4/2024 at ?. Please Tana is unable to work at any capacity due to multiple chronic medical conditions. For further information regarding theses conditions please follow up with her Neurologist.      If you have any questions or concerns, please don't hesitate to call.         Sincerely,         Jacobo Thakkar MD

## 2024-06-04 NOTE — PROGRESS NOTES
"Subjective   Patient ID: Tana Bahena \"Axel" is a 48 y.o. female who presents for No chief complaint on file..  Back Pain  This is a new problem. The current episode started more than 1 year ago. The problem occurs constantly. The problem has been gradually worsening since onset. The pain is present in the lumbar spine and sacro-iliac. The quality of the pain is described as aching and shooting. The pain does not radiate. The pain is at a severity of 10/10. The pain is The same all the time. The symptoms are aggravated by standing. Stiffness is present All day. Associated symptoms include chest pain and weakness. Pertinent negatives include no abdominal pain, bladder incontinence, bowel incontinence, dysuria, fever, headaches, leg pain, numbness, paresis, paresthesias, pelvic pain, perianal numbness, tingling or weight loss. Risk factors include lack of exercise, obesity and recent trauma.   Patient has multiple medical issues    Insomnia ongoing medicine helpful    COPD stable with positive shortness of breath    Depressed mood and bipolar stable see specialist    Diabetes with fair control watch diet follow blood work    Coronary disease no angina see cardiology    Hypertension stable no angina    High cholesterol stable watch diet follow blood work      Letter for disability stating she can't work       Review of Systems   Constitutional:  Negative for activity change, fatigue, fever and weight loss.   HENT:  Negative for congestion and sore throat.    Eyes:  Negative for discharge.   Respiratory:  Negative for cough, chest tightness and shortness of breath.    Cardiovascular:  Positive for chest pain. Negative for leg swelling.   Gastrointestinal:  Negative for abdominal pain, blood in stool, bowel incontinence, constipation, diarrhea, nausea and vomiting.   Endocrine: Negative for cold intolerance and heat intolerance.   Genitourinary:  Negative for bladder incontinence, difficulty urinating, dysuria, " hematuria and pelvic pain.   Musculoskeletal:  Positive for back pain. Negative for arthralgias, gait problem, myalgias and neck pain.   Allergic/Immunologic: Negative for environmental allergies.   Neurological:  Positive for weakness. Negative for dizziness, tingling, syncope, numbness, headaches and paresthesias.   Hematological:  Negative for adenopathy. Does not bruise/bleed easily.   Psychiatric/Behavioral:  Negative for dysphoric mood. The patient is not nervous/anxious.    All other systems reviewed and are negative.      Objective   There were no vitals taken for this visit.   Physical Exam  Video visit  Assessment/Plan   Problem List Items Addressed This Visit       Bipolar affective disorder, currently manic, moderate (Multi)    Coronary artery disease involving native coronary artery of native heart without angina pectoris    Panlobular emphysema (Multi)    Moderate episode of recurrent major depressive disorder (Multi)    Type 2 diabetes mellitus without complication, with long-term current use of insulin (Multi)    Primary hypertension    Mixed hyperlipidemia    Chronic bilateral low back pain without sciatica    Primary insomnia - Primary       Patient education provided.  Stay current with age appropriate health maintenance as instructed.  Appointment here or ER with new or worsening symptoms'  Keep appropriate follow-up visit.  Stay current with proper immunizations   Letter as above  Recheck 3 months and as needed  Return sooner with new or worsening symptoms  Discussed at length with patient

## 2024-06-05 DIAGNOSIS — E11.9 TYPE 2 DIABETES MELLITUS WITHOUT COMPLICATION, WITH LONG-TERM CURRENT USE OF INSULIN (MULTI): ICD-10-CM

## 2024-06-05 DIAGNOSIS — Z79.4 TYPE 2 DIABETES MELLITUS WITHOUT COMPLICATION, WITH LONG-TERM CURRENT USE OF INSULIN (MULTI): ICD-10-CM

## 2024-06-07 DIAGNOSIS — E11.9 TYPE 2 DIABETES MELLITUS WITHOUT COMPLICATION, WITH LONG-TERM CURRENT USE OF INSULIN (MULTI): ICD-10-CM

## 2024-06-07 DIAGNOSIS — Z79.4 TYPE 2 DIABETES MELLITUS WITHOUT COMPLICATION, WITH LONG-TERM CURRENT USE OF INSULIN (MULTI): ICD-10-CM

## 2024-06-10 ENCOUNTER — TELEPHONE (OUTPATIENT)
Dept: PRIMARY CARE | Facility: CLINIC | Age: 49
End: 2024-06-10
Payer: COMMERCIAL

## 2024-06-10 RX ORDER — INSULIN GLARGINE 100 [IU]/ML
50 INJECTION, SOLUTION SUBCUTANEOUS NIGHTLY
Qty: 15 ML | Refills: 1 | Status: SHIPPED | OUTPATIENT
Start: 2024-06-10

## 2024-06-10 RX ORDER — BLOOD-GLUCOSE SENSOR
EACH MISCELLANEOUS
Qty: 1 EACH | Refills: 3 | Status: SHIPPED | OUTPATIENT
Start: 2024-06-10

## 2024-06-10 NOTE — TELEPHONE ENCOUNTER
We received a request for prior authorization on the patient's   insulin glargine (Lantus Solostar U-100 Insulin) 100 unit/mL (3 mL) pen  from their pharmacy. Prior authorization was submitted to insurance today. We will await their determination.

## 2024-06-11 NOTE — TELEPHONE ENCOUNTER
[facility] [place] was unable to find patient eligibility.  Forms for franz filled out and faxed.  Awaiting fax confirmation.

## 2024-06-14 ENCOUNTER — PATIENT MESSAGE (OUTPATIENT)
Dept: PRIMARY CARE | Facility: CLINIC | Age: 49
End: 2024-06-14
Payer: COMMERCIAL

## 2024-06-14 DIAGNOSIS — F51.01 PRIMARY INSOMNIA: ICD-10-CM

## 2024-06-15 DIAGNOSIS — F51.01 PRIMARY INSOMNIA: ICD-10-CM

## 2024-06-17 RX ORDER — HYDROXYZINE HYDROCHLORIDE 25 MG/1
25 TABLET, FILM COATED ORAL NIGHTLY
Qty: 30 TABLET | Refills: 0 | Status: SHIPPED | OUTPATIENT
Start: 2024-06-17

## 2024-06-17 RX ORDER — TRAZODONE HYDROCHLORIDE 100 MG/1
TABLET ORAL
Qty: 120 TABLET | Refills: 0 | Status: SHIPPED | OUTPATIENT
Start: 2024-06-17

## 2024-06-17 NOTE — TELEPHONE ENCOUNTER
From: Tana Bahena  To: Jacobo Thakkar  Sent: 6/14/2024 7:39 PM EDT  Subject: Meds    Could you please send a refill for my trazadone to [facility] thank you

## 2024-06-17 NOTE — TELEPHONE ENCOUNTER
Rx Refill Request     Date of last appointment:  11/7/2023   Date of next appointment:  Visit date not found   Best number to reach patient:  [phone]

## 2024-06-22 ENCOUNTER — PATIENT MESSAGE (OUTPATIENT)
Dept: PRIMARY CARE | Facility: CLINIC | Age: 49
End: 2024-06-22
Payer: COMMERCIAL

## 2024-06-22 DIAGNOSIS — F31.12 BIPOLAR AFFECTIVE DISORDER, CURRENTLY MANIC, MODERATE (MULTI): ICD-10-CM

## 2024-06-22 DIAGNOSIS — E11.9 TYPE 2 DIABETES MELLITUS WITHOUT COMPLICATION, WITH LONG-TERM CURRENT USE OF INSULIN (MULTI): ICD-10-CM

## 2024-06-22 DIAGNOSIS — Z79.4 TYPE 2 DIABETES MELLITUS WITHOUT COMPLICATION, WITH LONG-TERM CURRENT USE OF INSULIN (MULTI): ICD-10-CM

## 2024-06-24 RX ORDER — QUETIAPINE FUMARATE 100 MG/1
100 TABLET, FILM COATED ORAL NIGHTLY
Qty: 30 TABLET | Refills: 1 | Status: SHIPPED | OUTPATIENT
Start: 2024-06-24

## 2024-06-24 RX ORDER — INSULIN GLARGINE 100 [IU]/ML
50 INJECTION, SOLUTION SUBCUTANEOUS NIGHTLY
Qty: 15 ML | Refills: 1 | Status: SHIPPED | OUTPATIENT
Start: 2024-06-24

## 2024-06-24 RX ORDER — BLOOD-GLUCOSE METER
EACH MISCELLANEOUS
Qty: 400 STRIP | Refills: 3 | Status: SHIPPED | OUTPATIENT
Start: 2024-06-24

## 2024-07-15 DIAGNOSIS — F51.01 PRIMARY INSOMNIA: ICD-10-CM

## 2024-07-15 RX ORDER — TRAZODONE HYDROCHLORIDE 100 MG/1
TABLET ORAL
Qty: 120 TABLET | Refills: 0 | Status: SHIPPED | OUTPATIENT
Start: 2024-07-15

## 2024-07-26 DIAGNOSIS — E11.9 TYPE 2 DIABETES MELLITUS WITHOUT COMPLICATION, WITH LONG-TERM CURRENT USE OF INSULIN (MULTI): ICD-10-CM

## 2024-07-26 DIAGNOSIS — Z79.4 TYPE 2 DIABETES MELLITUS WITHOUT COMPLICATION, WITH LONG-TERM CURRENT USE OF INSULIN (MULTI): ICD-10-CM

## 2024-07-26 DIAGNOSIS — F31.12 BIPOLAR AFFECTIVE DISORDER, CURRENTLY MANIC, MODERATE (MULTI): ICD-10-CM

## 2024-07-26 RX ORDER — BLOOD-GLUCOSE SENSOR
EACH MISCELLANEOUS
Qty: 1 EACH | Refills: 3 | Status: SHIPPED | OUTPATIENT
Start: 2024-07-26

## 2024-07-26 RX ORDER — QUETIAPINE FUMARATE 100 MG/1
100 TABLET, FILM COATED ORAL NIGHTLY
Qty: 30 TABLET | Refills: 1 | Status: SHIPPED | OUTPATIENT
Start: 2024-07-26

## 2024-07-26 NOTE — TELEPHONE ENCOUNTER
Rx Refill Request     Name:  Tana Bahena  :  [id number]  Medication Name:    FreeStyle Jayy 3 Sensor device   QUEtiapine (SEROquel) 100 mg tablet   Specific Pharmacy location:  [id number] [address]   Date of last appointment:  24  Date of next appointment:  N/A

## 2024-07-29 ENCOUNTER — PATIENT MESSAGE (OUTPATIENT)
Dept: PRIMARY CARE | Facility: CLINIC | Age: 49
End: 2024-07-29
Payer: COMMERCIAL

## 2024-07-29 DIAGNOSIS — Z79.4 TYPE 2 DIABETES MELLITUS WITHOUT COMPLICATION, WITH LONG-TERM CURRENT USE OF INSULIN (MULTI): ICD-10-CM

## 2024-07-29 DIAGNOSIS — J44.9 CHRONIC OBSTRUCTIVE PULMONARY DISEASE, UNSPECIFIED COPD TYPE (MULTI): Primary | ICD-10-CM

## 2024-07-29 DIAGNOSIS — E11.9 TYPE 2 DIABETES MELLITUS WITHOUT COMPLICATION, WITH LONG-TERM CURRENT USE OF INSULIN (MULTI): ICD-10-CM

## 2024-07-29 RX ORDER — EMPAGLIFLOZIN 10 MG/1
10 TABLET, FILM COATED ORAL DAILY
Qty: 30 TABLET | Refills: 2 | Status: SHIPPED | OUTPATIENT
Start: 2024-07-29

## 2024-07-29 RX ORDER — METHYLPREDNISOLONE 4 MG/1
TABLET ORAL
Qty: 21 TABLET | Refills: 0 | Status: SHIPPED | OUTPATIENT
Start: 2024-07-29 | End: 2024-08-05

## 2024-07-29 NOTE — TELEPHONE ENCOUNTER
Rx Refill Request     Name: Tana Bahena  :  1975   Medication Name:  empagliflozin (Jardiance) 10 mg   Specific Pharmacy location:  [id number] [place], [place]   Date of last appointment:  2024   Date of next appointment:  Visit date not found   Best number to reach patient:  [phone]

## 2024-07-30 DIAGNOSIS — F51.01 PRIMARY INSOMNIA: Primary | ICD-10-CM

## 2024-07-30 RX ORDER — TRAZODONE HYDROCHLORIDE 100 MG/1
TABLET ORAL
Qty: 120 TABLET | Refills: 0 | Status: SHIPPED | OUTPATIENT
Start: 2024-07-30

## 2024-07-30 NOTE — TELEPHONE ENCOUNTER
Rx Refill Request Telephone Encounter    Name:  Tana Bahena  :  [id number]  Medication Name:  trazodone (Desyrel) 100 mg   Specific Pharmacy location:  [facility] [address]  Date of last appointment:   VV  Date of next appointment:  NA  Best number to reach patient:  [phone]

## 2024-08-15 ENCOUNTER — APPOINTMENT (OUTPATIENT)
Dept: PHARMACY | Facility: HOSPITAL | Age: 49
End: 2024-08-15
Payer: COMMERCIAL

## 2024-08-15 DIAGNOSIS — Z79.4 TYPE 2 DIABETES MELLITUS WITHOUT COMPLICATION, WITH LONG-TERM CURRENT USE OF INSULIN (MULTI): Primary | ICD-10-CM

## 2024-08-15 DIAGNOSIS — E11.9 TYPE 2 DIABETES MELLITUS WITHOUT COMPLICATION, WITH LONG-TERM CURRENT USE OF INSULIN (MULTI): Primary | ICD-10-CM

## 2024-08-15 PROBLEM — R13.10 DYSPHAGIA, UNSPECIFIED: Status: ACTIVE | Noted: 2022-03-30

## 2024-08-15 PROBLEM — R80.9 MICROALBUMINURIA: Status: ACTIVE | Noted: 2021-02-26

## 2024-08-15 PROBLEM — F33.1 MODERATE EPISODE OF RECURRENT MAJOR DEPRESSIVE DISORDER (HCC): Status: ACTIVE | Noted: 2023-02-19

## 2024-08-15 PROBLEM — L03.90 CELLULITIS: Status: ACTIVE | Noted: 2024-08-15

## 2024-08-15 PROBLEM — Z93.0 TRACHEOSTOMY STATUS (HCC): Status: ACTIVE | Noted: 2022-03-30

## 2024-08-15 PROBLEM — M54.50 CHRONIC LOW BACK PAIN: Status: ACTIVE | Noted: 2023-05-08

## 2024-08-15 PROBLEM — F41.9 ANXIETY DISORDER: Status: ACTIVE | Noted: 2022-06-02

## 2024-08-15 PROBLEM — G56.90 DISORDER OF PERIPHERAL NERVE OF UPPER EXTREMITY: Status: ACTIVE | Noted: 2023-02-19

## 2024-08-15 PROBLEM — J96.00 ACUTE RESPIRATORY FAILURE (HCC): Status: ACTIVE | Noted: 2022-03-30
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
## 2024-08-15 PROBLEM — G89.29 CHRONIC LOW BACK PAIN: Status: ACTIVE | Noted: 2023-05-08

## 2024-08-15 PROBLEM — R60.0 EDEMA OF LOWER EXTREMITY: Status: ACTIVE | Noted: 2024-08-15

## 2024-08-15 PROBLEM — R53.1 WEAKNESS: Status: ACTIVE | Noted: 2022-10-12

## 2024-08-15 PROBLEM — D64.9 ANEMIA, UNSPECIFIED: Status: ACTIVE | Noted: 2022-03-30

## 2024-08-15 PROBLEM — R49.9 CHANGE IN VOICE: Status: ACTIVE | Noted: 2023-05-08

## 2024-08-15 PROBLEM — S06.9XAA INTRACRANIAL INJURY (HCC): Status: ACTIVE | Noted: 2022-06-02

## 2024-08-15 PROBLEM — Z95.1 PRESENCE OF AORTOCORONARY BYPASS GRAFT: Status: ACTIVE | Noted: 2023-05-16

## 2024-08-15 PROBLEM — M79.673 PAIN OF FOOT: Status: ACTIVE | Noted: 2024-08-15

## 2024-08-15 PROBLEM — J39.8 TRACHEAL STENOSIS: Status: ACTIVE | Noted: 2023-05-08

## 2024-08-15 PROBLEM — M79.601 CHRONIC PAIN OF BOTH UPPER EXTREMITIES: Status: ACTIVE | Noted: 2023-01-31

## 2024-08-15 PROBLEM — G93.1 ANOXIC BRAIN DAMAGE, NOT ELSEWHERE CLASSIFIED (HCC): Status: ACTIVE | Noted: 2022-03-30

## 2024-08-15 PROBLEM — R41.82 ALTERED MENTAL STATUS: Status: ACTIVE | Noted: 2024-08-15

## 2024-08-15 PROBLEM — I46.9 CARDIAC ARREST (HCC): Status: ACTIVE | Noted: 2022-12-19

## 2024-08-15 PROBLEM — N91.1 SECONDARY PHYSIOLOGIC AMENORRHEA: Status: ACTIVE | Noted: 2022-10-12

## 2024-08-15 PROBLEM — M25.619 DECREASED RANGE OF MOTION OF SHOULDER: Status: ACTIVE | Noted: 2023-02-19

## 2024-08-15 PROBLEM — G89.29 CHRONIC PAIN OF BOTH UPPER EXTREMITIES: Status: ACTIVE | Noted: 2023-01-31

## 2024-08-15 PROBLEM — R48.8 OTHER SYMBOLIC DYSFUNCTIONS: Status: ACTIVE | Noted: 2022-03-31

## 2024-08-15 PROBLEM — K21.9 GASTROESOPHAGEAL REFLUX DISEASE WITHOUT ESOPHAGITIS: Status: ACTIVE | Noted: 2023-07-24

## 2024-08-15 PROBLEM — Z79.85 LONG-TERM (CURRENT) USE OF INJECTABLE NON-INSULIN ANTIDIABETIC DRUGS: Status: ACTIVE | Noted: 2023-05-16

## 2024-08-15 PROBLEM — F51.01 PRIMARY INSOMNIA: Status: ACTIVE | Noted: 2023-05-08

## 2024-08-15 PROBLEM — M79.602 CHRONIC PAIN OF BOTH UPPER EXTREMITIES: Status: ACTIVE | Noted: 2023-01-31

## 2024-08-15 PROBLEM — G25.81 RESTLESS LEGS SYNDROME: Status: ACTIVE | Noted: 2023-09-05

## 2024-08-15 PROBLEM — T82.111A MALFUNCTION OF CARDIAC PACEMAKER: Status: ACTIVE | Noted: 2023-05-20

## 2024-08-15 PROBLEM — E11.65 TYPE 2 DIABETES MELLITUS WITH HYPERGLYCEMIA (HCC): Status: ACTIVE | Noted: 2022-06-02

## 2024-08-15 PROBLEM — I25.2 OLD MYOCARDIAL INFARCTION: Status: ACTIVE | Noted: 2022-03-30

## 2024-08-15 PROBLEM — F12.19 CANNABIS ABUSE WITH UNSPECIFIED CANNABIS-INDUCED DISORDER (HCC): Status: ACTIVE | Noted: 2022-03-30

## 2024-08-15 PROBLEM — Z20.822 CONTACT WITH AND (SUSPECTED) EXPOSURE TO COVID-19: Status: ACTIVE | Noted: 2023-04-22

## 2024-08-15 PROBLEM — R79.89 LOW VITAMIN D LEVEL: Status: ACTIVE | Noted: 2024-08-15

## 2024-08-15 PROBLEM — E66.9 OBESITY: Status: ACTIVE | Noted: 2023-07-24

## 2024-08-15 PROBLEM — R27.8 OTHER LACK OF COORDINATION: Status: ACTIVE | Noted: 2022-03-31

## 2024-08-15 PROBLEM — M25.519 SHOULDER PAIN: Status: ACTIVE | Noted: 2023-02-19

## 2024-08-15 PROBLEM — R60.1 GENERALIZED EDEMA: Status: ACTIVE | Noted: 2023-02-19

## 2024-08-15 PROBLEM — F17.200 NICOTINE DEPENDENCE: Status: ACTIVE | Noted: 2023-01-06

## 2024-08-15 PROBLEM — E11.40 TYPE 2 DIABETES MELLITUS WITH DIABETIC NEUROPATHY, UNSPECIFIED (HCC): Status: ACTIVE | Noted: 2022-03-30

## 2024-08-15 PROBLEM — R92.8 ABNORMAL MAMMOGRAM: Status: ACTIVE | Noted: 2023-02-19

## 2024-08-15 PROBLEM — J43.1 PANLOBULAR EMPHYSEMA (HCC): Status: ACTIVE | Noted: 2023-02-19

## 2024-08-15 NOTE — PROGRESS NOTES
"Pharmacy Post-Discharge Follow-Up Visit  Tana Bahena \"Perlita\" is a 49 y.o. female was referred to Clinical Pharmacy Team to complete a post-discharge medication optimization and monitoring visit.  The patient was referred for their Diabetes.    Admission Date: 5/12/23  Discharge Date: 5/16/23    Referring Provider: Jacobo Thakkar MD  Last Visit: 6/4/24  Next Visit: Not scheduled    Subjective   Allergies   Allergen Reactions    Ketorolac Unknown    Tramadol Unknown       [id number] - [facility] [address]  [address] [address]  Phone: [phone] Fax: [phone]    [facility]  [address], [address]  [facility] [address]  Phone: [phone] Fax: [phone]      Social History     Social History Narrative    Not on file      Patient presents to Clinical Pharmacy team for follow-up after last visit on 5/17/24. At last visit, patient was continued on all diabetes medications as prescribed.      Since last visit, patient obtained new A1c which was elevated at 7.6%. Was started on Jardiance at last PCP visit. Has been tolerating since start, denies any urinary side effects.     Patient reports that since then BG has been better controlled, now in the mid 100s. Denies any s/sx of hypoglycemia.     Of note, she does not feel when her blood sugar drops <70 mg/dL.      Objective     There were no vitals taken for this visit.     LAB  Lab Results   Component Value Date    BILITOT 0.3 06/29/2023    CALCIUM 9.0 08/03/2023    CO2 25 08/03/2023     08/03/2023    CREATININE 0.72 11/20/2023    GLUCOSE 97 08/03/2023    ALKPHOS 91 06/29/2023    K 3.5 08/03/2023    PROT 6.9 06/29/2023     08/03/2023    AST 11 06/29/2023    ALT 15 06/29/2023    BUN 13 08/03/2023    ANIONGAP 14 08/03/2023    MG 1.78 06/30/2023    PHOS 3.4 05/02/2023    ALBUMIN 4.0 06/29/2023    LIPASE 15 06/29/2023    EGFR >90 11/20/2023     Lab Results   Component Value Date    " TRIG 118 12/19/2022    CHOL 154 12/19/2022    HDL 51.1 12/19/2022     Lab Results   Component Value Date    HGBA1C 7.6 (H) 05/14/2024         Current Outpatient Medications on File Prior to Visit   Medication Sig Dispense Refill    albuterol 90 mcg/actuation inhaler Inhale 2 puffs every 8 hours if needed for wheezing.      alcohol swabs (Alcohol Prep Pads) pads, medicated USE TO CLEANSE SKIN PRIOR TO INJECTION FOUR TIMES DAILY AS DIRECTED 200 each 3    aspirin 81 mg EC tablet Take 1 tablet (81 mg) by mouth once daily.      atorvastatin (Lipitor) 40 mg tablet Take 1 tablet (40 mg) by mouth once daily.      blood-glucose sensor (Dexcom G7 Sensor) device Use as directed to test blood glucose continuously. Replace every 10 days. 3 each 3    budesonide-formoteroL (Symbicort) 80-4.5 mcg/actuation inhaler Inhale 2 puffs 2 times a day. Rinse mouth after use 10.2 g 3    carBAMazepine (Carbatrol) 200 mg 12 hr capsule Take 1 capsule (200 mg) by mouth twice a day.      carvedilol (Coreg) 6.25 mg tablet take 1 tablet by mouth twice a day with meals 60 tablet 1    cyclobenzaprine (Flexeril) 10 mg tablet Take 1 tablet (10 mg) by mouth 3 times a day as needed for muscle spasms. 60 tablet 2    Dexcom G4 platinum  (Dexcom G7 ) misc Use as instructed to test blood sugar continuously 1 each 0    Dexcom G4 platinum transmitter (Dexcom G6 Transmitter) device Use as instructed to test blood sugar 4 times daily. Replace every 90 days. 1 each 3    Dexcom G4 platinum transmitter (Dexcom G6 Transmitter) device Use as instructed  New order as old machine was faulty. 1 each 0    fluticasone propion-salmeteroL (Advair Diskus) 250-50 mcg/dose diskus inhaler Inhale 1 puff 2 times a day. Rinse mouth with water after use to reduce aftertaste and incidence of candidiasis. Do not swallow. 2 each 3    FreeStyle Jayy 3 [place] misc USE AS INSTRUCTED FOR CONTINUOUS BLOOD GLUCOSE MONITORING 1 each 0    FreeStyle Jayy 3 Sensor device USE  AS DIRECTED TO CHECK BLOOD SUGAR 1 each 3    gabapentin (Neurontin) 100 mg capsule take 1 capsule by mouth three times a day 90 capsule 0    hydrOXYzine HCL (Atarax) 25 mg tablet take 1 tablet by mouth at bedtime 30 tablet 0    insulin aspart (NovoLOG Flexpen U-100 Insulin) 100 unit/mL (3 mL) pen INJECT 6-8 UNITS UNDER THE SKIN THREE TIMES DAILY BEFORE MEALS. PER SLIDING SCALE, MAX 30 UNITS PER DAY 9 mL 3    insulin glargine (Lantus Solostar U-100 Insulin) 100 unit/mL (3 mL) pen Inject 50 Units under the skin once daily at bedtime. Take as directed per insulin instructions. 15 mL 1    insulin lispro (HumaLOG KwikPen Insulin) 100 unit/mL injection Inject under the skin 3 times a day with meals. Take as directed per insulin instructions.      isosorbide mononitrate ER (Imdur) 60 mg 24 hr tablet take 1 tablet by mouth every morning 30 tablet 2    Jardiance 10 mg take 1 tablet by mouth once daily 30 tablet 2    lancets misc Use as directed when checking blood sugar 100 each 3    losartan (Cozaar) 25 mg tablet Take 1 tablet (25 mg) by mouth once daily.      metFORMIN  mg 24 hr tablet Take 2 tablets (1,000 mg) by mouth 2 times a day. 120 tablet 3    methylPREDNISolone (Medrol Dospak) 4 mg tablets use as directed FOLLOW DIRECTIONS ON BACK OF FOIL PACK 21 tablet 0    mirtazapine (Remeron) 45 mg tablet Take 1 tablet (45 mg) by mouth once daily at bedtime. 30 tablet 3    nitroglycerin (Nitrostat) 0.4 mg SL tablet Place 1 tablet (0.4 mg) under the tongue every 5 minutes if needed. PLACE 1 TABLET UNDER THE TONGUE EVERY 5 MINUTES FOR UP TO 3 DOSES AS NEEDED FOR CHEST PAIN.CALL 911 IF PAIN PERSISTS.      omeprazole (PriLOSEC) 40 mg DR capsule Take 1 capsule (40 mg) by mouth once daily in the morning. Take before meals. Do not crush or chew. 30 capsule 11    OneTouch Delica Plus Lancet 33 gauge misc Test once 4 times daily as needed 400 each 3    OneTouch Verio Flex Start kit Use as needed for glucose testing 1 each 0     "OneTouch Verio test strips strip Test glucose 4 times daily 400 strip 3    pen needle, diabetic (BD Breana 2nd Gen Pen Needle) 32 gauge x 5/32\" needle USE TO ADMINISTER INSULIN 4 TIMES A  each 3    QUEtiapine (SEROquel) 100 mg tablet take 1 tablet by mouth once daily at bedtime 30 tablet 1    ranolazine (Ranexa) 500 mg 12 hr tablet       rOPINIRole (Requip) 0.25 mg tablet take 1 tablet by mouth three times a day 180 tablet 1    semaglutide (Ozempic) 1 mg/dose (4 mg/3 mL) pen injector Inject 1 mg under the skin every 7 days. 3 mL 2    ticagrelor (Brilinta) 90 mg tablet Take 1 tablet (90 mg) by mouth 2 times a day. 60 tablet 2    traZODone (Desyrel) 100 mg tablet take 4 tablets by mouth at bedtime 120 tablet 0    triamterene-hydrochlorothiazid (Maxzide-25) 37.5-25 mg tablet Take 1 tablet by mouth once daily.      True Metrix Glucose Meter misc USE TO TEST BLOOD SUGAR ONCE DAILY AND AS NEEDED      zolpidem (Ambien) 10 mg tablet Take 1 tablet (10 mg) by mouth as needed at bedtime for sleep for up to 10 days. 10 tablet 0     No current facility-administered medications on file prior to visit.      Assessment/Plan   Problem List Items Addressed This Visit       Type 2 diabetes mellitus without complication, with long-term current use of insulin (Multi) - Primary     ASSESSMENT:  -Patient's last A1c is elevated from previous at 7.6%,now above goal of 7%  -Patient was started on Jardiance at last PCP visit. Has been tolerating well so far and denies any urinary side effects.   -Patient reported BG is now within goal, reported readings now in mid 100s.     RECOMMENDATIONS/PLAN:  CONTINUE all diabetes medications as prescribed:  a. Metformin  m tablets BID  b. Jardiance 10 mg daily        c. Ozempic 1 mg mg once weekly on .          d. Lantus 50 units at bedtime        e. Novolog TID per sliding scale (on average injecting 6-8 units); max dose of 30 units per day          Clinical Pharmacist follow up: " 11/14/24 or sooner as needed based on clinical intervention  Type of Encounter:  Telephone    Jackeline Potts PharmD  Clinical Ambulatory Care Pharmacist  Ph: [phone]    Verbal consent to manage patient's drug therapy was obtained from the patient. They were informed they may decline to participate or withdraw from participation in pharmacy services at any time.

## 2024-08-15 NOTE — ASSESSMENT & PLAN NOTE
ASSESSMENT:  -Patient's last A1c is elevated from previous at 7.6%,now above goal of 7%  -Patient was started on Jardiance at last PCP visit. Has been tolerating well so far and denies any urinary side effects.   -Patient reported BG is now within goal, reported readings now in mid 100s.     RECOMMENDATIONS/PLAN:  CONTINUE all diabetes medications as prescribed:  a. Metformin  m tablets BID  b. Jardiance 10 mg daily        c. Ozempic 1 mg mg once weekly on .          d. Lantus 50 units at bedtime        e. Novolog TID per sliding scale (on average injecting 6-8 units); max dose of 30 units per day

## 2024-08-18 DIAGNOSIS — J43.1 PANLOBULAR EMPHYSEMA (MULTI): Primary | ICD-10-CM

## 2024-08-18 DIAGNOSIS — J45.20 MILD INTERMITTENT ASTHMA, UNSPECIFIED WHETHER COMPLICATED (HHS-HCC): ICD-10-CM

## 2024-08-19 RX ORDER — FLUTICASONE PROPIONATE AND SALMETEROL 50; 250 UG/1; UG/1
POWDER RESPIRATORY (INHALATION)
Qty: 120 EACH | Refills: 0 | Status: SHIPPED | OUTPATIENT
Start: 2024-08-19

## 2024-08-19 RX ORDER — METHYLPREDNISOLONE 4 MG/1
TABLET ORAL
Qty: 21 TABLET | Refills: 0 | OUTPATIENT
Start: 2024-08-19

## 2024-08-26 ENCOUNTER — OFFICE VISIT (OUTPATIENT)
Dept: NEUROLOGY | Age: 49
End: 2024-08-26
Payer: COMMERCIAL

## 2024-08-26 ENCOUNTER — PATIENT MESSAGE (OUTPATIENT)
Dept: NEUROLOGY | Age: 49
End: 2024-08-26

## 2024-08-26 VITALS
DIASTOLIC BLOOD PRESSURE: 78 MMHG | BODY MASS INDEX: 29.54 KG/M2 | SYSTOLIC BLOOD PRESSURE: 118 MMHG | HEART RATE: 103 BPM | WEIGHT: 183 LBS

## 2024-08-26 DIAGNOSIS — G93.1 ANOXIC BRAIN DAMAGE, NOT ELSEWHERE CLASSIFIED (HCC): Primary | ICD-10-CM

## 2024-08-26 DIAGNOSIS — G93.1 ANOXIC ENCEPHALOPATHY (HCC): ICD-10-CM

## 2024-08-26 DIAGNOSIS — I46.9 CARDIAC ARREST (HCC): ICD-10-CM

## 2024-08-26 DIAGNOSIS — R27.8 OTHER LACK OF COORDINATION: ICD-10-CM

## 2024-08-26 DIAGNOSIS — I63.9 ACUTE CVA (CEREBROVASCULAR ACCIDENT) (HCC): ICD-10-CM

## 2024-08-26 DIAGNOSIS — G47.01 INSOMNIA DUE TO MEDICAL CONDITION: ICD-10-CM

## 2024-08-26 PROCEDURE — 3078F DIAST BP <80 MM HG: CPT | Performed by: PSYCHIATRY & NEUROLOGY

## 2024-08-26 PROCEDURE — 3074F SYST BP LT 130 MM HG: CPT | Performed by: PSYCHIATRY & NEUROLOGY

## 2024-08-26 PROCEDURE — G8417 CALC BMI ABV UP PARAM F/U: HCPCS | Performed by: PSYCHIATRY & NEUROLOGY

## 2024-08-26 PROCEDURE — G8427 DOCREV CUR MEDS BY ELIG CLIN: HCPCS | Performed by: PSYCHIATRY & NEUROLOGY

## 2024-08-26 PROCEDURE — 99204 OFFICE O/P NEW MOD 45 MIN: CPT | Performed by: PSYCHIATRY & NEUROLOGY

## 2024-08-26 PROCEDURE — 4004F PT TOBACCO SCREEN RCVD TLK: CPT | Performed by: PSYCHIATRY & NEUROLOGY

## 2024-08-26 RX ORDER — CYCLOBENZAPRINE HCL 10 MG
10 TABLET ORAL 3 TIMES DAILY PRN
COMMUNITY
Start: 2023-05-08 | End: 2024-09-22

## 2024-08-26 RX ORDER — ESZOPICLONE 2 MG/1
TABLET, FILM COATED ORAL
Qty: 30 TABLET | Refills: 1 | Status: SHIPPED | OUTPATIENT
Start: 2024-08-26 | End: 2024-09-26

## 2024-08-26 RX ORDER — EMPAGLIFLOZIN 10 MG/1
10 TABLET, FILM COATED ORAL DAILY
COMMUNITY

## 2024-08-26 RX ORDER — GABAPENTIN 100 MG/1
100 CAPSULE ORAL 3 TIMES DAILY
COMMUNITY

## 2024-08-26 RX ORDER — MEMANTINE HYDROCHLORIDE 5 MG/1
5 TABLET ORAL 2 TIMES DAILY
Qty: 60 TABLET | Refills: 3 | Status: SHIPPED | OUTPATIENT
Start: 2024-08-26

## 2024-08-26 ASSESSMENT — ENCOUNTER SYMPTOMS
TROUBLE SWALLOWING: 0
VOMITING: 0
COLOR CHANGE: 0
BACK PAIN: 0
PHOTOPHOBIA: 0
NAUSEA: 0
CHOKING: 0
SHORTNESS OF BREATH: 0

## 2024-08-26 NOTE — PROGRESS NOTES
Occupational Health - Occupational Stress Questionnaire     Feeling of Stress : Not at all   Social Connections: Moderately Isolated (1/7/2020)    [facility]    Social Connection and Isolation Panel [NHANES]     Frequency of Communication with Friends and Family: Three times a week     Frequency of Social Gatherings with Friends and Family: Never     Attends Rastafari Services: Never     Active Member of Clubs or Organizations: No     Attends Club or Organization Meetings: Never     Marital Status:    Intimate Partner Violence: Not on file   Housing Stability: Not on file     Family History   Problem Relation Age of Onset    Heart Disease Mother     High Blood Pressure Mother     Diabetes Mother     High Blood Pressure Father     Heart Disease Father     Kidney Disease Father      Allergies   Allergen Reactions    Toradol [Ketorolac Tromethamine]     Tramadol        Current Outpatient Medications   Medication Sig Dispense Refill    cyclobenzaprine (FLEXERIL) 10 MG tablet Take 1 tablet by mouth 3 times daily as needed      JARDIANCE 10 MG tablet Take 1 tablet by mouth daily      gabapentin (NEURONTIN) 100 MG capsule Take 1 capsule by mouth 3 times daily.      eszopiclone (LUNESTA) 2 MG TABS 1 nightly, patient to slowly discontinue trazodone when taking the medication. 30 tablet 1    traZODone (DESYREL) 100 MG tablet Take 1 tablet by mouth nightly      rOPINIRole (REQUIP) 0.25 MG tablet Take 1 tablet by mouth 3 times daily      insulin glargine (LANTUS) 100 UNIT/ML injection vial Inject 50 Units into the skin nightly      ticagrelor (BRILINTA) 90 MG TABS tablet Take 1 tablet by mouth 2 times daily 180 tablet 3    carvedilol (COREG) 6.25 MG tablet TAKE 1 TABLET BY MOUTH TWICE DAILY 180 tablet 0    aspirin 81 MG EC tablet Take 2 tablets by mouth daily 30 tablet 3    isosorbide mononitrate (IMDUR) 60 MG extended release tablet Take 1 tablet by mouth daily 90 tablet 2

## 2024-08-29 ENCOUNTER — PATIENT MESSAGE (OUTPATIENT)
Dept: NEUROLOGY | Age: 49
End: 2024-08-29

## 2024-08-29 DIAGNOSIS — G25.81 RESTLESS LEG: ICD-10-CM

## 2024-08-29 DIAGNOSIS — J43.1 PANLOBULAR EMPHYSEMA (MULTI): ICD-10-CM

## 2024-08-29 NOTE — TELEPHONE ENCOUNTER
Rx Refill Request     Name: Tana Bahena  :  1975   Medication Name:  methylpredmisone and ropinirole   Specific Pharmacy location:  [facility]   Date of last appointment:  Visit date not found   Date of next appointment:  Visit date not found   Best number to reach patient:  [phone]       Recent Visits  Date Type Provider Dept   23 Office Visit Jacobo Thakkar MD Do [id number] [facility]   10/24/23 Office Visit Jacobo Thakkar MD Do [id number] [facility]   23 Office Visit Jacobo Thakkar MD Do [id number] [facility]   23 Office Visit Jacobo Thakkar MD Do [id number] [facility]   23 Office Visit Jacobo Thakkar MD Do [id number] [facility]   23 Office Visit Jacobo Thakkar MD Do [id number] [facility]   23 Office Visit Jacobo Thakkar MD Do [id number] [facility]   Showing recent visits within past 540 days and meeting all other requirements  Future Appointments  No visits were found meeting these conditions.  Showing future appointments within next 180 days and meeting all other requirements

## 2024-08-30 ENCOUNTER — TELEPHONE (OUTPATIENT)
Dept: NEUROLOGY | Age: 49
End: 2024-08-30

## 2024-09-02 ENCOUNTER — PATIENT MESSAGE (OUTPATIENT)
Dept: NEUROLOGY | Age: 49
End: 2024-09-02

## 2024-09-03 DIAGNOSIS — G47.01 INSOMNIA DUE TO MEDICAL CONDITION: Primary | ICD-10-CM

## 2024-09-03 RX ORDER — METHYLPREDNISOLONE 4 MG/1
TABLET ORAL
Qty: 21 TABLET | Refills: 0 | Status: SHIPPED | OUTPATIENT
Start: 2024-09-03

## 2024-09-03 RX ORDER — TEMAZEPAM 15 MG/1
15 CAPSULE ORAL NIGHTLY PRN
Qty: 14 CAPSULE | Refills: 0 | Status: SHIPPED | OUTPATIENT
Start: 2024-09-03 | End: 2024-09-17

## 2024-09-03 RX ORDER — ROPINIROLE 0.25 MG/1
0.25 TABLET, FILM COATED ORAL 3 TIMES DAILY
Qty: 180 TABLET | Refills: 1 | Status: SHIPPED | OUTPATIENT
Start: 2024-09-03

## 2024-09-10 ENCOUNTER — TELEPHONE (OUTPATIENT)
Dept: NEUROLOGY | Age: 49
End: 2024-09-10

## 2024-09-11 ENCOUNTER — PATIENT MESSAGE (OUTPATIENT)
Dept: PRIMARY CARE | Facility: CLINIC | Age: 49
End: 2024-09-11
Payer: COMMERCIAL

## 2024-09-11 DIAGNOSIS — F51.01 PRIMARY INSOMNIA: ICD-10-CM

## 2024-09-11 DIAGNOSIS — E11.9 TYPE 2 DIABETES MELLITUS WITHOUT COMPLICATION, WITH LONG-TERM CURRENT USE OF INSULIN (MULTI): ICD-10-CM

## 2024-09-11 DIAGNOSIS — Z79.4 TYPE 2 DIABETES MELLITUS WITHOUT COMPLICATION, WITH LONG-TERM CURRENT USE OF INSULIN (MULTI): ICD-10-CM

## 2024-09-11 DIAGNOSIS — F31.12 BIPOLAR AFFECTIVE DISORDER, CURRENTLY MANIC, MODERATE (MULTI): ICD-10-CM

## 2024-09-12 ENCOUNTER — PATIENT MESSAGE (OUTPATIENT)
Dept: NEUROLOGY | Age: 49
End: 2024-09-12

## 2024-09-12 DIAGNOSIS — G47.01 INSOMNIA DUE TO MEDICAL CONDITION: ICD-10-CM

## 2024-09-12 DIAGNOSIS — I63.9 ACUTE CVA (CEREBROVASCULAR ACCIDENT) (HCC): Primary | ICD-10-CM

## 2024-09-12 RX ORDER — BLOOD-GLUCOSE SENSOR
EACH MISCELLANEOUS
Qty: 1 EACH | Refills: 3 | Status: SHIPPED | OUTPATIENT
Start: 2024-09-12

## 2024-09-12 RX ORDER — TRAZODONE HYDROCHLORIDE 100 MG/1
TABLET ORAL
Qty: 120 TABLET | Refills: 0 | Status: SHIPPED | OUTPATIENT
Start: 2024-09-12

## 2024-09-12 RX ORDER — QUETIAPINE FUMARATE 100 MG/1
100 TABLET, FILM COATED ORAL NIGHTLY
Qty: 30 TABLET | Refills: 1 | Status: SHIPPED | OUTPATIENT
Start: 2024-09-12

## 2024-09-13 RX ORDER — TEMAZEPAM 30 MG
30 CAPSULE ORAL NIGHTLY PRN
Qty: 30 CAPSULE | Refills: 0 | Status: SHIPPED | OUTPATIENT
Start: 2024-09-13 | End: 2024-10-13

## 2024-09-16 ENCOUNTER — PATIENT MESSAGE (OUTPATIENT)
Dept: PRIMARY CARE | Facility: CLINIC | Age: 49
End: 2024-09-16
Payer: COMMERCIAL

## 2024-09-16 DIAGNOSIS — E11.9 TYPE 2 DIABETES MELLITUS WITHOUT COMPLICATION, WITH LONG-TERM CURRENT USE OF INSULIN (MULTI): ICD-10-CM

## 2024-09-16 DIAGNOSIS — F51.01 PRIMARY INSOMNIA: ICD-10-CM

## 2024-09-16 DIAGNOSIS — I10 PRIMARY HYPERTENSION: ICD-10-CM

## 2024-09-16 DIAGNOSIS — Z79.4 TYPE 2 DIABETES MELLITUS WITHOUT COMPLICATION, WITH LONG-TERM CURRENT USE OF INSULIN (MULTI): ICD-10-CM

## 2024-09-16 PROCEDURE — RXMED WILLOW AMBULATORY MEDICATION CHARGE

## 2024-09-16 RX ORDER — METFORMIN HYDROCHLORIDE 500 MG/1
1000 TABLET, EXTENDED RELEASE ORAL 2 TIMES DAILY
Qty: 120 TABLET | Refills: 3 | Status: SHIPPED | OUTPATIENT
Start: 2024-09-16 | End: 2024-09-20 | Stop reason: SDUPTHER

## 2024-09-16 RX ORDER — CARVEDILOL 6.25 MG/1
6.25 TABLET ORAL
Qty: 60 TABLET | Refills: 2 | Status: SHIPPED | OUTPATIENT
Start: 2024-09-16 | End: 2024-09-20 | Stop reason: SDUPTHER

## 2024-09-16 RX ORDER — HYDROXYZINE HYDROCHLORIDE 25 MG/1
25 TABLET, FILM COATED ORAL NIGHTLY
Qty: 30 TABLET | Refills: 2 | Status: SHIPPED | OUTPATIENT
Start: 2024-09-16 | End: 2024-09-20 | Stop reason: SDUPTHER

## 2024-09-16 RX ORDER — CYCLOBENZAPRINE HCL 10 MG
10 TABLET ORAL 3 TIMES DAILY PRN
Qty: 60 TABLET | Refills: 2 | Status: SHIPPED | OUTPATIENT
Start: 2024-09-16 | End: 2024-09-20 | Stop reason: SDUPTHER

## 2024-09-16 RX ORDER — SEMAGLUTIDE 1.34 MG/ML
1 INJECTION, SOLUTION SUBCUTANEOUS
Qty: 3 ML | Refills: 2 | Status: SHIPPED | OUTPATIENT
Start: 2024-09-16

## 2024-09-17 RX ORDER — MEMANTINE HYDROCHLORIDE 5 MG/1
5 TABLET ORAL 2 TIMES DAILY
Qty: 60 TABLET | Refills: 3 | Status: SHIPPED | OUTPATIENT
Start: 2024-09-17

## 2024-09-19 ENCOUNTER — PHARMACY VISIT (OUTPATIENT)
Dept: PHARMACY | Facility: CLINIC | Age: 49
End: 2024-09-19
Payer: MEDICAID

## 2024-09-19 ENCOUNTER — PATIENT MESSAGE (OUTPATIENT)
Dept: PRIMARY CARE | Facility: CLINIC | Age: 49
End: 2024-09-19
Payer: COMMERCIAL

## 2024-09-19 DIAGNOSIS — Z79.4 TYPE 2 DIABETES MELLITUS WITHOUT COMPLICATION, WITH LONG-TERM CURRENT USE OF INSULIN (MULTI): ICD-10-CM

## 2024-09-19 DIAGNOSIS — E11.9 TYPE 2 DIABETES MELLITUS WITHOUT COMPLICATION, WITH LONG-TERM CURRENT USE OF INSULIN (MULTI): ICD-10-CM

## 2024-09-19 DIAGNOSIS — I10 PRIMARY HYPERTENSION: ICD-10-CM

## 2024-09-19 DIAGNOSIS — F51.01 PRIMARY INSOMNIA: ICD-10-CM

## 2024-09-20 RX ORDER — CARVEDILOL 6.25 MG/1
6.25 TABLET ORAL
Qty: 60 TABLET | Refills: 2 | Status: SHIPPED | OUTPATIENT
Start: 2024-09-20

## 2024-09-20 RX ORDER — HYDROXYZINE HYDROCHLORIDE 25 MG/1
25 TABLET, FILM COATED ORAL NIGHTLY
Qty: 30 TABLET | Refills: 2 | Status: SHIPPED | OUTPATIENT
Start: 2024-09-20

## 2024-09-20 RX ORDER — METFORMIN HYDROCHLORIDE 500 MG/1
1000 TABLET, EXTENDED RELEASE ORAL 2 TIMES DAILY
Qty: 120 TABLET | Refills: 2 | Status: SHIPPED | OUTPATIENT
Start: 2024-09-20

## 2024-09-20 RX ORDER — CYCLOBENZAPRINE HCL 10 MG
10 TABLET ORAL 3 TIMES DAILY PRN
Qty: 60 TABLET | Refills: 2 | Status: SHIPPED | OUTPATIENT
Start: 2024-09-20 | End: 2025-05-18

## 2024-09-24 ENCOUNTER — PATIENT MESSAGE (OUTPATIENT)
Dept: PRIMARY CARE | Facility: CLINIC | Age: 49
End: 2024-09-24
Payer: COMMERCIAL

## 2024-09-24 DIAGNOSIS — G56.90 NEUROPATHY OF UPPER EXTREMITY, UNSPECIFIED LATERALITY: ICD-10-CM

## 2024-09-24 PROCEDURE — RXMED WILLOW AMBULATORY MEDICATION CHARGE

## 2024-09-24 RX ORDER — GABAPENTIN 100 MG/1
100 CAPSULE ORAL 3 TIMES DAILY
Qty: 90 CAPSULE | Refills: 0 | Status: SHIPPED | OUTPATIENT
Start: 2024-09-24

## 2024-09-26 ENCOUNTER — PHARMACY VISIT (OUTPATIENT)
Dept: PHARMACY | Facility: CLINIC | Age: 49
End: 2024-09-26
Payer: MEDICAID

## 2024-09-27 ENCOUNTER — TELEPHONE (OUTPATIENT)
Dept: NEUROLOGY | Age: 49
End: 2024-09-27

## 2024-09-27 DIAGNOSIS — G47.01 INSOMNIA DUE TO MEDICAL CONDITION: ICD-10-CM

## 2024-09-27 NOTE — TELEPHONE ENCOUNTER
Patient is  requesting medication refill. Please approve or deny this request.    Rx requested:  Requested Prescriptions     Pending Prescriptions Disp Refills    temazepam (RESTORIL) 30 MG capsule 30 capsule 0     Sig: Take 1 capsule by mouth nightly as needed for Sleep for up to 30 days. Max Daily Amount: 30 mg         Last Office Visit:   8/26/2024      Next Visit Date:  Future Appointments   Date Time Provider Department Center   12/18/2024  3:00 PM Roberto Roman MD [facility]

## 2024-09-28 RX ORDER — TEMAZEPAM 30 MG
30 CAPSULE ORAL NIGHTLY PRN
Qty: 30 CAPSULE | Refills: 0 | Status: SHIPPED | OUTPATIENT
Start: 2024-09-28 | End: 2024-10-28

## 2024-10-08 DIAGNOSIS — F31.12 BIPOLAR AFFECTIVE DISORDER, CURRENTLY MANIC, MODERATE (MULTI): ICD-10-CM

## 2024-10-08 DIAGNOSIS — F51.01 PRIMARY INSOMNIA: ICD-10-CM

## 2024-10-08 NOTE — TELEPHONE ENCOUNTER
Rx Refill Request     Name: Tana Bahena  :  1975   Medication Name:  trazadone and quetiapine   Specific Pharmacy location:  [facility]   Date of last appointment:  Visit date not found   Date of next appointment:  Visit date not found   Best number to reach patient:  [phone]       Recent Visits  Date Type Provider Dept   23 Office Visit Jacobo Thakkar MD Do [id number] [facility]   10/24/23 Office Visit Jacobo Thakkar MD Do [id number] [facility]   23 Office Visit Jacobo Thakkar MD Do [id number] [facility]   23 Office Visit Jacobo Thakkar MD Do [id number] [facility]   23 Office Visit Jacobo Thakkar MD Do [id number] [facility]   23 Office Visit Jacobo Thakkar MD Do [id number] [facility]   Showing recent visits within past 540 days and meeting all other requirements  Future Appointments  No visits were found meeting these conditions.  Showing future appointments within next 180 days and meeting all other requirements

## 2024-10-10 RX ORDER — QUETIAPINE FUMARATE 100 MG/1
100 TABLET, FILM COATED ORAL NIGHTLY
Qty: 30 TABLET | Refills: 1 | OUTPATIENT
Start: 2024-10-10

## 2024-10-10 RX ORDER — TRAZODONE HYDROCHLORIDE 100 MG/1
TABLET ORAL
Qty: 120 TABLET | Refills: 0 | OUTPATIENT
Start: 2024-10-10

## 2024-10-13 ENCOUNTER — HOSPITAL ENCOUNTER (OUTPATIENT)
Dept: CARDIOLOGY | Age: 49
Discharge: HOME | End: 2024-10-13
Payer: COMMERCIAL

## 2024-10-13 PROCEDURE — 93296 REM INTERROG EVL PM/IDS: CPT

## 2024-10-15 ENCOUNTER — APPOINTMENT (OUTPATIENT)
Dept: PRIMARY CARE | Facility: CLINIC | Age: 49
End: 2024-10-15
Payer: COMMERCIAL

## 2024-10-21 ENCOUNTER — APPOINTMENT (OUTPATIENT)
Dept: PRIMARY CARE | Facility: CLINIC | Age: 49
End: 2024-10-21
Payer: COMMERCIAL

## 2024-10-21 VITALS
OXYGEN SATURATION: 96 % | HEIGHT: 67 IN | DIASTOLIC BLOOD PRESSURE: 74 MMHG | HEART RATE: 85 BPM | SYSTOLIC BLOOD PRESSURE: 113 MMHG | BODY MASS INDEX: 29.13 KG/M2 | WEIGHT: 185.6 LBS

## 2024-10-21 DIAGNOSIS — E11.9 TYPE 2 DIABETES MELLITUS WITHOUT COMPLICATION, WITH LONG-TERM CURRENT USE OF INSULIN (MULTI): ICD-10-CM

## 2024-10-21 DIAGNOSIS — I10 PRIMARY HYPERTENSION: ICD-10-CM

## 2024-10-21 DIAGNOSIS — F31.12 BIPOLAR AFFECTIVE DISORDER, CURRENTLY MANIC, MODERATE (MULTI): ICD-10-CM

## 2024-10-21 DIAGNOSIS — G89.29 CHRONIC BILATERAL LOW BACK PAIN WITHOUT SCIATICA: ICD-10-CM

## 2024-10-21 DIAGNOSIS — M54.50 CHRONIC BILATERAL LOW BACK PAIN WITHOUT SCIATICA: ICD-10-CM

## 2024-10-21 DIAGNOSIS — Z12.11 COLON CANCER SCREENING: ICD-10-CM

## 2024-10-21 DIAGNOSIS — F51.01 PRIMARY INSOMNIA: ICD-10-CM

## 2024-10-21 DIAGNOSIS — Z79.4 TYPE 2 DIABETES MELLITUS WITHOUT COMPLICATION, WITH LONG-TERM CURRENT USE OF INSULIN (MULTI): ICD-10-CM

## 2024-10-21 DIAGNOSIS — I25.10 CORONARY ARTERY DISEASE INVOLVING NATIVE CORONARY ARTERY OF NATIVE HEART WITHOUT ANGINA PECTORIS: Primary | ICD-10-CM

## 2024-10-21 DIAGNOSIS — E78.2 MIXED HYPERLIPIDEMIA: ICD-10-CM

## 2024-10-21 DIAGNOSIS — F33.1 MODERATE EPISODE OF RECURRENT MAJOR DEPRESSIVE DISORDER: ICD-10-CM

## 2024-10-21 DIAGNOSIS — R27.0 ATAXIA: ICD-10-CM

## 2024-10-21 PROCEDURE — 3008F BODY MASS INDEX DOCD: CPT | Performed by: FAMILY MEDICINE

## 2024-10-21 PROCEDURE — 3074F SYST BP LT 130 MM HG: CPT | Performed by: FAMILY MEDICINE

## 2024-10-21 PROCEDURE — 99214 OFFICE O/P EST MOD 30 MIN: CPT | Performed by: FAMILY MEDICINE

## 2024-10-21 PROCEDURE — 4010F ACE/ARB THERAPY RXD/TAKEN: CPT | Performed by: FAMILY MEDICINE

## 2024-10-21 PROCEDURE — 90656 IIV3 VACC NO PRSV 0.5 ML IM: CPT | Performed by: FAMILY MEDICINE

## 2024-10-21 PROCEDURE — 90471 IMMUNIZATION ADMIN: CPT | Performed by: FAMILY MEDICINE

## 2024-10-21 PROCEDURE — 3078F DIAST BP <80 MM HG: CPT | Performed by: FAMILY MEDICINE

## 2024-10-21 ASSESSMENT — ENCOUNTER SYMPTOMS
DYSPHORIC MOOD: 0
ADENOPATHY: 0
BLOOD IN STOOL: 0
DIFFICULTY URINATING: 0
EYE DISCHARGE: 0
ABDOMINAL PAIN: 0
SORE THROAT: 0
NERVOUS/ANXIOUS: 0
NECK PAIN: 0
DIARRHEA: 0
CONSTIPATION: 0
FATIGUE: 0
NUMBNESS: 0
ARTHRALGIAS: 0
COUGH: 0
ACTIVITY CHANGE: 0
BRUISES/BLEEDS EASILY: 0
BACK PAIN: 0
NAUSEA: 0
MYALGIAS: 0
CHEST TIGHTNESS: 0
HEMATURIA: 0
SHORTNESS OF BREATH: 0
WEAKNESS: 0
VOMITING: 0
DIZZINESS: 0
HEADACHES: 0

## 2024-10-21 NOTE — PROGRESS NOTES
"Subjective   Patient ID: Tana Bahena \"Axel" is a 49 y.o. female who presents for Follow-up.  HPI      Would like some medication changes   Would like a referral for PT for her legs  Positive gait ataxia    Coronary disease stable no angina  Keep cardiac follow-up    Hypertension stable no chest pain or shortness of breath    High cholesterol stable watch diet follow blood work    Diabetes with fair control watch diet and follow blood work    Mood issues stable  No suicidal or homicidal ideation no psychotic or manic symptoms  Recommend keep follow-up with psychiatry    Chronic back pain stable no progression or worsening    Positive insomnia  Has not gotten help from prior medicines  Does not want a controlled substance because of her marijuana use  Will discontinue Deseril and give trial of doxepin    Patient here today with her daughter        Review of Systems   Constitutional:  Negative for activity change and fatigue.   HENT:  Negative for congestion and sore throat.    Eyes:  Negative for discharge.   Respiratory:  Negative for cough, chest tightness and shortness of breath.    Cardiovascular:  Negative for chest pain and leg swelling.   Gastrointestinal:  Negative for abdominal pain, blood in stool, constipation, diarrhea, nausea and vomiting.   Endocrine: Negative for cold intolerance and heat intolerance.   Genitourinary:  Negative for difficulty urinating and hematuria.   Musculoskeletal:  Negative for arthralgias, back pain, gait problem, myalgias and neck pain.   Allergic/Immunologic: Negative for environmental allergies.   Neurological:  Negative for dizziness, syncope, weakness, numbness and headaches.   Hematological:  Negative for adenopathy. Does not bruise/bleed easily.   Psychiatric/Behavioral:  Negative for dysphoric mood. The patient is not nervous/anxious.    All other systems reviewed and are negative.      Objective   /74 (BP Location: Right arm, BP Cuff Size: Large adult)   Pulse " "85   Ht 1.702 m (5' 7\")   Wt 84.2 kg (185 lb 9.6 oz)   SpO2 96%   BMI 29.07 kg/m²    Physical Exam  Vitals and nursing note reviewed.   Constitutional:       General: She is not in acute distress.     Appearance: Normal appearance.   HENT:      Head: Normocephalic and atraumatic.      Right Ear: Tympanic membrane, ear canal and external ear normal.      Left Ear: Tympanic membrane, ear canal and external ear normal.      Nose: Nose normal.      Mouth/Throat:      Mouth: Mucous membranes are moist.      Pharynx: Oropharynx is clear. No oropharyngeal exudate or posterior oropharyngeal erythema.   Eyes:      Extraocular Movements: Extraocular movements intact.      Conjunctiva/sclera: Conjunctivae normal.      Pupils: Pupils are equal, round, and reactive to light.   Cardiovascular:      Rate and Rhythm: Normal rate and regular rhythm.      Pulses: Normal pulses.      Heart sounds: Normal heart sounds. No murmur heard.  Pulmonary:      Effort: Pulmonary effort is normal. No respiratory distress.      Breath sounds: Normal breath sounds. No wheezing or rales.   Abdominal:      General: Abdomen is flat. Bowel sounds are normal. There is no distension.      Palpations: Abdomen is soft. There is no mass.      Tenderness: There is no abdominal tenderness.   Musculoskeletal:         General: No swelling or deformity. Normal range of motion.      Cervical back: Normal range of motion and neck supple.      Right lower leg: No edema.      Left lower leg: No edema.   Lymphadenopathy:      Cervical: No cervical adenopathy.   Skin:     General: Skin is warm and dry.      Capillary Refill: Capillary refill takes less than 2 seconds.      Findings: No lesion or rash.   Neurological:      General: No focal deficit present.      Mental Status: She is alert and oriented to person, place, and time.      Cranial Nerves: No cranial nerve deficit.      Motor: No weakness.   Psychiatric:         Mood and Affect: Mood normal.         " Behavior: Behavior normal.         Thought Content: Thought content normal.         Judgment: Judgment normal.         Assessment/Plan   Problem List Items Addressed This Visit       Bipolar affective disorder, currently manic, moderate (Multi)    Coronary artery disease involving native coronary artery of native heart without angina pectoris - Primary    Moderate episode of recurrent major depressive disorder    Type 2 diabetes mellitus without complication, with long-term current use of insulin (Multi)    Relevant Medications    semaglutide 0.25 mg or 0.5 mg (2 mg/3 mL) pen injector    Primary hypertension    Mixed hyperlipidemia    Chronic bilateral low back pain without sciatica    Primary insomnia    Relevant Medications    doxepin (SINEquan) 25 mg capsule     Other Visit Diagnoses       Ataxia        Relevant Orders    Referral to Physical Therapy    Follow Up In [facility] - PCP    Colon cancer screening        Relevant Orders    Cologuard® colon cancer screening            Patient education provided.  Stay current with age appropriate health maintenance as instructed.  Appointment here or ER with new or worsening symptoms'  Keep appropriate follow-up visit.  Stay current with proper immunizations   Report suicidal ideation  Report psychotic or manic symptoms  3-month recheck  Discussed at length with patient and daughter  Keep specialist follow-up

## 2024-10-22 ENCOUNTER — TELEPHONE (OUTPATIENT)
Dept: PRIMARY CARE | Facility: CLINIC | Age: 49
End: 2024-10-22
Payer: COMMERCIAL

## 2024-10-22 RX ORDER — DOXEPIN HYDROCHLORIDE 25 MG/1
25 CAPSULE ORAL NIGHTLY
Qty: 30 CAPSULE | Refills: 3 | Status: SHIPPED | OUTPATIENT
Start: 2024-10-22 | End: 2025-10-22

## 2024-10-22 NOTE — TELEPHONE ENCOUNTER
Prior Authorization for semaglutide (Ozempic) 1 mg/dose (4 mg/3 mL) pen injector  has been DENIED.     Coverage is provided when the member meets all the followin. Member has a history of at least 120 days of therapy with THREE preferred medications [ONE of the 120 day trials must be Byetta (5 mcg and 10 mcg), Victoza (18 MG/3 ML PEN)(Brand name is pr eferred by your plan) or Trulicity (0.75 mg, 1.5 mg, 3 mg and 4.5 mg)], which include but are not limited to: Farxiga 5 and 10 mg (Brand name is preferred by your plan), Glimepiride, Glipizide, Invokana 100 mg and 300 mg, Januvia, Jardiance 10 and 25 mg and Metformin IR and ER (generic of Glucophage XR) and, 2. Member has had an inadequate clinical response (the inability to reach A1C goal (less than 7%) after at least 120 days of current regimen, with use of two or more drugs concomitantly per ADA ([place] Diabetes Association) guidelines and, 3. Member has documented adherence and appropriate dose escalation (must achieve maximum recommended dose or document that maximum recommended dose is not tolerated or is clinically inappropriate) and, Coverage  is provided when the prescriber provides documentation of the members clinical response to treatment and ongoing safety monitoring including: A1C goal (a test result that shows a three-month average of blood sugars) per ADA guidelines (American Diabetes  Association) and A1C trends including current value (within last 6 months) A patient specific A1C goal if less than 7% and A current A1C (within the last 6 months). The [place] Policy for Medical Necessity as posted on the [facility] website and [facility] Preferred Drug List criteria were reviewed and per [place] Administrative Code Rule 5160-1-01 (C) and  (B), a medically necessary service must include: generally accepted standards of medical practice, be clinically appropriate in administra tion, treatment and outcome and be the lowest cost alternative  to effectively treat the condition. Please contact your provider to assist you with other treatment options that might be covered under your benefit package,

## 2024-10-23 DIAGNOSIS — G56.90 NEUROPATHY OF UPPER EXTREMITY, UNSPECIFIED LATERALITY: ICD-10-CM

## 2024-10-23 NOTE — TELEPHONE ENCOUNTER
Rx Refill Request     Name: Tana Bahena  :  1975   Medication Name:  gabapentin (Neurontin) 100 mg capsule   Specific Pharmacy location:  [facility]   Date of last appointment:  10/21/2024   Date of next appointment:  2024   Best number to reach patient:  [phone]

## 2024-10-24 PROCEDURE — RXMED WILLOW AMBULATORY MEDICATION CHARGE

## 2024-10-24 RX ORDER — GABAPENTIN 100 MG/1
100 CAPSULE ORAL 3 TIMES DAILY
Qty: 90 CAPSULE | Refills: 0 | Status: SHIPPED | OUTPATIENT
Start: 2024-10-24

## 2024-10-26 ENCOUNTER — PHARMACY VISIT (OUTPATIENT)
Dept: PHARMACY | Facility: CLINIC | Age: 49
End: 2024-10-26
Payer: MEDICAID

## 2024-11-02 ENCOUNTER — PATIENT MESSAGE (OUTPATIENT)
Dept: PRIMARY CARE | Facility: CLINIC | Age: 49
End: 2024-11-02
Payer: COMMERCIAL

## 2024-11-02 DIAGNOSIS — Z79.4 TYPE 2 DIABETES MELLITUS WITHOUT COMPLICATION, WITH LONG-TERM CURRENT USE OF INSULIN (MULTI): ICD-10-CM

## 2024-11-02 DIAGNOSIS — E11.9 TYPE 2 DIABETES MELLITUS WITHOUT COMPLICATION, WITH LONG-TERM CURRENT USE OF INSULIN (MULTI): ICD-10-CM

## 2024-11-04 RX ORDER — BLOOD-GLUCOSE SENSOR
EACH MISCELLANEOUS
Qty: 1 EACH | Refills: 3 | Status: SHIPPED | OUTPATIENT
Start: 2024-11-04

## 2024-11-04 RX ORDER — SEMAGLUTIDE 1.34 MG/ML
1 INJECTION, SOLUTION SUBCUTANEOUS
Qty: 3 ML | Refills: 2 | Status: SHIPPED | OUTPATIENT
Start: 2024-11-04

## 2024-11-05 ENCOUNTER — TELEPHONE (OUTPATIENT)
Dept: PRIMARY CARE | Facility: CLINIC | Age: 49
End: 2024-11-05
Payer: COMMERCIAL

## 2024-11-05 NOTE — TELEPHONE ENCOUNTER
Prior Authorization for FreeStyle Jayy 3 Sensor device  has been APPROVED.    Approval valid through 11.04.2025  Approval letter scanned into media on 11.05.2024

## 2024-11-06 ENCOUNTER — TELEPHONE (OUTPATIENT)
Dept: PRIMARY CARE | Facility: CLINIC | Age: 49
End: 2024-11-06
Payer: COMMERCIAL

## 2024-11-06 NOTE — TELEPHONE ENCOUNTER
Prior Authorization for semaglutide (Ozempic) 1 mg/dose (4 mg/3 mL) pen injector  has been APPROVED.    Approval valid through 11.04.2025  Approval letter scanned into media on 11.06.2024

## 2024-11-06 NOTE — LETTER
Dear Tana Bahena,    The Office of Dr. Jacobo Thakkar MD is reaching out to you today to inform you that the Prior Authorization for semaglutide (Ozempic) 1 mg/dose (4 mg/3 mL) pen injector  was APPROVED by your insurance. Your insurance will send you a detailed Approval letter to your mailing address. If you have any questions please feel free to reach out to the office. I have also forwarded the approval to your pharmacy to make them aware as well.     Have a great day,  The Office of Jacobo Thakkar MD

## 2024-11-07 ENCOUNTER — PATIENT MESSAGE (OUTPATIENT)
Dept: PRIMARY CARE | Facility: CLINIC | Age: 49
End: 2024-11-07
Payer: COMMERCIAL

## 2024-11-07 DIAGNOSIS — F51.01 PRIMARY INSOMNIA: Primary | ICD-10-CM

## 2024-11-08 DIAGNOSIS — F51.01 PRIMARY INSOMNIA: ICD-10-CM

## 2024-11-08 RX ORDER — TRAZODONE HYDROCHLORIDE 100 MG/1
300 TABLET ORAL NIGHTLY PRN
Qty: 90 TABLET | Refills: 1 | Status: SHIPPED | OUTPATIENT
Start: 2024-11-08 | End: 2024-11-08 | Stop reason: SDUPTHER

## 2024-11-08 RX ORDER — TRAZODONE HYDROCHLORIDE 100 MG/1
300 TABLET ORAL NIGHTLY PRN
Qty: 90 TABLET | Refills: 1 | Status: SHIPPED | OUTPATIENT
Start: 2024-11-08 | End: 2025-01-07

## 2024-11-14 ENCOUNTER — APPOINTMENT (OUTPATIENT)
Dept: PHARMACY | Facility: HOSPITAL | Age: 49
End: 2024-11-14
Payer: COMMERCIAL

## 2024-11-15 ENCOUNTER — TELEMEDICINE (OUTPATIENT)
Dept: PHARMACY | Facility: HOSPITAL | Age: 49
End: 2024-11-15
Payer: COMMERCIAL

## 2024-11-15 DIAGNOSIS — Z79.4 TYPE 2 DIABETES MELLITUS WITHOUT COMPLICATION, WITH LONG-TERM CURRENT USE OF INSULIN (MULTI): Primary | ICD-10-CM

## 2024-11-15 DIAGNOSIS — E11.9 TYPE 2 DIABETES MELLITUS WITHOUT COMPLICATION, WITH LONG-TERM CURRENT USE OF INSULIN (MULTI): Primary | ICD-10-CM

## 2024-11-15 RX ORDER — METFORMIN HYDROCHLORIDE 500 MG/1
1000 TABLET, EXTENDED RELEASE ORAL 2 TIMES DAILY
Qty: 120 TABLET | Refills: 2 | Status: SHIPPED | OUTPATIENT
Start: 2024-11-15

## 2024-11-15 RX ORDER — HYDROCHLOROTHIAZIDE 12.5 MG/1
CAPSULE ORAL
Qty: 2 EACH | Refills: 2 | Status: SHIPPED | OUTPATIENT
Start: 2024-11-15

## 2024-11-15 NOTE — PROGRESS NOTES
"Pharmacy Post-Discharge Follow-Up Visit  Tana Bahena \"Perlita\" is a 49 y.o. female was referred to Clinical Pharmacy Team to complete a post-discharge medication optimization and monitoring visit.  The patient was referred for their Diabetes.    Admission Date: 5/12/23  Discharge Date: 5/16/23    Referring Provider: Jacobo Thakkar MD  Last Visit: 6/4/24  Next Visit: 11/18/24    Subjective   Allergies   Allergen Reactions    Ketorolac Unknown    Tramadol Unknown       [facility]  [address], [address]  [place] [id number]  Phone: [phone] Fax: [phone]      Social History     Social History Narrative    Not on file      Patient presents to Clinical Pharmacy team for follow-up after last visit on 8/15/24. At last visit, patient was continued on all diabetes medications as prescribed.      Since last visit, patient has been stable. Had some issues with getting PA for Ozempic approved, however finally was approved last week.      Patient reports that BG has been elevated (last night was 252), though has only been able to check a handful of times due to having issues with getting Freestyle Jayy 3 sensors. Denies any s/sx of hypoglycemia.     Of note, she does not feel when her blood sugar drops <70 mg/dL.    Objective     There were no vitals taken for this visit.     LAB  Lab Results   Component Value Date    BILITOT 0.3 06/29/2023    CALCIUM 9.0 08/03/2023    CO2 25 08/03/2023     08/03/2023    CREATININE 0.72 11/20/2023    GLUCOSE 97 08/03/2023    ALKPHOS 91 06/29/2023    K 3.5 08/03/2023    PROT 6.9 06/29/2023     08/03/2023    AST 11 06/29/2023    ALT 15 06/29/2023    BUN 13 08/03/2023    ANIONGAP 14 08/03/2023    MG 1.78 06/30/2023    PHOS 3.4 05/02/2023    ALBUMIN 4.0 06/29/2023    LIPASE 15 06/29/2023    EGFR >90 11/20/2023     Lab Results   Component Value Date    TRIG 118 12/19/2022    CHOL 154 12/19/2022    HDL 51.1 12/19/2022     Lab Results   Component " Value Date    HGBA1C 7.6 (H) 05/14/2024         Current Outpatient Medications on File Prior to Visit   Medication Sig Dispense Refill    Advair Diskus 250-50 mcg/dose diskus inhaler INAHLE 1 PUFF BY MOUTH AND INTO THE LUNGS TWICE DAILY. RINSE MOUTH WITH WATER AFTER USE TO REDUCE AFTER TASTE AND INCIDENCE OF CANDIDIASIS. DO NOT SWALLOW. 120 each 0    albuterol 90 mcg/actuation inhaler Inhale 2 puffs every 8 hours if needed for wheezing.      alcohol swabs (Alcohol Prep Pads) pads, medicated USE TO CLEANSE SKIN PRIOR TO INJECTION FOUR TIMES DAILY AS DIRECTED 200 each 3    aspirin 81 mg EC tablet Take 1 tablet (81 mg) by mouth once daily.      atorvastatin (Lipitor) 40 mg tablet Take 1 tablet (40 mg) by mouth once daily.      blood-glucose sensor (Dexcom G7 Sensor) device Use as directed to test blood glucose continuously. Replace every 10 days. 3 each 3    budesonide-formoteroL (Symbicort) 80-4.5 mcg/actuation inhaler Inhale 2 puffs 2 times a day. Rinse mouth after use 10.2 g 3    carBAMazepine (Carbatrol) 200 mg 12 hr capsule Take 1 capsule (200 mg) by mouth twice a day.      carvedilol (Coreg) 6.25 mg tablet Take 1 tablet (6.25 mg) by mouth 2 times daily (morning and late afternoon). 60 tablet 2    cyclobenzaprine (Flexeril) 10 mg tablet Take 1 tablet (10 mg) by mouth 3 times a day as needed for muscle spasms. 60 tablet 2    Dexcom G4 platinum  (Dexcom G7 ) misc Use as instructed to test blood sugar continuously 1 each 0    Dexcom G4 platinum transmitter (Dexcom G6 Transmitter) device Use as instructed to test blood sugar 4 times daily. Replace every 90 days. 1 each 3    Dexcom G4 platinum transmitter (Dexcom G6 Transmitter) device Use as instructed  New order as old machine was faulty. 1 each 0    doxepin (SINEquan) 25 mg capsule Take 1 capsule (25 mg) by mouth once daily at bedtime. 30 capsule 3    FreeStyle Jayy 3 [place] misc USE AS INSTRUCTED FOR CONTINUOUS BLOOD GLUCOSE MONITORING 1 each 0     FreeStyle Jayy 3 Sensor device USE AS DIRECTED TO CHECK BLOOD SUGAR 1 each 3    gabapentin (Neurontin) 100 mg capsule Take 1 capsule (100 mg) by mouth 3 times a day. 90 capsule 0    hydrOXYzine HCL (Atarax) 25 mg tablet Take 1 tablet (25 mg) by mouth once daily at bedtime. 30 tablet 2    insulin aspart (NovoLOG Flexpen U-100 Insulin) 100 unit/mL (3 mL) pen INJECT 6-8 UNITS UNDER THE SKIN THREE TIMES DAILY BEFORE MEALS. PER SLIDING SCALE, MAX 30 UNITS PER DAY 9 mL 3    insulin glargine (Lantus Solostar U-100 Insulin) 100 unit/mL (3 mL) pen Inject 50 Units under the skin once daily at bedtime. Take as directed per insulin instructions. 15 mL 1    insulin lispro (HumaLOG KwikPen Insulin) 100 unit/mL injection Inject under the skin 3 times a day with meals. Take as directed per insulin instructions.      isosorbide mononitrate ER (Imdur) 60 mg 24 hr tablet take 1 tablet by mouth every morning 30 tablet 2    Jardiance 10 mg take 1 tablet by mouth once daily 30 tablet 2    lancets misc Use as directed when checking blood sugar 100 each 3    losartan (Cozaar) 25 mg tablet Take 1 tablet (25 mg) by mouth once daily.      metFORMIN  mg 24 hr tablet Take 2 tablets (1,000 mg) by mouth 2 times a day. 120 tablet 2    methylPREDNISolone (Medrol Dospak) 4 mg tablets use as directed FOLLOW DIRECTIONS ON BACK OF FOIL PACK 21 tablet 0    nitroglycerin (Nitrostat) 0.4 mg SL tablet Place 1 tablet (0.4 mg) under the tongue every 5 minutes if needed. PLACE 1 TABLET UNDER THE TONGUE EVERY 5 MINUTES FOR UP TO 3 DOSES AS NEEDED FOR CHEST PAIN.CALL 911 IF PAIN PERSISTS.      omeprazole (PriLOSEC) 40 mg DR capsule Take 1 capsule (40 mg) by mouth once daily in the morning. Take before meals. Do not crush or chew. 30 capsule 11    OneTouch Verio Flex Start kit Use as needed for glucose testing 1 each 0    OneTouch Verio test strips strip Test glucose 4 times daily 400 strip 3    pen needle, diabetic (BD Breana 2nd Gen Pen Needle) 32 gauge x  "\" needle USE TO ADMINISTER INSULIN 4 TIMES A  each 3    ranolazine (Ranexa) 500 mg 12 hr tablet       rOPINIRole (Requip) 0.25 mg tablet Take 1 tablet (0.25 mg) by mouth 3 times a day. 180 tablet 1    semaglutide (Ozempic) 1 mg/dose (4 mg/3 mL) pen injector Inject 1 mg under the skin every 7 days. 3 mL 2    semaglutide 0.25 mg or 0.5 mg (2 mg/3 mL) pen injector Inject 0.25 mg under the skin 1 (one) time per week. 2 mL 2    ticagrelor (Brilinta) 90 mg tablet Take 1 tablet (90 mg) by mouth 2 times a day. 60 tablet 2    traZODone (Desyrel) 100 mg tablet Take 3 tablets (300 mg) by mouth as needed at bedtime for sleep. 90 tablet 1    triamterene-hydrochlorothiazid (Maxzide-25) 37.5-25 mg tablet Take 1 tablet by mouth once daily.      True Metrix Glucose Meter misc USE TO TEST BLOOD SUGAR ONCE DAILY AND AS NEEDED       No current facility-administered medications on file prior to visit.      Assessment/Plan   Problem List Items Addressed This Visit       Type 2 diabetes mellitus without complication, with long-term current use of insulin (Multi) - Primary     ASSESSMENT:  -Patient's last A1c is elevated from previous at 7.6%,now above goal of 7%  -Patient has been out of Ozempic due to issues with getting approval for PA renewal. Informed patient of renewal and patient will  from pharmacy.  -Patient reported BG is elevated, however have limited readings due to being unable to test BG due to backorder of Freestyle Jayy sensors.   Discussed that  is no longer making Freestyle Jayy 3 sensors and is switching over to Freestyle Jayy 3 Plus. Will send over updated script to pharmacy.    -Will make no changes today due to limited BG readings. Elevated BG likely related to being out of Ozempic. Will re-evaluate in 4 weeks after patient is re-established.      RECOMMENDATIONS/PLAN:  CONTINUE all diabetes medications as prescribed:  Metformin  m tablets BID (refill sent)  Jardiance 10 mg " daily  Ozempic 1 mg mg once weekly on Mondays  Lantus 50 units at bedtime  Novolog TID per sliding scale (on average injecting 6-8 units); max dose of 30 units per day  Updated script for Freestyle Jayy 3 Plus sensors sent to pharmacy.            Clinical Pharmacist follow up: 12/13/24 or sooner as needed based on clinical intervention  Type of Encounter:  Telephone    Jackeline Potts PharmD  Clinical Ambulatory Care Pharmacist  Ph: [phone]    Verbal consent to manage patient's drug therapy was obtained from the patient. They were informed they may decline to participate or withdraw from participation in pharmacy services at any time.

## 2024-11-15 NOTE — ASSESSMENT & PLAN NOTE
ASSESSMENT:  -Patient's last A1c is elevated from previous at 7.6%,now above goal of 7%  -Patient has been out of Ozempic due to issues with getting approval for PA renewal. Informed patient of renewal and patient will  from pharmacy.  -Patient reported BG is elevated, however have limited readings due to being unable to test BG due to backorder of Freestyle Jayy sensors.   Discussed that  is no longer making Freestyle Jayy 3 sensors and is switching over to Freestyle Jayy 3 Plus. Will send over updated script to pharmacy.    -Will make no changes today due to limited BG readings. Elevated BG likely related to being out of Ozempic. Will re-evaluate in 4 weeks after patient is re-established.      RECOMMENDATIONS/PLAN:  CONTINUE all diabetes medications as prescribed:  Metformin  m tablets BID (refill sent)  Jardiance 10 mg daily  Ozempic 1 mg mg once weekly on   Lantus 50 units at bedtime  Novolog TID per sliding scale (on average injecting 6-8 units); max dose of 30 units per day  Updated script for Freestyle Jayy 3 Plus sensors sent to pharmacy.

## 2024-11-18 ENCOUNTER — APPOINTMENT (OUTPATIENT)
Dept: PRIMARY CARE | Facility: CLINIC | Age: 49
End: 2024-11-18
Payer: COMMERCIAL

## 2024-11-25 ENCOUNTER — PATIENT MESSAGE (OUTPATIENT)
Dept: PRIMARY CARE | Facility: CLINIC | Age: 49
End: 2024-11-25
Payer: COMMERCIAL

## 2024-11-25 DIAGNOSIS — G56.90 NEUROPATHY OF UPPER EXTREMITY, UNSPECIFIED LATERALITY: ICD-10-CM

## 2024-11-25 DIAGNOSIS — E11.9 TYPE 2 DIABETES MELLITUS WITHOUT COMPLICATION, WITH LONG-TERM CURRENT USE OF INSULIN (MULTI): Primary | ICD-10-CM

## 2024-11-25 DIAGNOSIS — Z79.4 TYPE 2 DIABETES MELLITUS WITHOUT COMPLICATION, WITH LONG-TERM CURRENT USE OF INSULIN (MULTI): Primary | ICD-10-CM

## 2024-11-25 PROCEDURE — RXMED WILLOW AMBULATORY MEDICATION CHARGE

## 2024-11-25 RX ORDER — GABAPENTIN 100 MG/1
100 CAPSULE ORAL 3 TIMES DAILY
Qty: 90 CAPSULE | Refills: 2 | Status: SHIPPED | OUTPATIENT
Start: 2024-11-25

## 2024-11-25 RX ORDER — HYDROCHLOROTHIAZIDE 12.5 MG/1
1 CAPSULE ORAL DAILY
Qty: 1 EACH | Refills: 1 | Status: SHIPPED | OUTPATIENT
Start: 2024-11-25 | End: 2024-11-25 | Stop reason: SDUPTHER

## 2024-11-25 NOTE — TELEPHONE ENCOUNTER
Rx Refill Request     Name: Tana Bahena  :  1975   Medication Name:  gabapentin (Neurontin) 100 mg capsule   Specific Pharmacy location:  [facility]   Date of last appointment:  10/21/2024   Date of next appointment:  Visit date not found   Best number to reach patient:  [phone]

## 2024-11-27 ENCOUNTER — PHARMACY VISIT (OUTPATIENT)
Dept: PHARMACY | Facility: CLINIC | Age: 49
End: 2024-11-27
Payer: MEDICAID

## 2024-12-02 ENCOUNTER — PHARMACY VISIT (OUTPATIENT)
Dept: PHARMACY | Facility: CLINIC | Age: 49
End: 2024-12-02

## 2024-12-09 DIAGNOSIS — F51.01 PRIMARY INSOMNIA: ICD-10-CM

## 2024-12-09 RX ORDER — TRAZODONE HYDROCHLORIDE 100 MG/1
300 TABLET ORAL NIGHTLY PRN
Qty: 90 TABLET | Refills: 1 | Status: SHIPPED | OUTPATIENT
Start: 2024-12-09 | End: 2025-02-07

## 2024-12-09 NOTE — TELEPHONE ENCOUNTER
Rx Refill Request     Name: Tana Bahena  :  1975   Medication Name:  trazadone,   Specific Pharmacy location:  [facility]   Date of last appointment:  Visit date not found   Date of next appointment:  Visit date not found   Best number to reach patient:  [phone]         Recent Visits  Date Type Provider Dept   10/21/24 Office Visit Jacobo Thakkar MD Do [id number] [facility]   23 Office Visit Jacobo Thakkar MD Do [id number] [facility]   10/24/23 Office Visit Jacobo Thakkar MD Do [id number] [facility]   23 Office Visit Jacobo Thakkar MD Do [id number] [facility]   23 Office Visit Jacobo Thakkar MD Do [id number] [facility]   Showing recent visits within past 540 days and meeting all other requirements  Future Appointments  No visits were found meeting these conditions.  Showing future appointments within next 180 days and meeting all other requirements
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
## 2024-12-17 ENCOUNTER — EVALUATION (OUTPATIENT)
Dept: PHYSICAL THERAPY | Facility: CLINIC | Age: 49
End: 2024-12-17
Payer: COMMERCIAL

## 2024-12-17 DIAGNOSIS — R26.81 UNSTEADINESS ON FEET: ICD-10-CM

## 2024-12-17 DIAGNOSIS — R26.89 DECREASED MOBILITY: Primary | ICD-10-CM

## 2024-12-17 DIAGNOSIS — R27.0 ATAXIA: ICD-10-CM

## 2024-12-17 DIAGNOSIS — R29.898 WEAKNESS OF BOTH LOWER EXTREMITIES: ICD-10-CM

## 2024-12-17 PROCEDURE — 97161 PT EVAL LOW COMPLEX 20 MIN: CPT | Mod: GP | Performed by: PHYSICAL THERAPIST

## 2024-12-17 ASSESSMENT — ENCOUNTER SYMPTOMS
DEPRESSION: 1
LOSS OF SENSATION IN FEET: 1
OCCASIONAL FEELINGS OF UNSTEADINESS: 1

## 2024-12-18 ENCOUNTER — OFFICE VISIT (OUTPATIENT)
Dept: NEUROLOGY | Age: 49
End: 2024-12-18
Payer: COMMERCIAL

## 2024-12-18 VITALS
DIASTOLIC BLOOD PRESSURE: 64 MMHG | SYSTOLIC BLOOD PRESSURE: 120 MMHG | HEART RATE: 61 BPM | WEIGHT: 176 LBS | BODY MASS INDEX: 28.41 KG/M2

## 2024-12-18 DIAGNOSIS — R41.3 MEMORY LOSS: ICD-10-CM

## 2024-12-18 DIAGNOSIS — R41.89 BRAIN FOG: ICD-10-CM

## 2024-12-18 DIAGNOSIS — G47.01 INSOMNIA DUE TO MEDICAL CONDITION: ICD-10-CM

## 2024-12-18 PROCEDURE — G8417 CALC BMI ABV UP PARAM F/U: HCPCS | Performed by: PSYCHIATRY & NEUROLOGY

## 2024-12-18 PROCEDURE — G8484 FLU IMMUNIZE NO ADMIN: HCPCS | Performed by: PSYCHIATRY & NEUROLOGY

## 2024-12-18 PROCEDURE — 3074F SYST BP LT 130 MM HG: CPT | Performed by: PSYCHIATRY & NEUROLOGY

## 2024-12-18 PROCEDURE — 3078F DIAST BP <80 MM HG: CPT | Performed by: PSYCHIATRY & NEUROLOGY

## 2024-12-18 PROCEDURE — 99214 OFFICE O/P EST MOD 30 MIN: CPT | Performed by: PSYCHIATRY & NEUROLOGY

## 2024-12-18 PROCEDURE — 4004F PT TOBACCO SCREEN RCVD TLK: CPT | Performed by: PSYCHIATRY & NEUROLOGY

## 2024-12-18 PROCEDURE — G8427 DOCREV CUR MEDS BY ELIG CLIN: HCPCS | Performed by: PSYCHIATRY & NEUROLOGY

## 2024-12-18 RX ORDER — MEMANTINE HYDROCHLORIDE 5 MG/1
5 TABLET ORAL 2 TIMES DAILY
Qty: 60 TABLET | Refills: 3 | Status: SHIPPED | OUTPATIENT
Start: 2024-12-18

## 2024-12-18 RX ORDER — ESZOPICLONE 2 MG/1
TABLET, FILM COATED ORAL
Qty: 30 TABLET | Refills: 1 | Status: SHIPPED | OUTPATIENT
Start: 2024-12-18 | End: 2025-01-18

## 2024-12-18 RX ORDER — ACYCLOVIR 800 MG/1
TABLET ORAL
COMMUNITY
Start: 2024-12-06

## 2024-12-18 NOTE — PROGRESS NOTES
Subjective:      Patient ID: Radha Malloy is a 49 y.o. female who presents today for:  Chief Complaint   Patient presents with    Follow-up     Pt states things are not better. Pt states its the same things.pt states after 6 pm it like her brain shuts off        HPI 49 right-handed female with a history of anoxia with brain damage with insomnia due to underlying medical conditions.  Patient was seen also for an acute CVA with a previous history of cardiac arrest and encephalopathy.  When last seen patient had Multiple residuals of anoxia including loss of balance with some brainstem issues as well as difficulty sleep.  She was on a very high dose of trazodone which I recommended to taper off and we had started her on Lunesta.  We also recommend addition of Namenda 5 mg twice a day with vitamin E and patient feels that some things are better.  She still has issues when at 6 PM it feels like her brain shuts off.    Pt had a neuropsychometry test. The results of which I evaluated, and cannot be definitely interpreted    Patient is very frustrated given this findings and we had further discussed this with her.    Past Medical History:   Diagnosis Date    Anoxic brain damage (HCC) 2022    FROM [facility]    Asthma     Bipolar disease, manic (HCC)     CAD (coronary artery disease)     COPD (chronic obstructive pulmonary disease) (HCC)     Depression     Diabetes (HCC)     DM (diabetes mellitus) (HCC)     Dyspnea     Hx of CABG 03/31/2020    Hx of CABG 03/31/2020    Hyperlipidemia     Hypertension     Internal carotid artery stenosis     Subclavian artery stenosis (HCC)     Suicide attempt by multiple drug overdose (HCC) 2009    TMJ (dislocation of temporomandibular joint)     Vertebral artery stenosis      Past Surgical History:   Procedure Laterality Date    APPENDECTOMY  2010    CARDIAC CATHETERIZATION  07/11/2013    CARDIAC CATHETERIZATION  10/16/2013    DR EHRNÁNDEZ    CORONARY ARTERY BYPASS GRAFT  01/2020    CORONARY STENT

## 2024-12-20 ENCOUNTER — APPOINTMENT (OUTPATIENT)
Dept: PHARMACY | Facility: HOSPITAL | Age: 49
End: 2024-12-20
Payer: COMMERCIAL

## 2024-12-20 DIAGNOSIS — E11.9 TYPE 2 DIABETES MELLITUS WITHOUT COMPLICATION, WITH LONG-TERM CURRENT USE OF INSULIN (MULTI): ICD-10-CM

## 2024-12-20 DIAGNOSIS — Z79.4 TYPE 2 DIABETES MELLITUS WITHOUT COMPLICATION, WITH LONG-TERM CURRENT USE OF INSULIN (MULTI): ICD-10-CM

## 2024-12-20 PROCEDURE — RXMED WILLOW AMBULATORY MEDICATION CHARGE

## 2024-12-20 RX ORDER — BLOOD-GLUCOSE SENSOR
EACH MISCELLANEOUS
Qty: 2 EACH | Refills: 2 | Status: SHIPPED | OUTPATIENT
Start: 2024-12-20

## 2024-12-20 NOTE — PROGRESS NOTES
"  Clinical Pharmacy Post-Discharge Visit    Tana Bahena \"Perlita\" is a 49 y.o. female was referred to Clinical Pharmacy Team to complete a post-discharge medication optimization and monitoring visit.  The patient was referred for their Diabetes.    Admission Date: 5/12/23  Discharge Date: 5/16/23    Referring Provider: Jacobo Thakkar MD  Last Visit: 10/21/24  Next Visit: 12/24/24    Subjective   Allergies   Allergen Reactions    Ketorolac Unknown    Tramadol Unknown       [facility]  [address], [address]  [place] [id number]  Phone: [phone] Fax: [phone]      Social History     Social History Narrative    Not on file      Patient presents to Clinical Pharmacy team for follow-up after last visit on 11/15/24. At last visit, patient was restarted on Ozempic and switched over to Freestyle Jayy Plus sensors due to backorder.      Since last visit, patient's reported BGs have been improving, now within low 120s on average. Has been able to start measuring BG with sensors again; notes that she is on her last sensor.      Of note, she does not feel when her blood sugar drops <70 mg/dL. Denies any recent readings indicative of hypoglycemia.     Objective     There were no vitals taken for this visit.     LAB  Lab Results   Component Value Date    BILITOT 0.3 06/29/2023    CALCIUM 9.0 08/03/2023    CO2 25 08/03/2023     08/03/2023    CREATININE 0.72 11/20/2023    GLUCOSE 97 08/03/2023    ALKPHOS 91 06/29/2023    K 3.5 08/03/2023    PROT 6.9 06/29/2023     08/03/2023    AST 11 06/29/2023    ALT 15 06/29/2023    BUN 13 08/03/2023    ANIONGAP 14 08/03/2023    MG 1.78 06/30/2023    PHOS 3.4 05/02/2023    ALBUMIN 4.0 06/29/2023    LIPASE 15 06/29/2023    EGFR >90 11/20/2023     Lab Results   Component Value Date    TRIG 118 12/19/2022    CHOL 154 12/19/2022    HDL 51.1 12/19/2022     Lab Results   Component Value Date    HGBA1C 7.6 (H) 05/14/2024         Current Outpatient " Medications on File Prior to Visit   Medication Sig Dispense Refill    Advair Diskus 250-50 mcg/dose diskus inhaler INAHLE 1 PUFF BY MOUTH AND INTO THE LUNGS TWICE DAILY. RINSE MOUTH WITH WATER AFTER USE TO REDUCE AFTER TASTE AND INCIDENCE OF CANDIDIASIS. DO NOT SWALLOW. 120 each 0    albuterol 90 mcg/actuation inhaler Inhale 2 puffs every 8 hours if needed for wheezing.      alcohol swabs (Alcohol Prep Pads) pads, medicated USE TO CLEANSE SKIN PRIOR TO INJECTION FOUR TIMES DAILY AS DIRECTED 200 each 3    aspirin 81 mg EC tablet Take 1 tablet (81 mg) by mouth once daily.      atorvastatin (Lipitor) 40 mg tablet Take 1 tablet (40 mg) by mouth once daily.      blood-glucose sensor (FreeStyle Jayy 3 Plus Sensor) device Use as directed to test blood sugar daily. Replace sensor in arm every 15 days. 2 each 2    budesonide-formoteroL (Symbicort) 80-4.5 mcg/actuation inhaler Inhale 2 puffs 2 times a day. Rinse mouth after use 10.2 g 3    carBAMazepine (Carbatrol) 200 mg 12 hr capsule Take 1 capsule (200 mg) by mouth twice a day.      carvedilol (Coreg) 6.25 mg tablet Take 1 tablet (6.25 mg) by mouth 2 times daily (morning and late afternoon). 60 tablet 2    cyclobenzaprine (Flexeril) 10 mg tablet Take 1 tablet (10 mg) by mouth 3 times a day as needed for muscle spasms. 60 tablet 2    doxepin (SINEquan) 25 mg capsule Take 1 capsule (25 mg) by mouth once daily at bedtime. 30 capsule 3    FreeStyle Jayy 3 [place] misc USE AS INSTRUCTED FOR CONTINUOUS BLOOD GLUCOSE MONITORING 1 each 0    gabapentin (Neurontin) 100 mg capsule Take 1 capsule (100 mg) by mouth 3 times a day. 90 capsule 2    hydrOXYzine HCL (Atarax) 25 mg tablet Take 1 tablet (25 mg) by mouth once daily at bedtime. 30 tablet 2    insulin aspart (NovoLOG Flexpen U-100 Insulin) 100 unit/mL (3 mL) pen INJECT 6-8 UNITS UNDER THE SKIN THREE TIMES DAILY BEFORE MEALS. PER SLIDING SCALE, MAX 30 UNITS PER DAY 9 mL 3    insulin glargine (Lantus Solostar U-100 Insulin) 100  "unit/mL (3 mL) pen Inject 50 Units under the skin once daily at bedtime. Take as directed per insulin instructions. 15 mL 1    insulin lispro (HumaLOG KwikPen Insulin) 100 unit/mL injection Inject under the skin 3 times a day with meals. Take as directed per insulin instructions.      isosorbide mononitrate ER (Imdur) 60 mg 24 hr tablet take 1 tablet by mouth every morning 30 tablet 2    Jardiance 10 mg take 1 tablet by mouth once daily 30 tablet 2    lancets misc Use as directed when checking blood sugar 100 each 3    losartan (Cozaar) 25 mg tablet Take 1 tablet (25 mg) by mouth once daily.      metFORMIN  mg 24 hr tablet Take 2 tablets (1,000 mg) by mouth 2 times a day. 120 tablet 2    methylPREDNISolone (Medrol Dospak) 4 mg tablets use as directed FOLLOW DIRECTIONS ON BACK OF FOIL PACK 21 tablet 0    nitroglycerin (Nitrostat) 0.4 mg SL tablet Place 1 tablet (0.4 mg) under the tongue every 5 minutes if needed. PLACE 1 TABLET UNDER THE TONGUE EVERY 5 MINUTES FOR UP TO 3 DOSES AS NEEDED FOR CHEST PAIN.CALL 911 IF PAIN PERSISTS.      omeprazole (PriLOSEC) 40 mg DR capsule Take 1 capsule (40 mg) by mouth once daily in the morning. Take before meals. Do not crush or chew. 30 capsule 11    OneTouch Verio Flex Start kit Use as needed for glucose testing 1 each 0    OneTouch Verio test strips strip Test glucose 4 times daily 400 strip 3    pen needle, diabetic (BD Breana 2nd Gen Pen Needle) 32 gauge x 5/32\" needle USE TO ADMINISTER INSULIN 4 TIMES A  each 3    ranolazine (Ranexa) 500 mg 12 hr tablet       rOPINIRole (Requip) 0.25 mg tablet Take 1 tablet (0.25 mg) by mouth 3 times a day. 180 tablet 1    semaglutide (Ozempic) 1 mg/dose (4 mg/3 mL) pen injector Inject 1 mg under the skin every 7 days. 3 mL 2    semaglutide 0.25 mg or 0.5 mg (2 mg/3 mL) pen injector Inject 0.25 mg under the skin 1 (one) time per week. 2 mL 2    ticagrelor (Brilinta) 90 mg tablet Take 1 tablet (90 mg) by mouth 2 times a day. 60 " tablet 2    traZODone (Desyrel) 100 mg tablet Take 3 tablets (300 mg) by mouth as needed at bedtime for sleep. 90 tablet 1    triamterene-hydrochlorothiazid (Maxzide-25) 37.5-25 mg tablet Take 1 tablet by mouth once daily.      True Metrix Glucose Meter misc USE TO TEST BLOOD SUGAR ONCE DAILY AND AS NEEDED       No current facility-administered medications on file prior to visit.      Assessment/Plan   Problem List Items Addressed This Visit       Type 2 diabetes mellitus without complication, with long-term current use of insulin (Multi)     ASSESSMENT:  -Patient's last A1c is elevated from previous at 7.6%,now above goal of 7%  -Patient has been able to restart Ozempic at 1 mg/week since last visit with no issues. BG has been much improved since restart.   -Will make no changes today due to improved BG readings. Will re-evaluate in 4 weeks once new A1c is completed at PCP visit next week.      RECOMMENDATIONS/PLAN:  CONTINUE all diabetes medications as prescribed:  Metformin  m tablets BID  Jardiance 10 mg daily  Ozempic 1 mg once weekly on   Lantus 50 units at bedtime  Novolog TID per sliding scale (on average injecting 6-8 units); max dose of 30 units per day  Refills for Freestyle Jayy 3 Plus sensors sent to pharmacy.          Clinical Pharmacist follow up: 25 or sooner as needed based on clinical intervention  Type of Encounter:  Telephone    Jackeline Potts PharmD  Clinical Ambulatory Care Pharmacist  Ph: [phone]    Verbal consent to manage patient's drug therapy was obtained from the patient. They were informed they may decline to participate or withdraw from participation in pharmacy services at any time.

## 2024-12-20 NOTE — ASSESSMENT & PLAN NOTE
ASSESSMENT:  -Patient's last A1c is elevated from previous at 7.6%,now above goal of 7%  -Patient has been able to restart Ozempic at 1 mg/week since last visit with no issues. BG has been much improved since restart.   -Will make no changes today due to improved BG readings. Will re-evaluate in 4 weeks once new A1c is completed at PCP visit next week.      RECOMMENDATIONS/PLAN:  CONTINUE all diabetes medications as prescribed:  Metformin  m tablets BID  Jardiance 10 mg daily  Ozempic 1 mg once weekly on   Lantus 50 units at bedtime  Novolog TID per sliding scale (on average injecting 6-8 units); max dose of 30 units per day  Refills for Freestyle Jayy 3 Plus sensors sent to pharmacy.

## 2024-12-23 ENCOUNTER — PHARMACY VISIT (OUTPATIENT)
Dept: PHARMACY | Facility: CLINIC | Age: 49
End: 2024-12-23
Payer: MEDICAID

## 2024-12-23 ASSESSMENT — ENCOUNTER SYMPTOMS
TROUBLE SWALLOWING: 0
CHOKING: 0
BACK PAIN: 0
VOMITING: 0
NAUSEA: 0
COLOR CHANGE: 0
PHOTOPHOBIA: 0
SHORTNESS OF BREATH: 0

## 2024-12-30 ENCOUNTER — TREATMENT (OUTPATIENT)
Dept: PHYSICAL THERAPY | Facility: CLINIC | Age: 49
End: 2024-12-30
Payer: COMMERCIAL

## 2024-12-30 DIAGNOSIS — R26.81 UNSTEADINESS ON FEET: ICD-10-CM

## 2024-12-30 DIAGNOSIS — R29.898 WEAKNESS OF BOTH LOWER EXTREMITIES: ICD-10-CM

## 2024-12-30 DIAGNOSIS — R26.89 DECREASED MOBILITY: Primary | ICD-10-CM

## 2024-12-30 DIAGNOSIS — R27.0 ATAXIA: ICD-10-CM

## 2024-12-30 PROCEDURE — 97110 THERAPEUTIC EXERCISES: CPT | Mod: GP,CQ

## 2024-12-30 ASSESSMENT — PAIN SCALES - GENERAL: PAINLEVEL_OUTOF10: 0 - NO PAIN

## 2024-12-30 ASSESSMENT — PAIN - FUNCTIONAL ASSESSMENT: PAIN_FUNCTIONAL_ASSESSMENT: 0-10

## 2024-12-30 NOTE — PROGRESS NOTES
Physical Therapy    Physical Therapy Treatment    Patient Name: Tana Bahena  MRN: [id number]  Today's Date: 12/30/2024    Time Entry:   Time Calculation  Start Time: 1148  Stop Time: 1228  Time Calculation (min): 40 min     PT Therapeutic Procedures Time Entry  Therapeutic Exercise Time Entry: 40    Insurance:  [facility]  20% coinsurance  25 V/Y   PA not req  Visit: 2  POC: 14                   Assessment:  Pt had no complaints of pain throughout treatment. Minimal muscle fatigue only at end of session per patient. Pt would benefit from PT to continue to address impairments in order to improve strength, flexibility, balance, gait, and body mechanics and to decrease symptoms and increase overall function.   PT Assessment  PT Assessment Results: Decreased strength, Decreased range of motion, Impaired balance, Decreased mobility  Plan:  Continue to progress LE strengthening and balance as laura   OP PT Plan  PT Plan: Skilled PT  OP PT Plan  Treatment/Interventions: Aquatic therapy, Cryotherapy, Education/ Instruction, Electrical stimulation, Hot pack, Manual therapy, Neuromuscular re-education, Self care/ home management, Taping techniques, Therapeutic activities, Therapeutic exercises, Vasopneumatic device  PT Plan: Skilled PT  PT Frequency:  (1-2x/week)  Duration: 13 additional visits  Onset Date: 10/21/24  Certification Period Start Date: 12/17/24  Certification Period End Date: 03/17/25    Current Problem  1. Decreased mobility        2. Ataxia        3. Unsteadiness on feet        4. Weakness of both lower extremities            General     General  Reason for Referral: Decreased mobility  Referred By: Dr. Jacobo Thakkar  Past Medical History Relevant to Rehab: PACEMAKER, diabetes, heart disease, seizure disorder    Subjective    Pt denies any pain. She states that she has noticed that she has the most difficulty with balance when she first gets up. She also states that she has noticed that balance impairments  prevent her from bending her knees when she walks.   Precautions  Precautions  STEADI Fall Risk Score (The score of 4 or more indicates an increased risk of falling): 11  Precautions Comment: PACEMAKER, diabetes, heart disease, seizure disorder       Pain  Pain Assessment  Pain Assessment: 0-10  0-10 (Numeric) Pain Score: 0 - No pain    Objective        Pt ambulates with wide NOAH, decreased step length, and decreased knee flexion bilaterally                  Treatments:      Supine Bridge  x 15  Clamshell  x 15 B  Active Straight Leg Raise with Quad Set x 15 reps B  Supine Hip Adduction Isometric with Ball  x 15 B  Supine hip abduction with GTB x15 B  Seated Long Arc Quad  x 15 B  Heel Raises with Counter Support  2 sets x 10 reps   Mini squats x 10  Standing hip abduction x 10 B  Standing hip extension x 10 B  Side stepping in hallway x 10    OP EDUCATION:       Goals:  By discharge:  1. Patient will report and demonstrate independence with current HEP  2. Patient will demonstrate the ability to perform bilateral tandem stance >/= 20s without loss of balance or need for assistance to indicate improved static balance and safety  3. Patient will demonstrate an improvement in TUG score to </= 14 with least restrictive assistive device to indicate overall improved functional mobility as well as approach the cutoff for reduced risk for falls (baseline 12/17/24 21.07s)  4. Patient will demonstrate gross hip, knee, and ankle strength >/= 4+/5 to improve the ability to ambulate, squat, and lift without restrictions  5. Patient will demonstrate an increase in Lower Extremity Functional Scale score by 30 points to 50/80 to meet established MCID (baseline 12/17/24 20/80)  6. Patient will demonstrate the ability to ambulate >/= 150' with least restrictive assistive device and with improved gait speed and mechanics  7. Patient will demonstrate the ability to perform 10 sit to stand transfers from a normal chair height without  upper extremity assistance to indicate improved functional mobility and strength  8. Patient will demonstrate the ability to perform 3 CW and 3 CCW turns with continuous steps, and without loss of balance to indicate improved dynamic balance and safety within the home  9. Patient will demonstrate the ability to ascend and descend one flight of stairs reciprocally and without loss of balance to indicate improved mobility within the home/community  10. Patient will demonstrate an improvement in total Tinetti score to >19/28 to indicate improved balance in standing and with mobility and a reduced risk for falls (baseline 12/17/24: 12/28)

## 2025-01-02 ENCOUNTER — PATIENT MESSAGE (OUTPATIENT)
Dept: NEUROLOGY | Age: 50
End: 2025-01-02

## 2025-01-02 ENCOUNTER — TELEPHONE (OUTPATIENT)
Dept: PRIMARY CARE | Facility: CLINIC | Age: 50
End: 2025-01-02

## 2025-01-02 ENCOUNTER — PATIENT MESSAGE (OUTPATIENT)
Dept: PRIMARY CARE | Facility: CLINIC | Age: 50
End: 2025-01-02
Payer: COMMERCIAL

## 2025-01-02 DIAGNOSIS — Z79.4 TYPE 2 DIABETES MELLITUS WITHOUT COMPLICATION, WITH LONG-TERM CURRENT USE OF INSULIN (MULTI): ICD-10-CM

## 2025-01-02 DIAGNOSIS — I25.10 CORONARY ARTERY DISEASE INVOLVING NATIVE CORONARY ARTERY OF NATIVE HEART WITHOUT ANGINA PECTORIS: ICD-10-CM

## 2025-01-02 DIAGNOSIS — E11.9 TYPE 2 DIABETES MELLITUS WITHOUT COMPLICATION, WITH LONG-TERM CURRENT USE OF INSULIN (MULTI): ICD-10-CM

## 2025-01-02 RX ORDER — ISOSORBIDE MONONITRATE 60 MG/1
60 TABLET, EXTENDED RELEASE ORAL
Qty: 30 TABLET | Refills: 2 | Status: SHIPPED | OUTPATIENT
Start: 2025-01-02

## 2025-01-02 RX ORDER — BLOOD-GLUCOSE SENSOR
EACH MISCELLANEOUS
Qty: 100 EACH | Refills: 3 | Status: SHIPPED | OUTPATIENT
Start: 2025-01-02

## 2025-01-02 NOTE — TELEPHONE ENCOUNTER
"Pharmacy called in - stated the patient needs a RX for the Freestyle Jayy 3 sensor device; NOT the 3 \"Plus\". Please advise.   "

## 2025-01-03 ENCOUNTER — PATIENT MESSAGE (OUTPATIENT)
Dept: PRIMARY CARE | Facility: CLINIC | Age: 50
End: 2025-01-03
Payer: COMMERCIAL

## 2025-01-03 DIAGNOSIS — J45.20 MILD INTERMITTENT ASTHMA, UNSPECIFIED WHETHER COMPLICATED (HHS-HCC): ICD-10-CM

## 2025-01-06 ENCOUNTER — TREATMENT (OUTPATIENT)
Dept: PHYSICAL THERAPY | Facility: CLINIC | Age: 50
End: 2025-01-06
Payer: COMMERCIAL

## 2025-01-06 DIAGNOSIS — R27.0 ATAXIA: ICD-10-CM

## 2025-01-06 DIAGNOSIS — R29.898 WEAKNESS OF BOTH LOWER EXTREMITIES: ICD-10-CM

## 2025-01-06 DIAGNOSIS — R26.81 UNSTEADINESS ON FEET: ICD-10-CM

## 2025-01-06 DIAGNOSIS — R26.89 DECREASED MOBILITY: Primary | ICD-10-CM

## 2025-01-06 PROCEDURE — 97110 THERAPEUTIC EXERCISES: CPT | Mod: GP,CQ

## 2025-01-06 RX ORDER — FLUTICASONE PROPIONATE AND SALMETEROL 250; 50 UG/1; UG/1
1 POWDER RESPIRATORY (INHALATION)
Qty: 120 EACH | Refills: 0 | Status: SHIPPED | OUTPATIENT
Start: 2025-01-06

## 2025-01-06 ASSESSMENT — PAIN SCALES - GENERAL: PAINLEVEL_OUTOF10: 4

## 2025-01-06 ASSESSMENT — PAIN - FUNCTIONAL ASSESSMENT: PAIN_FUNCTIONAL_ASSESSMENT: 0-10

## 2025-01-06 NOTE — PROGRESS NOTES
Physical Therapy    Physical Therapy Treatment    Patient Name: Tana Bahena  MRN: [id number]  Today's Date: 1/6/2025    Time Entry:   Time Calculation  Start Time: 1145  Stop Time: 1225  Time Calculation (min): 40 min     PT Therapeutic Procedures Time Entry  Therapeutic Exercise Time Entry: 40    Insurance:  [facility]  20% coinsurance  25 V/Y   PA not req  Visit: 3  POC: 14                   Assessment:  Pt had no complaints of increased left hip pain following exercises. Minimal muscle soreness/fatigue only per patient. Pt would benefit from PT to continue to address impairments in order to improve strength, flexibility, balance, gait, and body mechanics and to decrease symptoms and increase overall function.   PT Assessment  PT Assessment Results: Decreased strength, Decreased range of motion, Impaired balance, Decreased mobility  Plan:  Continue to progress LE strengthening and balance as laura   OP PT Plan  PT Plan: Skilled PT  OP PT Plan  Treatment/Interventions: Aquatic therapy, Cryotherapy, Education/ Instruction, Electrical stimulation, Hot pack, Manual therapy, Neuromuscular re-education, Self care/ home management, Taping techniques, Therapeutic activities, Therapeutic exercises, Vasopneumatic device  PT Plan: Skilled PT  PT Frequency:  (1-2x/week)  Duration: 13 additional visits  Onset Date: 10/21/24  Certification Period Start Date: 12/17/24  Certification Period End Date: 03/17/25    Current Problem  1. Decreased mobility        2. Ataxia  Follow Up In Physical Therapy      3. Unsteadiness on feet        4. Weakness of both lower extremities              General     General  Reason for Referral: Decreased mobility  Referred By: Dr. Jacobo Thakkar  Past Medical History Relevant to Rehab: PACEMAKER, diabetes, heart disease, seizure disorder    Subjective    Pt reports slipping on wet towel yesterday and falling onto left hip. She states that she woke up this morning with a lot of pain in left hip. Currently  pain is only 4/10 per patient.   Precautions  Precautions  STEADI Fall Risk Score (The score of 4 or more indicates an increased risk of falling): 11  Precautions Comment: PACEMAKER, diabetes, heart disease, seizure disorder       Pain  Pain Assessment  Pain Assessment: 0-10  0-10 (Numeric) Pain Score: 4  Pain Location: Hip  Pain Orientation: Left    Objective      PT notified of patient fall. PT consulted with patient. Ok to continue treatment as laura per PT         Pt continues to ambulate with decreased knee flexion bilaterally             Treatments:      Supine Bridge  x 15  Clamshell  x 15 B NT  Active Straight Leg Raise with Quad Set x 15 reps B  Supine Hip Adduction Isometric with Ball  x 15 B  Supine hip abduction with GTB x15 B  Seated Long Arc Quad  x 15 B  Seated hamstring curls RTB x 15 B   Heel Raises with Counter Support  2 sets x 10 reps   Mini squats x 10  Standing hip abduction x 10 B  Standing hip extension x 10 B  Side stepping in hallway 20 ft x 2    Tandem walking in hallway  20 ft x 2  Standing on cushion with feet together 30 sec x 1 with SBA      OP EDUCATION:       Goals:  By discharge:  1. Patient will report and demonstrate independence with current HEP  2. Patient will demonstrate the ability to perform bilateral tandem stance >/= 20s without loss of balance or need for assistance to indicate improved static balance and safety  3. Patient will demonstrate an improvement in TUG score to </= 14 with least restrictive assistive device to indicate overall improved functional mobility as well as approach the cutoff for reduced risk for falls (baseline 12/17/24 21.07s)  4. Patient will demonstrate gross hip, knee, and ankle strength >/= 4+/5 to improve the ability to ambulate, squat, and lift without restrictions  5. Patient will demonstrate an increase in Lower Extremity Functional Scale score by 30 points to 50/80 to meet established MCID (baseline 12/17/24 20/80)  6. Patient will demonstrate  the ability to ambulate >/= 150' with least restrictive assistive device and with improved gait speed and mechanics  7. Patient will demonstrate the ability to perform 10 sit to stand transfers from a normal chair height without upper extremity assistance to indicate improved functional mobility and strength  8. Patient will demonstrate the ability to perform 3 CW and 3 CCW turns with continuous steps, and without loss of balance to indicate improved dynamic balance and safety within the home  9. Patient will demonstrate the ability to ascend and descend one flight of stairs reciprocally and without loss of balance to indicate improved mobility within the home/community  10. Patient will demonstrate an improvement in total Tinetti score to >19/28 to indicate improved balance in standing and with mobility and a reduced risk for falls (baseline 12/17/24: 12/28)

## 2025-01-06 NOTE — PROGRESS NOTES
Physical Therapy    Physical Therapy Treatment    Patient Name: Tana Bahena  MRN: [id number]  Today's Date: 1/6/2025    Time Entry:   Time Calculation  Start Time: 1145  Stop Time: 1225  Time Calculation (min): 40 min     PT Therapeutic Procedures Time Entry  Therapeutic Exercise Time Entry: 40    Insurance:  [facility]  20% coinsurance  25 V/Y   PA not req  Visit: 3  POC: 14                   Assessment:  Pt had no complaints of increased pain in left hip following treatment. Minimal muscle fatigue/soreness only per patient. Pt would benefit from PT to continue to address impairments in order to improve strength, flexibility, balance, gait, and body mechanics and to decrease symptoms and increase overall function.   PT Assessment  PT Assessment Results: Decreased strength, Decreased range of motion, Impaired balance, Decreased mobility  Plan:  Continue to progress LE strengthening and balance as laura   OP PT Plan  PT Plan: Skilled PT  OP PT Plan  Treatment/Interventions: Aquatic therapy, Cryotherapy, Education/ Instruction, Electrical stimulation, Hot pack, Manual therapy, Neuromuscular re-education, Self care/ home management, Taping techniques, Therapeutic activities, Therapeutic exercises, Vasopneumatic device  PT Plan: Skilled PT  PT Frequency:  (1-2x/week)  Duration: 13 additional visits  Onset Date: 10/21/24  Certification Period Start Date: 12/17/24  Certification Period End Date: 03/17/25    Current Problem  1. Decreased mobility        2. Ataxia  Follow Up In Physical Therapy      3. Unsteadiness on feet        4. Weakness of both lower extremities              General     General  Reason for Referral: Decreased mobility  Referred By: Dr. Jacobo Thakkar  Past Medical History Relevant to Rehab: PACEMAKER, diabetes, heart disease, seizure disorder    Subjective    Pt reports slipping on wet towel yesterday and falling onto left hip. She reports waking up in a lot of pain in left hip this morning. Currently only  about 4/10.   Precautions  Precautions  STEADI Fall Risk Score (The score of 4 or more indicates an increased risk of falling): 11  Precautions Comment: PACEMAKER, diabetes, heart disease, seizure disorder       Pain  Pain Assessment  Pain Assessment: 0-10  0-10 (Numeric) Pain Score: 4  Pain Location: Hip  Pain Orientation: Left    Objective         PT notified of patient report of fall. PT consulted with patient. OK to continue exercises to tolerance per PT         Pt continues to ambulate with decreased knee flexion bilaterally          Treatments:      Supine Bridge  x 15  Clamshell  x 15 B NT  Active Straight Leg Raise with Quad Set x 15 reps B  Supine Hip Adduction Isometric with Ball  x 15 B  Supine hip abduction with GTB x15 B  Seated Long Arc Quad  x 15 B  Seated hamstring curl RTB x10  Heel Raises with Counter Support  2 sets x 10 reps   Mini squats x 10  Standing hip abduction x 10 B  Standing hip extension x 10 B  Side stepping in hallway  20 ft x 2   Tandem walking in hallway 20 ft x 2   Standing hamstring curls x 10 B   Standing on cushion feet together 30 sec x 1 with SBA    OP EDUCATION:       Goals:  By discharge:  1. Patient will report and demonstrate independence with current HEP  2. Patient will demonstrate the ability to perform bilateral tandem stance >/= 20s without loss of balance or need for assistance to indicate improved static balance and safety  3. Patient will demonstrate an improvement in TUG score to </= 14 with least restrictive assistive device to indicate overall improved functional mobility as well as approach the cutoff for reduced risk for falls (baseline 12/17/24 21.07s)  4. Patient will demonstrate gross hip, knee, and ankle strength >/= 4+/5 to improve the ability to ambulate, squat, and lift without restrictions  5. Patient will demonstrate an increase in Lower Extremity Functional Scale score by 30 points to 50/80 to meet established MCID (baseline 12/17/24 20/80)  6.  Patient will demonstrate the ability to ambulate >/= 150' with least restrictive assistive device and with improved gait speed and mechanics  7. Patient will demonstrate the ability to perform 10 sit to stand transfers from a normal chair height without upper extremity assistance to indicate improved functional mobility and strength  8. Patient will demonstrate the ability to perform 3 CW and 3 CCW turns with continuous steps, and without loss of balance to indicate improved dynamic balance and safety within the home  9. Patient will demonstrate the ability to ascend and descend one flight of stairs reciprocally and without loss of balance to indicate improved mobility within the home/community  10. Patient will demonstrate an improvement in total Tinetti score to >19/28 to indicate improved balance in standing and with mobility and a reduced risk for falls (baseline 12/17/24: 12/28)

## 2025-01-07 ENCOUNTER — PATIENT MESSAGE (OUTPATIENT)
Dept: PRIMARY CARE | Facility: CLINIC | Age: 50
End: 2025-01-07
Payer: COMMERCIAL

## 2025-01-07 DIAGNOSIS — E11.9 TYPE 2 DIABETES MELLITUS WITHOUT COMPLICATION, WITH LONG-TERM CURRENT USE OF INSULIN (MULTI): Primary | ICD-10-CM

## 2025-01-07 DIAGNOSIS — Z79.4 TYPE 2 DIABETES MELLITUS WITHOUT COMPLICATION, WITH LONG-TERM CURRENT USE OF INSULIN (MULTI): Primary | ICD-10-CM

## 2025-01-09 RX ORDER — PEN NEEDLE, DIABETIC 30 GX3/16"
NEEDLE, DISPOSABLE MISCELLANEOUS
Qty: 200 EACH | Refills: 3 | Status: SHIPPED | OUTPATIENT
Start: 2025-01-09

## 2025-01-13 ENCOUNTER — APPOINTMENT (OUTPATIENT)
Dept: PHYSICAL THERAPY | Facility: CLINIC | Age: 50
End: 2025-01-13
Payer: COMMERCIAL

## 2025-01-17 ENCOUNTER — APPOINTMENT (OUTPATIENT)
Dept: PHARMACY | Facility: HOSPITAL | Age: 50
End: 2025-01-17
Payer: COMMERCIAL

## 2025-01-17 DIAGNOSIS — E11.9 TYPE 2 DIABETES MELLITUS WITHOUT COMPLICATION, WITH LONG-TERM CURRENT USE OF INSULIN (MULTI): ICD-10-CM

## 2025-01-17 DIAGNOSIS — Z79.4 TYPE 2 DIABETES MELLITUS WITHOUT COMPLICATION, WITH LONG-TERM CURRENT USE OF INSULIN (MULTI): ICD-10-CM

## 2025-01-17 RX ORDER — SEMAGLUTIDE 1.34 MG/ML
1 INJECTION, SOLUTION SUBCUTANEOUS
Qty: 3 ML | Refills: 2 | Status: SHIPPED | OUTPATIENT
Start: 2025-01-17 | End: 2025-01-20 | Stop reason: SDUPTHER

## 2025-01-17 NOTE — PROGRESS NOTES
"  Clinical Pharmacy Post-Discharge Visit    Tana Bahena \"Perlita\" is a 49 y.o. female was referred to Clinical Pharmacy Team to complete a post-discharge medication optimization and monitoring visit.  The patient was referred for their Diabetes.    Admission Date: 5/12/23  Discharge Date: 5/16/23    Referring Provider: Jacobo Thakkar MD  Last Visit: 10/21/24  Next Visit: not scheduled    Subjective   Allergies   Allergen Reactions    Ketorolac Unknown    Tramadol Unknown       [facility]  [address], [address]  [facility] [address]  Phone: [phone] Fax: [phone]      Social History     Social History Narrative    Not on file      Patient presents to Clinical Pharmacy team for follow-up after last visit on 12/20/24. At last visit, patient was restarted on Ozempic and switched over to Freestyle Jayy Plus sensors due to backorder.      Since last visit, patient's reported BGs have been stable, though occasionally having spikes. Reports BG of 250 today after lunch; notes injecting mealtime insulin due to spike.      Of note, she does not feel when her blood sugar drops <70 mg/dL. Denies any recent readings indicative of hypoglycemia.     Objective     There were no vitals taken for this visit.     LAB  Lab Results   Component Value Date    BILITOT 0.3 06/29/2023    CALCIUM 9.0 08/03/2023    CO2 25 08/03/2023     08/03/2023    CREATININE 0.72 11/20/2023    GLUCOSE 97 08/03/2023    ALKPHOS 91 06/29/2023    K 3.5 08/03/2023    PROT 6.9 06/29/2023     08/03/2023    AST 11 06/29/2023    ALT 15 06/29/2023    BUN 13 08/03/2023    ANIONGAP 14 08/03/2023    MG 1.78 06/30/2023    PHOS 3.4 05/02/2023    ALBUMIN 4.0 06/29/2023    LIPASE 15 06/29/2023    EGFR >90 11/20/2023     Lab Results   Component Value Date    TRIG 118 12/19/2022    CHOL 154 12/19/2022    HDL 51.1 12/19/2022     Lab Results   Component Value Date    HGBA1C 7.6 (H) 05/14/2024         Current Outpatient " Medications on File Prior to Visit   Medication Sig Dispense Refill    albuterol 90 mcg/actuation inhaler Inhale 2 puffs every 8 hours if needed for wheezing.      alcohol swabs (Alcohol Prep Pads) pads, medicated USE TO CLEANSE SKIN PRIOR TO INJECTION FOUR TIMES DAILY AS DIRECTED 200 each 3    aspirin 81 mg EC tablet Take 1 tablet (81 mg) by mouth once daily.      atorvastatin (Lipitor) 40 mg tablet Take 1 tablet (40 mg) by mouth once daily.      blood-glucose sensor (FreeStyle Jayy 3 Plus Sensor) device Use as directed to test blood sugar daily. Replace sensor in arm every 15 days. 2 each 2    budesonide-formoteroL (Symbicort) 80-4.5 mcg/actuation inhaler Inhale 2 puffs 2 times a day. Rinse mouth after use 10.2 g 3    carBAMazepine (Carbatrol) 200 mg 12 hr capsule Take 1 capsule (200 mg) by mouth twice a day.      carvedilol (Coreg) 6.25 mg tablet Take 1 tablet (6.25 mg) by mouth 2 times daily (morning and late afternoon). 60 tablet 2    cyclobenzaprine (Flexeril) 10 mg tablet Take 1 tablet (10 mg) by mouth 3 times a day as needed for muscle spasms. 60 tablet 2    doxepin (SINEquan) 25 mg capsule Take 1 capsule (25 mg) by mouth once daily at bedtime. 30 capsule 3    fluticasone propion-salmeteroL (Advair Diskus) 250-50 mcg/dose diskus inhaler Inhale 1 puff 2 times a day. Rinse mouth with water after use to reduce aftertaste and incidence of candidiasis. Do not swallow. 120 each 0    FreeStyle Jayy 3 Penasco misc USE AS INSTRUCTED FOR CONTINUOUS BLOOD GLUCOSE MONITORING 1 each 0    FreeStyle Jayy 3 Sensor device tid 100 each 3    gabapentin (Neurontin) 100 mg capsule Take 1 capsule (100 mg) by mouth 3 times a day. 90 capsule 2    hydrOXYzine HCL (Atarax) 25 mg tablet Take 1 tablet (25 mg) by mouth once daily at bedtime. 30 tablet 2    insulin aspart (NovoLOG Flexpen U-100 Insulin) 100 unit/mL (3 mL) pen INJECT 6-8 UNITS UNDER THE SKIN THREE TIMES DAILY BEFORE MEALS. PER SLIDING SCALE, MAX 30 UNITS PER DAY 9 mL 3  "   insulin glargine (Lantus Solostar U-100 Insulin) 100 unit/mL (3 mL) pen Inject 50 Units under the skin once daily at bedtime. Take as directed per insulin instructions. 15 mL 1    insulin lispro (HumaLOG KwikPen Insulin) 100 unit/mL injection Inject under the skin 3 times a day with meals. Take as directed per insulin instructions.      isosorbide mononitrate ER (Imdur) 60 mg 24 hr tablet Take 1 tablet (60 mg) by mouth once daily in the morning. Take before meals. 30 tablet 2    Jardiance 10 mg take 1 tablet by mouth once daily 30 tablet 2    lancets misc Use as directed when checking blood sugar 100 each 3    losartan (Cozaar) 25 mg tablet Take 1 tablet (25 mg) by mouth once daily.      metFORMIN  mg 24 hr tablet Take 2 tablets (1,000 mg) by mouth 2 times a day. 120 tablet 2    methylPREDNISolone (Medrol Dospak) 4 mg tablets use as directed FOLLOW DIRECTIONS ON BACK OF FOIL PACK 21 tablet 0    nitroglycerin (Nitrostat) 0.4 mg SL tablet Place 1 tablet (0.4 mg) under the tongue every 5 minutes if needed. PLACE 1 TABLET UNDER THE TONGUE EVERY 5 MINUTES FOR UP TO 3 DOSES AS NEEDED FOR CHEST PAIN.CALL 911 IF PAIN PERSISTS.      omeprazole (PriLOSEC) 40 mg DR capsule Take 1 capsule (40 mg) by mouth once daily in the morning. Take before meals. Do not crush or chew. 30 capsule 11    OneTouch Verio Flex Start kit Use as needed for glucose testing 1 each 0    OneTouch Verio test strips strip Test glucose 4 times daily 400 strip 3    pen needle, diabetic (BD Breana 2nd Gen Pen Needle) 32 gauge x 5/32\" needle USE TO ADMINISTER INSULIN 4 TIMES A  each 3    ranolazine (Ranexa) 500 mg 12 hr tablet       rOPINIRole (Requip) 0.25 mg tablet Take 1 tablet (0.25 mg) by mouth 3 times a day. 180 tablet 1    semaglutide (Ozempic) 1 mg/dose (4 mg/3 mL) pen injector Inject 1 mg under the skin every 7 days. 3 mL 2    semaglutide 0.25 mg or 0.5 mg (2 mg/3 mL) pen injector Inject 0.25 mg under the skin 1 (one) time per week. 2 " mL 2    ticagrelor (Brilinta) 90 mg tablet Take 1 tablet (90 mg) by mouth 2 times a day. 60 tablet 2    traZODone (Desyrel) 100 mg tablet Take 3 tablets (300 mg) by mouth as needed at bedtime for sleep. 90 tablet 1    triamterene-hydrochlorothiazid (Maxzide-25) 37.5-25 mg tablet Take 1 tablet by mouth once daily.      True Metrix Glucose Meter misc USE TO TEST BLOOD SUGAR ONCE DAILY AND AS NEEDED       No current facility-administered medications on file prior to visit.      Assessment/Plan   Problem List Items Addressed This Visit       Type 2 diabetes mellitus without complication, with long-term current use of insulin (Multi)     ASSESSMENT:  -Patient's last A1c is elevated from previous at 7.6%,now above goal of 7%  -Patient has been able to restart Ozempic at 1 mg/week since last visit with no issues. BG is improving, however patient notes still having some spikes.   -Reviewed role of mealtime insulin and stressed importance of injecting only with meals. If using several hours prior to a meal for spikes, advised to skip dose at next meal to prevent hypoglycemia.   -Will make no changes today due to improved BG readings. Will re-evaluate in 4 weeks once new A1c is completed at next PCP visit.     RECOMMENDATIONS/PLAN:  CONTINUE all diabetes medications as prescribed:  Metformin  m tablets BID  Jardiance 10 mg daily  Ozempic 1 mg once weekly on  (refill sent)  Lantus 50 units at bedtime  Novolog TID per sliding scale (on average injecting 6-8 units); max dose of 30 units per day        Other Patient Discussion:  -Patient notes having extreme anxiety which causes her to not leave her house except for attending doctor's appointments. Encouraged her to make appointment with PCP to discuss as well as get updated labwork.  -Will reach out  specialist, Fior, about referral for behavioral health services    Clinical Pharmacist follow up: 25 or sooner as needed based on clinical  intervention  Type of Encounter:  Telephone    Jackeline Potts PharmD  Clinical Ambulatory Care Pharmacist  Ph: [phone]    Verbal consent to manage patient's drug therapy was obtained from the patient. They were informed they may decline to participate or withdraw from participation in pharmacy services at any time.

## 2025-01-17 NOTE — ASSESSMENT & PLAN NOTE
ASSESSMENT:  -Patient's last A1c is elevated from previous at 7.6%,now above goal of 7%  -Patient has been able to restart Ozempic at 1 mg/week since last visit with no issues. BG is improving, however patient notes still having some spikes.   -Reviewed role of mealtime insulin and stressed importance of injecting only with meals. If using several hours prior to a meal for spikes, advised to skip dose at next meal to prevent hypoglycemia.   -Will make no changes today due to improved BG readings. Will re-evaluate in 4 weeks once new A1c is completed at next PCP visit.     RECOMMENDATIONS/PLAN:  CONTINUE all diabetes medications as prescribed:  Metformin  m tablets BID  Jardiance 10 mg daily  Ozempic 1 mg once weekly on  (refill sent)  Lantus 50 units at bedtime  Novolog TID per sliding scale (on average injecting 6-8 units); max dose of 30 units per day

## 2025-01-19 PROCEDURE — RXMED WILLOW AMBULATORY MEDICATION CHARGE

## 2025-01-20 ENCOUNTER — TELEPHONE (OUTPATIENT)
Dept: PRIMARY CARE | Facility: CLINIC | Age: 50
End: 2025-01-20

## 2025-01-20 ENCOUNTER — APPOINTMENT (OUTPATIENT)
Dept: PRIMARY CARE | Facility: CLINIC | Age: 50
End: 2025-01-20
Payer: COMMERCIAL

## 2025-01-20 ENCOUNTER — TREATMENT (OUTPATIENT)
Dept: PHYSICAL THERAPY | Facility: CLINIC | Age: 50
End: 2025-01-20
Payer: COMMERCIAL

## 2025-01-20 VITALS
SYSTOLIC BLOOD PRESSURE: 129 MMHG | HEART RATE: 75 BPM | OXYGEN SATURATION: 99 % | BODY MASS INDEX: 28.41 KG/M2 | HEIGHT: 67 IN | WEIGHT: 181 LBS | DIASTOLIC BLOOD PRESSURE: 88 MMHG

## 2025-01-20 DIAGNOSIS — I25.10 CORONARY ARTERY DISEASE INVOLVING NATIVE CORONARY ARTERY OF NATIVE HEART WITHOUT ANGINA PECTORIS: Primary | ICD-10-CM

## 2025-01-20 DIAGNOSIS — E11.9 TYPE 2 DIABETES MELLITUS WITHOUT COMPLICATION, WITH LONG-TERM CURRENT USE OF INSULIN (MULTI): ICD-10-CM

## 2025-01-20 DIAGNOSIS — Z93.0 TRACHEOSTOMY STATUS (MULTI): ICD-10-CM

## 2025-01-20 DIAGNOSIS — E78.2 MIXED HYPERLIPIDEMIA: ICD-10-CM

## 2025-01-20 DIAGNOSIS — F33.1 MODERATE EPISODE OF RECURRENT MAJOR DEPRESSIVE DISORDER: ICD-10-CM

## 2025-01-20 DIAGNOSIS — G91.8 OTHER HYDROCEPHALUS: ICD-10-CM

## 2025-01-20 DIAGNOSIS — R27.0 ATAXIA: ICD-10-CM

## 2025-01-20 DIAGNOSIS — F09 ORGANIC BRAIN SYNDROME: ICD-10-CM

## 2025-01-20 DIAGNOSIS — F51.01 PRIMARY INSOMNIA: ICD-10-CM

## 2025-01-20 DIAGNOSIS — G89.29 CHRONIC BILATERAL LOW BACK PAIN WITHOUT SCIATICA: ICD-10-CM

## 2025-01-20 DIAGNOSIS — E10.65 TYPE 1 DIABETES MELLITUS WITH HYPERGLYCEMIA (MULTI): ICD-10-CM

## 2025-01-20 DIAGNOSIS — M54.50 CHRONIC BILATERAL LOW BACK PAIN WITHOUT SCIATICA: ICD-10-CM

## 2025-01-20 DIAGNOSIS — I10 PRIMARY HYPERTENSION: ICD-10-CM

## 2025-01-20 DIAGNOSIS — Z79.4 TYPE 2 DIABETES MELLITUS WITHOUT COMPLICATION, WITH LONG-TERM CURRENT USE OF INSULIN (MULTI): ICD-10-CM

## 2025-01-20 DIAGNOSIS — I50.22 CHRONIC SYSTOLIC (CONGESTIVE) HEART FAILURE: ICD-10-CM

## 2025-01-20 DIAGNOSIS — F31.12 BIPOLAR AFFECTIVE DISORDER, CURRENTLY MANIC, MODERATE (MULTI): ICD-10-CM

## 2025-01-20 DIAGNOSIS — J44.9 CHRONIC OBSTRUCTIVE PULMONARY DISEASE, UNSPECIFIED COPD TYPE (MULTI): ICD-10-CM

## 2025-01-20 DIAGNOSIS — J43.1 PANLOBULAR EMPHYSEMA (MULTI): ICD-10-CM

## 2025-01-20 PROCEDURE — 3074F SYST BP LT 130 MM HG: CPT | Performed by: FAMILY MEDICINE

## 2025-01-20 PROCEDURE — 3008F BODY MASS INDEX DOCD: CPT | Performed by: FAMILY MEDICINE

## 2025-01-20 PROCEDURE — 4010F ACE/ARB THERAPY RXD/TAKEN: CPT | Performed by: FAMILY MEDICINE

## 2025-01-20 PROCEDURE — 99214 OFFICE O/P EST MOD 30 MIN: CPT | Performed by: FAMILY MEDICINE

## 2025-01-20 PROCEDURE — 97110 THERAPEUTIC EXERCISES: CPT | Mod: GP,CQ

## 2025-01-20 PROCEDURE — 3079F DIAST BP 80-89 MM HG: CPT | Performed by: FAMILY MEDICINE

## 2025-01-20 RX ORDER — SEMAGLUTIDE 1.34 MG/ML
1 INJECTION, SOLUTION SUBCUTANEOUS
Qty: 3 ML | Refills: 2 | Status: SHIPPED | OUTPATIENT
Start: 2025-01-20

## 2025-01-20 ASSESSMENT — ENCOUNTER SYMPTOMS
ABDOMINAL PAIN: 0
HEADACHES: 0
VOMITING: 0
NERVOUS/ANXIOUS: 0
DIZZINESS: 0
SORE THROAT: 0
COUGH: 0
BACK PAIN: 0
ACTIVITY CHANGE: 0
ADENOPATHY: 0
EYE DISCHARGE: 0
BLOOD IN STOOL: 0
CONSTIPATION: 0
CHEST TIGHTNESS: 0
BRUISES/BLEEDS EASILY: 0
DIARRHEA: 0
NAUSEA: 0
DYSPHORIC MOOD: 0
FATIGUE: 0
MYALGIAS: 0
WEAKNESS: 0
SHORTNESS OF BREATH: 0
DIFFICULTY URINATING: 0
NECK PAIN: 0
HEMATURIA: 0
ARTHRALGIAS: 0
NUMBNESS: 0

## 2025-01-20 ASSESSMENT — PAIN - FUNCTIONAL ASSESSMENT: PAIN_FUNCTIONAL_ASSESSMENT: 0-10

## 2025-01-20 NOTE — PROGRESS NOTES
"Subjective   Patient ID: Tana Bahena \"Axel" is a 49 y.o. female who presents for Anxiety.  HPI  Anxiety worse  Lots of stress at home  Would benefit from behavioral health coordination  No suicidal ideation no psychotic or manic symptoms  History of bipolar and depression as well    Hypertension stable no chest pain or shortness of breath    CHF and coronary disease stable keep cardiac follow-up    Diabetes with good control watch diet follow blood work    Low back pain stable no progression or worsening    COPD ongoing continue current medicine      Review of Systems   Constitutional:  Negative for activity change and fatigue.   HENT:  Negative for congestion and sore throat.    Eyes:  Negative for discharge.   Respiratory:  Negative for cough, chest tightness and shortness of breath.    Cardiovascular:  Negative for chest pain and leg swelling.   Gastrointestinal:  Negative for abdominal pain, blood in stool, constipation, diarrhea, nausea and vomiting.   Endocrine: Negative for cold intolerance and heat intolerance.   Genitourinary:  Negative for difficulty urinating and hematuria.   Musculoskeletal:  Negative for arthralgias, back pain, gait problem, myalgias and neck pain.   Allergic/Immunologic: Negative for environmental allergies.   Neurological:  Negative for dizziness, syncope, weakness, numbness and headaches.   Hematological:  Negative for adenopathy. Does not bruise/bleed easily.   Psychiatric/Behavioral:  Negative for dysphoric mood. The patient is not nervous/anxious.    All other systems reviewed and are negative.      Objective   /88 (BP Location: Right arm, BP Cuff Size: Large adult)   Pulse 75   Ht 1.702 m (5' 7\")   Wt 82.1 kg (181 lb)   SpO2 99%   BMI 28.35 kg/m²    Physical Exam  Vitals and nursing note reviewed.   Constitutional:       General: She is not in acute distress.     Appearance: Normal appearance.   HENT:      Head: Normocephalic and atraumatic.      Right Ear: " Tympanic membrane, ear canal and external ear normal.      Left Ear: Tympanic membrane, ear canal and external ear normal.      Nose: Nose normal.      Mouth/Throat:      Mouth: Mucous membranes are moist.      Pharynx: Oropharynx is clear. No oropharyngeal exudate or posterior oropharyngeal erythema.   Eyes:      Extraocular Movements: Extraocular movements intact.      Conjunctiva/sclera: Conjunctivae normal.      Pupils: Pupils are equal, round, and reactive to light.   Cardiovascular:      Rate and Rhythm: Normal rate and regular rhythm.      Pulses: Normal pulses.      Heart sounds: Normal heart sounds. No murmur heard.  Pulmonary:      Effort: Pulmonary effort is normal. No respiratory distress.      Breath sounds: Normal breath sounds. No wheezing or rales.   Abdominal:      General: Abdomen is flat. Bowel sounds are normal. There is no distension.      Palpations: Abdomen is soft. There is no mass.      Tenderness: There is no abdominal tenderness.   Musculoskeletal:         General: No swelling or deformity. Normal range of motion.      Cervical back: Normal range of motion and neck supple.      Right lower leg: No edema.      Left lower leg: No edema.   Lymphadenopathy:      Cervical: No cervical adenopathy.   Skin:     General: Skin is warm and dry.      Capillary Refill: Capillary refill takes less than 2 seconds.      Findings: No lesion or rash.   Neurological:      General: No focal deficit present.      Mental Status: She is alert and oriented to person, place, and time.      Cranial Nerves: No cranial nerve deficit.      Motor: No weakness.   Psychiatric:         Mood and Affect: Mood normal.         Behavior: Behavior normal.         Thought Content: Thought content normal.         Judgment: Judgment normal.         Assessment/Plan   Problem List Items Addressed This Visit       Bipolar affective disorder, currently manic, moderate (Multi)    Coronary artery disease involving native coronary artery  of native heart without angina pectoris - Primary    Panlobular emphysema (Multi)    Moderate episode of recurrent major depressive disorder    Type 2 diabetes mellitus without complication, with long-term current use of insulin (Multi)    Relevant Orders    Follow Up In [facility] - PCP    Follow Up In [facility] - Behavioral Health Collaborative [facility]    Primary hypertension    Mixed hyperlipidemia    Chronic bilateral low back pain without sciatica    Primary insomnia    Chronic obstructive pulmonary disease, unspecified    Tracheostomy status (Multi)    Other hydrocephalus    Chronic systolic (congestive) heart failure    Type 1 diabetes mellitus with hyperglycemia (Multi)       Patient education provided.  Stay current with age appropriate health maintenance as instructed.  Appointment here or ER with new or worsening symptoms'  Keep appropriate follow-up visit.  Stay current with proper immunizations   Behavioral health coordination  Recheck 3 months  Return sooner with new or worsening symptoms  Report suicidal ideation report psychotic manic symptoms  Introduced today to Fior behavioral health care coordinator  Discussed at length with patient and daughter

## 2025-01-20 NOTE — TELEPHONE ENCOUNTER
We received a request for prior authorization on the patient's semaglutide (Ozempic) 1 mg/dose (4 mg/3 mL) pen injector  from their pharmacy. Prior authorization was submitted to insurance today. We will await their determination.

## 2025-01-20 NOTE — PROGRESS NOTES
"Physical Therapy    Physical Therapy Treatment    Patient Name: Tana Bahena  MRN: [id number]  Today's Date: 1/20/2025    Time Entry:   Time Calculation  Start Time: 0102  Stop Time: 0150  Time Calculation (min): 48 min     PT Therapeutic Procedures Time Entry  Therapeutic Exercise Time Entry: 45    Insurance:  [facility]  20% coinsurance  25 V/Y   PA not req  Visit: 4  POC: 14                   Assessment:  Pt noted increased left hamstring pain during GS and hamstring stretches. Pt reports increased soreness/fatigue during and after ther ex per patient. Pt would benefit from PT to continue to address impairments in order to improve strength, flexibility, balance, gait, and body mechanics and to decrease symptoms and increase overall function.   PT Assessment  PT Assessment Results: Decreased strength, Decreased range of motion, Impaired balance, Decreased mobility    Plan:  Continue to progress LE strengthening and balance as laura   OP PT Plan  PT Plan: Skilled PT  OP PT Plan  Treatment/Interventions: Aquatic therapy, Cryotherapy, Education/ Instruction, Electrical stimulation, Hot pack, Manual therapy, Neuromuscular re-education, Self care/ home management, Taping techniques, Therapeutic activities, Therapeutic exercises, Vasopneumatic device  PT Plan: Skilled PT  PT Frequency:  (1-2x/week)  Duration: 13 additional visits  Onset Date: 10/21/24  Certification Period Start Date: 12/17/24  Certification Period End Date: 03/17/25    Current Problem  1. Ataxia  Follow Up In Physical Therapy          General     General  Reason for Referral: Decreased mobility  Referred By: Dr. Jacobo Thakkar  Past Medical History Relevant to Rehab: PACEMAKER, diabetes, heart disease, seizure disorder    Subjective    Pt reports current hurt hip pain is 0/10 per patient. Pt notes B LE and LBP and LE numbness \"as usual.\"    Precautions  Precautions  STEADI Fall Risk Score (The score of 4 or more indicates an increased risk of falling): " "11  Precautions Comment: PACEMAKER, diabetes, heart disease, seizure disorder       Pain  Pain Assessment  Pain Assessment: 0-10    Objective   Pt continues to ambulate with decreased knee flexion  bilaterally and B LE ER.             Treatments:    Supine Bridge 5\" x 15  Clamshell  x 15 B  Active Straight Leg Raise with Quad Set x 15 reps B  Supine Hip Adduction Isometric with Ball  x 15 B  Supine hip abduction with BTB x15 B  Seated Long Arc Quad  2# x 15 B  Seated hamstring curls GTB x 15 B   Heel Raises with Counter Support  2 sets x 10 reps   Mini squats x 10 NT  Standing hip abduction x 10 B  Standing hip extension x 10 B  Side stepping in hallway 20 ft x 2  nt  Tandem walking in hallway  20 ft x 2 nt  Standing on cushion with feet together 30 sec x 1 with SBA    Hip Fallouts x20ea  DLS March x15ea  Step Ups 6\" x10ea alt  HS longsit/ GS Stretch at 4\"step B 3x30\"ea      OP EDUCATION:   12/17/24:  Exercises  - Supine Bridge  - 1 x daily - 7 x weekly - 2 sets - 10 reps - 3 sec hold  - Clamshell  - 1 x daily - 7 x weekly - 1 sets - 10 reps - 3 sec hold  - Active Straight Leg Raise with Quad Set  - 1 x daily - 7 x weekly - 2 sets - 10 reps - 3 sec hold  - Supine Hip Adduction Isometric with Ball  - 1 x daily - 7 x weekly - 2 sets - 10 reps - 5 sec hold  - Seated Long Arc Quad  - 1 x daily - 7 x weekly - 2 sets - 10 reps  - Heel Raises with Counter Support  - 1 x daily - 7 x weekly - 2 sets - 10 reps - 3 sec hold    Goals:  By discharge:  1. Patient will report and demonstrate independence with current HEP  2. Patient will demonstrate the ability to perform bilateral tandem stance >/= 20s without loss of balance or need for assistance to indicate improved static balance and safety  3. Patient will demonstrate an improvement in TUG score to </= 14 with least restrictive assistive device to indicate overall improved functional mobility as well as approach the cutoff for reduced risk for falls (baseline 12/17/24 " 21.07s)  4. Patient will demonstrate gross hip, knee, and ankle strength >/= 4+/5 to improve the ability to ambulate, squat, and lift without restrictions  5. Patient will demonstrate an increase in Lower Extremity Functional Scale score by 30 points to 50/80 to meet established MCID (baseline 12/17/24 20/80)  6. Patient will demonstrate the ability to ambulate >/= 150' with least restrictive assistive device and with improved gait speed and mechanics  7. Patient will demonstrate the ability to perform 10 sit to stand transfers from a normal chair height without upper extremity assistance to indicate improved functional mobility and strength  8. Patient will demonstrate the ability to perform 3 CW and 3 CCW turns with continuous steps, and without loss of balance to indicate improved dynamic balance and safety within the home  9. Patient will demonstrate the ability to ascend and descend one flight of stairs reciprocally and without loss of balance to indicate improved mobility within the home/community  10. Patient will demonstrate an improvement in total Tinetti score to >19/28 to indicate improved balance in standing and with mobility and a reduced risk for falls (baseline 12/17/24: 12/28)

## 2025-01-21 ENCOUNTER — PATIENT MESSAGE (OUTPATIENT)
Dept: PRIMARY CARE | Facility: CLINIC | Age: 50
End: 2025-01-21
Payer: COMMERCIAL

## 2025-01-21 DIAGNOSIS — K31.84 DIABETIC GASTROPARESIS (MULTI): Primary | ICD-10-CM

## 2025-01-21 DIAGNOSIS — E11.43 DIABETIC GASTROPARESIS (MULTI): Primary | ICD-10-CM

## 2025-01-21 RX ORDER — METOCLOPRAMIDE 10 MG/1
10 TABLET ORAL 4 TIMES DAILY PRN
Qty: 30 TABLET | Refills: 0 | Status: SHIPPED | OUTPATIENT
Start: 2025-01-21 | End: 2025-03-22

## 2025-01-23 ENCOUNTER — TELEPHONE (OUTPATIENT)
Dept: NEUROLOGY | Age: 50
End: 2025-01-23

## 2025-01-23 PROCEDURE — RXMED WILLOW AMBULATORY MEDICATION CHARGE

## 2025-01-23 NOTE — TELEPHONE ENCOUNTER
Please advised         I've been doing research on the brain pill that you gave me the mamatine do you think that it would be possible to try something else like dozenzil or remininyl thank you for the thought it's just getting wrse  Dr Roman

## 2025-01-24 ENCOUNTER — DOCUMENTATION (OUTPATIENT)
Dept: BEHAVIORAL HEALTH | Facility: HOSPITAL | Age: 50
End: 2025-01-24
Payer: COMMERCIAL

## 2025-01-24 ENCOUNTER — PATIENT MESSAGE (OUTPATIENT)
Dept: CARDIOLOGY CLINIC | Age: 50
End: 2025-01-24

## 2025-01-24 ENCOUNTER — PATIENT MESSAGE (OUTPATIENT)
Dept: PRIMARY CARE | Facility: CLINIC | Age: 50
End: 2025-01-24
Payer: COMMERCIAL

## 2025-01-24 DIAGNOSIS — Z79.4 TYPE 2 DIABETES MELLITUS WITHOUT COMPLICATION, WITH LONG-TERM CURRENT USE OF INSULIN (MULTI): ICD-10-CM

## 2025-01-24 DIAGNOSIS — E11.9 TYPE 2 DIABETES MELLITUS WITHOUT COMPLICATION, WITH LONG-TERM CURRENT USE OF INSULIN (MULTI): ICD-10-CM

## 2025-01-24 DIAGNOSIS — I25.10 CORONARY ARTERY DISEASE INVOLVING NATIVE CORONARY ARTERY OF NATIVE HEART WITHOUT ANGINA PECTORIS: ICD-10-CM

## 2025-01-24 RX ORDER — DONEPEZIL HYDROCHLORIDE 5 MG/1
5 TABLET, FILM COATED ORAL NIGHTLY
Qty: 30 TABLET | Refills: 0 | Status: SHIPPED | OUTPATIENT
Start: 2025-01-24

## 2025-01-24 NOTE — PROGRESS NOTES
Tana Bahena is a 49 y.o., referred to [facility] for symptoms of depression and anxiety. I have reviewed the patient with the behavioral health manager and reviewed the patient's electronic record.    Recommendations:   - Patient would be better served through seeing a psychiatrist rather than having her psychiatric medications managed through [facility], due to medical and psychiatric complexity. Would place referral to psychiatry department.        No data recorded    The above treatment considerations and suggestions are based on consultations with the patient's care manager and a review of information available in the electronic medical record. I have not personally examined the patient. All recommendations should be implemented with consideration of the patient's relevant prior history and current clinical status. Please feel free to call me with any questions about the care of this patient. I can easily be reached through [employer] or at [email or web address]

## 2025-01-24 NOTE — TELEPHONE ENCOUNTER
Requesting medication refill. Please approve or deny this request.    Rx requested:  Requested Prescriptions     Pending Prescriptions Disp Refills    ticagrelor (BRILINTA) 90 MG TABS tablet 180 tablet 3     Sig: Take 1 tablet by mouth 2 times daily         Last Office Visit:   10/17/2023      Next Visit Date:  Future Appointments   Date Time Provider Department Center   1/28/2025 10:30 AM Virgen, DO LISA [facility]   2/4/2025  1:30 PM [facility]   5/12/2025  3:00 PM Roberto Roman MD [place] [facility] -               Last refill 4/14/2024. Please approve or deny.

## 2025-01-27 ENCOUNTER — TREATMENT (OUTPATIENT)
Dept: PHYSICAL THERAPY | Facility: CLINIC | Age: 50
End: 2025-01-27
Payer: COMMERCIAL

## 2025-01-27 ENCOUNTER — PHARMACY VISIT (OUTPATIENT)
Dept: PHARMACY | Facility: CLINIC | Age: 50
End: 2025-01-27
Payer: MEDICAID

## 2025-01-27 ENCOUNTER — PATIENT MESSAGE (OUTPATIENT)
Dept: PRIMARY CARE | Facility: CLINIC | Age: 50
End: 2025-01-27

## 2025-01-27 DIAGNOSIS — F51.01 PRIMARY INSOMNIA: ICD-10-CM

## 2025-01-27 DIAGNOSIS — R27.0 ATAXIA: ICD-10-CM

## 2025-01-27 PROCEDURE — RXMED WILLOW AMBULATORY MEDICATION CHARGE

## 2025-01-27 PROCEDURE — 97110 THERAPEUTIC EXERCISES: CPT | Mod: GP,CQ

## 2025-01-27 RX ORDER — HYDROXYZINE HYDROCHLORIDE 25 MG/1
25 TABLET, FILM COATED ORAL NIGHTLY
Qty: 30 TABLET | Refills: 2 | Status: SHIPPED | OUTPATIENT
Start: 2025-01-27

## 2025-01-27 RX ORDER — INSULIN GLARGINE 100 [IU]/ML
50 INJECTION, SOLUTION SUBCUTANEOUS NIGHTLY
Qty: 15 ML | Refills: 1 | Status: SHIPPED | OUTPATIENT
Start: 2025-01-27

## 2025-01-27 ASSESSMENT — PAIN - FUNCTIONAL ASSESSMENT: PAIN_FUNCTIONAL_ASSESSMENT: 0-10

## 2025-01-27 NOTE — PROGRESS NOTES
"Physical Therapy    Physical Therapy Treatment    Patient Name: Tana Bahena  MRN: [id number]  Today's Date: 1/27/2025    Time Entry:   Time Calculation  Start Time: 1146  Stop Time: 1228  Time Calculation (min): 42 min     PT Therapeutic Procedures Time Entry  Therapeutic Exercise Time Entry: 42    Insurance:  [facility]  20% coinsurance  25 V/Y   PA not req  Visit: 4  POC: 14                   Assessment:  Pt reported feeling \"pretty good\" at end of session and states that she doesn't feel as sore following treatment as she did last visit. Pt would benefit from PT to continue to address impairments in order to improve strength, flexibility, balance, gait, and body mechanics and to decrease symptoms and increase overall function.   PT Assessment  PT Assessment Results: Decreased strength, Decreased range of motion, Impaired balance, Decreased mobility    Plan:  Continue to progress LE strengthening and balance as laura   OP PT Plan  PT Plan: Skilled PT  OP PT Plan  Treatment/Interventions: Aquatic therapy, Cryotherapy, Education/ Instruction, Electrical stimulation, Hot pack, Manual therapy, Neuromuscular re-education, Self care/ home management, Taping techniques, Therapeutic activities, Therapeutic exercises, Vasopneumatic device  PT Plan: Skilled PT  PT Frequency:  (1-2x/week)  Duration: 13 additional visits  Onset Date: 10/21/24  Certification Period Start Date: 12/17/24  Certification Period End Date: 03/17/25    Current Problem  1. Ataxia  Follow Up In Physical Therapy          General     General  Reason for Referral: Decreased mobility  Referred By: Dr. Jacobo Thakkar  Past Medical History Relevant to Rehab: PACEMAKER, diabetes, heart disease, seizure disorder    Subjective    Pt denies current pain. She reports continued B LE numbness \"as usual.\"  Pt states that she was sore for a couple of days following last session.     Precautions  Precautions  STEADI Fall Risk Score (The score of 4 or more indicates an " "increased risk of falling): 11  Precautions Comment: PACEMAKER, diabetes, heart disease, seizure disorder       Pain  Pain Assessment  Pain Assessment: 0-10    Objective   Pt continues to ambulate with decreased knee flexion bilaterally and B LE ER but gait improved slightly from previous visits.             Treatments:    Supine Bridge 5\" x 15  Clamshell  x 15 B  Active Straight Leg Raise with Quad Set x 10 reps B  Supine Hip Adduction Isometric with Ball  x 15 B  Supine hip abduction with BTB x15 B  Seated Long Arc Quad  2# x 15 B  Seated hamstring curls GTB x 15 B   Heel Raises with Counter Support  2 sets x 10 reps   Mini squats x 10 NT  Standing hip abduction x 10 B  Standing hip extension x 10 B  Side stepping in hallway 20 ft x 2    Tandem walking in hallway  20 ft x 2   Standing on cushion with feet together 30 sec x 1 with SBA NT   Hip Fallouts  x 15 ea  DLS March x15ea  Step Ups 6\" x 10 ea alt  HS longsit/ GS Stretch at 4\"step B 3x30\"ea      OP EDUCATION:   12/17/24:  Exercises  - Supine Bridge  - 1 x daily - 7 x weekly - 2 sets - 10 reps - 3 sec hold  - Clamshell  - 1 x daily - 7 x weekly - 1 sets - 10 reps - 3 sec hold  - Active Straight Leg Raise with Quad Set  - 1 x daily - 7 x weekly - 2 sets - 10 reps - 3 sec hold  - Supine Hip Adduction Isometric with Ball  - 1 x daily - 7 x weekly - 2 sets - 10 reps - 5 sec hold  - Seated Long Arc Quad  - 1 x daily - 7 x weekly - 2 sets - 10 reps  - Heel Raises with Counter Support  - 1 x daily - 7 x weekly - 2 sets - 10 reps - 3 sec hold    Goals:  By discharge:  1. Patient will report and demonstrate independence with current HEP  2. Patient will demonstrate the ability to perform bilateral tandem stance >/= 20s without loss of balance or need for assistance to indicate improved static balance and safety  3. Patient will demonstrate an improvement in TUG score to </= 14 with least restrictive assistive device to indicate overall improved functional mobility as " well as approach the cutoff for reduced risk for falls (baseline 12/17/24 21.07s)  4. Patient will demonstrate gross hip, knee, and ankle strength >/= 4+/5 to improve the ability to ambulate, squat, and lift without restrictions  5. Patient will demonstrate an increase in Lower Extremity Functional Scale score by 30 points to 50/80 to meet established MCID (baseline 12/17/24 20/80)  6. Patient will demonstrate the ability to ambulate >/= 150' with least restrictive assistive device and with improved gait speed and mechanics  7. Patient will demonstrate the ability to perform 10 sit to stand transfers from a normal chair height without upper extremity assistance to indicate improved functional mobility and strength  8. Patient will demonstrate the ability to perform 3 CW and 3 CCW turns with continuous steps, and without loss of balance to indicate improved dynamic balance and safety within the home  9. Patient will demonstrate the ability to ascend and descend one flight of stairs reciprocally and without loss of balance to indicate improved mobility within the home/community  10. Patient will demonstrate an improvement in total Tinetti score to >19/28 to indicate improved balance in standing and with mobility and a reduced risk for falls (baseline 12/17/24: 12/28)

## 2025-01-27 NOTE — TELEPHONE ENCOUNTER
"----- Message from Jacobo Thakkar sent at 1/27/2025 10:13 AM EST -----  Regarding: FW: Please see Dr. Wilks's note from 1/24/25.  Okay to refer patient to psychiatry  ----- Message -----  From: LILIANA Amaya  Sent: 1/27/2025   9:37 AM EST  To: Jacobo Thakkar MD  Subject: Please see Dr. Wilks's note from 1/24/25.      Good morning,    I reviewed this patient with Dr. Wilks and per her recommendation: \"- Patient would be better served through seeing a psychiatrist rather than having her psychiatric medications managed through [facility], due to medical and psychiatric complexity. Would place referral to psychiatry department.\"    Thank you,  Fior  "

## 2025-01-27 NOTE — PATIENT COMMUNICATION
Rx Refill Request     Date of last appointment:  1/20/2025   Date of next appointment:  2/18/2025   Best number to reach patient:  [phone]

## 2025-01-28 ENCOUNTER — PHARMACY VISIT (OUTPATIENT)
Dept: PHARMACY | Facility: CLINIC | Age: 50
End: 2025-01-28
Payer: MEDICAID

## 2025-01-28 DIAGNOSIS — F41.9 ANXIETY: ICD-10-CM

## 2025-01-28 DIAGNOSIS — F32.A DEPRESSION, UNSPECIFIED DEPRESSION TYPE: ICD-10-CM

## 2025-01-30 ENCOUNTER — PHARMACY VISIT (OUTPATIENT)
Dept: PHARMACY | Facility: CLINIC | Age: 50
End: 2025-01-30
Payer: MEDICAID

## 2025-02-03 ENCOUNTER — APPOINTMENT (OUTPATIENT)
Dept: PHYSICAL THERAPY | Facility: CLINIC | Age: 50
End: 2025-02-03
Payer: COMMERCIAL

## 2025-02-04 ENCOUNTER — HOSPITAL ENCOUNTER (OUTPATIENT)
Facility: HOSPITAL | Age: 50
Setting detail: OBSERVATION
Discharge: HOME | End: 2025-02-05
Attending: EMERGENCY MEDICINE | Admitting: INTERNAL MEDICINE
Payer: COMMERCIAL

## 2025-02-04 ENCOUNTER — APPOINTMENT (OUTPATIENT)
Dept: RADIOLOGY | Facility: HOSPITAL | Age: 50
End: 2025-02-04
Payer: COMMERCIAL

## 2025-02-04 ENCOUNTER — APPOINTMENT (OUTPATIENT)
Dept: CARDIOLOGY | Facility: HOSPITAL | Age: 50
End: 2025-02-04
Payer: COMMERCIAL

## 2025-02-04 ENCOUNTER — HOSPITAL ENCOUNTER (OUTPATIENT)
Dept: CARDIOLOGY | Age: 50
Discharge: HOME | End: 2025-02-04

## 2025-02-04 DIAGNOSIS — R07.9 CHEST PAIN, UNSPECIFIED TYPE: Primary | ICD-10-CM

## 2025-02-04 LAB
ALBUMIN SERPL BCP-MCNC: 4.5 G/DL (ref 3.4–5)
ALP SERPL-CCNC: 83 U/L (ref 33–110)
ALT SERPL W P-5'-P-CCNC: 14 U/L (ref 7–45)
ANION GAP SERPL CALC-SCNC: 13 MMOL/L (ref 10–20)
AST SERPL W P-5'-P-CCNC: 15 U/L (ref 9–39)
ATRIAL RATE: 72 BPM
ATRIAL RATE: 85 BPM
BASOPHILS # BLD AUTO: 0.05 X10*3/UL (ref 0–0.1)
BASOPHILS NFR BLD AUTO: 0.7 %
BILIRUB SERPL-MCNC: 0.7 MG/DL (ref 0–1.2)
BUN SERPL-MCNC: 10 MG/DL (ref 6–23)
CALCIUM SERPL-MCNC: 9.9 MG/DL (ref 8.6–10.3)
CARDIAC TROPONIN I PNL SERPL HS: 6 NG/L (ref 0–13)
CARDIAC TROPONIN I PNL SERPL HS: 7 NG/L (ref 0–13)
CHLORIDE SERPL-SCNC: 100 MMOL/L (ref 98–107)
CO2 SERPL-SCNC: 28 MMOL/L (ref 21–32)
CREAT SERPL-MCNC: 0.88 MG/DL (ref 0.5–1.05)
EGFRCR SERPLBLD CKD-EPI 2021: 82 ML/MIN/1.73M*2
EOSINOPHIL # BLD AUTO: 0.07 X10*3/UL (ref 0–0.7)
EOSINOPHIL NFR BLD AUTO: 1 %
ERYTHROCYTE [DISTWIDTH] IN BLOOD BY AUTOMATED COUNT: 12.9 % (ref 11.5–14.5)
GLUCOSE BLD MANUAL STRIP-MCNC: 124 MG/DL (ref 74–99)
GLUCOSE BLD MANUAL STRIP-MCNC: 203 MG/DL (ref 74–99)
GLUCOSE SERPL-MCNC: 90 MG/DL (ref 74–99)
HCT VFR BLD AUTO: 53.3 % (ref 36–46)
HGB BLD-MCNC: 18.1 G/DL (ref 12–16)
IMM GRANULOCYTES # BLD AUTO: 0.01 X10*3/UL (ref 0–0.7)
IMM GRANULOCYTES NFR BLD AUTO: 0.1 % (ref 0–0.9)
INR PPP: 1.1 (ref 0.9–1.1)
LIPASE SERPL-CCNC: 146 U/L (ref 9–82)
LYMPHOCYTES # BLD AUTO: 2.05 X10*3/UL (ref 1.2–4.8)
LYMPHOCYTES NFR BLD AUTO: 30.7 %
MAGNESIUM SERPL-MCNC: 2.15 MG/DL (ref 1.6–2.4)
MCH RBC QN AUTO: 31.2 PG (ref 26–34)
MCHC RBC AUTO-ENTMCNC: 34 G/DL (ref 32–36)
MCV RBC AUTO: 92 FL (ref 80–100)
MONOCYTES # BLD AUTO: 0.39 X10*3/UL (ref 0.1–1)
MONOCYTES NFR BLD AUTO: 5.8 %
NEUTROPHILS # BLD AUTO: 4.1 X10*3/UL (ref 1.2–7.7)
NEUTROPHILS NFR BLD AUTO: 61.7 %
NRBC BLD-RTO: 0 /100 WBCS (ref 0–0)
P AXIS: 43 DEGREES
P AXIS: 65 DEGREES
P OFFSET: 176 MS
P OFFSET: 180 MS
P ONSET: 112 MS
P ONSET: 128 MS
PLATELET # BLD AUTO: 291 X10*3/UL (ref 150–450)
POTASSIUM SERPL-SCNC: 4.3 MMOL/L (ref 3.5–5.3)
PR INTERVAL: 176 MS
PR INTERVAL: 212 MS
PROT SERPL-MCNC: 7.9 G/DL (ref 6.4–8.2)
PROTHROMBIN TIME: 12.2 SECONDS (ref 9.8–12.8)
Q ONSET: 216 MS
Q ONSET: 218 MS
QRS COUNT: 12 BEATS
QRS COUNT: 14 BEATS
QRS DURATION: 108 MS
QRS DURATION: 108 MS
QT INTERVAL: 400 MS
QT INTERVAL: 426 MS
QTC CALCULATION(BAZETT): 466 MS
QTC CALCULATION(BAZETT): 476 MS
QTC FREDERICIA: 449 MS
QTC FREDERICIA: 452 MS
R AXIS: -41 DEGREES
R AXIS: -45 DEGREES
RBC # BLD AUTO: 5.8 X10*6/UL (ref 4–5.2)
SODIUM SERPL-SCNC: 137 MMOL/L (ref 136–145)
T AXIS: 94 DEGREES
T AXIS: 95 DEGREES
T OFFSET: 416 MS
T OFFSET: 431 MS
VENTRICULAR RATE: 72 BPM
VENTRICULAR RATE: 85 BPM
WBC # BLD AUTO: 6.7 X10*3/UL (ref 4.4–11.3)

## 2025-02-04 PROCEDURE — 99223 1ST HOSP IP/OBS HIGH 75: CPT | Performed by: INTERNAL MEDICINE

## 2025-02-04 PROCEDURE — 85025 COMPLETE CBC W/AUTO DIFF WBC: CPT | Performed by: PHYSICIAN ASSISTANT

## 2025-02-04 PROCEDURE — G0378 HOSPITAL OBSERVATION PER HR: HCPCS

## 2025-02-04 PROCEDURE — 96374 THER/PROPH/DIAG INJ IV PUSH: CPT

## 2025-02-04 PROCEDURE — 2500000004 HC RX 250 GENERAL PHARMACY W/ HCPCS (ALT 636 FOR OP/ED): Performed by: PHYSICIAN ASSISTANT

## 2025-02-04 PROCEDURE — 96376 TX/PRO/DX INJ SAME DRUG ADON: CPT

## 2025-02-04 PROCEDURE — 2500000001 HC RX 250 WO HCPCS SELF ADMINISTERED DRUGS (ALT 637 FOR MEDICARE OP): Performed by: PHYSICIAN ASSISTANT

## 2025-02-04 PROCEDURE — 83036 HEMOGLOBIN GLYCOSYLATED A1C: CPT | Mod: ELYLAB | Performed by: INTERNAL MEDICINE

## 2025-02-04 PROCEDURE — 36415 COLL VENOUS BLD VENIPUNCTURE: CPT | Performed by: PHYSICIAN ASSISTANT

## 2025-02-04 PROCEDURE — 82947 ASSAY GLUCOSE BLOOD QUANT: CPT | Mod: 59

## 2025-02-04 PROCEDURE — 96375 TX/PRO/DX INJ NEW DRUG ADDON: CPT

## 2025-02-04 PROCEDURE — 99285 EMERGENCY DEPT VISIT HI MDM: CPT | Performed by: EMERGENCY MEDICINE

## 2025-02-04 PROCEDURE — 84484 ASSAY OF TROPONIN QUANT: CPT | Performed by: PHYSICIAN ASSISTANT

## 2025-02-04 PROCEDURE — 93005 ELECTROCARDIOGRAM TRACING: CPT

## 2025-02-04 PROCEDURE — 2500000002 HC RX 250 W HCPCS SELF ADMINISTERED DRUGS (ALT 637 FOR MEDICARE OP, ALT 636 FOR OP/ED): Performed by: INTERNAL MEDICINE

## 2025-02-04 PROCEDURE — 2500000001 HC RX 250 WO HCPCS SELF ADMINISTERED DRUGS (ALT 637 FOR MEDICARE OP): Performed by: INTERNAL MEDICINE

## 2025-02-04 PROCEDURE — 83735 ASSAY OF MAGNESIUM: CPT | Performed by: PHYSICIAN ASSISTANT

## 2025-02-04 PROCEDURE — 71045 X-RAY EXAM CHEST 1 VIEW: CPT

## 2025-02-04 PROCEDURE — 85610 PROTHROMBIN TIME: CPT | Performed by: PHYSICIAN ASSISTANT

## 2025-02-04 PROCEDURE — 80053 COMPREHEN METABOLIC PANEL: CPT | Performed by: PHYSICIAN ASSISTANT

## 2025-02-04 PROCEDURE — 83690 ASSAY OF LIPASE: CPT | Performed by: PHYSICIAN ASSISTANT

## 2025-02-04 RX ORDER — DONEPEZIL HYDROCHLORIDE 5 MG/1
5 TABLET, FILM COATED ORAL NIGHTLY
COMMUNITY

## 2025-02-04 RX ORDER — ROPINIROLE 0.25 MG/1
0.25 TABLET, FILM COATED ORAL 3 TIMES DAILY
Status: DISCONTINUED | OUTPATIENT
Start: 2025-02-04 | End: 2025-02-05 | Stop reason: HOSPADM

## 2025-02-04 RX ORDER — ASPIRIN 81 MG/1
81 TABLET ORAL DAILY
Status: DISCONTINUED | OUTPATIENT
Start: 2025-02-05 | End: 2025-02-05 | Stop reason: HOSPADM

## 2025-02-04 RX ORDER — TRAZODONE HYDROCHLORIDE 150 MG/1
300 TABLET ORAL NIGHTLY PRN
Status: DISCONTINUED | OUTPATIENT
Start: 2025-02-04 | End: 2025-02-05 | Stop reason: HOSPADM

## 2025-02-04 RX ORDER — NITROGLYCERIN 0.4 MG/1
0.4 TABLET SUBLINGUAL EVERY 5 MIN PRN
Status: DISCONTINUED | OUTPATIENT
Start: 2025-02-04 | End: 2025-02-04

## 2025-02-04 RX ORDER — DOXEPIN HYDROCHLORIDE 25 MG/1
25 CAPSULE ORAL NIGHTLY
Status: DISCONTINUED | OUTPATIENT
Start: 2025-02-04 | End: 2025-02-05 | Stop reason: HOSPADM

## 2025-02-04 RX ORDER — ONDANSETRON HYDROCHLORIDE 2 MG/ML
4 INJECTION, SOLUTION INTRAVENOUS ONCE
Status: COMPLETED | OUTPATIENT
Start: 2025-02-04 | End: 2025-02-04

## 2025-02-04 RX ORDER — DEXTROSE 50 % IN WATER (D50W) INTRAVENOUS SYRINGE
12.5
Status: DISCONTINUED | OUTPATIENT
Start: 2025-02-04 | End: 2025-02-05 | Stop reason: HOSPADM

## 2025-02-04 RX ORDER — POLYETHYLENE GLYCOL 3350 17 G/17G
17 POWDER, FOR SOLUTION ORAL DAILY
Status: DISCONTINUED | OUTPATIENT
Start: 2025-02-05 | End: 2025-02-05 | Stop reason: HOSPADM

## 2025-02-04 RX ORDER — INSULIN GLARGINE 100 [IU]/ML
40 INJECTION, SOLUTION SUBCUTANEOUS NIGHTLY
Status: DISCONTINUED | OUTPATIENT
Start: 2025-02-04 | End: 2025-02-05 | Stop reason: HOSPADM

## 2025-02-04 RX ORDER — ALBUTEROL SULFATE 90 UG/1
2 INHALANT RESPIRATORY (INHALATION) EVERY 8 HOURS PRN
Status: DISCONTINUED | OUTPATIENT
Start: 2025-02-04 | End: 2025-02-05 | Stop reason: HOSPADM

## 2025-02-04 RX ORDER — ACETAMINOPHEN 160 MG/5ML
650 SOLUTION ORAL EVERY 4 HOURS PRN
Status: DISCONTINUED | OUTPATIENT
Start: 2025-02-04 | End: 2025-02-05 | Stop reason: HOSPADM

## 2025-02-04 RX ORDER — INSULIN LISPRO 100 [IU]/ML
0-10 INJECTION, SOLUTION INTRAVENOUS; SUBCUTANEOUS
Status: DISCONTINUED | OUTPATIENT
Start: 2025-02-05 | End: 2025-02-05 | Stop reason: HOSPADM

## 2025-02-04 RX ORDER — FENTANYL CITRATE 50 UG/ML
50 INJECTION, SOLUTION INTRAMUSCULAR; INTRAVENOUS ONCE
Status: COMPLETED | OUTPATIENT
Start: 2025-02-04 | End: 2025-02-04

## 2025-02-04 RX ORDER — ISOSORBIDE MONONITRATE 60 MG/1
60 TABLET, EXTENDED RELEASE ORAL
Status: DISCONTINUED | OUTPATIENT
Start: 2025-02-05 | End: 2025-02-05 | Stop reason: HOSPADM

## 2025-02-04 RX ORDER — LOSARTAN POTASSIUM 25 MG/1
25 TABLET ORAL DAILY
Status: DISCONTINUED | OUTPATIENT
Start: 2025-02-05 | End: 2025-02-05

## 2025-02-04 RX ORDER — ACETAMINOPHEN 325 MG/1
650 TABLET ORAL EVERY 4 HOURS PRN
Status: DISCONTINUED | OUTPATIENT
Start: 2025-02-04 | End: 2025-02-05 | Stop reason: HOSPADM

## 2025-02-04 RX ORDER — CARVEDILOL 6.25 MG/1
6.25 TABLET ORAL
Status: DISCONTINUED | OUTPATIENT
Start: 2025-02-05 | End: 2025-02-05 | Stop reason: HOSPADM

## 2025-02-04 RX ORDER — NITROGLYCERIN 0.4 MG/1
0.4 TABLET SUBLINGUAL EVERY 5 MIN PRN
Status: DISCONTINUED | OUTPATIENT
Start: 2025-02-04 | End: 2025-02-05 | Stop reason: HOSPADM

## 2025-02-04 RX ORDER — HYDROXYZINE HYDROCHLORIDE 25 MG/1
25 TABLET, FILM COATED ORAL NIGHTLY
Status: DISCONTINUED | OUTPATIENT
Start: 2025-02-04 | End: 2025-02-05 | Stop reason: HOSPADM

## 2025-02-04 RX ORDER — ATORVASTATIN CALCIUM 20 MG/1
40 TABLET, FILM COATED ORAL NIGHTLY
Status: DISCONTINUED | OUTPATIENT
Start: 2025-02-04 | End: 2025-02-05 | Stop reason: HOSPADM

## 2025-02-04 RX ORDER — ESZOPICLONE 2 MG/1
2 TABLET, FILM COATED ORAL NIGHTLY
COMMUNITY

## 2025-02-04 RX ORDER — MEMANTINE HYDROCHLORIDE 5 MG/1
5 TABLET ORAL 2 TIMES DAILY
COMMUNITY

## 2025-02-04 RX ORDER — RANOLAZINE 500 MG/1
500 TABLET, EXTENDED RELEASE ORAL 2 TIMES DAILY
Status: DISCONTINUED | OUTPATIENT
Start: 2025-02-04 | End: 2025-02-05 | Stop reason: HOSPADM

## 2025-02-04 RX ORDER — DEXTROSE 50 % IN WATER (D50W) INTRAVENOUS SYRINGE
25
Status: DISCONTINUED | OUTPATIENT
Start: 2025-02-04 | End: 2025-02-05 | Stop reason: HOSPADM

## 2025-02-04 RX ORDER — TRIAMTERENE/HYDROCHLOROTHIAZID 37.5-25 MG
1 TABLET ORAL DAILY
Status: DISCONTINUED | OUTPATIENT
Start: 2025-02-05 | End: 2025-02-05 | Stop reason: HOSPADM

## 2025-02-04 RX ORDER — ACETAMINOPHEN 650 MG/1
650 SUPPOSITORY RECTAL EVERY 4 HOURS PRN
Status: DISCONTINUED | OUTPATIENT
Start: 2025-02-04 | End: 2025-02-05 | Stop reason: HOSPADM

## 2025-02-04 RX ORDER — GABAPENTIN 100 MG/1
100 CAPSULE ORAL 3 TIMES DAILY
Status: DISCONTINUED | OUTPATIENT
Start: 2025-02-04 | End: 2025-02-05 | Stop reason: HOSPADM

## 2025-02-04 RX ORDER — FLUTICASONE FUROATE AND VILANTEROL 200; 25 UG/1; UG/1
1 POWDER RESPIRATORY (INHALATION)
Status: DISCONTINUED | OUTPATIENT
Start: 2025-02-05 | End: 2025-02-05 | Stop reason: HOSPADM

## 2025-02-04 RX ORDER — PANTOPRAZOLE SODIUM 40 MG/1
40 TABLET, DELAYED RELEASE ORAL
Status: DISCONTINUED | OUTPATIENT
Start: 2025-02-05 | End: 2025-02-05

## 2025-02-04 RX ADMIN — INSULIN GLARGINE 40 UNITS: 100 INJECTION, SOLUTION SUBCUTANEOUS at 23:00

## 2025-02-04 RX ADMIN — TICAGRELOR 90 MG: 90 TABLET ORAL at 22:59

## 2025-02-04 RX ADMIN — GABAPENTIN 100 MG: 100 CAPSULE ORAL at 22:59

## 2025-02-04 RX ADMIN — FENTANYL CITRATE 50 MCG: 50 INJECTION INTRAMUSCULAR; INTRAVENOUS at 19:18

## 2025-02-04 RX ADMIN — NITROGLYCERIN 0.4 MG: 0.4 TABLET SUBLINGUAL at 16:25

## 2025-02-04 RX ADMIN — FENTANYL CITRATE 50 MCG: 50 INJECTION INTRAMUSCULAR; INTRAVENOUS at 17:20

## 2025-02-04 RX ADMIN — ROPINIROLE 0.25 MG: 0.25 TABLET, FILM COATED ORAL at 22:58

## 2025-02-04 RX ADMIN — ONDANSETRON 4 MG: 2 INJECTION INTRAMUSCULAR; INTRAVENOUS at 16:27

## 2025-02-04 RX ADMIN — ATORVASTATIN CALCIUM 40 MG: 20 TABLET, FILM COATED ORAL at 22:59

## 2025-02-04 SDOH — ECONOMIC STABILITY: INCOME INSECURITY: IN THE PAST 12 MONTHS HAS THE ELECTRIC, GAS, OIL, OR WATER COMPANY THREATENED TO SHUT OFF SERVICES IN YOUR HOME?: NO

## 2025-02-04 SDOH — SOCIAL STABILITY: SOCIAL INSECURITY: ARE THERE ANY APPARENT SIGNS OF INJURIES/BEHAVIORS THAT COULD BE RELATED TO ABUSE/NEGLECT?: NO

## 2025-02-04 SDOH — SOCIAL STABILITY: SOCIAL INSECURITY: WERE YOU ABLE TO COMPLETE ALL THE BEHAVIORAL HEALTH SCREENINGS?: YES

## 2025-02-04 SDOH — SOCIAL STABILITY: SOCIAL INSECURITY: WITHIN THE LAST YEAR, HAVE YOU BEEN AFRAID OF YOUR PARTNER OR EX-PARTNER?: NO

## 2025-02-04 SDOH — SOCIAL STABILITY: SOCIAL INSECURITY: DOES ANYONE TRY TO KEEP YOU FROM HAVING/CONTACTING OTHER FRIENDS OR DOING THINGS OUTSIDE YOUR HOME?: NO

## 2025-02-04 SDOH — ECONOMIC STABILITY: HOUSING INSECURITY: AT ANY TIME IN THE PAST 12 MONTHS, WERE YOU HOMELESS OR LIVING IN A SHELTER (INCLUDING NOW)?: NO

## 2025-02-04 SDOH — SOCIAL STABILITY: SOCIAL INSECURITY
WITHIN THE LAST YEAR, HAVE YOU BEEN KICKED, HIT, SLAPPED, OR OTHERWISE PHYSICALLY HURT BY YOUR PARTNER OR EX-PARTNER?: NO

## 2025-02-04 SDOH — SOCIAL STABILITY: SOCIAL INSECURITY: WITHIN THE LAST YEAR, HAVE YOU BEEN HUMILIATED OR EMOTIONALLY ABUSED IN OTHER WAYS BY YOUR PARTNER OR EX-PARTNER?: NO

## 2025-02-04 SDOH — ECONOMIC STABILITY: FOOD INSECURITY: WITHIN THE PAST 12 MONTHS, YOU WORRIED THAT YOUR FOOD WOULD RUN OUT BEFORE YOU GOT THE MONEY TO BUY MORE.: NEVER TRUE

## 2025-02-04 SDOH — SOCIAL STABILITY: SOCIAL INSECURITY: HAS ANYONE EVER THREATENED TO HURT YOUR FAMILY OR YOUR PETS?: NO

## 2025-02-04 SDOH — ECONOMIC STABILITY: HOUSING INSECURITY: IN THE LAST 12 MONTHS, WAS THERE A TIME WHEN YOU WERE NOT ABLE TO PAY THE MORTGAGE OR RENT ON TIME?: NO

## 2025-02-04 SDOH — SOCIAL STABILITY: SOCIAL INSECURITY: DO YOU FEEL UNSAFE GOING BACK TO THE PLACE WHERE YOU ARE LIVING?: NO

## 2025-02-04 SDOH — ECONOMIC STABILITY: TRANSPORTATION INSECURITY: IN THE PAST 12 MONTHS, HAS LACK OF TRANSPORTATION KEPT YOU FROM MEDICAL APPOINTMENTS OR FROM GETTING MEDICATIONS?: NO

## 2025-02-04 SDOH — SOCIAL STABILITY: SOCIAL INSECURITY: HAVE YOU HAD THOUGHTS OF HARMING ANYONE ELSE?: NO

## 2025-02-04 SDOH — ECONOMIC STABILITY: FOOD INSECURITY: WITHIN THE PAST 12 MONTHS, THE FOOD YOU BOUGHT JUST DIDN'T LAST AND YOU DIDN'T HAVE MONEY TO GET MORE.: NEVER TRUE

## 2025-02-04 SDOH — SOCIAL STABILITY: SOCIAL INSECURITY: HAVE YOU HAD ANY THOUGHTS OF HARMING ANYONE ELSE?: NO

## 2025-02-04 SDOH — SOCIAL STABILITY: SOCIAL INSECURITY
WITHIN THE LAST YEAR, HAVE YOU BEEN RAPED OR FORCED TO HAVE ANY KIND OF SEXUAL ACTIVITY BY YOUR PARTNER OR EX-PARTNER?: NO

## 2025-02-04 SDOH — SOCIAL STABILITY: SOCIAL INSECURITY: DO YOU FEEL ANYONE HAS EXPLOITED OR TAKEN ADVANTAGE OF YOU FINANCIALLY OR OF YOUR PERSONAL PROPERTY?: NO

## 2025-02-04 SDOH — ECONOMIC STABILITY: FOOD INSECURITY: HOW HARD IS IT FOR YOU TO PAY FOR THE VERY BASICS LIKE FOOD, HOUSING, MEDICAL CARE, AND HEATING?: NOT HARD AT ALL

## 2025-02-04 SDOH — SOCIAL STABILITY: SOCIAL INSECURITY: ABUSE: ADULT

## 2025-02-04 SDOH — ECONOMIC STABILITY: HOUSING INSECURITY: IN THE PAST 12 MONTHS, HOW MANY TIMES HAVE YOU MOVED WHERE YOU WERE LIVING?: 0

## 2025-02-04 SDOH — SOCIAL STABILITY: SOCIAL INSECURITY: ARE YOU OR HAVE YOU BEEN THREATENED OR ABUSED PHYSICALLY, EMOTIONALLY, OR SEXUALLY BY ANYONE?: NO

## 2025-02-04 ASSESSMENT — HEART SCORE
HEART SCORE: 5
AGE: 45-64
HISTORY: HIGHLY SUSPICIOUS
RISK FACTORS: >2 RISK FACTORS OR HX OF ATHEROSCLEROTIC DISEASE
ECG: NORMAL
TROPONIN: LESS THAN OR EQUAL TO NORMAL LIMIT

## 2025-02-04 ASSESSMENT — ENCOUNTER SYMPTOMS
SHORTNESS OF BREATH: 1
EYES NEGATIVE: 1
CONSTITUTIONAL NEGATIVE: 1
ENDOCRINE NEGATIVE: 1
PSYCHIATRIC NEGATIVE: 1
ALLERGIC/IMMUNOLOGIC NEGATIVE: 1
MUSCULOSKELETAL NEGATIVE: 1
GASTROINTESTINAL NEGATIVE: 1

## 2025-02-04 ASSESSMENT — COGNITIVE AND FUNCTIONAL STATUS - GENERAL
DRESSING REGULAR UPPER BODY CLOTHING: A LITTLE
PATIENT BASELINE BEDBOUND: NO
MOBILITY SCORE: 23
CLIMB 3 TO 5 STEPS WITH RAILING: A LITTLE
DAILY ACTIVITIY SCORE: 23

## 2025-02-04 ASSESSMENT — COLUMBIA-SUICIDE SEVERITY RATING SCALE - C-SSRS
1. IN THE PAST MONTH, HAVE YOU WISHED YOU WERE DEAD OR WISHED YOU COULD GO TO SLEEP AND NOT WAKE UP?: NO
2. HAVE YOU ACTUALLY HAD ANY THOUGHTS OF KILLING YOURSELF?: NO
2. HAVE YOU ACTUALLY HAD ANY THOUGHTS OF KILLING YOURSELF?: NO
1. IN THE PAST MONTH, HAVE YOU WISHED YOU WERE DEAD OR WISHED YOU COULD GO TO SLEEP AND NOT WAKE UP?: NO
6. HAVE YOU EVER DONE ANYTHING, STARTED TO DO ANYTHING, OR PREPARED TO DO ANYTHING TO END YOUR LIFE?: NO
6. HAVE YOU EVER DONE ANYTHING, STARTED TO DO ANYTHING, OR PREPARED TO DO ANYTHING TO END YOUR LIFE?: NO

## 2025-02-04 ASSESSMENT — LIFESTYLE VARIABLES
HAVE PEOPLE ANNOYED YOU BY CRITICIZING YOUR DRINKING: NO
HOW OFTEN DO YOU HAVE A DRINK CONTAINING ALCOHOL: NEVER
AUDIT-C TOTAL SCORE: 0
SKIP TO QUESTIONS 9-10: 1
TOTAL SCORE: 0
EVER HAD A DRINK FIRST THING IN THE MORNING TO STEADY YOUR NERVES TO GET RID OF A HANGOVER: NO
HOW OFTEN DO YOU HAVE 6 OR MORE DRINKS ON ONE OCCASION: NEVER
HOW MANY STANDARD DRINKS CONTAINING ALCOHOL DO YOU HAVE ON A TYPICAL DAY: PATIENT DOES NOT DRINK
HAVE YOU EVER FELT YOU SHOULD CUT DOWN ON YOUR DRINKING: NO
EVER FELT BAD OR GUILTY ABOUT YOUR DRINKING: NO
AUDIT-C TOTAL SCORE: 0

## 2025-02-04 ASSESSMENT — PAIN SCALES - GENERAL
PAINLEVEL_OUTOF10: 0 - NO PAIN
PAINLEVEL_OUTOF10: 8
PAINLEVEL_OUTOF10: 7

## 2025-02-04 ASSESSMENT — ACTIVITIES OF DAILY LIVING (ADL)
JUDGMENT_ADEQUATE_SAFELY_COMPLETE_DAILY_ACTIVITIES: YES
GROOMING: INDEPENDENT
BATHING: INDEPENDENT
ADEQUATE_TO_COMPLETE_ADL: YES
LACK_OF_TRANSPORTATION: NO
LACK_OF_TRANSPORTATION: NO
WALKS IN HOME: INDEPENDENT
HEARING - LEFT EAR: FUNCTIONAL
PATIENT'S MEMORY ADEQUATE TO SAFELY COMPLETE DAILY ACTIVITIES?: YES
TOILETING: INDEPENDENT
FEEDING YOURSELF: INDEPENDENT
DRESSING YOURSELF: NEEDS ASSISTANCE
HEARING - RIGHT EAR: FUNCTIONAL

## 2025-02-04 ASSESSMENT — PATIENT HEALTH QUESTIONNAIRE - PHQ9
SUM OF ALL RESPONSES TO PHQ9 QUESTIONS 1 & 2: 0
2. FEELING DOWN, DEPRESSED OR HOPELESS: NOT AT ALL
1. LITTLE INTEREST OR PLEASURE IN DOING THINGS: NOT AT ALL

## 2025-02-04 ASSESSMENT — PAIN - FUNCTIONAL ASSESSMENT: PAIN_FUNCTIONAL_ASSESSMENT: 0-10

## 2025-02-04 NOTE — ED PROVIDER NOTES
"HPI   Chief Complaint   Patient presents with    Chest Pain     \"Here for chest pain center of the chest that woke me up.  I vomited also.\"       49-year-old female with history of HTN, HLD, DM, CAD, previous CABG, CHF, asthma, anxiety presenting to the ER today with pain in her chest that started around 5:30 AM this morning.  She states that the pain is in the center of her chest and does not radiate anywhere.  It feels like a pressure/squeezing sensation and is making her feel short of breath.  She did have some nausea and vomiting this morning and she still does feel little bit nauseous but has not had any further vomiting.  She reports regular bowel movements denies any abdominal pain.  She is not having any back pain and has not been sick with fever or cough.  She has not had any pain or swelling in her legs and she denies history of DVT or PE.  She does smoke, denies alcohol use or drug use.  No further complaints.      History provided by:  Patient          Patient History   Past Medical History:   Diagnosis Date    Anxiety     Asthma     CHF (congestive heart failure)     COPD (chronic obstructive pulmonary disease) (Multi)     Depression     Diabetes mellitus (Multi)     Heart disease     Myocardial infarction (Multi)     Other specified abnormal findings of blood chemistry     Low vitamin D level     Past Surgical History:   Procedure Laterality Date    CARDIAC CATHETERIZATION      CARDIAC SURGERY       SECTION, LOW TRANSVERSE      CORONARY ARTERY BYPASS GRAFT  [id number]    OTHER SURGICAL HISTORY  2020    Cardiac catheterization    OTHER SURGICAL HISTORY  2022    Heart surgery    TUBAL LIGATION       Family History   Problem Relation Name Age of Onset    Diabetes Mother Chershalini nazario     Cervical cancer Mother Cher anneins     Heart disease Father Rafael nazario     Hypertension Father Rafael nazario      Social History     Tobacco Use    Smoking status: Every Day     Current packs/day: " 1.50     Average packs/day: 1.5 packs/day for 30.5 years (45.8 ttl pk-yrs)     Types: Cigarettes     Start date: 8/2/1994     Passive exposure: Never    Smokeless tobacco: Never   Vaping Use    Vaping status: Never Used   Substance Use Topics    Alcohol use: Never    Drug use: Yes     Types: Marijuana       Physical Exam   ED Triage Vitals [02/04/25 1423]   Temperature Heart Rate Respirations BP   35.7 °C (96.3 °F) 86 18 (!) 168/99      Pulse Ox Temp Source Heart Rate Source Patient Position   98 % Temporal Monitor Sitting      BP Location FiO2 (%)     Right arm --       Physical Exam  Constitutional:       General: She is not in acute distress.  Eyes:      Conjunctiva/sclera: Conjunctivae normal.   Cardiovascular:      Rate and Rhythm: Normal rate and regular rhythm.      Pulses: Normal pulses.      Heart sounds: Normal heart sounds.      Comments: No peripheral edema.  Distal pulses intact.  Pulmonary:      Effort: Pulmonary effort is normal.      Breath sounds: Normal breath sounds.   Abdominal:      General: Bowel sounds are normal. There is no distension.      Palpations: Abdomen is soft.      Tenderness: There is abdominal tenderness. There is no guarding or rebound.   Musculoskeletal:      Cervical back: Normal range of motion and neck supple.      Comments: Normal gait and strength tone.  No calf tenderness or swelling bilaterally.  Chest wall nontender, atraumatic.   Skin:     General: Skin is warm.   Neurological:      Mental Status: She is alert and oriented to person, place, and time.      Comments: Speech normal           ED Course & MDM   Diagnoses as of 02/04/25 1749   Chest pain, unspecified type                 No data recorded                                 Medical Decision Making  49-year-old female with history of HTN, HLD, DM, CAD, previous CABG, anxiety, asthma, current smoker presenting to the ER today with a pressure sensation in the center of her chest that started at 5:30 AM this morning  has been constant.  Is associated with shortness of breath, nausea and vomiting although her nausea has been persistent she has stopped vomiting.  She denies any radiation of pain or any further associated symptoms.  She has had previous bypass and stents.  She arrives afebrile, hypertensive with otherwise stable vital signs.  Patient is resting on exam without signs of acute distress.  She is awake alert and oriented and her speech is clear.  On my exam heart RRR, lungs are clear and she does not have any peripheral edema or calf tenderness.  Abdomen soft nondistended nontender.  Exam is otherwise within normal limits.  ECG, laboratory studies and chest x-ray are ordered.    ECG per my interpretation normal sinus rhythm at 85 bpm with nonspecific changes but no acute ST ovation's or depressions.  QTc is 476.  Chest x-ray performed today shows no acute cardiopulmonary process.  CBC with stable hemoglobin, no leukocytosis.  Lipase mildly elevated at 146.  CMP without acute electrolyte abnormality or renal insufficiency and first troponin is 7 and will be trended.  Labs this time are otherwise within normal limits.  Zofran and nitro are ordered for the patient and she will be reassessed.    Repeat ECG per my interpretation is sinus rhythm at 72 bpm with nonspecific changes, QTc is 466.  Repeat troponin is 6.  Patient only got minimal relief with nitro of her pain and tells me that this normally does not help her chest pain so she was given a dose of fentanyl and on reassessment she is resting comfortably.  Vital signs are stable.  Patient does have high heart score of 5 with extensive cardiac history.  Due to her chest pain today we do feel she would benefit from observation admission and patient was agreeable with this plan today.  I discussed the case with Dr. Doe who accepts the patient for admission at this time.      Labs Reviewed   CBC WITH AUTO DIFFERENTIAL - Abnormal       Result Value    WBC 6.7      nRBC  0.0      RBC 5.80 (*)     Hemoglobin 18.1 (*)     Hematocrit 53.3 (*)     MCV 92      MCH 31.2      MCHC 34.0      RDW 12.9      Platelets 291      Neutrophils % 61.7      Immature Granulocytes %, Automated 0.1      Lymphocytes % 30.7      Monocytes % 5.8      Eosinophils % 1.0      Basophils % 0.7      Neutrophils Absolute 4.10      Immature Granulocytes Absolute, Automated 0.01      Lymphocytes Absolute 2.05      Monocytes Absolute 0.39      Eosinophils Absolute 0.07      Basophils Absolute 0.05     LIPASE - Abnormal    Lipase 146 (*)     Narrative:     Venipuncture immediately after or during the administration of Metamizole may lead to falsely low results. Testing should be performed immediately prior to Metamizole dosing.   COMPREHENSIVE METABOLIC PANEL - Normal    Glucose 90      Sodium 137      Potassium 4.3      Chloride 100      Bicarbonate 28      Anion Gap 13      Urea Nitrogen 10      Creatinine 0.88      eGFR 82      Calcium 9.9      Albumin 4.5      Alkaline Phosphatase 83      Total Protein 7.9      AST 15      Bilirubin, Total 0.7      ALT 14     MAGNESIUM - Normal    Magnesium 2.15     PROTIME-INR - Normal    Protime 12.2      INR 1.1     SERIAL TROPONIN-INITIAL - Normal    Troponin I, High Sensitivity 7      Narrative:     Less than 99th percentile of normal range cutoff-  Female and children under 18 years old <14 ng/L; Male <21 ng/L: Negative  Repeat testing should be performed if clinically indicated.     Female and children under 18 years old 14-50 ng/L; Male 21-50 ng/L:  Consistent with possible cardiac damage and possible increased clinical   risk. Serial measurements may help to assess extent of myocardial damage.     >50 ng/L: Consistent with cardiac damage, increased clinical risk and  myocardial infarction. Serial measurements may help assess extent of   myocardial damage.      NOTE: Children less than 1 year old may have higher baseline troponin   levels and results should be interpreted  in conjunction with the overall   clinical context.     NOTE: Troponin I testing is performed using a different   testing methodology at [facility] than at other   [facility]. Direct result comparisons should only   be made within the same method.   SERIAL TROPONIN, 1 HOUR - Normal    Troponin I, High Sensitivity 6      Narrative:     Less than 99th percentile of normal range cutoff-  Female and children under 18 years old <14 ng/L; Male <21 ng/L: Negative  Repeat testing should be performed if clinically indicated.     Female and children under 18 years old 14-50 ng/L; Male 21-50 ng/L:  Consistent with possible cardiac damage and possible increased clinical   risk. Serial measurements may help to assess extent of myocardial damage.     >50 ng/L: Consistent with cardiac damage, increased clinical risk and  myocardial infarction. Serial measurements may help assess extent of   myocardial damage.      NOTE: Children less than 1 year old may have higher baseline troponin   levels and results should be interpreted in conjunction with the overall   clinical context.     NOTE: Troponin I testing is performed using a different   testing methodology at [facility] than at other   [facility]. Direct result comparisons should only   be made within the same method.   TROPONIN SERIES- (INITIAL, 1 HR)    Narrative:     The following orders were created for panel order Troponin I Series, High Sensitivity (0, 1 HR).  Procedure                               Abnormality         Status                     ---------                               -----------         ------                     Troponin I, High Sensiti...[[id number]]  Normal              Final result               Troponin, High Sensitivi...[[id number]]  Normal              Final result                 Please view results for these tests on the individual orders.       XR chest 1 view   Final Result   No acute cardiopulmonary process.    Signed by Elkin Foreman MD              Shared KAUSHIK Attestation:    I personally saw the patient and made/approved the management plan and take responsibility for the patient management.     History: 49-year-old female presents with chest pain.    Exam: Regular rate and rhythm cardiac exam with clear breath sounds bilaterally.  Abdomen is soft and nontender.  Negative Homans' sign bilaterally.    MDM: ACS, arrhythmia, electrolyte disorder, pneumonia    Labs Reviewed   CBC WITH AUTO DIFFERENTIAL - Abnormal       Result Value    WBC 6.7      nRBC 0.0      RBC 5.80 (*)     Hemoglobin 18.1 (*)     Hematocrit 53.3 (*)     MCV 92      MCH 31.2      MCHC 34.0      RDW 12.9      Platelets 291      Neutrophils % 61.7      Immature Granulocytes %, Automated 0.1      Lymphocytes % 30.7      Monocytes % 5.8      Eosinophils % 1.0      Basophils % 0.7      Neutrophils Absolute 4.10      Immature Granulocytes Absolute, Automated 0.01      Lymphocytes Absolute 2.05      Monocytes Absolute 0.39      Eosinophils Absolute 0.07      Basophils Absolute 0.05     LIPASE - Abnormal    Lipase 146 (*)     Narrative:     Venipuncture immediately after or during the administration of Metamizole may lead to falsely low results. Testing should be performed immediately prior to Metamizole dosing.   COMPREHENSIVE METABOLIC PANEL - Normal    Glucose 90      Sodium 137      Potassium 4.3      Chloride 100      Bicarbonate 28      Anion Gap 13      Urea Nitrogen 10      Creatinine 0.88      eGFR 82      Calcium 9.9      Albumin 4.5      Alkaline Phosphatase 83      Total Protein 7.9      AST 15      Bilirubin, Total 0.7      ALT 14     MAGNESIUM - Normal    Magnesium 2.15     PROTIME-INR - Normal    Protime 12.2      INR 1.1     SERIAL TROPONIN-INITIAL - Normal    Troponin I, High Sensitivity 7      Narrative:     Less than 99th percentile of normal range cutoff-  Female and children under 18 years old <14 ng/L; Male <21 ng/L: Negative  Repeat  testing should be performed if clinically indicated.     Female and children under 18 years old 14-50 ng/L; Male 21-50 ng/L:  Consistent with possible cardiac damage and possible increased clinical   risk. Serial measurements may help to assess extent of myocardial damage.     >50 ng/L: Consistent with cardiac damage, increased clinical risk and  myocardial infarction. Serial measurements may help assess extent of   myocardial damage.      NOTE: Children less than 1 year old may have higher baseline troponin   levels and results should be interpreted in conjunction with the overall   clinical context.     NOTE: Troponin I testing is performed using a different   testing methodology at [facility] than at other   [facility]. Direct result comparisons should only   be made within the same method.   SERIAL TROPONIN, 1 HOUR - Normal    Troponin I, High Sensitivity 6      Narrative:     Less than 99th percentile of normal range cutoff-  Female and children under 18 years old <14 ng/L; Male <21 ng/L: Negative  Repeat testing should be performed if clinically indicated.     Female and children under 18 years old 14-50 ng/L; Male 21-50 ng/L:  Consistent with possible cardiac damage and possible increased clinical   risk. Serial measurements may help to assess extent of myocardial damage.     >50 ng/L: Consistent with cardiac damage, increased clinical risk and  myocardial infarction. Serial measurements may help assess extent of   myocardial damage.      NOTE: Children less than 1 year old may have higher baseline troponin   levels and results should be interpreted in conjunction with the overall   clinical context.     NOTE: Troponin I testing is performed using a different   testing methodology at [facility] than at other   [facility]. Direct result comparisons should only   be made within the same method.   TROPONIN SERIES- (INITIAL, 1 HR)    Narrative:     The following orders were  created for panel order Troponin I Series, High Sensitivity (0, 1 HR).  Procedure                               Abnormality         Status                     ---------                               -----------         ------                     Troponin I, High Sensiti...[[id number]]  Normal              Final result               Troponin, High Sensitivi...[[id number]]  Normal              Final result                 Please view results for these tests on the individual orders.       XR chest 1 view   Final Result   No acute cardiopulmonary process.   Signed by Elkin Foreman MD        My interpretation of EKG is normal sinus rhythm 85 bpm with left axis deviation and nonspecific ST-T changes      Jovany Avilez MD      Procedure  Procedures     Kat Hoyt PA-C  02/04/25 5281
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
## 2025-02-05 ENCOUNTER — APPOINTMENT (OUTPATIENT)
Dept: CARDIOLOGY | Facility: HOSPITAL | Age: 50
End: 2025-02-05
Payer: COMMERCIAL

## 2025-02-05 VITALS
WEIGHT: 175.93 LBS | DIASTOLIC BLOOD PRESSURE: 68 MMHG | SYSTOLIC BLOOD PRESSURE: 102 MMHG | RESPIRATION RATE: 17 BRPM | OXYGEN SATURATION: 92 % | HEART RATE: 89 BPM | TEMPERATURE: 98.1 F | BODY MASS INDEX: 27.61 KG/M2 | HEIGHT: 67 IN

## 2025-02-05 LAB
ANION GAP SERPL CALC-SCNC: 11 MMOL/L (ref 10–20)
AORTIC VALVE MEAN GRADIENT: 3 MMHG
AORTIC VALVE PEAK VELOCITY: 0.97 M/S
AV PEAK GRADIENT: 4 MMHG
AVA (PEAK VEL): 1.93 CM2
AVA (VTI): 2.02 CM2
BUN SERPL-MCNC: 10 MG/DL (ref 6–23)
CALCIUM SERPL-MCNC: 8.9 MG/DL (ref 8.6–10.3)
CHLORIDE SERPL-SCNC: 101 MMOL/L (ref 98–107)
CO2 SERPL-SCNC: 27 MMOL/L (ref 21–32)
CREAT SERPL-MCNC: 0.79 MG/DL (ref 0.5–1.05)
EGFRCR SERPLBLD CKD-EPI 2021: >90 ML/MIN/1.73M*2
EJECTION FRACTION APICAL 4 CHAMBER: 36.6
EJECTION FRACTION: 23 %
ERYTHROCYTE [DISTWIDTH] IN BLOOD BY AUTOMATED COUNT: 12.9 % (ref 11.5–14.5)
EST. AVERAGE GLUCOSE BLD GHB EST-MCNC: 174 MG/DL
GLUCOSE BLD MANUAL STRIP-MCNC: 100 MG/DL (ref 74–99)
GLUCOSE BLD MANUAL STRIP-MCNC: 165 MG/DL (ref 74–99)
GLUCOSE BLD MANUAL STRIP-MCNC: 204 MG/DL (ref 74–99)
GLUCOSE SERPL-MCNC: 86 MG/DL (ref 74–99)
HBA1C MFR BLD: 7.7 %
HCT VFR BLD AUTO: 48.1 % (ref 36–46)
HGB BLD-MCNC: 16.5 G/DL (ref 12–16)
HOLD SPECIMEN: NORMAL
LEFT ATRIUM VOLUME AREA LENGTH INDEX BSA: 11.7 ML/M2
LEFT VENTRICLE INTERNAL DIMENSION DIASTOLE: 5.04 CM (ref 3.5–6)
LEFT VENTRICULAR OUTFLOW TRACT DIAMETER: 1.84 CM
LV EJECTION FRACTION BIPLANE: 40 %
MCH RBC QN AUTO: 31.5 PG (ref 26–34)
MCHC RBC AUTO-ENTMCNC: 34.3 G/DL (ref 32–36)
MCV RBC AUTO: 92 FL (ref 80–100)
MITRAL VALVE E/A RATIO: 0.76
NRBC BLD-RTO: 0 /100 WBCS (ref 0–0)
PLATELET # BLD AUTO: 236 X10*3/UL (ref 150–450)
POTASSIUM SERPL-SCNC: 3.9 MMOL/L (ref 3.5–5.3)
RBC # BLD AUTO: 5.23 X10*6/UL (ref 4–5.2)
RIGHT VENTRICLE PEAK SYSTOLIC PRESSURE: 16.8 MMHG
SODIUM SERPL-SCNC: 135 MMOL/L (ref 136–145)
TRICUSPID ANNULAR PLANE SYSTOLIC EXCURSION: 1.5 CM
WBC # BLD AUTO: 5.9 X10*3/UL (ref 4.4–11.3)

## 2025-02-05 PROCEDURE — 2500000001 HC RX 250 WO HCPCS SELF ADMINISTERED DRUGS (ALT 637 FOR MEDICARE OP): Performed by: INTERNAL MEDICINE

## 2025-02-05 PROCEDURE — 2500000002 HC RX 250 W HCPCS SELF ADMINISTERED DRUGS (ALT 637 FOR MEDICARE OP, ALT 636 FOR OP/ED): Performed by: INTERNAL MEDICINE

## 2025-02-05 PROCEDURE — 99232 SBSQ HOSP IP/OBS MODERATE 35: CPT | Performed by: STUDENT IN AN ORGANIZED HEALTH CARE EDUCATION/TRAINING PROGRAM

## 2025-02-05 PROCEDURE — 82947 ASSAY GLUCOSE BLOOD QUANT: CPT

## 2025-02-05 PROCEDURE — 36415 COLL VENOUS BLD VENIPUNCTURE: CPT | Performed by: INTERNAL MEDICINE

## 2025-02-05 PROCEDURE — C8929 TTE W OR WO FOL WCON,DOPPLER: HCPCS

## 2025-02-05 PROCEDURE — 85027 COMPLETE CBC AUTOMATED: CPT | Performed by: INTERNAL MEDICINE

## 2025-02-05 PROCEDURE — 2500000004 HC RX 250 GENERAL PHARMACY W/ HCPCS (ALT 636 FOR OP/ED): Performed by: INTERNAL MEDICINE

## 2025-02-05 PROCEDURE — G0378 HOSPITAL OBSERVATION PER HR: HCPCS

## 2025-02-05 PROCEDURE — 96376 TX/PRO/DX INJ SAME DRUG ADON: CPT | Mod: 59

## 2025-02-05 PROCEDURE — 80048 BASIC METABOLIC PNL TOTAL CA: CPT | Performed by: INTERNAL MEDICINE

## 2025-02-05 PROCEDURE — 2500000004 HC RX 250 GENERAL PHARMACY W/ HCPCS (ALT 636 FOR OP/ED): Performed by: STUDENT IN AN ORGANIZED HEALTH CARE EDUCATION/TRAINING PROGRAM

## 2025-02-05 RX ORDER — ONDANSETRON HYDROCHLORIDE 2 MG/ML
4 INJECTION, SOLUTION INTRAVENOUS EVERY 6 HOURS PRN
Status: DISCONTINUED | OUTPATIENT
Start: 2025-02-05 | End: 2025-02-05 | Stop reason: HOSPADM

## 2025-02-05 RX ADMIN — TRAZODONE HYDROCHLORIDE 300 MG: 150 TABLET ORAL at 01:21

## 2025-02-05 RX ADMIN — ROPINIROLE 0.25 MG: 0.25 TABLET, FILM COATED ORAL at 08:52

## 2025-02-05 RX ADMIN — PERFLUTREN 2 ML OF DILUTION: 6.52 INJECTION, SUSPENSION INTRAVENOUS at 10:20

## 2025-02-05 RX ADMIN — GABAPENTIN 100 MG: 100 CAPSULE ORAL at 13:50

## 2025-02-05 RX ADMIN — CARVEDILOL 6.25 MG: 6.25 TABLET, FILM COATED ORAL at 16:40

## 2025-02-05 RX ADMIN — INSULIN LISPRO 2 UNITS: 100 INJECTION, SOLUTION INTRAVENOUS; SUBCUTANEOUS at 11:43

## 2025-02-05 RX ADMIN — GABAPENTIN 100 MG: 100 CAPSULE ORAL at 08:52

## 2025-02-05 RX ADMIN — ONDANSETRON 4 MG: 2 INJECTION INTRAMUSCULAR; INTRAVENOUS at 16:39

## 2025-02-05 RX ADMIN — INSULIN LISPRO 4 UNITS: 100 INJECTION, SOLUTION INTRAVENOUS; SUBCUTANEOUS at 16:40

## 2025-02-05 RX ADMIN — TICAGRELOR 90 MG: 90 TABLET ORAL at 08:52

## 2025-02-05 RX ADMIN — TRIAMTERENE AND HYDROCHLOROTHIAZIDE 1 TABLET: 37.5; 25 TABLET ORAL at 09:00

## 2025-02-05 RX ADMIN — FLUTICASONE FUROATE AND VILANTEROL TRIFENATATE 1 PUFF: 200; 25 POWDER RESPIRATORY (INHALATION) at 06:49

## 2025-02-05 RX ADMIN — ASPIRIN 81 MG: 81 TABLET, COATED ORAL at 08:52

## 2025-02-05 RX ADMIN — ROPINIROLE 0.25 MG: 0.25 TABLET, FILM COATED ORAL at 13:50

## 2025-02-05 RX ADMIN — EMPAGLIFLOZIN 10 MG: 10 TABLET, FILM COATED ORAL at 08:52

## 2025-02-05 RX ADMIN — PANTOPRAZOLE SODIUM 40 MG: 40 TABLET, DELAYED RELEASE ORAL at 06:49

## 2025-02-05 RX ADMIN — CARVEDILOL 6.25 MG: 6.25 TABLET, FILM COATED ORAL at 08:52

## 2025-02-05 RX ADMIN — ISOSORBIDE MONONITRATE 60 MG: 60 TABLET, EXTENDED RELEASE ORAL at 06:49

## 2025-02-05 ASSESSMENT — COGNITIVE AND FUNCTIONAL STATUS - GENERAL
MOBILITY SCORE: 23
DRESSING REGULAR UPPER BODY CLOTHING: A LITTLE
PERSONAL GROOMING: A LITTLE
DAILY ACTIVITIY SCORE: 21
HELP NEEDED FOR BATHING: A LITTLE
CLIMB 3 TO 5 STEPS WITH RAILING: A LITTLE

## 2025-02-05 ASSESSMENT — ENCOUNTER SYMPTOMS
CHILLS: 0
ENDOCRINE NEGATIVE: 1
CONSTITUTIONAL NEGATIVE: 1
RESPIRATORY NEGATIVE: 1
DIAPHORESIS: 0
EYES NEGATIVE: 1
VOMITING: 1
PSYCHIATRIC NEGATIVE: 1
NEUROLOGICAL NEGATIVE: 1
NAUSEA: 1
FATIGUE: 0
MUSCULOSKELETAL NEGATIVE: 1
HEMATOLOGIC/LYMPHATIC NEGATIVE: 1
ALLERGIC/IMMUNOLOGIC NEGATIVE: 1

## 2025-02-05 ASSESSMENT — PAIN SCALES - GENERAL: PAINLEVEL_OUTOF10: 4

## 2025-02-05 ASSESSMENT — PAIN - FUNCTIONAL ASSESSMENT: PAIN_FUNCTIONAL_ASSESSMENT: 0-10

## 2025-02-05 NOTE — PROGRESS NOTES
02/05/25 1213   Discharge Planning   Living Arrangements Children   Support Systems Children;Family members   Assistance Needed PTA - lives with her daughter in a 2 level home, 4 [address]. Independent for ambulation as well as ADLs.   Type of Residence Private residence   Home or Post Acute Services   (active with Outpatient PT)   Expected Discharge Disposition Home   Does the patient need discharge transport arranged? Yes   RoundTrip coordination needed? Yes   Stroke Family Assessment   Stroke Family Assessment Needed No   Intensity of Service   Intensity of Service 0-30 min     Chest pain with N&V. Follows with Dr Sykes at [facility], pending consult eval. Echo ordered for today. Lives with her daughter and refers HOME at dc. She is active with outpatient PT and wants to resume that at dc.

## 2025-02-05 NOTE — CARE PLAN
The patient's goals for the shift include      The clinical goals for the shift include Pagtient will have a decrease in CP

## 2025-02-05 NOTE — CARE PLAN
The patient's goals for the shift include      The clinical goals for the shift include Patient will remain hemodynamically stable with no complaints of chest  pain.    Over the shift, the patient is progressing toward her goals.

## 2025-02-05 NOTE — CONSULTS
Consults  History Of Present Illness:    Perlita Bahena is a 49 y.o. female presenting with N/V and CP.past medical history of coronary disease status post CABG, PCI, ischemic cardiomyopathy ejection fraction of 30-35%, uncontrolled diabetes mellitus, hypertension, dyslipidemia, history of tobacco abuse who presents with nausea and vomiting as well as midsternal chest pain.  Cardiac enzymes are negative.  Patient underwent cardiac catheterization in February 2022 and had PCI of the diagonal 1 branch as well as OM1 and noted to have occluded SVG grafts and patent LIMA to the LAD.    Last cardiac catheterization in August 2023 with ejection fraction of 25 to 30%.  Patent LIMA to the LAD.  Severe native three-vessel CAD.  Patient with known ischemic cardiomyopathy ejection fraction of 30 to 35% status post AICD.  She is currently chest pain-free at this time.  She states she has been compliant with her medications.  EKG with normal sinus rhythm, old anterior as well as inferior wall myocardial infarction.      Last Recorded Vitals:  Vitals:    02/05/25 0115 02/05/25 0643 02/05/25 0759 02/05/25 1445   BP: (!) 162/99 113/74 124/75 102/68   BP Location:  Left arm Left arm Left arm   Patient Position:  Lying Sitting Lying   Pulse: 78 80 95 89   Resp: 18 18 16 17   Temp: 36.1 °C (97 °F) 36.4 °C (97.5 °F) 36.4 °C (97.5 °F) 36.7 °C (98.1 °F)   TempSrc:  Temporal Temporal Temporal   SpO2: 94% 93% 94% 92%   Weight:       Height:           Last Labs:  CBC - 2/5/2025:  6:16 AM  5.9 16.5 236    48.1      CMP - 2/5/2025:  6:16 AM  8.9 7.9 15 --- 0.7   _ 4.5 14 83      PTT - No results in last year.  1.1   12.2 _     Troponin I, High Sensitivity   Date/Time Value Ref Range Status   02/04/2025 04:31 PM 6 0 - 13 ng/L Final   02/04/2025 03:01 PM 7 0 - 13 ng/L Final     Troponin I   Date/Time Value Ref Range Status   06/30/2023 12:25 AM 6 0 - 13 ng/L Final     Comment:     .  Less than 99th percentile of normal range cutoff-  Female and  children under 18 years old <14 ng/L; Male <21 ng/L: Negative  Repeat testing should be performed if clinically indicated.   .  Female and children under 18 years old 14-50 ng/L; Male 21-50 ng/L:  Consistent with possible cardiac damage and possible increased clinical   risk. Serial measurements may help to assess extent of myocardial damage.   .  >50 ng/L: Consistent with cardiac damage, increased clinical risk and  myocardial infarction. Serial measurements may help assess extent of   myocardial damage.   .   NOTE: Children less than 1 year old may have higher baseline troponin   levels and results should be interpreted in conjunction with the overall   clinical context.   .  NOTE: Troponin I testing is performed using a different   testing methodology at [facility] than at other   [facility] hospitals. Direct result comparisons should only   be made within the same method.     06/29/2023 10:30 PM 6 0 - 13 ng/L Final     Comment:     .  Less than 99th percentile of normal range cutoff-  Female and children under 18 years old <14 ng/L; Male <21 ng/L: Negative  Repeat testing should be performed if clinically indicated.   .  Female and children under 18 years old 14-50 ng/L; Male 21-50 ng/L:  Consistent with possible cardiac damage and possible increased clinical   risk. Serial measurements may help to assess extent of myocardial damage.   .  >50 ng/L: Consistent with cardiac damage, increased clinical risk and  myocardial infarction. Serial measurements may help assess extent of   myocardial damage.   .   NOTE: Children less than 1 year old may have higher baseline troponin   levels and results should be interpreted in conjunction with the overall   clinical context.   .  NOTE: Troponin I testing is performed using a different   testing methodology at [facility] than at other   [facility] hospitals. Direct result comparisons should only   be made within the same method.     06/29/2023 09:08 PM 6  0 - 13 ng/L Final     Comment:     .  Less than 99th percentile of normal range cutoff-  Female and children under 18 years old <14 ng/L; Male <21 ng/L: Negative  Repeat testing should be performed if clinically indicated.   .  Female and children under 18 years old 14-50 ng/L; Male 21-50 ng/L:  Consistent with possible cardiac damage and possible increased clinical   risk. Serial measurements may help to assess extent of myocardial damage.   .  >50 ng/L: Consistent with cardiac damage, increased clinical risk and  myocardial infarction. Serial measurements may help assess extent of   myocardial damage.   .   NOTE: Children less than 1 year old may have higher baseline troponin   levels and results should be interpreted in conjunction with the overall   clinical context.   .  NOTE: Troponin I testing is performed using a different   testing methodology at [facility] than at other   [facility] hospitals. Direct result comparisons should only   be made within the same method.       BNP   Date/Time Value Ref Range Status   06/29/2023 09:08  (H) 0 - 99 pg/mL Final     Comment:     .  <100 pg/mL - Heart failure unlikely  100-299 pg/mL - Intermediate probability of acute heart  .               failure exacerbation. Correlate with clinical  .               context and patient history.    >=300 pg/mL - Heart Failure likely. Correlate with clinical  .               context and patient history.  BNP testing is performed using different testing   methodology at [facility] than at other   [facility] hospitals. Direct result comparisons should   only be made within the same method.     04/14/2023 07:35  (H) 0 - 99 pg/mL Final     Comment:     .  <100 pg/mL - Heart failure unlikely  100-299 pg/mL - Intermediate probability of acute heart  .               failure exacerbation. Correlate with clinical  .               context and patient history.    >=300 pg/mL - Heart Failure likely. Correlate with  clinical  .               context and patient history.  BNP testing is performed using different testing   methodology at [facility] than at other   [facility] hospitals. Direct result comparisons should   only be made within the same method.       Hemoglobin A1C   Date/Time Value Ref Range Status   2025 03:01 PM 7.7 (H) See comment % Final   2024 04:53 AM 7.6 (H) 4.0 - 6.0 % Final   2023 11:38 AM 6.6 (H) see below % Final     VLDL   Date/Time Value Ref Range Status   2022 05:58 AM 24 0 - 40 mg/dL Final   2020 09:17 AM 14 0 - 40 mg/dL Final      Last I/O:  No intake/output data recorded.    Past Cardiology Tests (Last 3 Years):  EKG:  ECG 12 lead 2025 (Preliminary)      ECG 12 lead 2025 (Preliminary)    Echo:  Transthoracic Echo (TTE) Complete 2025    Ejection Fractions:  EF   Date/Time Value Ref Range Status   2025 10:27 AM 23 %      Cath:  No results found for this or any previous visit from the past 1095 days.    Stress Test:  No results found for this or any previous visit from the past 1095 days.    Cardiac Imaging:  No results found for this or any previous visit from the past 1095 days.      Past Medical History:  She has a past medical history of Anxiety, Asthma, CHF (congestive heart failure), COPD (chronic obstructive pulmonary disease) (Multi), Depression, Diabetes mellitus (Multi), Early onset Alzheimer's dementia (Multi), Heart disease, Hypoxic ischemic coma, Myocardial infarction (Multi), and Other specified abnormal findings of blood chemistry.    Past Surgical History:  She has a past surgical history that includes Other surgical history (2020); Other surgical history (2022); Cardiac catheterization;  section, low transverse; Cardiac surgery; Tubal ligation; Coronary artery bypass graft (606); and Cardiac defibrillator placement.      Social History:  She reports that she has been smoking cigarettes. She started  smoking about 30 years ago. She has a 45.8 pack-year smoking history. She has never been exposed to tobacco smoke. She has never used smokeless tobacco. She reports current drug use. Drug: Marijuana. She reports that she does not drink alcohol.    Family History:  Family History   Problem Relation Name Age of Onset    Diabetes Mother Cher nazario     Cervical cancer Mother Cher nazario     Heart disease Father Rafael nazario     Hypertension Father Rafael nazario         Allergies:  Ketorolac and Tramadol    Inpatient Medications:  Scheduled medications   Medication Dose Route Frequency    aspirin  81 mg oral Daily    atorvastatin  40 mg oral Nightly    carvedilol  6.25 mg oral BID    doxepin  25 mg oral Nightly    empagliflozin  10 mg oral Daily    fluticasone furoate-vilanteroL  1 puff inhalation Daily    gabapentin  100 mg oral TID    hydrOXYzine HCL  25 mg oral Nightly    insulin glargine  40 Units subcutaneous Nightly    insulin lispro  0-10 Units subcutaneous TID AC    isosorbide mononitrate ER  60 mg oral Daily before breakfast    perflutren protein A microsphere  0.5 mL intravenous Once in imaging    polyethylene glycol  17 g oral Daily    ranolazine  500 mg oral BID    rOPINIRole  0.25 mg oral TID    sulfur hexafluoride microsphr  2 mL intravenous Once in imaging    ticagrelor  90 mg oral BID    triamterene-hydrochlorothiazid  1 tablet oral Daily     PRN medications   Medication    acetaminophen    Or    acetaminophen    Or    acetaminophen    albuterol    dextrose    dextrose    glucagon    glucagon    nitroglycerin    traZODone     Continuous Medications   Medication Dose Last Rate     Outpatient Medications:  Current Outpatient Medications   Medication Instructions    alcohol swabs (Alcohol Prep Pads) pads, medicated USE TO CLEANSE SKIN PRIOR TO INJECTION FOUR TIMES DAILY AS DIRECTED    aspirin 81 mg EC tablet 1 tablet, Daily    atorvastatin (Lipitor) 40 mg tablet 1 tablet, Daily    blood-glucose sensor  (FreeStyle Jayy 3 Plus Sensor) device Use as directed to test blood sugar daily. Replace sensor in arm every 15 days.    budesonide-formoteroL (Symbicort) 80-4.5 mcg/actuation inhaler 2 puffs, inhalation, 2 times daily, Rinse mouth after use    carBAMazepine (Carbatrol) 200 mg 12 hr capsule 1 capsule, 2 times daily    carvedilol (COREG) 6.25 mg, oral, 2 times daily (morning and late afternoon)    cyclobenzaprine (FLEXERIL) 10 mg, oral, 3 times daily PRN    donepezil (ARICEPT) 5 mg, Nightly    doxepin (SINEQUAN) 25 mg, oral, Nightly    eszopiclone (LUNESTA) 2 mg, Nightly    fluticasone propion-salmeteroL (Advair Diskus) 250-50 mcg/dose diskus inhaler 1 puff, inhalation, 2 times daily RT, Rinse mouth with water after use to reduce aftertaste and incidence of candidiasis. Do not swallow.    FreeStyle Jayy 3 [place] misc USE AS INSTRUCTED FOR CONTINUOUS BLOOD GLUCOSE MONITORING    FreeStyle Jayy 3 Sensor device tid    gabapentin (NEURONTIN) 100 mg, oral, 3 times daily    hydrOXYzine HCL (ATARAX) 25 mg, oral, Nightly    insulin aspart (NovoLOG Flexpen U-100 Insulin) 100 unit/mL (3 mL) pen INJECT 6-8 UNITS UNDER THE SKIN THREE TIMES DAILY BEFORE MEALS. PER SLIDING SCALE, MAX 30 UNITS PER DAY    insulin lispro (HumaLOG KwikPen Insulin) 100 unit/mL injection 3 times daily (morning, midday, late afternoon)    isosorbide mononitrate ER (IMDUR) 60 mg, oral, Daily before breakfast    Jardiance 10 mg, oral, Daily    lancets misc Use as directed when checking blood sugar    Lantus Solostar U-100 Insulin 50 Units, subcutaneous, Nightly, Take as directed per insulin instructions.    losartan (Cozaar) 25 mg tablet 1 tablet, Daily    memantine (NAMENDA) 5 mg, 2 times daily    metFORMIN XR (GLUCOPHAGE-XR) 1,000 mg, oral, 2 times daily    metoclopramide (REGLAN) 10 mg, oral, 4 times daily PRN    nitroglycerin (Nitrostat) 0.4 mg SL tablet 1 tablet, Every 5 min PRN    omeprazole (PRILOSEC) 40 mg, oral, Daily before breakfast, Do not  "crush or chew.    OneTouch Verio Flex Start kit Use as needed for glucose testing    OneTouch Verio test strips strip Test glucose 4 times daily    Ozempic 1 mg, subcutaneous, Every 7 days    pen needle, diabetic (BD Breana 2nd Gen Pen Needle) 32 gauge x 5/32\" needle USE TO ADMINISTER INSULIN 4 TIMES A DAY    ranolazine (Ranexa) 500 mg 12 hr tablet     rOPINIRole (REQUIP) 0.25 mg, oral, 3 times daily    ticagrelor (BRILINTA) 90 mg, oral, 2 times daily    traZODone (DESYREL) 300 mg, oral, Nightly PRN    triamterene-hydrochlorothiazid (Maxzide-25) 37.5-25 mg tablet 1 tablet, Daily    True Metrix Glucose Meter misc USE TO TEST BLOOD SUGAR ONCE DAILY AND AS NEEDED     Review of Systems   Constitutional: Negative.  Negative for chills, diaphoresis and fatigue.   HENT: Negative.     Eyes: Negative.    Respiratory: Negative.     Cardiovascular:  Positive for chest pain.   Gastrointestinal:  Positive for nausea and vomiting.   Endocrine: Negative.    Genitourinary: Negative.    Musculoskeletal: Negative.    Skin: Negative.    Allergic/Immunologic: Negative.    Neurological: Negative.    Hematological: Negative.    Psychiatric/Behavioral: Negative.        Physical Exam:    Physical Exam  Constitutional:       Appearance: Normal appearance.   HENT:      Head: Normocephalic and atraumatic.      Mouth/Throat:      Mouth: Mucous membranes are moist.      Pharynx: Oropharynx is clear.   Eyes:      Extraocular Movements: Extraocular movements intact.      Conjunctiva/sclera: Conjunctivae normal.      Pupils: Pupils are equal, round, and reactive to light.   Neck:      Vascular: No carotid bruit.   Cardiovascular:      Rate and Rhythm: Normal rate and regular rhythm.   Pulmonary:      Effort: Pulmonary effort is normal.      Breath sounds: Normal breath sounds. No wheezing or rales.   Abdominal:      General: Bowel sounds are normal. There is no distension.      Tenderness: There is no abdominal tenderness. There is no guarding. "   Musculoskeletal:      Cervical back: Normal range of motion and neck supple.   Skin:     General: Skin is warm.   Neurological:      General: No focal deficit present.      Mental Status: She is oriented to person, place, and time. Mental status is at baseline.   Psychiatric:         Mood and Affect: Mood normal.         Behavior: Behavior normal.           Assessment:  Chest pain rule out cardiac ischemia  Nausea and vomiting questionable etiology.  Questionable related to angina versus GI versus other  History of ischemic cardiomyopathy ejection fraction of 25 to 30%.  Status post AICD  History of severe CAD status post multivessel PCI, CABG.  History of uncontrolled diabetes mellitus  History of tobacco abuse  Multiple medical problems      Plan:    Maximize cardiac medications-aspirin, Brilinta, Lipitor, Coreg, Ranexa, Imdur    2D echocardiogram performed-EF of 25 to 30%.  No valve abnormalities    Stable for discharge from cardiology standpoint.  Cardiac enzymes are negative.  EKG is unchanged.  Echo with no significant changes compared to prior echocardiogram.  Patient is chest pain-free at this time.  Plan for outpatient stress test.    Nausea and vomiting evaluation per primary team    Strict blood sugar control        Code Status:  Full Code    I spent 40 minutes in the professional and overall care of this patient.        Isma Sykes, DO
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
## 2025-02-06 ENCOUNTER — PATIENT MESSAGE (OUTPATIENT)
Dept: CARDIOLOGY CLINIC | Age: 50
End: 2025-02-06

## 2025-02-06 ENCOUNTER — PATIENT OUTREACH (OUTPATIENT)
Dept: PRIMARY CARE | Facility: CLINIC | Age: 50
End: 2025-02-06
Payer: COMMERCIAL

## 2025-02-06 DIAGNOSIS — I25.10 CORONARY ARTERY DISEASE INVOLVING NATIVE CORONARY ARTERY OF NATIVE HEART WITHOUT ANGINA PECTORIS: ICD-10-CM

## 2025-02-06 NOTE — TELEPHONE ENCOUNTER
Requesting medication refill. Please approve or deny this request.    Rx requested:  Requested Prescriptions     Pending Prescriptions Disp Refills    ticagrelor (BRILINTA) 90 MG TABS tablet 180 tablet 0     Sig: Take 1 tablet by mouth 2 times daily         Last Office Visit:   10/17/2023      Next Visit Date:  Future Appointments   Date Time Provider Department Center   2/18/2025 11:45 AM Francisco New DO [facility]   5/12/2025  3:00 PM Roberto Roman MD [facility] -

## 2025-02-06 NOTE — NURSING NOTE
Tele monitor removed, as well as iv. Patient discharge instruction given and belongings bagged and sent home with patient.

## 2025-02-06 NOTE — PROGRESS NOTES
Discharge Facility: [facility]    Discharge Diagnosis: Chest Pain    Admission Date: 2/4/25    Discharge Date: 2/5/25    PCP Appointment Date: 2/17/25    Specialist Appointment Date: Cardiology Dr Sykes 2/18/25    Hospital Encounter and Summary Linked: Yes    ED to Hosp-Admission (Discharged) with Roberto Chow MD; Jovany SÁNCHEZ MD (02/04/2025)       See discharge assessment below for further details     Wrap Up  Wrap Up Additional Comments: This CM spoke with patient via phone. Successful transition of care outreach complete. Pt reports doing well at home since discharge. New meds reviewed. Pt denies any prescription medication questions. Pt will use [employer] to request refills on medications. Pt denies CP and SOB. Patient denies any further discharge questions/needs at this time. Emphasized that Follow up is needed after discharge to review the hospital recommendations, assess your response to your treatment.  Pt aware of my availability for non-emergent concerns. Contact info provided to patient. (2/6/2025 10:38 AM)    Engagement  Call Start Time: 1038 (2/6/2025 10:38 AM)    Medications  Medications reviewed with patient/caregiver?: Yes (2/6/2025 10:38 AM)  Is the patient having any side effects they believe may be caused by any medication additions or changes?: No (2/6/2025 10:38 AM)  Does the patient have all medications ordered at discharge?: Yes (2/6/2025 10:38 AM)  Care Management Interventions: No intervention needed (2/6/2025 10:38 AM)  Prescription Comments: ranolazine (Ranexa)              ASK how to take:  budesonide-formoteroL 80-4.5 mcg/actuation  inhaler (Symbicort)   doxepin 25 mg capsule (SINEquan)   losartan 25 mg tablet (Cozaar) (2/6/2025 10:38 AM)  Is the patient taking all medications as directed (includes completed medication regime)?: Yes (2/6/2025 10:38 AM)  Care Management Interventions: Provided patient education (2/6/2025 10:38 AM)  Medication Comments: pt requesting some refills  through [email or web address] for her Jardiance and Cozaar to be sent to her pharmacy (2/6/2025 10:38 AM)    Appointments  Does the patient have a primary care provider?: Yes (2/6/2025 10:38 AM)  Has the patient kept scheduled appointments due by today?: Yes (2/6/2025 10:38 AM)  Care Management Interventions: Advised patient to keep appointment (2/6/2025 10:38 AM)    Self Management  What is the home health agency?: denies need (2/6/2025 10:38 AM)  What Durable Medical Equipment (DME) was ordered?: no new needs (2/6/2025 10:38 AM)    Patient Teaching  Care Management Interventions: Reviewed instructions with patient (2/6/2025 10:38 AM)  What is the patient's perception of their health status since discharge?: Improving (2/6/2025 10:38 AM)  Is the patient/caregiver able to teach back the hierarchy of who to call/visit for symptoms/problems? PCP, Specialist, Home Health nurse, Urgent Care, ED, 911: Yes (2/6/2025 10:38 AM)
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
## 2025-02-10 ENCOUNTER — APPOINTMENT (OUTPATIENT)
Dept: PHYSICAL THERAPY | Facility: CLINIC | Age: 50
End: 2025-02-10
Payer: COMMERCIAL

## 2025-02-10 NOTE — PROGRESS NOTES
"Physical Therapy    Physical Therapy Treatment    Patient Name: Tana Bahena  MRN: [id number]  Today's Date: 2/10/2025    Time Entry:                           Insurance:  [employer]  20% coinsurance  25 V/Y   PA not req  Visit: 6  POC: 14    Assessment:  ***       Plan:  Continue to progress LE strengthening and balance as laura        Current Problem  No diagnosis found.      General          Subjective    ***    Precautions          Pain       Objective   ***    Treatments:    Supine Bridge 5\" x 15  Clamshell  x 15 B  Active Straight Leg Raise with Quad Set x 10 reps B  Supine Hip Adduction Isometric with Ball  x 15 B  Supine hip abduction with BTB x15 B  Seated Long Arc Quad  2# x 15 B  Seated hamstring curls GTB x 15 B   Heel Raises with Counter Support  2 sets x 10 reps   Mini squats x 10 NT  Standing hip abduction x 10 B  Standing hip extension x 10 B  Side stepping in hallway 20 ft x 2    Tandem walking in hallway  20 ft x 2   Standing on cushion with feet together 30 sec x 1 with SBA NT   Hip Fallouts  x 15 ea  DLS March x15ea  Step Ups 6\" x 10 ea alt  HS longsit/ GS Stretch at 4\"step B 3x30\"ea      OP EDUCATION:   12/17/24:  Exercises  - Supine Bridge  - 1 x daily - 7 x weekly - 2 sets - 10 reps - 3 sec hold  - Clamshell  - 1 x daily - 7 x weekly - 1 sets - 10 reps - 3 sec hold  - Active Straight Leg Raise with Quad Set  - 1 x daily - 7 x weekly - 2 sets - 10 reps - 3 sec hold  - Supine Hip Adduction Isometric with Ball  - 1 x daily - 7 x weekly - 2 sets - 10 reps - 5 sec hold  - Seated Long Arc Quad  - 1 x daily - 7 x weekly - 2 sets - 10 reps  - Heel Raises with Counter Support  - 1 x daily - 7 x weekly - 2 sets - 10 reps - 3 sec hold    Goals:  By discharge:  1. Patient will report and demonstrate independence with current HEP  2. Patient will demonstrate the ability to perform bilateral tandem stance >/= 20s without loss of balance or need for assistance to indicate improved static balance and safety  3. " Patient will demonstrate an improvement in TUG score to </= 14 with least restrictive assistive device to indicate overall improved functional mobility as well as approach the cutoff for reduced risk for falls (baseline 12/17/24 21.07s)  4. Patient will demonstrate gross hip, knee, and ankle strength >/= 4+/5 to improve the ability to ambulate, squat, and lift without restrictions  5. Patient will demonstrate an increase in Lower Extremity Functional Scale score by 30 points to 50/80 to meet established MCID (baseline 12/17/24 20/80)  6. Patient will demonstrate the ability to ambulate >/= 150' with least restrictive assistive device and with improved gait speed and mechanics  7. Patient will demonstrate the ability to perform 10 sit to stand transfers from a normal chair height without upper extremity assistance to indicate improved functional mobility and strength  8. Patient will demonstrate the ability to perform 3 CW and 3 CCW turns with continuous steps, and without loss of balance to indicate improved dynamic balance and safety within the home  9. Patient will demonstrate the ability to ascend and descend one flight of stairs reciprocally and without loss of balance to indicate improved mobility within the home/community  10. Patient will demonstrate an improvement in total Tinetti score to >19/28 to indicate improved balance in standing and with mobility and a reduced risk for falls (baseline 12/17/24: 12/28)

## 2025-02-12 ENCOUNTER — TELEPHONE (OUTPATIENT)
Dept: NEUROLOGY | Age: 50
End: 2025-02-12

## 2025-02-12 NOTE — TELEPHONE ENCOUNTER
Msg left for pt to call office to advise referral has been sent to Dr. Rudi Benitez at [facility], [phone].  She will be contacted by that office to schedule

## 2025-02-14 ENCOUNTER — APPOINTMENT (OUTPATIENT)
Dept: PHARMACY | Facility: HOSPITAL | Age: 50
End: 2025-02-14
Payer: COMMERCIAL

## 2025-02-14 DIAGNOSIS — E11.9 TYPE 2 DIABETES MELLITUS WITHOUT COMPLICATION, WITH LONG-TERM CURRENT USE OF INSULIN (MULTI): ICD-10-CM

## 2025-02-14 DIAGNOSIS — Z79.4 TYPE 2 DIABETES MELLITUS WITHOUT COMPLICATION, WITH LONG-TERM CURRENT USE OF INSULIN (MULTI): ICD-10-CM
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
## 2025-02-14 NOTE — PROGRESS NOTES
"  Clinical Pharmacy Appointment    Patient ID: Tana Bahena \"Axel" is a 49 y.o. female who presents for Diabetes.    Pt is here for Follow Up appointment.     Referring Provider: Jacobo Thakkar MD  PCP: Jacobo Thakkar MD   Last visit with PCP: 1/20/25   Next visit with PCP: 2/18/25      Subjective     Interval History  Pt has started back on Ozempic   Pt is going to rehabilitation for an accident 1 x per week and does exercises daily outside of visits  Pt denies n/v/d/constipation or other GI ADR's from Ozempic  Pt endorses some mild appetite suppression  Pt endorses wanting to loose some weight  Pt needs refills on Freestyle sensor  Pt authorized pharmD to help facilitate a refill with [facility]  Pt has been applying them to her belly     HPI  DIABETES MELLITUS TYPE II:    Diagnosed with diabetes:  >3 years ago.   Known diabetic complications: long term use of insulin, overweight, microalbuminuria, insomnia, CHF, bipolar disorder.  Does patient follow with Endocrinology: Not at this time  Last Diabetic retinal exam: 11/2/16  Most recent visit in Podiatry: 1/29/17- did not assess today if cuts or busies    Current diabetic medications include:  Metformin  mg 2 tabs po bid  Jardiance 10 mg daily  Lantus 90 units at bedtime  Ozempic 1 mg subcutaneous weekly, Monday  Humalog 6-8 units subcutaneous TID AC on sliding scale with meals (max 30 units/day)    Clarifications to above regimen: pt is taking metformin 1 tab BID   Adverse Effects: none     Past diabetic medications include:  Novolog    Glucose Readings:  Glucometer/CGM Type: Freestyle Jayy 3 Plus  Patient tests BG continuously     Current home BG readings (mg/dL): pt did not provide   Previous home BG readings (mg/dL): patient did not provide    Any episodes of hypoglycemia? No,   .  Did patient treat episode of hypoglycemia appropriately? N/A  Does the patient have a prescription for ready-to-use Glucagon? No      Lifestyle:  Diet: 1 " "meals/day. Dinner meal   Drinks: did not assess today  Exercise: exercises daily  Activity type: Rehab exercises  Is limited by: Injury  Tobacco history: did not assess today    Secondary Prevention:  Statin? Yes, Atorvastatin 40 mg PO daily  ACE-I/ARB? Yes, losartan 25 mg daily  Aspirin? Yes    Pertinent PNH Review:  PMH of Pancreatitis: No  PMH of Retinopathy: No  PMH of Urinary Tract Infections: Yes  PMH of Medullary Thyroid Carcinoma: No  PMH of Multiple Endocrine Neoplasia 2: No  UACR/EGFR in last year?: Yes  No results found for: \"MICROALBCREA\"    Immunizations:  Influenza? Yes 10/21/24  COVID? No  Pneumonia? Yes, 3/20/22  Shingles? No  Hepatitis B? Not in chart      Drug Interactions  No relevant drug interactions were noted.    Medication System Management  Patients preferred pharmacy: [facility]  Adherence/Organization: poor adherence, did not assess organization today  Affordability/Accessibility:   did not assess affordability today   pt has hx of susceptibility problems  Ozempic supply chain issues        Objective   Allergies   Allergen Reactions    Ketorolac Unknown    Tramadol Unknown     Social History     Social History Narrative    Not on file      Medication Review  Current Outpatient Medications   Medication Instructions    alcohol swabs (Alcohol Prep Pads) pads, medicated USE TO CLEANSE SKIN PRIOR TO INJECTION FOUR TIMES DAILY AS DIRECTED    aspirin 81 mg EC tablet 1 tablet, Daily    atorvastatin (Lipitor) 40 mg tablet 1 tablet, Daily    blood-glucose sensor (FreeStyle Jayy 3 Plus Sensor) device Use as directed to test blood sugar daily. Replace sensor in arm every 15 days.    budesonide-formoteroL (Symbicort) 80-4.5 mcg/actuation inhaler 2 puffs, inhalation, 2 times daily, Rinse mouth after use    carBAMazepine (Carbatrol) 200 mg 12 hr capsule 1 capsule, 2 times daily    carvedilol (COREG) 6.25 mg, oral, 2 times daily (morning and late afternoon)    donepezil (ARICEPT) 5 mg, Nightly    doxepin " "(SINEQUAN) 25 mg, oral, Nightly    eszopiclone (LUNESTA) 2 mg, Nightly    fluticasone propion-salmeteroL (Advair Diskus) 250-50 mcg/dose diskus inhaler 1 puff, inhalation, 2 times daily RT, Rinse mouth with water after use to reduce aftertaste and incidence of candidiasis. Do not swallow.    FreeStyle Jayy 3 [place] misc USE AS INSTRUCTED FOR CONTINUOUS BLOOD GLUCOSE MONITORING    FreeStyle Jayy 3 Sensor device tid    gabapentin (NEURONTIN) 100 mg, oral, 3 times daily    hydrOXYzine HCL (ATARAX) 25 mg, oral, Nightly    insulin aspart (NovoLOG Flexpen U-100 Insulin) 100 unit/mL (3 mL) pen INJECT 6-8 UNITS UNDER THE SKIN THREE TIMES DAILY BEFORE MEALS. PER SLIDING SCALE, MAX 30 UNITS PER DAY    insulin lispro (HumaLOG KwikPen Insulin) 100 unit/mL injection 3 times daily (morning, midday, late afternoon)    isosorbide mononitrate ER (IMDUR) 60 mg, oral, Daily before breakfast    Jardiance 10 mg, oral, Daily    lancets misc Use as directed when checking blood sugar    Lantus Solostar U-100 Insulin 50 Units, subcutaneous, Nightly, Take as directed per insulin instructions.    losartan (Cozaar) 25 mg tablet 1 tablet, Daily    memantine (NAMENDA) 5 mg, 2 times daily    metFORMIN XR (GLUCOPHAGE-XR) 1,000 mg, oral, 2 times daily    metoclopramide (REGLAN) 10 mg, oral, 4 times daily PRN    nitroglycerin (Nitrostat) 0.4 mg SL tablet 1 tablet, Every 5 min PRN    OneTouch Verio Flex Start kit Use as needed for glucose testing    OneTouch Verio test strips strip Test glucose 4 times daily    pen needle, diabetic (BD Breana 2nd Gen Pen Needle) 32 gauge x 5/32\" needle USE TO ADMINISTER INSULIN 4 TIMES A DAY    ranolazine (Ranexa) 500 mg 12 hr tablet     rOPINIRole (REQUIP) 0.25 mg, oral, 3 times daily    semaglutide 2 mg, subcutaneous, Weekly    ticagrelor (BRILINTA) 90 mg, oral, 2 times daily    traZODone (DESYREL) 300 mg, oral, Nightly PRN    triamterene-hydrochlorothiazid (Maxzide-25) 37.5-25 mg tablet 1 tablet, Daily    True " "Metrix Glucose Meter misc USE TO TEST BLOOD SUGAR ONCE DAILY AND AS NEEDED      Vitals  BP Readings from Last 2 Encounters:   02/05/25 102/68   01/20/25 129/88     BMI Readings from Last 1 Encounters:   02/04/25 27.55 kg/m²      Labs  A 1 C  Lab Results   Component Value Date    HGBA1C 7.7 (H) 02/04/2025    HGBA1C 7.6 (H) 05/14/2024    HGBA1C 6.6 (H) 11/20/2023     BMP  Lab Results   Component Value Date    CALCIUM 8.9 02/05/2025     (L) 02/05/2025    K 3.9 02/05/2025    CO2 27 02/05/2025     02/05/2025    BUN 10 02/05/2025    CREATININE 0.79 02/05/2025    EGFR >90 02/05/2025     LFTs  Lab Results   Component Value Date    ALT 14 02/04/2025    AST 15 02/04/2025    ALKPHOS 83 02/04/2025    BILITOT 0.7 02/04/2025     FLP  Lab Results   Component Value Date    TRIG 118 12/19/2022    CHOL 154 12/19/2022    LDLF 79 12/19/2022    HDL 51.1 12/19/2022     Urine Microalbumin  No results found for: \"MICROALBCREA\"  Weight Management  Wt Readings from Last 3 Encounters:   02/04/25 79.8 kg (175 lb 14.8 oz)   01/20/25 82.1 kg (181 lb)   10/21/24 84.2 kg (185 lb 9.6 oz)      There is no height or weight on file to calculate BMI.     Assessment/Plan   Problem List Items Addressed This Visit       Type 2 diabetes mellitus without complication, with long-term current use of insulin (Multi)     ASSESSMENT:  -Patient's last A1c is elevated from previous at 7.6%,now above goal of 7%  -Patient has been able to restart Ozempic at 1 mg/week since last visit with no issues. BG is improving.   -Reviewed role of mealtime insulin and stressed importance of injecting only with meals. If using several hours prior to a meal for spikes, advised to skip dose at next meal to prevent hypoglycemia.   -Stressed importance of taking medications daily 2 tabs for metformin twice a day pt was only taking one tablet at a time  -Will make no changes today due to improved BG readings. Will re-evaluate in 3 weeks once new A1c is completed at next " PCP visit.     RECOMMENDATIONS/PLAN:  CONTINUE all diabetes medications as prescribed:  Metformin  m tablets BID  Jardiance 10 mg daily  Lantus 50 units at bedtime  Novolog TID per sliding scale (on average injecting 6-8 units); max dose of 30 units per day  INCREASE  Ozempic 2 mg once weekly on    EDUCATION:  Went over importance of medication adherence  Went over where to apply CGM reader on arm is best  Went over GI ADRs for Ozempic Dose escalation         Relevant Medications    semaglutide 2 mg/dose (8 mg/3 mL) pen injector    Other Relevant Orders    Referral to Clinical Pharmacy       Clinical Pharmacist follow-up: 3/7/25 @2 pm, Telehealth visit    Continue all meds under the continuation of care with the referring provider and clinical pharmacy team.    Thank you,  Dariana Styles  Clinical Pharmacist  [phone]    Verbal consent to manage patient's drug therapy was obtained from the patient. They were informed they may decline to participate or withdraw from participation in pharmacy services at any time.

## 2025-02-14 NOTE — ASSESSMENT & PLAN NOTE
ASSESSMENT:  -Patient's last A1c is elevated from previous at 7.6%, now above goal of 7%  -Patient has been able to restart Ozempic at 1 mg/week since last visit with no issues. BG is improving.   -Reviewed role of mealtime insulin and stressed importance of injecting only with meals. If using several hours prior to a meal for spikes, advised to skip dose at next meal to prevent hypoglycemia.   -Stressed importance of taking medications daily as prescribed; patient to take 2 tablets of Metformin BID-patient was only taking 1 tablet BID  -As patient has been tolerating Ozempic dose well, will increase to maximum dose of 2 mg/week for additional weight loss     RECOMMENDATIONS/PLAN:  CONTINUE all diabetes medications as prescribed:  Metformin  m tablets BID  Jardiance 10 mg daily  Lantus 50 units at bedtime  Novolog TID per sliding scale (on average injecting 6-8 units); max dose of 30 units per day  INCREASE  Ozempic to 2 mg once weekly on    EDUCATION  Went over importance of medication adherence  Went over where to apply CGM reader; on arm is best  Went over GI ADRs for Ozempic due to dose escalation

## 2025-02-17 ENCOUNTER — APPOINTMENT (OUTPATIENT)
Dept: PHYSICAL THERAPY | Facility: CLINIC | Age: 50
End: 2025-02-17
Payer: COMMERCIAL

## 2025-02-17 ENCOUNTER — APPOINTMENT (OUTPATIENT)
Dept: PRIMARY CARE | Facility: CLINIC | Age: 50
End: 2025-02-17
Payer: COMMERCIAL

## 2025-02-18 ENCOUNTER — APPOINTMENT (OUTPATIENT)
Dept: PRIMARY CARE | Facility: CLINIC | Age: 50
End: 2025-02-18
Payer: COMMERCIAL

## 2025-02-18 ENCOUNTER — OFFICE VISIT (OUTPATIENT)
Dept: CARDIOLOGY CLINIC | Age: 50
End: 2025-02-18
Payer: COMMERCIAL

## 2025-02-18 VITALS
WEIGHT: 167 LBS | OXYGEN SATURATION: 98 % | SYSTOLIC BLOOD PRESSURE: 112 MMHG | RESPIRATION RATE: 16 BRPM | BODY MASS INDEX: 26.95 KG/M2 | DIASTOLIC BLOOD PRESSURE: 76 MMHG | HEART RATE: 80 BPM

## 2025-02-18 DIAGNOSIS — R06.02 SHORTNESS OF BREATH: ICD-10-CM

## 2025-02-18 DIAGNOSIS — I10 PRIMARY HYPERTENSION: Primary | ICD-10-CM

## 2025-02-18 DIAGNOSIS — I25.5 CARDIOMYOPATHY, ISCHEMIC: ICD-10-CM

## 2025-02-18 DIAGNOSIS — Z95.1 HX OF CABG: ICD-10-CM

## 2025-02-18 DIAGNOSIS — E78.2 MIXED HYPERLIPIDEMIA: ICD-10-CM

## 2025-02-18 DIAGNOSIS — I20.89 ANGINA AT REST: ICD-10-CM

## 2025-02-18 PROCEDURE — G8417 CALC BMI ABV UP PARAM F/U: HCPCS | Performed by: INTERNAL MEDICINE

## 2025-02-18 PROCEDURE — 3078F DIAST BP <80 MM HG: CPT | Performed by: INTERNAL MEDICINE

## 2025-02-18 PROCEDURE — 99214 OFFICE O/P EST MOD 30 MIN: CPT | Performed by: INTERNAL MEDICINE

## 2025-02-18 PROCEDURE — G8427 DOCREV CUR MEDS BY ELIG CLIN: HCPCS | Performed by: INTERNAL MEDICINE

## 2025-02-18 PROCEDURE — 3074F SYST BP LT 130 MM HG: CPT | Performed by: INTERNAL MEDICINE

## 2025-02-18 PROCEDURE — 4004F PT TOBACCO SCREEN RCVD TLK: CPT | Performed by: INTERNAL MEDICINE

## 2025-02-18 RX ORDER — NITROGLYCERIN 0.4 MG/1
0.4 TABLET SUBLINGUAL EVERY 5 MIN PRN
Qty: 25 TABLET | Refills: 3 | Status: SHIPPED | OUTPATIENT
Start: 2025-02-18

## 2025-02-18 RX ORDER — RANOLAZINE 1000 MG/1
1000 TABLET, EXTENDED RELEASE ORAL 2 TIMES DAILY
Qty: 180 TABLET | Refills: 3 | Status: SHIPPED | OUTPATIENT
Start: 2025-02-18

## 2025-02-18 RX ORDER — ISOSORBIDE MONONITRATE 60 MG/1
60 TABLET, EXTENDED RELEASE ORAL DAILY
Qty: 90 TABLET | Refills: 3 | Status: SHIPPED | OUTPATIENT
Start: 2025-02-18

## 2025-02-18 ASSESSMENT — ENCOUNTER SYMPTOMS
SHORTNESS OF BREATH: 0
ABDOMINAL PAIN: 0
WHEEZING: 0
GASTROINTESTINAL NEGATIVE: 1
NAUSEA: 0
VOMITING: 0
EYES NEGATIVE: 1

## 2025-02-18 NOTE — PROGRESS NOTES
2025        HPI:  Patient presents for follow up medical evaluation.Patient is s/p negative 2Decho and treadmill stress test. Pt denies change in exercise capacity, fatigue, nausea, vomiting, diarrhea, constipation, motor weakness, insomnia, weight loss, syncope, dizziness, lightheadedness, palpitations, PND, orthopnea, or claudication. Patient with some episodes of chest pain, dyspnea, dyspnea on exertion still. Continues to   Smoke, diet could be better controlled. Compliant with meds.   13: as above, patient presents with worsening midsternal CP and SOB. States her CP lasts more than 20 minutes at rest, worse with exertion. Pain radiates to left shoulder. Does not feel like GERD type symptoms. Almost took one of her dad's nitro. Both Mother and father recently  from large MI and CVA. Father with hx of 34 stents with NOHC. Continues to smoke. Under a lot of stress and anxiety. Worried that she may have a heart and die. Patient fatigues very easily.      13: as above, s/p cardiac cath and S/P ZACHARY to mid LAD and distal RCA. Has not been compliant with ASA but takes plavix daily. Has been having numerous midsternal and midepigastric chest pains since cath. Had an episode of sever CP a week ago with associted SOB, Nausea, diaphoresis, and ashy looking per friend. Had a look of impending doom. Has been using numerous SL nitro's with relief. + smoking. Patient complains of LE dicomfort.     13: as above, was in hospital last week for CP. Patient had negative EKG and negative C.E. Continues to smoke, chantix not working per patient. Compliant with meds. BS are still not under very good control. Pt denies dyspnea, dyspnea on exertion, change in exercise capacity, fatigue,  nausea, vomiting, diarrhea, constipation, motor weakness, insomnia, weight loss, syncope, dizziness, lightheadedness, palpitations, PND, orthopnea, or claudication. Still has occasional CP/discomfort. No bleeding issues. Has not

## 2025-02-18 NOTE — H&P (VIEW-ONLY)
2025        HPI:  Patient presents for follow up medical evaluation.Patient is s/p negative 2Decho and treadmill stress test. Pt denies change in exercise capacity, fatigue, nausea, vomiting, diarrhea, constipation, motor weakness, insomnia, weight loss, syncope, dizziness, lightheadedness, palpitations, PND, orthopnea, or claudication. Patient with some episodes of chest pain, dyspnea, dyspnea on exertion still. Continues to   Smoke, diet could be better controlled. Compliant with meds.   13: as above, patient presents with worsening midsternal CP and SOB. States her CP lasts more than 20 minutes at rest, worse with exertion. Pain radiates to left shoulder. Does not feel like GERD type symptoms. Almost took one of her dad's nitro. Both Mother and father recently  from large MI and CVA. Father with hx of 34 stents with NOHC. Continues to smoke. Under a lot of stress and anxiety. Worried that she may have a heart and die. Patient fatigues very easily.      13: as above, s/p cardiac cath and S/P NIVIA to mid LAD and distal RCA. Has not been compliant with ASA but takes plavix daily. Has been having numerous midsternal and midepigastric chest pains since cath. Had an episode of sever CP a week ago with associted SOB, Nausea, diaphoresis, and ashy looking per friend. Had a look of impending doom. Has been using numerous SL nitro's with relief. + smoking. Patient complains of LE dicomfort.     13: as above, was in hospital last week for CP. Patient had negative EKG and negative C.E. Continues to smoke, chantix not working per patient. Compliant with meds. BS are still not under very good control. Pt denies dyspnea, dyspnea on exertion, change in exercise capacity, fatigue,  nausea, vomiting, diarrhea, constipation, motor weakness, insomnia, weight loss, syncope, dizziness, lightheadedness, palpitations, PND, orthopnea, or claudication. Still has occasional CP/discomfort. No bleeding issues. Has not  used SL nitro since discharged. soemtimes takes 5 SL nitros a day. Patient on previous cardiac cath with significant PLVB lesion nearing need for PCI.     5-13-14: as above, s/p LHC with moderate distal RCA instent restenosis of 50%, 50% PLVB, 30-40% mid RCA stenosis, normal LVF. Had to take one nitro this morning which helped, but doing better overall. Compliant with meds. Pt denies chest pain, dyspnea, dyspnea on exertion, change in exercise capacity, fatigue,  nausea, vomiting, diarrhea, constipation, motor weakness, insomnia, weight loss, syncope, dizziness, lightheadedness, palpitations, PND, orthopnea, or claudication. Statins increased by Dr. Jean-Baptiste. Was smoking 2 ppd now down to a PPD.     1-6-15: as above, states she's having worsening angina over the past couple of weeks. States she's been eating nitro like its candy. Went through a whole bottle of nitro in one week. Nitro completely takes her pain away immeditely. She lester get tired, LH and dizzy with episodes. Denies N/V or SOB. Symptoms are worse with activity and better with rest. complian with meds, continues to smoke 1ppd. Has had multiple previous hospitalizations since last visit. Feels like there is something is wrong and having similar symptoms to previous PCI.      6-11-15: as above, s/p LHC with patent LAD and RCA stents, normal LVF. S/p recurrent hospitalization for CP. C.E were negative. Did take nitro which relieved her CP immediately. States CP went into her jaw, got diaphoretic. States symptoms occurred at rest. Continues to smoke 0.5ppd. Had run out of her plavix for 3 days but resumed now. Did have a few more episodes since being discharged from hospital.         4-12-16: as above, s/p hospitalization for angina, was treated medically. Does have URI type symptoms. Pt denies  dyspnea, dyspnea on exertion, change in exercise capacity, fatigue,  nausea, vomiting, diarrhea, constipation, motor weakness, insomnia, weight loss, syncope,  dizziness, lightheadedness, palpitations, PND, orthopnea. BP is good. Does have episodes of CP that are on and off, states shes used to it now and does not feel like a heart attack. Has quit smoking for about 2 months. Compliant with meds.      6-2-16: as above, having sig. CP along with nauseas. States its constant in nature, had to get vicodin off of a friend for pain. Going through a bottle of nitro a month. Occurs at rest, radiated to shoulder blades, + diaphoresis, lasting more than 10 min.  S/p hospitalization a few days ago for CP, C.E were negative. S/p ECHO  With normal LVF. Continues to smoke. Her ranexa was increased to a 1000mcg daily. Compliant with other meds. BP and HR are good. Pt denies diarrhea, constipation, motor weakness, insomnia, weight loss, syncope, dizziness, lightheadedness, palpitations, PND, orthopnea, or claudication. Does have SOB and worse with walking.      7-7-16: as above, s/p LHC with PTCA of PDA branch, patent OM1 and LAD stents,100% mid CX with right to left collaterals, EF of 60%. S/p CP/abd pain episode yesterady where she felt like she got punched in the gut, got nauseated and diaphoretic, took nitro without any sig. Improvement, she kept throwing up. C.E were negative. Compliant with meds. BP is good. She feels great today. No bleeding issues. EKG was ok in ER. Pt denies chest pain, dyspnea, dyspnea on exertion, change in exercise capacity, fatigue,  nausea, vomiting, diarrhea, constipation, motor weakness, insomnia, weight loss, syncope, dizziness, lightheadedness, palpitations, PND, orthopnea. No smoking.      10-18-16: continues to have chest pressure and heaviness. Has been utilizing nitro almost on a daily basis. States its horrible and almost went to hospital a few times over the past few weeks. It did radiate to left arm pit a few times, got nauseous and left finger tingling. Does get SOB with mild activity, just with sweeping the house sometimes. Wakes her up at  night, occures at rest. Symptoms are accelerating in nature.  Compliant with meds. Smokes 3 cig a day. No bleeding issues. Pt denies   vomiting, diarrhea, constipation, motor weakness, insomnia, weight loss, syncope, dizziness, lightheadedness, palpitations, PND, orthopnea, or claudication.  BP and HR are good. BS continues to be uncontrolled. In the 200's.      12-8-16: s/p s/p PCI of PDA with NIVIA, patent mid LAD stent with 30-40% restenosis, patent ostial OM1 stent, 50% mid RCA, normal LVF EF of 55%. Right LE angio was normal to knees.  Pt denies chest pain, dyspnea, dyspnea on exertion, change in exercise capacity, fatigue,  nausea, vomiting, diarrhea, constipation, motor weakness, insomnia, weight loss, syncope, dizziness, lightheadedness, palpitations, PND, orthopnea. Did have a right UE venous dupplex US with thrombosis of cephalic vein. Continues to smoke. Taking all her meds.  No nitro use. BP and hr are good. CAD is stable. No LE discoloration or ulcers. No LE edema. No CHF type symptoms. Lipid profile is normal.      4-11-17: s/p hospitalization for CP 2 weeks ago. Lab work was ok, C.E is negative. States she is getting sporadic pains now, gets a spasm in her left arm. Has been going to the gym, doing stairmasters and treadmill without any major pains. Has lost 18 pounds.  Has needed to take nitro last night. States she feels like she is gonna die sometimes.  Pt denies chest pain, dyspnea, dyspnea on exertion, change in exercise capacity, fatigue,  nausea, vomiting, diarrhea, constipation, motor weakness, insomnia, weight loss, syncope, dizziness, lightheadedness, palpitations, PND, orthopnea, or claudication.      9-6-18: doing well overall. Getting . Need refills on meds. Her stress level is much better. Pt denies chest pain, dyspnea, dyspnea on exertion, change in exercise capacity, fatigue,  nausea, vomiting, diarrhea, constipation, motor weakness, insomnia, weight loss, syncope, dizziness,  lightheadedness, palpitations, PND, orthopnea, or claudication. No nitro use. BP and hr are good. CAD is stable. No LE discoloration or ulcers. No LE edema. No CHF type symptoms. Lipid profile is normal. No recent hospitalization. No change in meds.   Continues to smoke. On ASA and Effient. No bleeding issues.      S/p LHC in 5/18   Conclusions  Procedure Summary  95% mid PDA ISR s/p PTCA with 0% residual and MARJORIE III flow  99% distal PLVB stenosis s/p PTCA with 0% residual and MARJORIE III flow  distal LAD ISR of 40-50%  100% mid CX   50% mid RCA  normal LVF     12-5-18: FEELS LIKE something is wrong again. Having chest pressure/squeeze and radiation to left arm. Completely resolved with one nitro. Using more nitro lately the last 2 weeks after going months without it. She ran out of effient for a month she states due to insurance reasons. Continues to smoke. Compliant with meds. BS is fairly controlled per patient. She is seeing dr. Zamudio.   Pt denies nausea, vomiting, diarrhea, constipation, motor weakness, insomnia, weight loss, syncope, dizziness, lightheadedness, palpitations, PND, orthopnea, or claudication.   BP and hr are good.  No LE discoloration or ulcers. No LE edema. No CHF type symptoms. Lipid profile is normal. No recent hospitalization. No change in meds.      12-5-19: Lost her insurance and has been out of meds for a while. But has been feeling ok for the past year  But the last 2 weeks have been rough with CP, has been using nitro which provides relief. Feels like she has another blockage. CP can occur at rest, wakes her up from sleep at times.  Continues to smoke. Pt denies dyspnea, dyspnea on exertion, change in exercise capacity, fatigue,  nausea, vomiting, diarrhea, constipation, motor weakness, insomnia, weight loss, syncope, dizziness, lightheadedness, palpitations, PND, orthopnea, or claudication.  She got  and going on a honeymoon and wants to make sure she's gonna be fine.       19: s/p LHC with normal LVF, EF of 55%, normal LM, 50-60% mid LAD ISR, 100% mid CX , very small OM1 with 80% stenosis, RCA with 30% prox, 50-60% mid, PDA is small with 80% ISR. All vessels are small with diffuse disease. Was felt not to be a good PCI candidate due to small vessels and hx of in stent restenosis. Continues to have angina type symptoms. Taking SL nitro 4-5x/week. Her HR is elevated today at 111 but states she is sick. BP is good. On Imdur 60mg daily, Coreg 6.25mg BID, Ranexa 1000mg BID, on DAPT.   Continues to smoke, but trying to quit. Following with dr. Farfan.       20:   Tana Bahena (:  1975) has requested an audio/video evaluation for the following concern(s):    History of CAD status post remote PCI as well as CABG recently. States she is having CP again but not as bad as it was. Midsternal, feels like indigestion.  Worse with palpation. States she is SOB. She has quit smoking since .   Pt denies  nausea, vomiting, diarrhea, constipation, motor weakness, insomnia, weight loss, syncope, dizziness, lightheadedness, palpitations, PND, orthopnea, or claudication.  Positive history of diabetes.  On DAPT. On Imdur and Ranexa. Following with Endo and PCP for DM. BS is under control now.       22: Patient states she is experiencing anginal symptoms again that felt very similar to her previous anginal symptoms status post PCI.  Patient with history of coronary disease status post remote PCI as well as CABG x4 at [facility].  Patient remains on dual antiplatelet therapy.  She is compliant with her medications.  She does have stress in her life and she is smoking but not as much as previous.  States her blood sugars are under control.  CP occurs at rest. Taking nitro. + SOB.       2022: Patient admitted to [facility] in  status post V. fib arrest, pneumonia requiring intubation and then trach.  She has been 2 months in rehab.  Patient with history of coronary  disease status post remote PCI as well as CABG x4 at [facility].  She had left heart cath with PCI of OM1 on 2/7/2022 with Dr. Alfaro.  At that time she was noted to have LVEF 50%, LIMA to LAD patent, all other SVGs were occluded.  Patient remains on dual antiplatelet therapy.  She is compliant with her medications.  Under stress at home.  Reports she continues to smoke but has significantly cut back.  States her blood sugars are under control.  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  Requesting referral for new ENT physician.  Blood pressure is under control.     pt had echocardiogram on 5/23/2022 which showed LVEF 30 to 35%.    11-8-22: s/p cardiac arrest/V. fib arrest. Had long hospitalization, nursing home.  Status post echo in May 2022 ejection fraction of 30 to 35%  Patient status post cardiac catheterization in February 2022 where she underwent PCI of the diagonal 1 as well as OM1 noted to have occluded SVG grafts and patent LIMA to the LAD.  Patient with history of CAD status post CABG x4 at [facility] and multivessel PCI  Doing much better now.  She is at home.    1-31-23: s/p AICD with dr Oh.   Hx of cardiac arrest/V. fib arrest. Had long hospitalization, nursing home.  Status post echo in May 2022 ejection fraction of 30 to 35% Patient status post cardiac catheterization in February 2022 where she underwent PCI of the diagonal 1 as well as OM1 noted to have occluded SVG grafts and patent LIMA to the LAD.  Patient with history of CAD status post CABG x4 at [facility] and multivessel PCI  Status post limited echocardiogram in December 2022 ejection fraction of 30 to 35% no significant valve abnormalities moderate pulm hypertension RVSP of 51 mmHg      7-25-23: Status post ER visits for chest pain.  States her ICD is speeding up at times.  Hx of cardiac arrest/V. fib arrest. Status post echo in May 2022 ejection fraction of 30 to 35% Patient status post cardiac catheterization  in February 2022 where she underwent PCI of the diagonal 1 as well as OM1 noted to have occluded SVG grafts and patent LIMA to the LAD.  Patient with history of CAD status post CABG x4 at [facility] and multivessel PCI..  Most recent echo in 2022 ejection fraction of 3035% moderate pulmonary hypertension.  She underwent AICD placement. She continues to smoke.     10-17-23: Status post In August 2023 at [facility] with patent LIMA to LAD all other vessels are occluded.   Hx of cardiac arrest/V. fib arrest. Status post echo in May 2022 ejection fraction of 30 to 35% Patient status post cardiac catheterization in February 2022 where she underwent PCI of the diagonal 1 as well as OM1 noted to have occluded SVG grafts and patent LIMA to the LAD.  Patient with history of CAD status post CABG x4 at [facility] and multivessel PCI..  Most recent echo in 2022 ejection fraction of 3035% moderate pulmonary hypertension.  She underwent AICD placement. She continues to smoke.     2/18/2025: Patient status post recent hospitalization nausea, vomiting as well as midsternal chest pain type symptoms cardiac enzymes were negative.. Status post echocardiogram showed ejection fraction of 2025%.  No significant valve abnormalities.  She continues to have rest anginal symptoms.  She feels like they are similar to her previous heart attack  Patient with history of advanced CAD status post multivessel PCI, CABG.  History of ischemic cardiomyopathy ejection fraction 25% status post AICD.  No defibrillator shocks.  Patient did run out of her Ranexa as well as Imdur.  She does have a history of uncontrolled diabetes.  Continues to smoke      Review of Systems   Constitutional: Negative.  Negative for chills and fever.   Eyes: Negative.    Respiratory:  Negative for shortness of breath and wheezing.    Cardiovascular:  Negative for chest pain, palpitations and leg swelling.   Gastrointestinal: Negative.  Negative for abdominal pain, nausea and vomiting.   Skin:  Negative.  Negative for rash.   Neurological:  Negative for dizziness, weakness and headaches.       Prior to Visit Medications    Medication Sig Taking? Authorizing Provider   isosorbide mononitrate (IMDUR) 60 MG extended release tablet Take 1 tablet by mouth daily Yes Holiday, Isma HARDY, DO   nitroGLYCERIN (NITROSTAT) 0.4 MG SL tablet Place 1 tablet under the tongue every 5 minutes as needed for Chest pain Yes Holiday, Isma HARDY, DO   ranolazine (RANEXA) 1000 MG extended release tablet Take 1 tablet by mouth 2 times daily Yes Holiday, Isma HARDY, DO   ticagrelor (BRILINTA) 90 MG TABS tablet Take 1 tablet by mouth 2 times daily  Holiday, Isma HARDY, DO   donepezil (ARICEPT) 5 MG tablet Take 1 tablet by mouth nightly  Jonn Zepeda MD   Continuous Glucose Sensor (FREESTYLE HAYDEE 3 SENSOR) MISC USE AS DIRECTED TO CHECK BLOOD SUGAR. CHANGE SENSOR EVERY 14 DAYS.  Zayda Simmons MD   memantine (NAMENDA) 5 MG tablet Take 1 tablet by mouth 2 times daily  Jonn Zepeda MD   JARDIANCE 10 MG tablet Take 1 tablet by mouth daily  Zayda Simmons MD   gabapentin (NEURONTIN) 100 MG capsule Take 1 capsule by mouth 3 times daily.  Zayda Simmons MD   traZODone (DESYREL) 100 MG tablet Take 1 tablet by mouth nightly  Chano [address] DO   rOPINIRole (REQUIP) 0.25 MG tablet Take 1 tablet by mouth 3 times daily  Jacobo Thakkar MD   insulin glargine (LANTUS) 100 UNIT/ML injection vial Inject 50 Units into the skin nightly  Zayda Simmons MD   carvedilol (COREG) 6.25 MG tablet TAKE 1 TABLET BY MOUTH TWICE DAILY  Page Mccormick APRN - CNP   aspirin 81 MG EC tablet Take 2 tablets by mouth daily  Page Mccormick APRN - CNP   insulin aspart (NOVOLOG) 100 UNIT/ML injection pen Inject 6-8 Units into the skin 3 times daily (before meals) Sliding scale  Zayda Simmons MD       Social History     Tobacco Use   • Smoking status: Light Smoker     Current packs/day: 1.00     Average packs/day: 1 pack/day for 31.1  years (31.1 ttl pk-yrs)     Types: Cigarettes     Start date: 1/1/1994   • Smokeless tobacco: Never   Substance Use Topics   • Alcohol use: No   • Drug use: Yes     Types: Marijuana (Weed)        Allergies   Allergen Reactions   • Toradol [Ketorolac Tromethamine]    • Tramadol    ,   Past Medical History:   Diagnosis Date   • Anoxic brain damage (HCC) 2022    FROM CPR   • Asthma    • Bipolar disease, manic ([facility])    • CAD (coronary artery disease)    • COPD (chronic obstructive pulmonary disease) ([facility])    • Depression    • Diabetes ([facility])    • DM (diabetes mellitus) ([facility])    • Dyspnea    • Hx of CABG 03/31/2020   • Hx of CABG 03/31/2020   • Hyperlipidemia    • Hypertension    • Internal carotid artery stenosis    • Subclavian artery stenosis ([facility])    • Suicide attempt by multiple drug overdose ([facility]) 2009   • TMJ (dislocation of temporomandibular joint)    • Vertebral artery stenosis    ,   Past Surgical History:   Procedure Laterality Date   • APPENDECTOMY  2010   • CARDIAC CATHETERIZATION  07/11/2013   • CARDIAC CATHETERIZATION  10/16/2013    DR CARL   • CORONARY ARTERY BYPASS GRAFT  01/2020   • CORONARY STENT PLACEMENT  05/2022   • TUBAL LIGATION     ,   Family History   Problem Relation Age of Onset   • Heart Disease Mother    • High Blood Pressure Mother    • Diabetes Mother    • High Blood Pressure Father    • Heart Disease Father    • Kidney Disease Father        Physical Examination:  General appearance - alert, well appearing, and in no distress  Mental status - alert, oriented to person, place, and time  Neck - Neck is supple, no JVD or carotid bruits.  No thyromegaly or adenopathy.   Chest - clear to auscultation, no wheezes, rales or rhonchi, symmetric air entry  Heart - normal rate, regular rhythm, normal S1, S2, no murmurs, rubs, clicks or gallops  Abdomen - soft, nontender, nondistended, no masses or organomegaly  Neurological - alert, oriented, normal speech, no focal findings or movement disorder  noted  Extremities - peripheral pulses normal, no pedal edema, no clubbing or cyanosis  Skin - normal coloration and turgor, no rashes, no suspicious skin lesions noted    EKG with normal sinus rhythm left bundle branch block.    ASSESSMENT:        Diagnosis Orders   1. Primary hypertension        2. Mixed hyperlipidemia        3. Hx of CABG        4. Shortness of breath        5. Cardiomyopathy, ischemic        6. Angina at rest (HCC)            PLAN:  Patient will need to continue to follow up with you for their general medical care and return to see me in the office in 6 months     As always, aggressive risk factor modification is strongly recommended. We should adhere to the JNC VII guidelines for HTN management and the NCEP ATP III guidelines for LDL-C management.     The nature of cardiac risk has been fully discussed with this patient. I have made her aware of her LDL target goal given her cardiovascular risk analysis. I have discussed the appropriate diet. The need for lifelong compliance in order to reduce risk is stressed. A regular exercise program is recommended to help achieve and maintain normal body weight, fitness and improve lipid balance.      Continue medications at current doses.     Will represcribe Ranexa as well as Imdur.    Sublingual nitroglycerin provided    Had a long talk with the patient regarding their symptoms, test results and the different treatment options available which include cardiac cath, alternate imaging modality and medical therapy. Patient would like to proceed with cardiac catheterization and understands the risks and benefits of the procedure.    device clinic.     DAPT      Follow up with PCP.      Check EKG     Smoking cessation was strongly recommended     Patient was advised and encouraged to check blood pressure at home or at a pharmacy, maintain a logbook, and also call us back if blood pressure are above the target ranges or if it is low. Patient clearly  understands and agrees to the instructions.      We will need to continue to monitor muscle and liver enzymes, BUN, CR, and electrolytes.     The proper use and anticipated side effects of nitroglycerine has been carefully explained.  If a single episode of chest pain is not relieved by one tablet, the patient will try another within 5 minutes; and if this doesn't relieve the pain, the patient is instructed to call 911 for transportation to an emergency department.     Details of medical condition explained and patient was warned about adverse consequences of uncontrolled medical conditions and possible side effects of prescribed medications.     Patient was advised to go to the ER if he starts experiencing adverse effects of the medications.patient was instructed to call us back or go to nearby emergency room immediately if symptoms get worse or do not improve.     Thank you for allowing me to participate in the care of your patient, please don't hesitate to contact me if you have any further questions.      No follow-ups on file.    An  electronic signature was used to authenticate this note.    --Isma Sykes, DO on 2/18/2025 at 12:07 PM  9}

## 2025-02-19 ENCOUNTER — TELEPHONE (OUTPATIENT)
Dept: CARDIOLOGY CLINIC | Age: 50
End: 2025-02-19

## 2025-02-19 NOTE — TELEPHONE ENCOUNTER
Pt aware proceduer 02/27/25  EM 7a arrive 5a NPO 12a and [facility] will call the day before to confirm.

## 2025-02-20 ENCOUNTER — PATIENT OUTREACH (OUTPATIENT)
Dept: PRIMARY CARE | Facility: CLINIC | Age: 50
End: 2025-02-20
Payer: COMMERCIAL

## 2025-02-20 RX ORDER — DONEPEZIL HYDROCHLORIDE 5 MG/1
5 TABLET, FILM COATED ORAL NIGHTLY
Qty: 30 TABLET | Refills: 0 | Status: SHIPPED | OUTPATIENT
Start: 2025-02-20

## 2025-02-20 NOTE — TELEPHONE ENCOUNTER
Pharmacy is  requesting medication refill. Please approve or deny this request.    Rx requested:  Requested Prescriptions     Pending Prescriptions Disp Refills    donepezil (ARICEPT) 5 MG tablet [Pharmacy Med Name: DONEPEZIL 5MG TABLETS] 30 tablet 0     Sig: TAKE 1 TABLET BY MOUTH EVERY NIGHT         Last Office Visit:   12/18/2024      Next Visit Date:  Future Appointments   Date Time Provider Department Center   5/12/2025  3:00 PM Roberto Roman MD [place] [facility] -   ;

## 2025-02-20 NOTE — PROGRESS NOTES
Call after hospitalization.  At time of outreach call the patient feels as if their condition has (returned to baseline) since last visit. Pt reports she is scheduled to have her heart cath in one week. No new questions or concerns at this time.

## 2025-02-24 ENCOUNTER — APPOINTMENT (OUTPATIENT)
Dept: PHYSICAL THERAPY | Facility: CLINIC | Age: 50
End: 2025-02-24
Payer: COMMERCIAL

## 2025-02-24 DIAGNOSIS — R29.898 WEAKNESS OF BOTH LOWER EXTREMITIES: ICD-10-CM

## 2025-02-24 DIAGNOSIS — R26.81 UNSTEADINESS ON FEET: ICD-10-CM

## 2025-02-24 DIAGNOSIS — R27.0 ATAXIA: Primary | ICD-10-CM

## 2025-02-24 DIAGNOSIS — R26.89 DECREASED MOBILITY: ICD-10-CM

## 2025-02-24 NOTE — PROGRESS NOTES
"Physical Therapy    Physical Therapy Treatment    Patient Name: Tana Bahena  MRN: [id number]  Today's Date: 2/24/2025    Time Entry:              Insurance:  [employer]  20% coinsurance  25 V/Y   PA not req  Visit: 6  POC: 14                   Assessment:  Pt reported feeling \"pretty good\" at end of session and states that she doesn't feel as sore following treatment as she did last visit. Pt would benefit from PT to continue to address impairments in order to improve strength, flexibility, balance, gait, and body mechanics and to decrease symptoms and increase overall function.        Plan:  Continue to progress LE strengthening and balance as laura      OP PT Plan  Treatment/Interventions: Aquatic therapy, Cryotherapy, Education/ Instruction, Electrical stimulation, Hot pack, Manual therapy, Neuromuscular re-education, Self care/ home management, Taping techniques, Therapeutic activities, Therapeutic exercises, Vasopneumatic device  PT Plan: Skilled PT  PT Frequency:  (1-2x/week)  Duration: 13 additional visits  Onset Date: 10/21/24  Certification Period Start Date: 12/17/24  Certification Period End Date: 03/17/25    Current Problem  No diagnosis found.      General          Subjective    Pt denies current pain. She reports continued B LE numbness \"as usual.\"  Pt states that she was sore for a couple of days following last session.     Precautions          Pain       Objective   Pt continues to ambulate with decreased knee flexion bilaterally and B LE ER but gait improved slightly from previous visits.             Treatments:    Supine Bridge 5\" x 15  Clamshell  x 15 B  Active Straight Leg Raise with Quad Set x 10 reps B  Supine Hip Adduction Isometric with Ball  x 15 B  Supine hip abduction with BTB x15 B  Seated Long Arc Quad  2# x 15 B  Seated hamstring curls GTB x 15 B   Heel Raises with Counter Support  2 sets x 10 reps   Mini squats x 10 NT  Standing hip abduction x 10 B  Standing hip extension x 10 B  Side " "stepping in hallway 20 ft x 2    Tandem walking in hallway  20 ft x 2   Standing on cushion with feet together 30 sec x 1 with SBA NT   Hip Fallouts  x 15 ea  DLS March x15ea  Step Ups 6\" x 10 ea alt  HS longsit/ GS Stretch at 4\"step B 3x30\"ea      OP EDUCATION:   12/17/24:  Exercises  - Supine Bridge  - 1 x daily - 7 x weekly - 2 sets - 10 reps - 3 sec hold  - Clamshell  - 1 x daily - 7 x weekly - 1 sets - 10 reps - 3 sec hold  - Active Straight Leg Raise with Quad Set  - 1 x daily - 7 x weekly - 2 sets - 10 reps - 3 sec hold  - Supine Hip Adduction Isometric with Ball  - 1 x daily - 7 x weekly - 2 sets - 10 reps - 5 sec hold  - Seated Long Arc Quad  - 1 x daily - 7 x weekly - 2 sets - 10 reps  - Heel Raises with Counter Support  - 1 x daily - 7 x weekly - 2 sets - 10 reps - 3 sec hold    Goals:  By discharge:  1. Patient will report and demonstrate independence with current HEP  2. Patient will demonstrate the ability to perform bilateral tandem stance >/= 20s without loss of balance or need for assistance to indicate improved static balance and safety  3. Patient will demonstrate an improvement in TUG score to </= 14 with least restrictive assistive device to indicate overall improved functional mobility as well as approach the cutoff for reduced risk for falls (baseline 12/17/24 21.07s)  4. Patient will demonstrate gross hip, knee, and ankle strength >/= 4+/5 to improve the ability to ambulate, squat, and lift without restrictions  5. Patient will demonstrate an increase in Lower Extremity Functional Scale score by 30 points to 50/80 to meet established MCID (baseline 12/17/24 20/80)  6. Patient will demonstrate the ability to ambulate >/= 150' with least restrictive assistive device and with improved gait speed and mechanics  7. Patient will demonstrate the ability to perform 10 sit to stand transfers from a normal chair height without upper extremity assistance to indicate improved functional mobility and " strength  8. Patient will demonstrate the ability to perform 3 CW and 3 CCW turns with continuous steps, and without loss of balance to indicate improved dynamic balance and safety within the home  9. Patient will demonstrate the ability to ascend and descend one flight of stairs reciprocally and without loss of balance to indicate improved mobility within the home/community  10. Patient will demonstrate an improvement in total Tinetti score to >19/28 to indicate improved balance in standing and with mobility and a reduced risk for falls (baseline 12/17/24: 12/28)

## 2025-02-25 ENCOUNTER — PREP FOR PROCEDURE (OUTPATIENT)
Dept: CARDIOLOGY | Facility: HOSPITAL | Age: 50
End: 2025-02-25

## 2025-02-25 ENCOUNTER — APPOINTMENT (OUTPATIENT)
Dept: PRIMARY CARE | Facility: CLINIC | Age: 50
End: 2025-02-25
Payer: COMMERCIAL

## 2025-02-25 DIAGNOSIS — I20.0 ANGINA PECTORIS, UNSTABLE (MULTI): Primary | ICD-10-CM

## 2025-02-25 DIAGNOSIS — I20.9 ANGINA, CLASS IV (CMS-HCC): ICD-10-CM

## 2025-02-25 RX ORDER — SODIUM CHLORIDE 9 MG/ML
100 INJECTION, SOLUTION INTRAVENOUS CONTINUOUS
Status: CANCELLED | OUTPATIENT
Start: 2025-02-25 | End: 2025-02-25

## 2025-02-26 LAB
ATRIAL RATE: 72 BPM
ATRIAL RATE: 85 BPM
P AXIS: 43 DEGREES
P AXIS: 65 DEGREES
P OFFSET: 176 MS
P OFFSET: 180 MS
P ONSET: 112 MS
P ONSET: 128 MS
PR INTERVAL: 176 MS
PR INTERVAL: 212 MS
Q ONSET: 216 MS
Q ONSET: 218 MS
QRS COUNT: 12 BEATS
QRS COUNT: 14 BEATS
QRS DURATION: 108 MS
QRS DURATION: 108 MS
QT INTERVAL: 400 MS
QT INTERVAL: 426 MS
QTC CALCULATION(BAZETT): 466 MS
QTC CALCULATION(BAZETT): 476 MS
QTC FREDERICIA: 449 MS
QTC FREDERICIA: 452 MS
R AXIS: -41 DEGREES
R AXIS: -45 DEGREES
T AXIS: 94 DEGREES
T AXIS: 95 DEGREES
T OFFSET: 416 MS
T OFFSET: 431 MS
VENTRICULAR RATE: 72 BPM
VENTRICULAR RATE: 85 BPM

## 2025-02-27 ENCOUNTER — APPOINTMENT (OUTPATIENT)
Dept: CARDIOLOGY | Facility: HOSPITAL | Age: 50
End: 2025-02-27
Payer: COMMERCIAL

## 2025-02-27 ENCOUNTER — HOSPITAL ENCOUNTER (OUTPATIENT)
Facility: HOSPITAL | Age: 50
Setting detail: OUTPATIENT SURGERY
Discharge: HOME | End: 2025-02-27
Attending: INTERNAL MEDICINE | Admitting: INTERNAL MEDICINE
Payer: COMMERCIAL

## 2025-02-27 VITALS
RESPIRATION RATE: 15 BRPM | BODY MASS INDEX: 27.4 KG/M2 | SYSTOLIC BLOOD PRESSURE: 124 MMHG | HEART RATE: 77 BPM | WEIGHT: 174.6 LBS | HEIGHT: 67 IN | DIASTOLIC BLOOD PRESSURE: 81 MMHG | OXYGEN SATURATION: 100 % | TEMPERATURE: 97.7 F

## 2025-02-27 DIAGNOSIS — I20.9 ANGINA, CLASS IV (CMS-HCC): ICD-10-CM

## 2025-02-27 DIAGNOSIS — I20.0 ANGINA PECTORIS, UNSTABLE (MULTI): Primary | ICD-10-CM

## 2025-02-27 LAB
ANION GAP SERPL CALC-SCNC: 13 MMOL/L (ref 10–20)
ATRIAL RATE: 70 BPM
BUN SERPL-MCNC: 7 MG/DL (ref 6–23)
CALCIUM SERPL-MCNC: 9.7 MG/DL (ref 8.6–10.3)
CHLORIDE SERPL-SCNC: 102 MMOL/L (ref 98–107)
CO2 SERPL-SCNC: 27 MMOL/L (ref 21–32)
CREAT SERPL-MCNC: 0.86 MG/DL (ref 0.5–1.05)
EGFRCR SERPLBLD CKD-EPI 2021: 84 ML/MIN/1.73M*2
ERYTHROCYTE [DISTWIDTH] IN BLOOD BY AUTOMATED COUNT: 12.5 % (ref 11.5–14.5)
GLUCOSE SERPL-MCNC: 124 MG/DL (ref 74–99)
HCT VFR BLD AUTO: 50.8 % (ref 36–46)
HGB BLD-MCNC: 17.6 G/DL (ref 12–16)
MCH RBC QN AUTO: 31.7 PG (ref 26–34)
MCHC RBC AUTO-ENTMCNC: 34.6 G/DL (ref 32–36)
MCV RBC AUTO: 91 FL (ref 80–100)
NRBC BLD-RTO: 0 /100 WBCS (ref 0–0)
P AXIS: 60 DEGREES
P OFFSET: 171 MS
P ONSET: 106 MS
PLATELET # BLD AUTO: 234 X10*3/UL (ref 150–450)
POTASSIUM SERPL-SCNC: 4.2 MMOL/L (ref 3.5–5.3)
PR INTERVAL: 210 MS
Q ONSET: 211 MS
QRS COUNT: 12 BEATS
QRS DURATION: 114 MS
QT INTERVAL: 448 MS
QTC CALCULATION(BAZETT): 483 MS
QTC FREDERICIA: 471 MS
R AXIS: -37 DEGREES
RBC # BLD AUTO: 5.56 X10*6/UL (ref 4–5.2)
SODIUM SERPL-SCNC: 138 MMOL/L (ref 136–145)
T AXIS: 96 DEGREES
T OFFSET: 435 MS
VENTRICULAR RATE: 70 BPM
WBC # BLD AUTO: 6.9 X10*3/UL (ref 4.4–11.3)

## 2025-02-27 PROCEDURE — 2500000001 HC RX 250 WO HCPCS SELF ADMINISTERED DRUGS (ALT 637 FOR MEDICARE OP): Performed by: INTERNAL MEDICINE

## 2025-02-27 PROCEDURE — 93459 L HRT ART/GRFT ANGIO: CPT | Performed by: INTERNAL MEDICINE

## 2025-02-27 PROCEDURE — 2550000001 HC RX 255 CONTRASTS: Performed by: INTERNAL MEDICINE

## 2025-02-27 PROCEDURE — 2720000007 HC OR 272 NO HCPCS: Performed by: INTERNAL MEDICINE

## 2025-02-27 PROCEDURE — 93005 ELECTROCARDIOGRAM TRACING: CPT | Mod: 59

## 2025-02-27 PROCEDURE — 7100000001 HC RECOVERY ROOM TIME - INITIAL BASE CHARGE: Performed by: INTERNAL MEDICINE

## 2025-02-27 PROCEDURE — 7100000009 HC PHASE TWO TIME - INITIAL BASE CHARGE: Performed by: INTERNAL MEDICINE

## 2025-02-27 PROCEDURE — 2500000002 HC RX 250 W HCPCS SELF ADMINISTERED DRUGS (ALT 637 FOR MEDICARE OP, ALT 636 FOR OP/ED): Performed by: INTERNAL MEDICINE

## 2025-02-27 PROCEDURE — 85027 COMPLETE CBC AUTOMATED: CPT | Performed by: INTERNAL MEDICINE

## 2025-02-27 PROCEDURE — C1769 GUIDE WIRE: HCPCS | Performed by: INTERNAL MEDICINE

## 2025-02-27 PROCEDURE — 7100000002 HC RECOVERY ROOM TIME - EACH INCREMENTAL 1 MINUTE: Performed by: INTERNAL MEDICINE

## 2025-02-27 PROCEDURE — 7100000010 HC PHASE TWO TIME - EACH INCREMENTAL 1 MINUTE: Performed by: INTERNAL MEDICINE

## 2025-02-27 PROCEDURE — 36415 COLL VENOUS BLD VENIPUNCTURE: CPT | Performed by: INTERNAL MEDICINE

## 2025-02-27 PROCEDURE — C1760 CLOSURE DEV, VASC: HCPCS | Performed by: INTERNAL MEDICINE

## 2025-02-27 PROCEDURE — 82374 ASSAY BLOOD CARBON DIOXIDE: CPT | Performed by: INTERNAL MEDICINE

## 2025-02-27 PROCEDURE — G0269 OCCLUSIVE DEVICE IN VEIN ART: HCPCS | Mod: 59 | Performed by: INTERNAL MEDICINE

## 2025-02-27 PROCEDURE — 2780000003 HC OR 278 NO HCPCS: Performed by: INTERNAL MEDICINE

## 2025-02-27 PROCEDURE — C1894 INTRO/SHEATH, NON-LASER: HCPCS | Performed by: INTERNAL MEDICINE

## 2025-02-27 PROCEDURE — 2500000004 HC RX 250 GENERAL PHARMACY W/ HCPCS (ALT 636 FOR OP/ED): Performed by: INTERNAL MEDICINE

## 2025-02-27 PROCEDURE — 93010 ELECTROCARDIOGRAM REPORT: CPT | Performed by: INTERNAL MEDICINE

## 2025-02-27 PROCEDURE — 99152 MOD SED SAME PHYS/QHP 5/>YRS: CPT | Performed by: INTERNAL MEDICINE

## 2025-02-27 RX ORDER — FENTANYL CITRATE 50 UG/ML
INJECTION, SOLUTION INTRAMUSCULAR; INTRAVENOUS AS NEEDED
Status: DISCONTINUED | OUTPATIENT
Start: 2025-02-27 | End: 2025-02-27 | Stop reason: HOSPADM

## 2025-02-27 RX ORDER — CARVEDILOL 3.12 MG/1
6.25 TABLET ORAL ONCE
Status: COMPLETED | OUTPATIENT
Start: 2025-02-27 | End: 2025-02-27

## 2025-02-27 RX ORDER — ISOSORBIDE MONONITRATE 30 MG/1
60 TABLET, EXTENDED RELEASE ORAL ONCE
Status: COMPLETED | OUTPATIENT
Start: 2025-02-27 | End: 2025-02-27

## 2025-02-27 RX ORDER — NAPROXEN SODIUM 220 MG/1
81 TABLET, FILM COATED ORAL ONCE
Status: COMPLETED | OUTPATIENT
Start: 2025-02-27 | End: 2025-02-27

## 2025-02-27 RX ORDER — SODIUM CHLORIDE 9 MG/ML
100 INJECTION, SOLUTION INTRAVENOUS CONTINUOUS
Status: ACTIVE | OUTPATIENT
Start: 2025-02-27 | End: 2025-02-27

## 2025-02-27 RX ORDER — MIDAZOLAM HYDROCHLORIDE 1 MG/ML
INJECTION, SOLUTION INTRAMUSCULAR; INTRAVENOUS AS NEEDED
Status: DISCONTINUED | OUTPATIENT
Start: 2025-02-27 | End: 2025-02-27 | Stop reason: HOSPADM

## 2025-02-27 RX ORDER — LIDOCAINE HYDROCHLORIDE 20 MG/ML
INJECTION, SOLUTION INFILTRATION; PERINEURAL AS NEEDED
Status: DISCONTINUED | OUTPATIENT
Start: 2025-02-27 | End: 2025-02-27 | Stop reason: HOSPADM

## 2025-02-27 RX ORDER — RANOLAZINE 500 MG/1
500 TABLET, EXTENDED RELEASE ORAL ONCE
Status: DISCONTINUED | OUTPATIENT
Start: 2025-02-27 | End: 2025-02-27 | Stop reason: HOSPADM

## 2025-02-27 RX ADMIN — CARVEDILOL 6.25 MG: 3.12 TABLET, FILM COATED ORAL at 06:41

## 2025-02-27 RX ADMIN — SODIUM CHLORIDE 100 ML/HR: 9 INJECTION, SOLUTION INTRAVENOUS at 06:40

## 2025-02-27 RX ADMIN — TICAGRELOR 90 MG: 90 TABLET ORAL at 06:41

## 2025-02-27 RX ADMIN — ISOSORBIDE MONONITRATE 60 MG: 30 TABLET, EXTENDED RELEASE ORAL at 06:40

## 2025-02-27 RX ADMIN — ASPIRIN 81 MG: 81 TABLET, CHEWABLE ORAL at 06:41

## 2025-02-27 ASSESSMENT — COLUMBIA-SUICIDE SEVERITY RATING SCALE - C-SSRS
6. HAVE YOU EVER DONE ANYTHING, STARTED TO DO ANYTHING, OR PREPARED TO DO ANYTHING TO END YOUR LIFE?: NO
2. HAVE YOU ACTUALLY HAD ANY THOUGHTS OF KILLING YOURSELF?: NO
1. IN THE PAST MONTH, HAVE YOU WISHED YOU WERE DEAD OR WISHED YOU COULD GO TO SLEEP AND NOT WAKE UP?: NO

## 2025-02-27 ASSESSMENT — PAIN - FUNCTIONAL ASSESSMENT: PAIN_FUNCTIONAL_ASSESSMENT: 0-10

## 2025-02-27 ASSESSMENT — PAIN SCALES - GENERAL
PAINLEVEL_OUTOF10: 0 - NO PAIN

## 2025-02-27 NOTE — POST-PROCEDURE NOTE
Procedure(s):  C, b/l coronary angio, grafts    Pre-operative Diagnosis: Angina class IV    H&P Status: Completed and reviewed.     Post-operative Diagnosis:      LV ejection fraction 20%.  Global hypokinesis.  Normal LVEDP  Left Main moderate caliber.  Mild disease  % mid in-stent restenosis  Circumflex severe diffuse disease  % mid.  Fills retrograde    LIMA to LAD is widely patent.  Supplies moderate caliber LAD with mild to moderate diffuse disease    Findings:  See full report    Complications:  none    IMPLANTS: NONE     Primary Proceduralist:   Dr.Wes Sykes DO    Plan:  Maximize medical therapy  Risk factor modification  Full procedure note to follow

## 2025-02-27 NOTE — DISCHARGE INSTRUCTIONS

## 2025-02-27 NOTE — SIGNIFICANT EVENT
Discharge instructions reviewed with pt including medications, wound care, cardiac rehab and follow up, pt verbalized understanding

## 2025-02-27 NOTE — NURSING NOTE
Spoke with Dr Sykes, orders for brilinta, imdur, coreg, aspirin, ranexa and EKG obtained, entered into the computer as stat

## 2025-02-27 NOTE — Clinical Note
Diagnostic wire inserted. thinks she may have pneumonia and wants to be seen today but there is no avail time- wants to speak to nurse

## 2025-03-03 ENCOUNTER — APPOINTMENT (OUTPATIENT)
Dept: PRIMARY CARE | Facility: CLINIC | Age: 50
End: 2025-03-03
Payer: COMMERCIAL

## 2025-03-03 LAB
ATRIAL RATE: 70 BPM
P AXIS: 60 DEGREES
P OFFSET: 171 MS
P ONSET: 106 MS
PR INTERVAL: 210 MS
Q ONSET: 211 MS
QRS COUNT: 12 BEATS
QRS DURATION: 114 MS
QT INTERVAL: 448 MS
QTC CALCULATION(BAZETT): 483 MS
QTC FREDERICIA: 471 MS
R AXIS: -37 DEGREES
T AXIS: 96 DEGREES
T OFFSET: 435 MS
VENTRICULAR RATE: 70 BPM

## 2025-03-06 ENCOUNTER — PATIENT OUTREACH (OUTPATIENT)
Dept: PRIMARY CARE | Facility: CLINIC | Age: 50
End: 2025-03-06
Payer: COMMERCIAL

## 2025-03-07 ENCOUNTER — APPOINTMENT (OUTPATIENT)
Dept: PHARMACY | Facility: HOSPITAL | Age: 50
End: 2025-03-07
Payer: COMMERCIAL

## 2025-03-07 DIAGNOSIS — Z79.4 TYPE 2 DIABETES MELLITUS WITHOUT COMPLICATION, WITH LONG-TERM CURRENT USE OF INSULIN (MULTI): ICD-10-CM

## 2025-03-07 DIAGNOSIS — E11.9 TYPE 2 DIABETES MELLITUS WITHOUT COMPLICATION, WITH LONG-TERM CURRENT USE OF INSULIN (MULTI): ICD-10-CM

## 2025-03-07 NOTE — PROGRESS NOTES
I reviewed the progress note and agree with the resident’s findings and plans as written. Case discussed with resident.    Jackeline Potts, SabihaD'

## 2025-03-07 NOTE — ASSESSMENT & PLAN NOTE
ASSESSMENT:  -Patient's last A1c is elevated from previous at 7.7%, now above goal of 7%  -Reviewed role of mealtime insulin and stressed importance of injecting only with meals. If using several hours prior to a meal for spikes, advised to skip dose at next meal to prevent hypoglycemia.   -As patient has been tolerating Ozempic dose well, will increase to dose of 2 mg/week for additional weight loss  -Reviewed the Lantus dose stressed taking 50 units subcutaneous daily to get her BG closer to goa; current SMBGS averaging ~140    Future Considerations:  Have pt link CGM to office software to aid in management; will discuss monitoring CGM on her mobile device if she has not already connected it     RECOMMENDATIONS/PLAN:  CONTINUE all diabetes medications as prescribed:  Metformin  m tablets BID  Jardiance 10 mg daily  Lantus 50 units at bedtime  Novolog TID per sliding scale (on average injecting 6-8 units); max dose of 30 units per day  INCREASE  Ozempic to 2 mg once weekly on    EDUCATION  Went over importance of medication adherence  Went over where to apply CGM reader; on arm is best  Went over GI ADR's for Ozempic due to dose escalation

## 2025-03-07 NOTE — PROGRESS NOTES
"  Clinical Pharmacy Appointment    Patient ID: Tana Bahena \"Axel" is a 49 y.o. female who presents for Diabetes.    Pt is here for Follow Up appointment.     Referring Provider: Jacobo Thakkar MD  PCP: Jacobo Thakkar MD   Last visit with PCP: 25   Next visit with PCP: 25    Subjective     Interval History  Pt denies GI ADR's (nausea/vomiting, diarrhea, constipation dyspepsia) from Ozempic  Pt endorses wanting to lose some weight    DIABETES MELLITUS TYPE II:    Diagnosed with diabetes:  >3 years ago.   Known diabetic complications: long term use of insulin, overweight, microalbuminuria, insomnia, CHF, bipolar disorder.  Does patient follow with Endocrinology: Not at this time  Last Diabetic retinal exam: 16  Most recent visit in Podiatry: 17- did not assess today if cuts or bruises    Current diabetic medications include:  Metformin  m tabs BID  Jardiance 10 mg daily  Lantus 50 units at bedtime  Humalog 6-8 units TID AC on sliding scale with meals (max 30 units/day)   Actually using ~ 2-4 units prior to one meal  Ozempic 1 mg weekly on     Clarifications to above regimen: Pt reports Lantus 46 units per day   Adverse Effects: None     Past diabetic medications include:  Novolog    Glucose Readings:  Glucometer/CGM Type: Freestyle Jayy 3 Plus  Patient tests BG continuously     Current home BG readings (mg/dL): 140s  Previous home BG readings (mg/dL): patient did not provide    Any episodes of hypoglycemia? No, denies .    Did patient treat episode of hypoglycemia appropriately? N/A  Does the patient have a prescription for ready-to-use Glucagon? No      Lifestyle:  Diet: 1 meals/day; dinner   Drinks: coffee 3 cups/day & pepsi   Exercise: exercises daily  Activity type: Rehab exercises  Is limited by: Injury  Tobacco history: currently smoking 1/2 ppd x 16 years    Secondary Prevention:  Statin? Yes (Atorvastatin 40 mg)  ACE-I/ARB? (Losartan 25 mg)  Aspirin? " Yes    Pertinent PNH Review:  PMH of Pancreatitis: No  PMH of Retinopathy: No  PMH of Urinary Tract Infections: Yes  PMH of Medullary Thyroid Carcinoma: No  PMH of Multiple Endocrine Neoplasia 2: No    Immunizations:  Influenza? Yes 10/21/24  COVID? No  Pneumonia? Yes, 3/20/22  Shingles? No  Hepatitis B? Not in chart    Medication System Management  Patients preferred pharmacy: [facility]  Adherence/Organization: poor adherence, did not assess organization today  Affordability/Accessibility:   did not assess affordability today   pt has hx of susceptibility problems  Ozempic supply chain issues    Objective   Allergies   Allergen Reactions    Ketorolac Unknown    Tramadol Unknown     Social History     Social History Narrative    Not on file      Medication Review  Current Outpatient Medications   Medication Instructions    alcohol swabs (Alcohol Prep Pads) pads, medicated USE TO CLEANSE SKIN PRIOR TO INJECTION FOUR TIMES DAILY AS DIRECTED    aspirin 81 mg EC tablet 1 tablet, Daily    atorvastatin (Lipitor) 40 mg tablet 1 tablet, Daily    blood-glucose sensor (FreeStyle Jayy 3 Plus Sensor) device Use as directed to test blood sugar daily. Replace sensor in arm every 15 days.    carBAMazepine (Carbatrol) 200 mg 12 hr capsule 1 capsule, 2 times daily    carvedilol (COREG) 6.25 mg, oral, 2 times daily (morning and late afternoon)    donepezil (ARICEPT) 5 mg, Nightly    eszopiclone (LUNESTA) 2 mg, Nightly    fluticasone propion-salmeteroL (Advair Diskus) 250-50 mcg/dose diskus inhaler 1 puff, inhalation, 2 times daily RT, Rinse mouth with water after use to reduce aftertaste and incidence of candidiasis. Do not swallow.    FreeStyle Jayy 3 [place] misc USE AS INSTRUCTED FOR CONTINUOUS BLOOD GLUCOSE MONITORING    FreeStyle Jayy 3 Sensor device tid    gabapentin (NEURONTIN) 100 mg, oral, 3 times daily    hydrOXYzine HCL (ATARAX) 25 mg, oral, Nightly    insulin aspart (NovoLOG Flexpen U-100 Insulin) 100 unit/mL (3 mL)  "pen INJECT 6-8 UNITS UNDER THE SKIN THREE TIMES DAILY BEFORE MEALS. PER SLIDING SCALE, MAX 30 UNITS PER DAY    insulin lispro (HumaLOG KwikPen Insulin) 100 unit/mL injection 3 times daily (morning, midday, late afternoon)    isosorbide mononitrate ER (IMDUR) 60 mg, oral, Daily before breakfast    Jardiance 10 mg, oral, Daily    lancets misc Use as directed when checking blood sugar    Lantus Solostar U-100 Insulin 50 Units, subcutaneous, Nightly, Take as directed per insulin instructions.    memantine (NAMENDA) 5 mg, 2 times daily    metFORMIN XR (GLUCOPHAGE-XR) 1,000 mg, oral, 2 times daily    metoclopramide (REGLAN) 10 mg, oral, 4 times daily PRN    nitroglycerin (Nitrostat) 0.4 mg SL tablet 1 tablet, Every 5 min PRN    OneTouch Verio Flex Start kit Use as needed for glucose testing    OneTouch Verio test strips strip Test glucose 4 times daily    pen needle, diabetic (BD Breana 2nd Gen Pen Needle) 32 gauge x 5/32\" needle USE TO ADMINISTER INSULIN 4 TIMES A DAY    ranolazine (Ranexa) 500 mg 12 hr tablet     rOPINIRole (REQUIP) 0.25 mg, oral, 3 times daily    semaglutide 2 mg, subcutaneous, Weekly    ticagrelor (BRILINTA) 90 mg, oral, 2 times daily    traZODone (DESYREL) 300 mg, oral, Nightly PRN    triamterene-hydrochlorothiazid (Maxzide-25) 37.5-25 mg tablet 1 tablet, Daily    True Metrix Glucose Meter misc USE TO TEST BLOOD SUGAR ONCE DAILY AND AS NEEDED      Vitals  BP Readings from Last 2 Encounters:   02/27/25 124/81   02/05/25 102/68     BMI Readings from Last 1 Encounters:   02/27/25 27.35 kg/m²      Labs  A 1 C  Lab Results   Component Value Date    HGBA1C 7.7 (H) 02/04/2025    HGBA1C 7.6 (H) 05/14/2024    HGBA1C 6.6 (H) 11/20/2023     BMP  Lab Results   Component Value Date    CALCIUM 9.7 02/27/2025     02/27/2025    K 4.2 02/27/2025    CO2 27 02/27/2025     02/27/2025    BUN 7 02/27/2025    CREATININE 0.86 02/27/2025    EGFR 84 02/27/2025     LFTs  Lab Results   Component Value Date    ALT 14 " "2025    AST 15 2025    ALKPHOS 83 2025    BILITOT 0.7 2025     FLP  Lab Results   Component Value Date    TRIG 118 2022    CHOL 154 2022    LDLF 79 2022    HDL 51.1 2022     Urine Microalbumin  No results found for: \"MICROALBCREA\"  Weight Management  Wt Readings from Last 3 Encounters:   25 79.2 kg (174 lb 9.7 oz)   25 79.8 kg (175 lb 14.8 oz)   25 82.1 kg (181 lb)      There is no height or weight on file to calculate BMI.     Assessment/Plan   Problem List Items Addressed This Visit       Type 2 diabetes mellitus without complication, with long-term current use of insulin (Multi)     ASSESSMENT:  -Patient's last A1c is elevated from previous at 7.7%, now above goal of 7%  -Reviewed role of mealtime insulin and stressed importance of injecting only with meals. If using several hours prior to a meal for spikes, advised to skip dose at next meal to prevent hypoglycemia.   -As patient has been tolerating Ozempic dose well, will increase to dose of 2 mg/week for additional weight loss  -Reviewed the Lantus dose stressed taking 50 units subcutaneous daily to get her BG closer to goa; current SMBGS averaging ~140    Future Considerations:  Have pt link CGM to office software to aid in management; will discuss monitoring CGM on her mobile device if she has not already connected it     RECOMMENDATIONS/PLAN:  CONTINUE all diabetes medications as prescribed:  Metformin  m tablets BID  Jardiance 10 mg daily  Lantus 50 units at bedtime  Novolog TID per sliding scale (on average injecting 6-8 units); max dose of 30 units per day  INCREASE  Ozempic to 2 mg once weekly on    EDUCATION  Went over importance of medication adherence  Went over where to apply CGM reader; on arm is best  Went over GI ADR's for Ozempic due to dose escalation         Relevant Orders    Referral to Clinical Pharmacy     Clinical Pharmacist follow-up: 25 @2 pm, " Telehealth visit    Continue all meds under the continuation of care with the referring provider and clinical pharmacy team.    Thank you,  Dariana Styles  Clinical Pharmacist  [phone]    Verbal consent to manage patient's drug therapy was obtained from the patient. They were informed they may decline to participate or withdraw from participation in pharmacy services at any time.

## 2025-03-10 DIAGNOSIS — G47.01 INSOMNIA DUE TO MEDICAL CONDITION: ICD-10-CM

## 2025-03-11 RX ORDER — ESZOPICLONE 2 MG/1
TABLET, FILM COATED ORAL
Qty: 30 TABLET | Refills: 0 | Status: SHIPPED | OUTPATIENT
Start: 2025-03-11 | End: 2025-04-04

## 2025-03-11 RX ORDER — DONEPEZIL HYDROCHLORIDE 5 MG/1
5 TABLET, FILM COATED ORAL NIGHTLY
Qty: 30 TABLET | Refills: 0 | Status: SHIPPED | OUTPATIENT
Start: 2025-03-11

## 2025-03-11 NOTE — TELEPHONE ENCOUNTER
Requesting medication refill. Please approve or deny this request.    Rx requested:  Requested Prescriptions     Pending Prescriptions Disp Refills    eszopiclone (LUNESTA) 2 MG TABS [Pharmacy Med Name: ESZOPICLONE 2MG TABLETS] 30 tablet      Sig: TAKE 1 TABLET BY MOUTH EVERY NIGHT. SLOWLY DISCONTINUE TRAZODONE WHEN TAKING THE MEDICATION    donepezil (ARICEPT) 5 MG tablet [Pharmacy Med Name: DONEPEZIL 5MG TABLETS] 30 tablet 0     Sig: TAKE 1 TABLET BY MOUTH EVERY NIGHT         Last Office Visit:   12/18/2024      Next Visit Date:  Future Appointments   Date Time Provider Department Center   5/12/2025  3:00 PM Roberto Roman MD [place] [facility] -               Last refill 2/20/25. Please approve or deny.

## 2025-03-13 DIAGNOSIS — E11.43 DIABETIC GASTROPARESIS (MULTI): ICD-10-CM

## 2025-03-13 DIAGNOSIS — K31.84 DIABETIC GASTROPARESIS (MULTI): ICD-10-CM

## 2025-03-13 RX ORDER — METOCLOPRAMIDE 10 MG/1
10 TABLET ORAL 4 TIMES DAILY PRN
Qty: 30 TABLET | Refills: 0 | Status: SHIPPED | OUTPATIENT
Start: 2025-03-13 | End: 2025-05-12

## 2025-03-18 DIAGNOSIS — Z79.4 TYPE 2 DIABETES MELLITUS WITHOUT COMPLICATION, WITH LONG-TERM CURRENT USE OF INSULIN (MULTI): ICD-10-CM

## 2025-03-18 DIAGNOSIS — F51.01 PRIMARY INSOMNIA: ICD-10-CM

## 2025-03-18 DIAGNOSIS — E11.9 TYPE 2 DIABETES MELLITUS WITHOUT COMPLICATION, WITH LONG-TERM CURRENT USE OF INSULIN (MULTI): ICD-10-CM

## 2025-03-18 NOTE — TELEPHONE ENCOUNTER
Rx Refill Request     Name: Tana Baezne  :  1975   Medication Name:  trazadone   Specific Pharmacy location:  [facility]   Date of last appointment:  2025   Date of next appointment:  Visit date not found   Best number to reach patient:  [phone]

## 2025-03-20 RX ORDER — TRAZODONE HYDROCHLORIDE 100 MG/1
300 TABLET ORAL NIGHTLY PRN
Qty: 90 TABLET | Refills: 1 | Status: SHIPPED | OUTPATIENT
Start: 2025-03-20 | End: 2025-05-19

## 2025-03-20 RX ORDER — METFORMIN HYDROCHLORIDE 500 MG/1
1000 TABLET, EXTENDED RELEASE ORAL 2 TIMES DAILY
Qty: 120 TABLET | Refills: 2 | Status: SHIPPED | OUTPATIENT
Start: 2025-03-20

## 2025-04-03 DIAGNOSIS — G47.01 INSOMNIA DUE TO MEDICAL CONDITION: ICD-10-CM

## 2025-04-04 ENCOUNTER — APPOINTMENT (OUTPATIENT)
Dept: PHARMACY | Facility: HOSPITAL | Age: 50
End: 2025-04-04
Payer: COMMERCIAL

## 2025-04-04 DIAGNOSIS — Z79.4 TYPE 2 DIABETES MELLITUS WITHOUT COMPLICATION, WITH LONG-TERM CURRENT USE OF INSULIN: ICD-10-CM

## 2025-04-04 DIAGNOSIS — E11.9 TYPE 2 DIABETES MELLITUS WITHOUT COMPLICATION, WITH LONG-TERM CURRENT USE OF INSULIN: ICD-10-CM

## 2025-04-04 PROCEDURE — RXMED WILLOW AMBULATORY MEDICATION CHARGE

## 2025-04-04 RX ORDER — ESZOPICLONE 2 MG/1
TABLET, FILM COATED ORAL
Qty: 30 TABLET | Refills: 0 | Status: SHIPPED | OUTPATIENT
Start: 2025-04-04 | End: 2025-05-08

## 2025-04-04 RX ORDER — ESZOPICLONE 2 MG/1
2 TABLET, FILM COATED ORAL NIGHTLY
Qty: 30 TABLET | Refills: 1 | Status: CANCELLED | OUTPATIENT
Start: 2025-04-04

## 2025-04-04 NOTE — ASSESSMENT & PLAN NOTE
ASSESSMENT:  -Patient's last A1c is elevated from previous at 7.7%, now above goal of 7%  -Reviewed role of mealtime insulin and stressed importance of injecting only with meals. If using several hours prior to a meal for spikes, advised to skip dose at next meal to prevent hypoglycemia.   -As patient has been tolerating Ozempic dose well, will increase to 2 mg/week for additional weight loss  -Reviewed Lantus dose and stressed taking as prescribed get her BG closer to goal; current SMBGS averaging ~244    Future Considerations:  Have pt link CGM to office software to aid in management; will discuss monitoring CGM on her mobile device if she has not already connected it  Increase Jardiance to 25 mg     RECOMMENDATIONS/PLAN:  INCREASE  Lantus to 53 units at bedtime  Ozempic to 2 mg once weekly on    CONTINUE all other diabetes medications as prescribed:  Metformin  m tablets BID  Jardiance 10 mg daily  Novolog TID per sliding scale (on average injecting 2-4 units); max dose of 30 units per day  3.   EDUCATION  Went over importance of medication adherence  Went over where to apply CGM reader; on arm is best  Went over GI ADR's for Ozempic due to dose escalation

## 2025-04-04 NOTE — TELEPHONE ENCOUNTER
Requesting medication refill. Please approve or deny this request.    Rx requested:  Requested Prescriptions     Pending Prescriptions Disp Refills    eszopiclone (LUNESTA) 2 MG TABS [Pharmacy Med Name: ESZOPICLONE 2MG TABLETS] 30 tablet      Sig: TAKE 1 TABLET BY MOUTH EVERY NIGHT. SLOWLY DISCONTINUE TRAZODONE WHEN TAKING THE MEDICATION         Last Office Visit:   12/18/2024      Next Visit Date:  Future Appointments   Date Time Provider Department Center   5/12/2025  3:00 PM Roberto Roman MD [place] [facility] -               Last refill 3/11/25. Please approve or deny.

## 2025-04-04 NOTE — PROGRESS NOTES
"  Clinical Pharmacy Appointment    Patient ID: Tana Bahena \"Axel" is a 49 y.o. female who presents for Diabetes.    Pt is here for Follow Up appointment.     Referring Provider: Jacobo Thakkar MD  Last visit with PCP: 25   Next visit with PCP: not scheduled    Subjective     Interval History  Pt denies GI ADR's (nausea/vomiting, diarrhea, constipation dyspepsia) from Ozempic  Pt endorses wanting to lose some weight  Pt is requesting hydroxyzine refill and Lunesta    DIABETES MELLITUS TYPE II:    Diagnosed with diabetes:  >3 years ago.   Known diabetic complications: long term use of insulin, overweight, microalbuminuria, insomnia, CHF, bipolar disorder.  Does patient follow with Endocrinology: Not at this time  Last diabetic retinal exam: 16  Most recent visit in Podiatry: 17- did not assess today if cuts or bruises    Current diabetic medications include:  Metformin  m tabs BID  Jardiance 10 mg daily  Lantus 50 units at bedtime  Humalog 6-8 units TID AC on sliding scale with meals (max 30 units/day)   Actually using ~2-4 units prior to one meal  Ozempic 1 mg weekly on     Clarifications to above regimen: Pt reports taking Lantus 46 units daily   Adverse Effects: None     Past diabetic medications include:  Novolog    Glucose Readings:  Glucometer/CGM Type: Freestyle Jayy 3 Plus  Patient tests BG continuously     Current home BG readings (mg/dL): 244  Previous home BG readings (mg/dL): 140s    CGM Averages:  7 day: 244  14 day: 221  30 day: 215  90 day: 190    Any episodes of hypoglycemia? No, denies .    Did patient treat episode of hypoglycemia appropriately? N/A  Does the patient have a prescription for ready-to-use Glucagon? No      Lifestyle:  Diet: 1 meals/day; dinner: tacos, burger  Drinks: coffee 3 cups/day & Pepsi 3 cans/bottles  Exercise: exercises daily  Activity type: Rehab exercises  Is limited by: Injury  Tobacco history: currently smoking 1/2 ppd x 16 " years    Secondary Prevention:  Statin? Yes (Atorvastatin 40 mg)  ACE-I/ARB? (Losartan 25 mg)  Aspirin? Yes    Pertinent PNH Review:  PMH of Pancreatitis: No  PMH of Retinopathy: No  PMH of Urinary Tract Infections: Yes  PMH of Medullary Thyroid Carcinoma: No  PMH of Multiple Endocrine Neoplasia 2: No    Immunizations:  Influenza? Yes 10/21/24  COVID? No  Pneumonia? Yes, 3/20/22  Shingles? No  Hepatitis B? Not in chart    Medication System Management  Patients preferred pharmacy: [facility]  Adherence/Organization: poor adherence, did not assess organization today  Affordability/Accessibility:   did not assess affordability today   pt has hx of accessbility problems  Ozempic supply chain issues    Objective   Allergies   Allergen Reactions    Ketorolac Unknown    Tramadol Unknown     Social History     Social History Narrative    Not on file      Medication Review  Current Outpatient Medications   Medication Instructions    alcohol swabs (Alcohol Prep Pads) pads, medicated USE TO CLEANSE SKIN PRIOR TO INJECTION FOUR TIMES DAILY AS DIRECTED    aspirin 81 mg EC tablet 1 tablet, Daily    atorvastatin (Lipitor) 40 mg tablet 1 tablet, Daily    blood-glucose sensor (FreeStyle Jayy 3 Plus Sensor) device Use as directed to test blood sugar daily. Replace sensor in arm every 15 days.    carBAMazepine (Carbatrol) 200 mg 12 hr capsule 1 capsule, 2 times daily    carvedilol (COREG) 6.25 mg, oral, 2 times daily (morning and late afternoon)    donepezil (ARICEPT) 5 mg, Nightly    eszopiclone (LUNESTA) 2 mg, Nightly    fluticasone propion-salmeteroL (Advair Diskus) 250-50 mcg/dose diskus inhaler 1 puff, inhalation, 2 times daily RT, Rinse mouth with water after use to reduce aftertaste and incidence of candidiasis. Do not swallow.    FreeStyle Jayy 3 [place] misc USE AS INSTRUCTED FOR CONTINUOUS BLOOD GLUCOSE MONITORING    FreeStyle Jayy 3 Sensor device tid    gabapentin (NEURONTIN) 100 mg, oral, 3 times daily    hydrOXYzine HCL  "(ATARAX) 25 mg, oral, Nightly    insulin aspart (NovoLOG Flexpen U-100 Insulin) 100 unit/mL (3 mL) pen INJECT 6-8 UNITS UNDER THE SKIN THREE TIMES DAILY BEFORE MEALS. PER SLIDING SCALE, MAX 30 UNITS PER DAY    insulin lispro (HumaLOG KwikPen Insulin) 100 unit/mL injection 3 times daily (morning, midday, late afternoon)    isosorbide mononitrate ER (IMDUR) 60 mg, oral, Daily before breakfast    Jardiance 10 mg, oral, Daily    lancets misc Use as directed when checking blood sugar    Lantus Solostar U-100 Insulin 50 Units, subcutaneous, Nightly, Take as directed per insulin instructions.    memantine (NAMENDA) 5 mg, 2 times daily    metFORMIN XR (GLUCOPHAGE-XR) 1,000 mg, oral, 2 times daily    metoclopramide (REGLAN) 10 mg, oral, 4 times daily PRN    nitroglycerin (Nitrostat) 0.4 mg SL tablet 1 tablet, Every 5 min PRN    OneTouch Verio Flex Start kit Use as needed for glucose testing    OneTouch Verio test strips strip Test glucose 4 times daily    pen needle, diabetic (BD Breana 2nd Gen Pen Needle) 32 gauge x 5/32\" needle USE TO ADMINISTER INSULIN 4 TIMES A DAY    ranolazine (Ranexa) 500 mg 12 hr tablet     rOPINIRole (REQUIP) 0.25 mg, oral, 3 times daily    semaglutide 2 mg, subcutaneous, Weekly    ticagrelor (BRILINTA) 90 mg, oral, 2 times daily    traZODone (DESYREL) 300 mg, oral, Nightly PRN    triamterene-hydrochlorothiazid (Maxzide-25) 37.5-25 mg tablet 1 tablet, Daily    True Metrix Glucose Meter misc USE TO TEST BLOOD SUGAR ONCE DAILY AND AS NEEDED      Vitals  BP Readings from Last 2 Encounters:   02/27/25 124/81   02/05/25 102/68     BMI Readings from Last 1 Encounters:   02/27/25 27.35 kg/m²      Labs  A 1 C  Lab Results   Component Value Date    HGBA1C 7.7 (H) 02/04/2025    HGBA1C 7.6 (H) 05/14/2024    HGBA1C 6.6 (H) 11/20/2023     BMP  Lab Results   Component Value Date    CALCIUM 9.7 02/27/2025     02/27/2025    K 4.2 02/27/2025    CO2 27 02/27/2025     02/27/2025    BUN 7 02/27/2025    " "CREATININE 0.86 2025    EGFR 84 2025     LFTs  Lab Results   Component Value Date    ALT 14 2025    AST 15 2025    ALKPHOS 83 2025    BILITOT 0.7 2025     FLP  Lab Results   Component Value Date    TRIG 118 2022    CHOL 154 2022    LDLF 79 2022    HDL 51.1 2022     Urine Microalbumin  No results found for: \"MICROALBCREA\"  Weight Management  Wt Readings from Last 3 Encounters:   25 79.2 kg (174 lb 9.7 oz)   25 79.8 kg (175 lb 14.8 oz)   25 82.1 kg (181 lb)      There is no height or weight on file to calculate BMI.     Assessment/Plan   Problem List Items Addressed This Visit       Type 2 diabetes mellitus without complication, with long-term current use of insulin     ASSESSMENT:  -Patient's last A1c is elevated from previous at 7.7%, now above goal of 7%  -Reviewed role of mealtime insulin and stressed importance of injecting only with meals. If using several hours prior to a meal for spikes, advised to skip dose at next meal to prevent hypoglycemia.   -As patient has been tolerating Ozempic dose well, will increase to 2 mg/week for additional weight loss  -Reviewed Lantus dose and stressed taking as prescribed get her BG closer to goal; current SMBGS averaging ~244    Future Considerations:  Have pt link CGM to office software to aid in management; will discuss monitoring CGM on her mobile device if she has not already connected it  Increase Jardiance to 25 mg     RECOMMENDATIONS/PLAN:  INCREASE  Lantus to 53 units at bedtime  Ozempic to 2 mg once weekly on    CONTINUE all other diabetes medications as prescribed:  Metformin  m tablets BID  Jardiance 10 mg daily  Novolog TID per sliding scale (on average injecting 2-4 units); max dose of 30 units per day  3.   EDUCATION  Went over importance of medication adherence  Went over where to apply CGM reader; on arm is best  Went over GI ADR's for Ozempic due to dose " escalation         Relevant Orders    Referral to Clinical Pharmacy     Clinical Pharmacist follow-up: 4/18/25 @2 pm, Telehealth visit    Continue all meds under the continuation of care with the referring provider and clinical pharmacy team.    Thank you,  Dariana Styles  Clinical Pharmacist  [phone]    Verbal consent to manage patient's drug therapy was obtained from the patient. They were informed they may decline to participate or withdraw from participation in pharmacy services at any time.

## 2025-04-07 ENCOUNTER — PHARMACY VISIT (OUTPATIENT)
Dept: PHARMACY | Facility: CLINIC | Age: 50
End: 2025-04-07
Payer: MEDICAID

## 2025-04-09 RX ORDER — INSULIN GLARGINE 100 [IU]/ML
53 INJECTION, SOLUTION SUBCUTANEOUS NIGHTLY
Qty: 15 ML | Refills: 2 | Status: SHIPPED | OUTPATIENT
Start: 2025-04-09 | End: 2025-07-03

## 2025-04-18 ENCOUNTER — APPOINTMENT (OUTPATIENT)
Dept: PHARMACY | Facility: HOSPITAL | Age: 50
End: 2025-04-18
Payer: COMMERCIAL

## 2025-04-22 ENCOUNTER — PATIENT OUTREACH (OUTPATIENT)
Dept: PRIMARY CARE | Facility: CLINIC | Age: 50
End: 2025-04-22
Payer: COMMERCIAL

## 2025-04-24 ENCOUNTER — APPOINTMENT (OUTPATIENT)
Dept: PRIMARY CARE | Facility: CLINIC | Age: 50
End: 2025-04-24
Payer: COMMERCIAL

## 2025-04-29 ENCOUNTER — APPOINTMENT (OUTPATIENT)
Dept: PHARMACY | Facility: HOSPITAL | Age: 50
End: 2025-04-29
Payer: COMMERCIAL

## 2025-04-29 DIAGNOSIS — E11.9 TYPE 2 DIABETES MELLITUS WITHOUT COMPLICATION, WITH LONG-TERM CURRENT USE OF INSULIN: ICD-10-CM

## 2025-04-29 DIAGNOSIS — Z79.4 TYPE 2 DIABETES MELLITUS WITHOUT COMPLICATION, WITH LONG-TERM CURRENT USE OF INSULIN: ICD-10-CM

## 2025-04-29 PROCEDURE — RXMED WILLOW AMBULATORY MEDICATION CHARGE

## 2025-04-29 NOTE — PROGRESS NOTES
"  Clinical Pharmacy Appointment    Patient ID: Tana Bahena \"Axel" is a 49 y.o. female who presents for Diabetes.    Pt is here for Follow Up appointment.     Referring Provider: Jacobo Thakkar MD  Last visit with PCP: 25   Next visit with PCP: not scheduled; missed 2025 visit    Subjective     Interval History  Having issues getting Ozempic pen- pharmacy supply issue  Has not had any doses for about 3-4 weeks  Given this, will not advance to 2 mg as previously planned to avoid GI side effects  Advised to call PCP or PharmD if any issues arise on previous 1mg dose  Agreeable to  mail order to avoid further med supply issues    DIABETES MELLITUS TYPE II:    Diagnosed with diabetes:  >3 years ago.   Known diabetic complications: long term use of insulin, overweight, microalbuminuria.  Does patient follow with Endocrinology: Not at this time  Last diabetic retinal exam: 16  Most recent visit in Podiatry: 17- did not assess today if cuts or bruises    Current diabetic medications include:  Metformin  m tabs BID  Jardiance 10 mg daily  Lantus 53 units at bedtime  Humalog 6-8 units TID AC on sliding scale with meals (max 30 units/day)   Actually using ~2-4 units prior to one meal  Ozempic 2 mg weekly on     Clarifications to above regimen: was out of Ozempic for a few weeks d/t pharmacy stocking issues  Adverse Effects: None     Past diabetic medications include:  Novolog    Glucose Readings:  Glucometer/CGM Type: Freestyle Jayy 3 Plus  Patient tests BG continuously     Current home BG readings (mg/dL):   Type of CGM: Jayy 3+  Sugars reading in high 200s and sometimes 300s at this point    Previous home BG readings (mg/dL): 244 7-day average    Any episodes of hypoglycemia? No, denies .    Did patient treat episode of hypoglycemia appropriately? N/A  Does the patient have a prescription for ready-to-use Glucagon? No      Lifestyle:  Denies any symptoms of high glucose " including vision changes or nausea    Hx:  Diet: 1 meals/day; dinner: tacos, burger  Drinks: coffee 3 cups/day & Pepsi 3 cans/bottles  Exercise: exercises daily  Activity type: Rehab exercises  Is limited by: Injury  Tobacco history: currently smoking 1/2 ppd x 16 years    Secondary Prevention:  Statin? Yes (Atorvastatin 40 mg)  ACE-I/ARB? (Losartan 25 mg)  Aspirin? Yes    Pertinent PNH Review:  PMH of Pancreatitis: No  PMH of Retinopathy: No  PMH of Urinary Tract Infections: Yes  PMH of Medullary Thyroid Carcinoma: No  PMH of Multiple Endocrine Neoplasia 2: No    Immunizations:  Influenza? Yes 10/21/24  COVID? No  Pneumonia? Yes, 3/20/22  Shingles? No  Hepatitis B? Not in chart    Medication System Management  Patients preferred pharmacy: [facility]  Adherence/Organization: poor adherence, did not assess organization today  Affordability/Accessibility:   did not assess affordability today   pt has hx of accessbility problems  Ozempic supply chain issues    Objective   Allergies   Allergen Reactions    Ketorolac Unknown    Tramadol Unknown     Social History     Social History Narrative    Not on file      Medication Review  Current Outpatient Medications   Medication Instructions    alcohol swabs (Alcohol Prep Pads) pads, medicated USE TO CLEANSE SKIN PRIOR TO INJECTION FOUR TIMES DAILY AS DIRECTED    aspirin 81 mg EC tablet 1 tablet, Daily    atorvastatin (Lipitor) 40 mg tablet 1 tablet, Daily    blood-glucose sensor (FreeStyle Jayy 3 Plus Sensor) device Use as directed to test blood sugar daily. Replace sensor in arm every 15 days.    carBAMazepine (Carbatrol) 200 mg 12 hr capsule 1 capsule, 2 times daily    carvedilol (COREG) 6.25 mg, oral, 2 times daily (morning and late afternoon)    donepezil (ARICEPT) 5 mg, Nightly    empagliflozin (JARDIANCE) 25 mg, oral, Daily    eszopiclone (LUNESTA) 2 mg, Nightly    fluticasone propion-salmeteroL (Advair Diskus) 250-50 mcg/dose diskus inhaler 1 puff, inhalation, 2 times  "daily RT, Rinse mouth with water after use to reduce aftertaste and incidence of candidiasis. Do not swallow.    FreeStyle Jayy 3 [place] misc USE AS INSTRUCTED FOR CONTINUOUS BLOOD GLUCOSE MONITORING    FreeStyle Jayy 3 Sensor device tid    gabapentin (NEURONTIN) 100 mg, oral, 3 times daily    hydrOXYzine HCL (ATARAX) 25 mg, oral, Nightly    insulin aspart (NovoLOG Flexpen U-100 Insulin) 100 unit/mL (3 mL) pen INJECT 6-8 UNITS UNDER THE SKIN THREE TIMES DAILY BEFORE MEALS. PER SLIDING SCALE, MAX 30 UNITS PER DAY    insulin glargine (LANTUS) 53 Units, subcutaneous, Nightly, Take as directed per insulin instructions.    insulin lispro (HumaLOG KwikPen Insulin) 100 unit/mL injection 3 times daily (morning, midday, late afternoon)    isosorbide mononitrate ER (IMDUR) 60 mg, oral, Daily before breakfast    lancets misc Use as directed when checking blood sugar    Lantus Solostar U-100 Insulin 50 Units, subcutaneous, Nightly, Take as directed per insulin instructions.    memantine (NAMENDA) 5 mg, 2 times daily    metFORMIN XR (GLUCOPHAGE-XR) 1,000 mg, oral, 2 times daily    metoclopramide (REGLAN) 10 mg, oral, 4 times daily PRN    nitroglycerin (Nitrostat) 0.4 mg SL tablet 1 tablet, Every 5 min PRN    OneTouch Verio Flex Start kit Use as needed for glucose testing    OneTouch Verio test strips strip Test glucose 4 times daily    pen needle, diabetic (BD Breana 2nd Gen Pen Needle) 32 gauge x 5/32\" needle USE TO ADMINISTER INSULIN 4 TIMES A DAY    ranolazine (Ranexa) 500 mg 12 hr tablet     rOPINIRole (REQUIP) 0.25 mg, oral, 3 times daily    semaglutide (OZEMPIC) 1 mg, subcutaneous, Weekly    ticagrelor (BRILINTA) 90 mg, oral, 2 times daily    traZODone (DESYREL) 300 mg, oral, Nightly PRN    triamterene-hydrochlorothiazid (Maxzide-25) 37.5-25 mg tablet 1 tablet, Daily    True Metrix Glucose Meter misc USE TO TEST BLOOD SUGAR ONCE DAILY AND AS NEEDED      Vitals  BP Readings from Last 2 Encounters:   02/27/25 124/81 " "  02/05/25 102/68     BMI Readings from Last 1 Encounters:   02/27/25 27.35 kg/m²      Labs  A 1 C  Lab Results   Component Value Date    HGBA1C 7.7 (H) 02/04/2025    HGBA1C 7.6 (H) 05/14/2024    HGBA1C 6.6 (H) 11/20/2023     BMP  Lab Results   Component Value Date    CALCIUM 9.7 02/27/2025     02/27/2025    K 4.2 02/27/2025    CO2 27 02/27/2025     02/27/2025    BUN 7 02/27/2025    CREATININE 0.86 02/27/2025    EGFR 84 02/27/2025     LFTs  Lab Results   Component Value Date    ALT 14 02/04/2025    AST 15 02/04/2025    ALKPHOS 83 02/04/2025    BILITOT 0.7 02/04/2025     FLP  Lab Results   Component Value Date    TRIG 118 12/19/2022    CHOL 154 12/19/2022    LDLF 79 12/19/2022    HDL 51.1 12/19/2022     Urine Microalbumin  No results found for: \"MICROALBCREA\"  Weight Management  Wt Readings from Last 3 Encounters:   02/27/25 79.2 kg (174 lb 9.7 oz)   02/04/25 79.8 kg (175 lb 14.8 oz)   01/20/25 82.1 kg (181 lb)      There is no height or weight on file to calculate BMI.     Assessment/Plan   Problem List Items Addressed This Visit       Type 2 diabetes mellitus without complication, with long-term current use of insulin    Patient's goal A1c is < 7%.  Is pt at goal? No, 7.7%  Patient's SMBGs are elevated. She recently has missed several doses of Ozempic due to a supply issue.     Rationale for plan: Given the patient has missed Ozempic, will not increase to 2 mg as previously planned to avoid severe GI upset. She is agreeable to trying to increase Jardiance instead as she has been taking this recently without side effects. Will plan to increase Ozempic later if able to tolerate restarting her current dose. Agreeable to send to [facility] to make sure there is no supply issue.    Medication Changes:  CONTINUE  Lantus 53 units  Humalog 6-8 units with sliding scale   Ozempic 1 mg weekly  Metformin 1000 mg twice daily  INCREASE  Jardiance to 25 mg daily    Future Considerations:  Increase Ozempic to 2 mg in " future visits if able    Monitoring and Education:   Reviewed side effects of both Jardiance and Ozempic today given increasing Jardiance and restarting Ozempic         Relevant Medications    empagliflozin (Jardiance) 25 mg tablet    semaglutide (OZEMPIC) 1 mg/dose (4 mg/3 mL) pen injector    Other Relevant Orders    Referral to Clinical Pharmacy       Clinical Pharmacist follow-up: 5/20 1:20PM, Telehealth visit    Continue all meds under the continuation of care with the referring provider and clinical pharmacy team.    Thank you,  Melissa Hernandez, PharmD  Clinical Pharmacist  [phone]    Verbal consent to manage patient's drug therapy was obtained from the patient. They were informed they may decline to participate or withdraw from participation in pharmacy services at any time.

## 2025-04-29 NOTE — ASSESSMENT & PLAN NOTE
Patient's goal A1c is < 7%.  Is pt at goal? No, 7.7%  Patient's SMBGs are elevated. She recently has missed several doses of Ozempic due to a supply issue.     Rationale for plan: Given the patient has missed Ozempic, will not increase to 2 mg as previously planned to avoid severe GI upset. She is agreeable to trying to increase Jardiance instead as she has been taking this recently without side effects. Will plan to increase Ozempic later if able to tolerate restarting her current dose. Agreeable to send to [facility] to make sure there is no supply issue.    Medication Changes:  CONTINUE  Lantus 53 units  Humalog 6-8 units with sliding scale   Ozempic 1 mg weekly  Metformin 1000 mg twice daily  INCREASE  Jardiance to 25 mg daily    Future Considerations:  Increase Ozempic to 2 mg in future visits if able    Monitoring and Education:   Reviewed side effects of both Jardiance and Ozempic today given increasing Jardiance and restarting Ozempic

## 2025-05-01 ENCOUNTER — PHARMACY VISIT (OUTPATIENT)
Dept: PHARMACY | Facility: CLINIC | Age: 50
End: 2025-05-01
Payer: MEDICAID

## 2025-05-07 DIAGNOSIS — G47.01 INSOMNIA DUE TO MEDICAL CONDITION: ICD-10-CM

## 2025-05-08 ENCOUNTER — APPOINTMENT (OUTPATIENT)
Dept: RADIOLOGY | Facility: HOSPITAL | Age: 50
DRG: 064 | End: 2025-05-08
Payer: COMMERCIAL

## 2025-05-08 ENCOUNTER — HOSPITAL ENCOUNTER (INPATIENT)
Facility: HOSPITAL | Age: 50
LOS: 2 days | Discharge: NURSING, REHAB OR LONG-TERM CARE | DRG: 064 | End: 2025-05-10
Attending: EMERGENCY MEDICINE | Admitting: STUDENT IN AN ORGANIZED HEALTH CARE EDUCATION/TRAINING PROGRAM
Payer: COMMERCIAL

## 2025-05-08 ENCOUNTER — APPOINTMENT (OUTPATIENT)
Dept: CARDIOLOGY | Facility: HOSPITAL | Age: 50
DRG: 064 | End: 2025-05-08
Payer: COMMERCIAL

## 2025-05-08 DIAGNOSIS — G45.8 OTHER TRANSIENT CEREBRAL ISCHEMIC ATTACKS AND RELATED SYNDROMES: ICD-10-CM

## 2025-05-08 DIAGNOSIS — S09.90XA CLOSED HEAD INJURY, INITIAL ENCOUNTER: ICD-10-CM

## 2025-05-08 DIAGNOSIS — R29.90 STROKE-LIKE SYMPTOM: Primary | ICD-10-CM

## 2025-05-08 DIAGNOSIS — I63.312 CEREBROVASCULAR ACCIDENT (CVA) DUE TO THROMBOSIS OF LEFT MIDDLE CEREBRAL ARTERY (MULTI): ICD-10-CM

## 2025-05-08 DIAGNOSIS — G93.40 ACUTE ENCEPHALOPATHY: ICD-10-CM
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
## 2025-05-08 LAB
ALBUMIN SERPL BCP-MCNC: 4 G/DL (ref 3.4–5)
ALP SERPL-CCNC: 89 U/L (ref 33–110)
ALT SERPL W P-5'-P-CCNC: 9 U/L (ref 7–45)
AMPHETAMINES UR QL SCN: ABNORMAL
ANION GAP BLDA CALCULATED.4IONS-SCNC: 6 MMO/L (ref 10–25)
ANION GAP SERPL CALC-SCNC: 13 MMOL/L (ref 10–20)
APPEARANCE UR: CLEAR
ARTERIAL PATENCY WRIST A: POSITIVE
ARTERIAL PATENCY WRIST A: YES
AST SERPL W P-5'-P-CCNC: 9 U/L (ref 9–39)
BARBITURATES UR QL SCN: ABNORMAL
BASE EXCESS BLDA CALC-SCNC: 4.2 MMOL/L (ref -2–3)
BASOPHILS # BLD AUTO: 0.08 X10*3/UL (ref 0–0.1)
BASOPHILS NFR BLD AUTO: 0.8 %
BENZODIAZ UR QL SCN: ABNORMAL
BILIRUB SERPL-MCNC: 0.4 MG/DL (ref 0–1.2)
BILIRUB UR STRIP.AUTO-MCNC: NEGATIVE MG/DL
BNP SERPL-MCNC: 297 PG/ML (ref 0–99)
BODY TEMPERATURE: ABNORMAL
BUN SERPL-MCNC: 11 MG/DL (ref 6–23)
BZE UR QL SCN: ABNORMAL
CA-I BLDA-SCNC: 1.2 MMOL/L (ref 1.1–1.33)
CALCIUM SERPL-MCNC: 9.1 MG/DL (ref 8.6–10.3)
CANNABINOIDS UR QL SCN: ABNORMAL
CHLORIDE BLDA-SCNC: 100 MMOL/L (ref 98–107)
CHLORIDE SERPL-SCNC: 98 MMOL/L (ref 98–107)
CHOLEST SERPL-MCNC: 229 MG/DL (ref 0–199)
CHOLESTEROL/HDL RATIO: 4.2
CO2 SERPL-SCNC: 26 MMOL/L (ref 21–32)
COLOR UR: COLORLESS
CREAT SERPL-MCNC: 0.66 MG/DL (ref 0.5–1.05)
EGFRCR SERPLBLD CKD-EPI 2021: >90 ML/MIN/1.73M*2
EOSINOPHIL # BLD AUTO: 0.14 X10*3/UL (ref 0–0.7)
EOSINOPHIL NFR BLD AUTO: 1.4 %
ERYTHROCYTE [DISTWIDTH] IN BLOOD BY AUTOMATED COUNT: 12.3 % (ref 11.5–14.5)
FENTANYL+NORFENTANYL UR QL SCN: ABNORMAL
GLUCOSE BLD MANUAL STRIP-MCNC: 244 MG/DL (ref 74–99)
GLUCOSE BLD MANUAL STRIP-MCNC: 286 MG/DL (ref 74–99)
GLUCOSE BLDA-MCNC: 320 MG/DL (ref 74–99)
GLUCOSE SERPL-MCNC: 360 MG/DL (ref 74–99)
GLUCOSE UR STRIP.AUTO-MCNC: ABNORMAL MG/DL
HCO3 BLDA-SCNC: 28.4 MMOL/L (ref 22–26)
HCT VFR BLD AUTO: 44.7 % (ref 36–46)
HCT VFR BLD EST: 44 % (ref 36–46)
HDLC SERPL-MCNC: 54.1 MG/DL
HGB BLD-MCNC: 15.3 G/DL (ref 12–16)
HGB BLDA-MCNC: 14.5 G/DL (ref 12–16)
HOLD SPECIMEN: 293
HOLD SPECIMEN: 293
HOLD SPECIMEN: NORMAL
IMM GRANULOCYTES # BLD AUTO: 0.03 X10*3/UL (ref 0–0.7)
IMM GRANULOCYTES NFR BLD AUTO: 0.3 % (ref 0–0.9)
INHALED O2 CONCENTRATION: 28 %
KETONES UR STRIP.AUTO-MCNC: ABNORMAL MG/DL
LACTATE BLDA-SCNC: 0.9 MMOL/L (ref 0.4–2)
LACTATE SERPL-SCNC: 1.3 MMOL/L (ref 0.4–2)
LDLC SERPL CALC-MCNC: 128 MG/DL
LEUKOCYTE ESTERASE UR QL STRIP.AUTO: NEGATIVE
LYMPHOCYTES # BLD AUTO: 1.6 X10*3/UL (ref 1.2–4.8)
LYMPHOCYTES NFR BLD AUTO: 16.4 %
MCH RBC QN AUTO: 31.2 PG (ref 26–34)
MCHC RBC AUTO-ENTMCNC: 34.2 G/DL (ref 32–36)
MCV RBC AUTO: 91 FL (ref 80–100)
METHADONE UR QL SCN: ABNORMAL
MONOCYTES # BLD AUTO: 0.35 X10*3/UL (ref 0.1–1)
MONOCYTES NFR BLD AUTO: 3.6 %
NEUTROPHILS # BLD AUTO: 7.53 X10*3/UL (ref 1.2–7.7)
NEUTROPHILS NFR BLD AUTO: 77.5 %
NITRITE UR QL STRIP.AUTO: NEGATIVE
NON HDL CHOLESTEROL: 175 MG/DL (ref 0–149)
NRBC BLD-RTO: 0 /100 WBCS (ref 0–0)
OPIATES UR QL SCN: ABNORMAL
OXYCODONE+OXYMORPHONE UR QL SCN: ABNORMAL
OXYHGB MFR BLDA: 95.1 % (ref 94–98)
PCO2 BLDA: 40 MM HG (ref 38–42)
PCP UR QL SCN: ABNORMAL
PH BLDA: 7.46 PH (ref 7.38–7.42)
PH UR STRIP.AUTO: 6.5 [PH]
PLATELET # BLD AUTO: 210 X10*3/UL (ref 150–450)
PO2 BLDA: 109 MM HG (ref 85–95)
POTASSIUM BLDA-SCNC: 4.2 MMOL/L (ref 3.5–5.3)
POTASSIUM SERPL-SCNC: 4.3 MMOL/L (ref 3.5–5.3)
PROT SERPL-MCNC: 6.8 G/DL (ref 6.4–8.2)
PROT UR STRIP.AUTO-MCNC: NEGATIVE MG/DL
RBC # BLD AUTO: 4.91 X10*6/UL (ref 4–5.2)
RBC # UR STRIP.AUTO: NEGATIVE MG/DL
SAO2 % BLDA: 99 % (ref 94–100)
SITE OF ARTERIAL PUNCTURE: ABNORMAL
SODIUM BLDA-SCNC: 130 MMOL/L (ref 136–145)
SODIUM SERPL-SCNC: 133 MMOL/L (ref 136–145)
SP GR UR STRIP.AUTO: 1.01
SPECIMEN DRAWN FROM PATIENT: ABNORMAL
TRIGL SERPL-MCNC: 236 MG/DL (ref 0–149)
UROBILINOGEN UR STRIP.AUTO-MCNC: NORMAL MG/DL
VLDL: 47 MG/DL (ref 0–40)
WBC # BLD AUTO: 9.7 X10*3/UL (ref 4.4–11.3)

## 2025-05-08 PROCEDURE — 80053 COMPREHEN METABOLIC PANEL: CPT | Performed by: NURSE PRACTITIONER

## 2025-05-08 PROCEDURE — 96361 HYDRATE IV INFUSION ADD-ON: CPT

## 2025-05-08 PROCEDURE — 99223 1ST HOSP IP/OBS HIGH 75: CPT | Performed by: REGISTERED NURSE

## 2025-05-08 PROCEDURE — 72125 CT NECK SPINE W/O DYE: CPT | Performed by: RADIOLOGY

## 2025-05-08 PROCEDURE — 70498 CT ANGIOGRAPHY NECK: CPT | Performed by: RADIOLOGY

## 2025-05-08 PROCEDURE — 83605 ASSAY OF LACTIC ACID: CPT | Performed by: NURSE PRACTITIONER

## 2025-05-08 PROCEDURE — 2550000001 HC RX 255 CONTRASTS: Performed by: NURSE PRACTITIONER

## 2025-05-08 PROCEDURE — 93005 ELECTROCARDIOGRAM TRACING: CPT

## 2025-05-08 PROCEDURE — 72125 CT NECK SPINE W/O DYE: CPT

## 2025-05-08 PROCEDURE — 82947 ASSAY GLUCOSE BLOOD QUANT: CPT

## 2025-05-08 PROCEDURE — 81003 URINALYSIS AUTO W/O SCOPE: CPT | Performed by: NURSE PRACTITIONER

## 2025-05-08 PROCEDURE — 70498 CT ANGIOGRAPHY NECK: CPT

## 2025-05-08 PROCEDURE — 2500000001 HC RX 250 WO HCPCS SELF ADMINISTERED DRUGS (ALT 637 FOR MEDICARE OP): Performed by: REGISTERED NURSE

## 2025-05-08 PROCEDURE — 80061 LIPID PANEL: CPT | Performed by: REGISTERED NURSE

## 2025-05-08 PROCEDURE — G0427 INPT/ED TELECONSULT70: HCPCS | Performed by: STUDENT IN AN ORGANIZED HEALTH CARE EDUCATION/TRAINING PROGRAM

## 2025-05-08 PROCEDURE — 70470 CT HEAD/BRAIN W/O & W/DYE: CPT | Performed by: RADIOLOGY

## 2025-05-08 PROCEDURE — 84443 ASSAY THYROID STIM HORMONE: CPT

## 2025-05-08 PROCEDURE — 83880 ASSAY OF NATRIURETIC PEPTIDE: CPT | Performed by: REGISTERED NURSE

## 2025-05-08 PROCEDURE — 1200000002 HC GENERAL ROOM WITH TELEMETRY DAILY

## 2025-05-08 PROCEDURE — 2500000002 HC RX 250 W HCPCS SELF ADMINISTERED DRUGS (ALT 637 FOR MEDICARE OP, ALT 636 FOR OP/ED): Performed by: STUDENT IN AN ORGANIZED HEALTH CARE EDUCATION/TRAINING PROGRAM

## 2025-05-08 PROCEDURE — 99291 CRITICAL CARE FIRST HOUR: CPT | Performed by: EMERGENCY MEDICINE

## 2025-05-08 PROCEDURE — 36415 COLL VENOUS BLD VENIPUNCTURE: CPT | Performed by: NURSE PRACTITIONER

## 2025-05-08 PROCEDURE — G0390 TRAUMA RESPONS W/HOSP CRITI: HCPCS

## 2025-05-08 PROCEDURE — 96360 HYDRATION IV INFUSION INIT: CPT

## 2025-05-08 PROCEDURE — 85025 COMPLETE CBC W/AUTO DIFF WBC: CPT | Performed by: NURSE PRACTITIONER

## 2025-05-08 PROCEDURE — 80307 DRUG TEST PRSMV CHEM ANLYZR: CPT | Performed by: NURSE PRACTITIONER

## 2025-05-08 PROCEDURE — 70450 CT HEAD/BRAIN W/O DYE: CPT

## 2025-05-08 PROCEDURE — 84132 ASSAY OF SERUM POTASSIUM: CPT | Performed by: NURSE PRACTITIONER

## 2025-05-08 PROCEDURE — 83036 HEMOGLOBIN GLYCOSYLATED A1C: CPT | Mod: ELYLAB | Performed by: REGISTERED NURSE

## 2025-05-08 PROCEDURE — 2500000004 HC RX 250 GENERAL PHARMACY W/ HCPCS (ALT 636 FOR OP/ED): Mod: JZ | Performed by: NURSE PRACTITIONER

## 2025-05-08 RX ORDER — DEXTROSE 50 % IN WATER (D50W) INTRAVENOUS SYRINGE
12.5
Status: DISCONTINUED | OUTPATIENT
Start: 2025-05-08 | End: 2025-05-10 | Stop reason: HOSPADM

## 2025-05-08 RX ORDER — NAPROXEN SODIUM 220 MG/1
324 TABLET, FILM COATED ORAL ONCE
Status: DISCONTINUED | OUTPATIENT
Start: 2025-05-08 | End: 2025-05-10 | Stop reason: HOSPADM

## 2025-05-08 RX ORDER — ESZOPICLONE 2 MG/1
2 TABLET, FILM COATED ORAL NIGHTLY
Qty: 30 TABLET | Refills: 0 | Status: SHIPPED | OUTPATIENT
Start: 2025-05-08 | End: 2025-06-07

## 2025-05-08 RX ORDER — LABETALOL HYDROCHLORIDE 5 MG/ML
10 INJECTION, SOLUTION INTRAVENOUS EVERY 10 MIN PRN
Status: ACTIVE | OUTPATIENT
Start: 2025-05-08 | End: 2025-05-10

## 2025-05-08 RX ORDER — POLYETHYLENE GLYCOL 3350 17 G/17G
17 POWDER, FOR SOLUTION ORAL DAILY
Status: DISCONTINUED | OUTPATIENT
Start: 2025-05-08 | End: 2025-05-10 | Stop reason: HOSPADM

## 2025-05-08 RX ORDER — ASPIRIN 81 MG/1
81 TABLET ORAL DAILY
Status: DISCONTINUED | OUTPATIENT
Start: 2025-05-09 | End: 2025-05-10 | Stop reason: HOSPADM

## 2025-05-08 RX ORDER — INSULIN LISPRO 100 [IU]/ML
0-5 INJECTION, SOLUTION INTRAVENOUS; SUBCUTANEOUS
Status: DISCONTINUED | OUTPATIENT
Start: 2025-05-08 | End: 2025-05-10 | Stop reason: HOSPADM

## 2025-05-08 RX ORDER — ATORVASTATIN CALCIUM 80 MG/1
80 TABLET, FILM COATED ORAL NIGHTLY
Status: DISCONTINUED | OUTPATIENT
Start: 2025-05-08 | End: 2025-05-10 | Stop reason: HOSPADM

## 2025-05-08 RX ORDER — DEXTROSE 50 % IN WATER (D50W) INTRAVENOUS SYRINGE
25
Status: DISCONTINUED | OUTPATIENT
Start: 2025-05-08 | End: 2025-05-09 | Stop reason: SDUPTHER

## 2025-05-08 RX ORDER — INSULIN LISPRO 100 [IU]/ML
0-5 INJECTION, SOLUTION INTRAVENOUS; SUBCUTANEOUS
Status: DISCONTINUED | OUTPATIENT
Start: 2025-05-09 | End: 2025-05-08

## 2025-05-08 RX ADMIN — ATORVASTATIN CALCIUM 80 MG: 80 TABLET, FILM COATED ORAL at 20:40

## 2025-05-08 RX ADMIN — SODIUM CHLORIDE 1000 ML: 0.9 INJECTION, SOLUTION INTRAVENOUS at 09:33

## 2025-05-08 RX ADMIN — INSULIN LISPRO 3 UNITS: 100 INJECTION, SOLUTION INTRAVENOUS; SUBCUTANEOUS at 20:40

## 2025-05-08 RX ADMIN — IOHEXOL 75 ML: 350 INJECTION, SOLUTION INTRAVENOUS at 10:36

## 2025-05-08 SDOH — ECONOMIC STABILITY: FOOD INSECURITY: WITHIN THE PAST 12 MONTHS, YOU WORRIED THAT YOUR FOOD WOULD RUN OUT BEFORE YOU GOT THE MONEY TO BUY MORE.: NEVER TRUE

## 2025-05-08 SDOH — ECONOMIC STABILITY: HOUSING INSECURITY: IN THE PAST 12 MONTHS, HOW MANY TIMES HAVE YOU MOVED WHERE YOU WERE LIVING?: 0

## 2025-05-08 SDOH — SOCIAL STABILITY: SOCIAL INSECURITY: HAVE YOU HAD THOUGHTS OF HARMING ANYONE ELSE?: NO

## 2025-05-08 SDOH — ECONOMIC STABILITY: HOUSING INSECURITY: IN THE LAST 12 MONTHS, WAS THERE A TIME WHEN YOU WERE NOT ABLE TO PAY THE MORTGAGE OR RENT ON TIME?: NO

## 2025-05-08 SDOH — ECONOMIC STABILITY: INCOME INSECURITY: IN THE PAST 12 MONTHS HAS THE ELECTRIC, GAS, OIL, OR WATER COMPANY THREATENED TO SHUT OFF SERVICES IN YOUR HOME?: NO

## 2025-05-08 SDOH — SOCIAL STABILITY: SOCIAL INSECURITY: DOES ANYONE TRY TO KEEP YOU FROM HAVING/CONTACTING OTHER FRIENDS OR DOING THINGS OUTSIDE YOUR HOME?: NO

## 2025-05-08 SDOH — ECONOMIC STABILITY: HOUSING INSECURITY: AT ANY TIME IN THE PAST 12 MONTHS, WERE YOU HOMELESS OR LIVING IN A SHELTER (INCLUDING NOW)?: NO

## 2025-05-08 SDOH — SOCIAL STABILITY: SOCIAL INSECURITY: WITHIN THE LAST YEAR, HAVE YOU BEEN AFRAID OF YOUR PARTNER OR EX-PARTNER?: NO

## 2025-05-08 SDOH — ECONOMIC STABILITY: FOOD INSECURITY: HOW HARD IS IT FOR YOU TO PAY FOR THE VERY BASICS LIKE FOOD, HOUSING, MEDICAL CARE, AND HEATING?: NOT HARD AT ALL

## 2025-05-08 SDOH — SOCIAL STABILITY: SOCIAL INSECURITY: WITHIN THE LAST YEAR, HAVE YOU BEEN HUMILIATED OR EMOTIONALLY ABUSED IN OTHER WAYS BY YOUR PARTNER OR EX-PARTNER?: NO

## 2025-05-08 SDOH — ECONOMIC STABILITY: FOOD INSECURITY: WITHIN THE PAST 12 MONTHS, THE FOOD YOU BOUGHT JUST DIDN'T LAST AND YOU DIDN'T HAVE MONEY TO GET MORE.: NEVER TRUE

## 2025-05-08 SDOH — SOCIAL STABILITY: SOCIAL INSECURITY: HAS ANYONE EVER THREATENED TO HURT YOUR FAMILY OR YOUR PETS?: NO

## 2025-05-08 SDOH — SOCIAL STABILITY: SOCIAL INSECURITY: ARE YOU OR HAVE YOU BEEN THREATENED OR ABUSED PHYSICALLY, EMOTIONALLY, OR SEXUALLY BY ANYONE?: NO

## 2025-05-08 SDOH — ECONOMIC STABILITY: TRANSPORTATION INSECURITY: IN THE PAST 12 MONTHS, HAS LACK OF TRANSPORTATION KEPT YOU FROM MEDICAL APPOINTMENTS OR FROM GETTING MEDICATIONS?: NO

## 2025-05-08 SDOH — SOCIAL STABILITY: SOCIAL INSECURITY: ABUSE: ADULT

## 2025-05-08 SDOH — SOCIAL STABILITY: SOCIAL INSECURITY: WERE YOU ABLE TO COMPLETE ALL THE BEHAVIORAL HEALTH SCREENINGS?: YES

## 2025-05-08 SDOH — SOCIAL STABILITY: SOCIAL INSECURITY: DO YOU FEEL UNSAFE GOING BACK TO THE PLACE WHERE YOU ARE LIVING?: NO

## 2025-05-08 SDOH — SOCIAL STABILITY: SOCIAL INSECURITY: DO YOU FEEL ANYONE HAS EXPLOITED OR TAKEN ADVANTAGE OF YOU FINANCIALLY OR OF YOUR PERSONAL PROPERTY?: NO

## 2025-05-08 SDOH — SOCIAL STABILITY: SOCIAL INSECURITY: ARE THERE ANY APPARENT SIGNS OF INJURIES/BEHAVIORS THAT COULD BE RELATED TO ABUSE/NEGLECT?: NO

## 2025-05-08 ASSESSMENT — PAIN DESCRIPTION - ORIENTATION: ORIENTATION: RIGHT

## 2025-05-08 ASSESSMENT — COGNITIVE AND FUNCTIONAL STATUS - GENERAL
DAILY ACTIVITIY SCORE: 24
MOBILITY SCORE: 24
PATIENT BASELINE BEDBOUND: NO

## 2025-05-08 ASSESSMENT — PAIN DESCRIPTION - DESCRIPTORS: DESCRIPTORS: DISCOMFORT

## 2025-05-08 ASSESSMENT — COLUMBIA-SUICIDE SEVERITY RATING SCALE - C-SSRS
2. HAVE YOU ACTUALLY HAD ANY THOUGHTS OF KILLING YOURSELF?: NO
6. HAVE YOU EVER DONE ANYTHING, STARTED TO DO ANYTHING, OR PREPARED TO DO ANYTHING TO END YOUR LIFE?: NO
1. IN THE PAST MONTH, HAVE YOU WISHED YOU WERE DEAD OR WISHED YOU COULD GO TO SLEEP AND NOT WAKE UP?: NO

## 2025-05-08 ASSESSMENT — PAIN - FUNCTIONAL ASSESSMENT: PAIN_FUNCTIONAL_ASSESSMENT: 0-10

## 2025-05-08 ASSESSMENT — LIFESTYLE VARIABLES
HOW OFTEN DO YOU HAVE 6 OR MORE DRINKS ON ONE OCCASION: NEVER
SKIP TO QUESTIONS 9-10: 1
EVER FELT BAD OR GUILTY ABOUT YOUR DRINKING: NO
AUDIT-C TOTAL SCORE: 0
HOW MANY STANDARD DRINKS CONTAINING ALCOHOL DO YOU HAVE ON A TYPICAL DAY: PATIENT DOES NOT DRINK
HAVE YOU EVER FELT YOU SHOULD CUT DOWN ON YOUR DRINKING: NO
TOTAL SCORE: 0
HAVE PEOPLE ANNOYED YOU BY CRITICIZING YOUR DRINKING: NO
AUDIT-C TOTAL SCORE: 0
HOW OFTEN DO YOU HAVE A DRINK CONTAINING ALCOHOL: NEVER
EVER HAD A DRINK FIRST THING IN THE MORNING TO STEADY YOUR NERVES TO GET RID OF A HANGOVER: NO

## 2025-05-08 ASSESSMENT — ACTIVITIES OF DAILY LIVING (ADL)
ADEQUATE_TO_COMPLETE_ADL: YES
GROOMING: INDEPENDENT
PATIENT'S MEMORY ADEQUATE TO SAFELY COMPLETE DAILY ACTIVITIES?: YES
HEARING - RIGHT EAR: FUNCTIONAL
WALKS IN HOME: INDEPENDENT
DRESSING YOURSELF: INDEPENDENT
FEEDING YOURSELF: INDEPENDENT
LACK_OF_TRANSPORTATION: NO
JUDGMENT_ADEQUATE_SAFELY_COMPLETE_DAILY_ACTIVITIES: YES
HEARING - LEFT EAR: FUNCTIONAL
BATHING: INDEPENDENT
TOILETING: INDEPENDENT
LACK_OF_TRANSPORTATION: NO

## 2025-05-08 ASSESSMENT — PATIENT HEALTH QUESTIONNAIRE - PHQ9
SUM OF ALL RESPONSES TO PHQ9 QUESTIONS 1 & 2: 0
1. LITTLE INTEREST OR PLEASURE IN DOING THINGS: NOT AT ALL
2. FEELING DOWN, DEPRESSED OR HOPELESS: NOT AT ALL

## 2025-05-08 ASSESSMENT — PAIN SCALES - GENERAL: PAINLEVEL_OUTOF10: 7

## 2025-05-08 ASSESSMENT — PAIN DESCRIPTION - LOCATION: LOCATION: FACE

## 2025-05-08 ASSESSMENT — PAIN DESCRIPTION - PROGRESSION: CLINICAL_PROGRESSION: NOT CHANGED

## 2025-05-08 ASSESSMENT — PAIN DESCRIPTION - ONSET: ONSET: SUDDEN

## 2025-05-08 ASSESSMENT — PAIN DESCRIPTION - PAIN TYPE: TYPE: ACUTE PAIN

## 2025-05-08 ASSESSMENT — PAIN DESCRIPTION - FREQUENCY: FREQUENCY: CONSTANT/CONTINUOUS

## 2025-05-08 NOTE — TELEPHONE ENCOUNTER
Requesting medication refill. Please approve or deny this request.    Rx requested:  Requested Prescriptions     Pending Prescriptions Disp Refills    eszopiclone (LUNESTA) 2 MG TABS [Pharmacy Med Name: ESZOPICLONE 2MG TABLETS] 30 tablet      Sig: Take 1 tablet by mouth nightly.         Last Office Visit:   12/18/2024      Next Visit Date:  Future Appointments   Date Time Provider Department Center   5/12/2025  3:00 PM Roberto Roman MD [facility] -               Last refill 4/4/25. Please approve or deny.

## 2025-05-08 NOTE — ED PROVIDER NOTES
HPI   Chief Complaint   Patient presents with    Fall     Pt arrived to ED via ems, per ems patient takes 4 trazadone to sleep and other medications. EMS states pt has a hx of TBI and mental delay. Patient presents very sleepy, and slow to answer questions    Weakness, Gen       49-year-old female presents emergency department, presents by EMS, they tell me that the family heard a thud, went upstairs, found the patient on the ground, she does have slight bruising to her forehead, right forearm.  Patient tells me she has history of TBI, does take a lot of sleeping medication, states that she fell asleep on the couch, was unsure what happened after that.  Describes discomfort to her forehead, does take Brilinta.    States she had a similar episode last month, was seen at that time as well.      History provided by:  Patient   used: No            Patient History   Medical History[1]  Surgical History[2]  Family History[3]  Social History[4]    Physical Exam   ED Triage Vitals   Temp Pulse Resp BP   -- -- -- --      SpO2 Temp src Heart Rate Source Patient Position   -- -- -- --      BP Location FiO2 (%)     -- --       Physical Exam  Gen.: Vitals noted no distress. Afebrile   Head: Normocephalic very minor bruising noted to the forehead. Pupils PERRL EOMI. TMs clear no hemotympanum.   Neck: Supple. No midline or paraspinal tenderness through full range of motion.   Cardiac: Regular rate rhythm no murmur.   Lungs: Clear to auscultation bilaterally with good aeration and no adventitious breath sounds.   Abdomen: Soft nontender nonsurgical. Normoactive bowel sounds.   Back: No midline or paraspinal tenderness.   Extremities: No edema. Negative Homans bilaterally no cords.  Mild superficial abrasion/ecchymosis right mid forearm without any bony tenderness, good range of motion of the elbow and wrist, neurovascularly intact distally  Skin: No rash.   Neuro: No focal deficits appreciated      ED Course &  [facility]   Diagnoses as of 05/08/25 1239   Fall, initial encounter   Acute encephalopathy   Closed head injury, initial encounter          Labs Reviewed   COMPREHENSIVE METABOLIC PANEL - Abnormal       Result Value    Glucose 360 (*)     Sodium 133 (*)     Potassium 4.3      Chloride 98      Bicarbonate 26      Anion Gap 13      Urea Nitrogen 11      Creatinine 0.66      eGFR >90      Calcium 9.1      Albumin 4.0      Alkaline Phosphatase 89      Total Protein 6.8      AST 9      Bilirubin, Total 0.4      ALT 9     URINALYSIS WITH REFLEX CULTURE AND MICROSCOPIC - Abnormal    Color, Urine Colorless (*)     Appearance, Urine Clear      Specific Gravity, Urine 1.015      pH, Urine 6.5      Protein, Urine NEGATIVE      Glucose, Urine OVER (4+) (*)     Blood, Urine NEGATIVE      Ketones, Urine TRACE (*)     Bilirubin, Urine NEGATIVE      Urobilinogen, Urine Normal      Nitrite, Urine NEGATIVE      Leukocyte Esterase, Urine NEGATIVE      Narrative:     OVER is reported when the result is greater than the clinically reportable range.   DRUG SCREEN,URINE - Abnormal    Amphetamine Screen, Urine Presumptive Negative      Barbiturate Screen, Urine Presumptive Negative      Benzodiazepines Screen, Urine Presumptive Negative      Cannabinoid Screen, Urine Presumptive Positive (*)     Cocaine Metabolite Screen, Urine Presumptive Negative      Fentanyl Screen, Urine Presumptive Negative      Opiate Screen, Urine Presumptive Negative      Oxycodone Screen, Urine Presumptive Negative      PCP Screen, Urine Presumptive Negative      Methadone Screen, Urine Presumptive Negative      Narrative:     Drug screen results are presumptive and should not be used to assess   compliance with prescribed medication. Contact the performing [facility] laboratory   to add-on definitive confirmatory testing if clinically indicated.    Toxicology screening results are reported qualitatively. The concentration must   be greater than or equal to the cutoff to be  reported as positive. The concentration   at which the screening test can detect an individual drug or metabolite varies.   The absence of expected drug(s) and/or drug metabolite(s) may indicate non-compliance,   inappropriate timing of specimen collection relative to drug administration, poor drug   absorption, diluted/adulterated urine, or limitations of testing. For medical purposes   only; not valid for forensic use.    Interpretive questions should be directed to the laboratory medical directors.   BLOOD GAS ARTERIAL FULL PANEL - Abnormal    POCT pH, Arterial 7.46 (*)     POCT pCO2, Arterial 40      POCT pO2, Arterial 109 (*)     POCT SO2, Arterial 99      POCT Oxy Hemoglobin, Arterial 95.1      POCT Hematocrit Calculated, Arterial 44.0      POCT Sodium, Arterial 130 (*)     POCT Potassium, Arterial 4.2      POCT Chloride, Arterial 100      POCT Ionized Calcium, Arterial 1.20      POCT Glucose, Arterial 320 (*)     POCT Lactate, Arterial 0.9      POCT Base Excess, Arterial 4.2 (*)     POCT HCO3 Calculated, Arterial 28.4 (*)     POCT Hemoglobin, Arterial 14.5      POCT Anion Gap, Arterial 6 (*)     Patient Temperature        FiO2 28      Site of Arterial Puncture Radial Right      Kenneth's Test Positive      Site of Arterial Puncture RRAD      Kenneth's Test YES     LACTATE - Normal    Lactate 1.3      Narrative:     Venipuncture immediately after or during the administration of Metamizole may lead to falsely low results. Testing should be performed immediately prior to Metamizole dosing.   CBC WITH AUTO DIFFERENTIAL    WBC 9.7      nRBC 0.0      RBC 4.91      Hemoglobin 15.3      Hematocrit 44.7      MCV 91      MCH 31.2      MCHC 34.2      RDW 12.3      Platelets 210      Neutrophils % 77.5      Immature Granulocytes %, Automated 0.3      Lymphocytes % 16.4      Monocytes % 3.6      Eosinophils % 1.4      Basophils % 0.8      Neutrophils Absolute 7.53      Immature Granulocytes Absolute, Automated 0.03       Lymphocytes Absolute 1.60      Monocytes Absolute 0.35      Eosinophils Absolute 0.14      Basophils Absolute 0.08     URINALYSIS WITH REFLEX CULTURE AND MICROSCOPIC    Narrative:     The following orders were created for panel order Urinalysis with Reflex Culture and Microscopic.  Procedure                               Abnormality         Status                     ---------                               -----------         ------                     Urinalysis with Reflex C...[[id number]]  Abnormal            Final result               Extra Urine Gray Tube[[id number]]                            In process                   Please view results for these tests on the individual orders.   EXTRA URINE GRAY TUBE        CT brain attack angio head and neck W and WO IV contrast   Final Result   Addendum (preliminary) 1 of 1   Interpreted By:  Partha Ibanez,    ADDENDUM:   Partha Ibanez discussed the significance and urgency of this CT   angiogram by secure chat with  MIRELA PIZAROR on 5/8/2025 at 11:15 am.   (**-RCF-**) Findings:  See findings.             Signed by: Partha Ibanez 5/8/2025 11:15 AM        -------- ORIGINAL REPORT --------   Dictation workstation:   [id number]      Final   Progressive dilatation of the lateral and 3rd ventricles compared to   the previous exam        Suspected interval development of a subacute or chronic left thalamic   infarction.        No evidence of cortical infarction or hemorrhage.        MACRO:   none        Signed by: Partha Ibanez 5/8/2025 10:47 AM   Dictation workstation:   [id number]      CT head wo IV contrast   Final Result   Addendum (preliminary) 1 of 1   Interpreted By:  Partha Ibanez,    ADDENDUM:   CT angiography        Following contrast injection, CT of the neck and head were performed   and multiplanar reconstructions as well as 3D reconstructions were   made        Chest: The aortic arch and origin of the great vessels are normal        Right  carotid: The common carotid artery is normal. The bifurcation   is normal. The cervical, petrous and cavernous portions are normal        Left carotid: The common carotid artery is normal. There are minor   atherosclerotic calcifications at the bifurcation without measurable   luminal narrowing. The cervical, petrous and cavernous portions   exhibit athero sclerotic calcification without measurable luminal   narrowing.        Vertebral arteries: The vertebral arteries are diminutive but   symmetrical. There origin is are normal. No evidence of compression   or filling defect in the cervical portions. Marked athero sclerotic   calcification at the distal right vertebral artery proximal to the   vertebrobasilar junction. The basilar artery is diminutive but   otherwise normal.        Intracranial circulation: There are athero sclerotic calcifications   in the cavernous and supraclinoid portions of both internal carotid   arteries. The intracranial supraclinoid carotid arteries are poorly   opacify as are the A1 and M1 segments of both the anterior and middle   cerebral arteries. The basilar artery is poorly opacify as are the   posterior cerebral arteries bilaterally. There is no evidence of   intracranial branch occlusion, aneurysm or vascular malformation.        Impression:        CTA of the carotid and vertebral vessels within the neck is normal        Poorly opacify intracranial arterial structures without evidence of   branch occlusion, aneurysm or vascular malformation        Normal contrast opacification of the dural venous sinuses.        Signed by: Partha Ibanez 5/8/2025 10:52 AM        -------- ORIGINAL REPORT --------   Dictation workstation:   [id number]      Final   No evidence of an acute intracranial process. CT findings which may   indicate normal pressure hydrocephalus.        MACRO:   None.        Signed by: Miguel Ángel Lopez 5/8/2025 8:29 AM   Dictation workstation:   [id number]      CT cervical  spine wo IV contrast   Final Result   No evidence of an acute fracture or subluxation.        MACRO:   None.        Signed by: Miguel Ángel Lopez 5/8/2025 8:30 AM   Dictation workstation:   [id number]                 No data recorded     Veda Coma Scale Score: 14 (05/08/25 1201 : Karel Barnes, RN)       NIH Stroke Scale: 11 (05/08/25 1100 : Lila Franklin RN)         Medical Decision Making    Initial presentation the patient does seem slightly fatigued, but answering all questions appropriately, she tells me that her previous TBI.  She also admits that she takes quite a bit of sleeping medication that makes her very tired.  Does not specifically remember what happened early this morning, unsure if she rolled off the couch or fell after she got up.    On initial evaluation no focal deficits appreciated.  She is moving all of her extremities, including her right upper extremity as she has bruising and abrasion present, but has great range of motion of the shoulder, elbow and wrist.    Workup initiated, given head injury and on Brilinta, CT imaging to head and C-spine as well as basic labs    EKG at 856 with ventricular rate of 65, as read by me, shows normal sinus rhythm normal axis normal interval, unremarkable ST and T wave patterns, no evidence of acute ischemia or other acute findings.    CBC with white count 9.7, hemoglobin of 15.3, platelets of 210.  Metabolic pane with glucose of 360, sodium 133, potassium 4.3, creatinine 0.66.    Urinalysis with glucose of +4, some trace ketones but otherwise unremarkable, no evidence of infection.  Urine drug screen positive for cannabinoids, otherwise negative.    CT imaging of the head and C-spine showed no acute traumatic findings.    Daughter presents to the department and states that her mom is not her usual self, seems more confused than usual.  I went in to reevaluate the patient, her speech is more slurred than it was earlier this morning and she is more difficult to  understand.  Performed an NIHSS, the patient had difficulty following my commands, when I asked her to raise both of her arms she only raised her left arm, with encouragement she then also raised her right her right with some weakness.  Weakness in the right lower extremity compared to the left as well.  Seem to have more left-sided facial droop.  Was unable to tell me her street address, difficulty understanding the name of the street she attempted to provide and gave the number of the street after.  Daughter said that it was not correct.    Concern for this change so stroke attending was consulted for her evaluation, sent for CTA of the head    CTA of the head was obtained, radiologist mentions progressive dilation of the lateral and third ventricles, suspected neuro development of subacute or chronic left thalamic infarction, no large vessel occlusion noted.    Patient was evaluated by neurologist, Dr. Kay.  Not TNK candidate. Her recommendation was admission, the patient seems more acutely encephalopathic versus strokelike, but did recommend MRI imaging.    She did ask me to evaluate the patient's peripheral vision, intact.    Discussed plan of care with the patient as well as her children at bedside    Nurse informed the patient's oxygen level dropped a bit as she was sleeping, I did add on an ABG which shows a pH of 7.46 with a PCO2 of 40 and a PO2 of 109.    Patient will also be admitted for acute encephalopathy, closed head injury, fall.  Spoke with ANTONELL[facility] You of the hospitalist service, will accept under Dr. Noriega      Shared KAUSHIK Attestation:    I personally saw the patient and made/approved the management plan and take responsibility for the patient management.    History: Patient presenting with fall.    Exam: Regular rate and rhythm cardiac exam with clear breath sounds bilaterally.  Abdomen is soft and nontender.  Neurological exam is grossly intact.  There is some mild tenderness palpation to  the forehead and right forearm with no obvious deformity.    MDM:     Differential Diagnosis: Stroke, intracranial bleed, arrhythmia, electrolyte disorder    Labs Reviewed   COMPREHENSIVE METABOLIC PANEL - Abnormal       Result Value    Glucose 360 (*)     Sodium 133 (*)     Potassium 4.3      Chloride 98      Bicarbonate 26      Anion Gap 13      Urea Nitrogen 11      Creatinine 0.66      eGFR >90      Calcium 9.1      Albumin 4.0      Alkaline Phosphatase 89      Total Protein 6.8      AST 9      Bilirubin, Total 0.4      ALT 9     URINALYSIS WITH REFLEX CULTURE AND MICROSCOPIC - Abnormal    Color, Urine Colorless (*)     Appearance, Urine Clear      Specific Gravity, Urine 1.015      pH, Urine 6.5      Protein, Urine NEGATIVE      Glucose, Urine OVER (4+) (*)     Blood, Urine NEGATIVE      Ketones, Urine TRACE (*)     Bilirubin, Urine NEGATIVE      Urobilinogen, Urine Normal      Nitrite, Urine NEGATIVE      Leukocyte Esterase, Urine NEGATIVE      Narrative:     OVER is reported when the result is greater than the clinically reportable range.   DRUG SCREEN,URINE - Abnormal    Amphetamine Screen, Urine Presumptive Negative      Barbiturate Screen, Urine Presumptive Negative      Benzodiazepines Screen, Urine Presumptive Negative      Cannabinoid Screen, Urine Presumptive Positive (*)     Cocaine Metabolite Screen, Urine Presumptive Negative      Fentanyl Screen, Urine Presumptive Negative      Opiate Screen, Urine Presumptive Negative      Oxycodone Screen, Urine Presumptive Negative      PCP Screen, Urine Presumptive Negative      Methadone Screen, Urine Presumptive Negative      Narrative:     Drug screen results are presumptive and should not be used to assess   compliance with prescribed medication. Contact the performing [facility] laboratory   to add-on definitive confirmatory testing if clinically indicated.    Toxicology screening results are reported qualitatively. The concentration must   be greater than or  equal to the cutoff to be reported as positive. The concentration   at which the screening test can detect an individual drug or metabolite varies.   The absence of expected drug(s) and/or drug metabolite(s) may indicate non-compliance,   inappropriate timing of specimen collection relative to drug administration, poor drug   absorption, diluted/adulterated urine, or limitations of testing. For medical purposes   only; not valid for forensic use.    Interpretive questions should be directed to the laboratory medical directors.   LACTATE - Normal    Lactate 1.3      Narrative:     Venipuncture immediately after or during the administration of Metamizole may lead to falsely low results. Testing should be performed immediately prior to Metamizole dosing.   CBC WITH AUTO DIFFERENTIAL    WBC 9.7      nRBC 0.0      RBC 4.91      Hemoglobin 15.3      Hematocrit 44.7      MCV 91      MCH 31.2      MCHC 34.2      RDW 12.3      Platelets 210      Neutrophils % 77.5      Immature Granulocytes %, Automated 0.3      Lymphocytes % 16.4      Monocytes % 3.6      Eosinophils % 1.4      Basophils % 0.8      Neutrophils Absolute 7.53      Immature Granulocytes Absolute, Automated 0.03      Lymphocytes Absolute 1.60      Monocytes Absolute 0.35      Eosinophils Absolute 0.14      Basophils Absolute 0.08     URINALYSIS WITH REFLEX CULTURE AND MICROSCOPIC    Narrative:     The following orders were created for panel order Urinalysis with Reflex Culture and Microscopic.  Procedure                               Abnormality         Status                     ---------                               -----------         ------                     Urinalysis with Reflex C...[[id number]]  Abnormal            Final result               Extra Urine Gray Tube[[id number]]                            In process                   Please view results for these tests on the individual orders.   EXTRA URINE GRAY TUBE   BLOOD GAS ARTERIAL FULL PANEL        CT brain attack angio head and neck W and WO IV contrast   Final Result   Addendum (preliminary) 1 of 1   Interpreted By:  Partha Ibanez,    ADDENDUM:   Partha Ibanez discussed the significance and urgency of this CT   angiogram by secure chat with  MIRELA PIZARRO on 5/8/2025 at 11:15 am.   (**-RCF-**) Findings:  See findings.             Signed by: Partha Ibanez 5/8/2025 11:15 AM        -------- ORIGINAL REPORT --------   Dictation workstation:   [id number]      Final   Progressive dilatation of the lateral and 3rd ventricles compared to   the previous exam        Suspected interval development of a subacute or chronic left thalamic   infarction.        No evidence of cortical infarction or hemorrhage.        MACRO:   none        Signed by: Partha Ibanez 5/8/2025 10:47 AM   Dictation workstation:   [id number]      CT head wo IV contrast   Final Result   Addendum (preliminary) 1 of 1   Interpreted By:  Partha Ibanez,    ADDENDUM:   CT angiography        Following contrast injection, CT of the neck and head were performed   and multiplanar reconstructions as well as 3D reconstructions were   made        Chest: The aortic arch and origin of the great vessels are normal        Right carotid: The common carotid artery is normal. The bifurcation   is normal. The cervical, petrous and cavernous portions are normal        Left carotid: The common carotid artery is normal. There are minor   atherosclerotic calcifications at the bifurcation without measurable   luminal narrowing. The cervical, petrous and cavernous portions   exhibit athero sclerotic calcification without measurable luminal   narrowing.        Vertebral arteries: The vertebral arteries are diminutive but   symmetrical. There origin is are normal. No evidence of compression   or filling defect in the cervical portions. Marked athero sclerotic   calcification at the distal right vertebral artery proximal to the   vertebrobasilar  junction. The basilar artery is diminutive but   otherwise normal.        Intracranial circulation: There are athero sclerotic calcifications   in the cavernous and supraclinoid portions of both internal carotid   arteries. The intracranial supraclinoid carotid arteries are poorly   opacify as are the A1 and M1 segments of both the anterior and middle   cerebral arteries. The basilar artery is poorly opacify as are the   posterior cerebral arteries bilaterally. There is no evidence of   intracranial branch occlusion, aneurysm or vascular malformation.        Impression:        CTA of the carotid and vertebral vessels within the neck is normal        Poorly opacify intracranial arterial structures without evidence of   branch occlusion, aneurysm or vascular malformation        Normal contrast opacification of the dural venous sinuses.        Signed by: Partha Ibanez 5/8/2025 10:52 AM        -------- ORIGINAL REPORT --------   Dictation workstation:   [id number]      Final   No evidence of an acute intracranial process. CT findings which may   indicate normal pressure hydrocephalus.        MACRO:   None.        Signed by: Miguel Ángel Lopez 5/8/2025 8:29 AM   Dictation workstation:   [id number]      CT cervical spine wo IV contrast   Final Result   No evidence of an acute fracture or subluxation.        MACRO:   None.        Signed by: Miguel Ángel Lopez 5/8/2025 8:30 AM   Dictation workstation:   [id number]              Jovany Avilez MD      Procedure  Procedures       EMMA Mendoza-CNP  05/08/25 6586         [1]   Past Medical History:  Diagnosis Date    Anxiety     Asthma     CHF (congestive heart failure)     COPD (chronic obstructive pulmonary disease) (Multi)     Coronary artery disease     Depression     Diabetes mellitus (Multi)     Early onset Alzheimer's dementia (Multi)     Heart disease     Hyperlipidemia     Hypertension     Hypoxic ischemic coma     Myocardial infarction (Multi)     Other specified  abnormal findings of blood chemistry     Low vitamin D level   [2]   Past Surgical History:  Procedure Laterality Date    CARDIAC CATHETERIZATION      CARDIAC CATHETERIZATION N/A 2025    Procedure: Left Heart Cath, With LV;  Surgeon: Isma Sykes DO;  Location: [facility];  Service: Cardiovascular;  Laterality: N/A;    CARDIAC DEFIBRILLATOR PLACEMENT      CARDIAC SURGERY       SECTION, LOW TRANSVERSE      CORONARY ARTERY BYPASS GRAFT  [id number]    OTHER SURGICAL HISTORY  2020    Cardiac catheterization    OTHER SURGICAL HISTORY  2022    Heart surgery    TUBAL LIGATION     [3]   Family History  Problem Relation Name Age of Onset    Diabetes Mother Cher nazario     Cervical cancer Mother Cher nazario     Heart disease Father Rafael nazario     Hypertension Father Rafael nazario    [4]   Social History  Tobacco Use    Smoking status: Every Day     Current packs/day: 1.50     Average packs/day: 1.5 packs/day for 30.8 years (46.1 ttl pk-yrs)     Types: Cigarettes     Start date: 1994     Passive exposure: Never    Smokeless tobacco: Never   Vaping Use    Vaping status: Never Used   Substance Use Topics    Alcohol use: Never    Drug use: Yes     Types: Marijuana        EMMA Mendoza-CNP  25 1429

## 2025-05-08 NOTE — H&P
History Of Present Illness  Perlita Bahena is a 49 y.o. female PMHx, anxiety, asthma, CHF, COPD, TBI on Brillinta presents with fall and altered mental status, confusion slurring her words.  Last known well 11:30 PM per EMR.  Family reports she heard a thud and went upstairs and found the patient on the ground.  They report she fell asleep on the couch and not sure what happened after that.  She was found to have slight bruising to her forehead and right forearm.  Family reports patient had a similar episode last month.     Provider Notes shows patient seems fatigued but answering all questions appropriately.  She admitted to taking a lot of sleeping medications that makes her very tired.  Unsure of the events leading up to her fall.  She was moving all extremities.  Initial workup for head injury on Brilinta, CT imaging of head and C-spine.      NIH SS was 8 by provider in ER. [facility] [place] Neurosurgery Team   Following was recommended:   MRI Brain w/o contrast stroke protocol or repeat CTH 24 hours after initial CTH if unable to get MRI.  TTE w/ bubble study.  Lipid panel, A1c.  Load with 325mg ASA now, then start 81mg daily starting tomorrow for 21 days. Continue brillinta  Lipid Goals: education on healthy diet and statin therapy to maintain or achieve goal LDL-cholesterol < 70mg. Atorvastatin 80mg..  Blood pressure goals: avoid hypotension SBP <100 that could worsen cerebral perfusion. Ischemic stroke- early permissive hypertension SBP < 220 mmHg with cautious inpatient lowering..  Glucose Goals: early treatment of hyperglycemia to goal glucose 140-180 mg/dl with long-term goal A1c < 7%.  NPO until nurse bedside swallow assessment.  Consider focused stroke order set.  Smoking Cessation and Education.  Assessment for Rehabilitation needs.  Patient and family education on signs and symptoms of stroke, calling 911, healthy strategies for stroke prevention.         Patient examined and seen. Alert and oriented self with  lethargy. She was easily awakened, but falls asleep easily. Moving all extremities. She did follow some commands, but would turn over and go back to sleep. Speech is garbled. Dakota RN, at bedside. Stated that since he took over the patient 1330 patient has been exhibiting these symptoms. No burgos from his assessment.   Patient denies chest pain, shortness of breath, palpitations, abdominal pain, fever or chills.  She denies any pain.     Per the RN, family stated that the patient took multiple trazadone for sleep.     Spoke with ER provider, admitting for stroke like symptoms, not a canidate for TNK, they will give ASA as per [place] [facility] [place]. See above recommendations.     Per MRI Department - PPM is not compatible for MRI. Will have to get CTH in 24 hours.    Review of systems: 10 system were reviewed and were negative except what was mentioned in history of present illness      Past Medical History  She has a past medical history of Anxiety, Asthma, CHF (congestive heart failure), COPD (chronic obstructive pulmonary disease) (Multi), Coronary artery disease, Depression, Diabetes mellitus (Multi), Early onset Alzheimer's dementia (Multi), Heart disease, Hyperlipidemia, Hypertension, Hypoxic ischemic coma, Myocardial infarction (Multi), and Other specified abnormal findings of blood chemistry.    Surgical History  She has a past surgical history that includes Other surgical history (2020); Other surgical history (2022); Cardiac catheterization;  section, low transverse; Cardiac surgery; Tubal ligation; Coronary artery bypass graft (251977); Cardiac defibrillator placement; and Cardiac catheterization (N/A, 2025).     Social History  She reports that she has been smoking cigarettes. She started smoking about 30 years ago. She has a 46.1 pack-year smoking history. She has never been exposed to tobacco smoke. She has never used smokeless tobacco. She reports current  drug use. Drug: Marijuana. She reports that she does not drink alcohol.    Family History  Family History[1]     Allergies  Ketorolac and Tramadol       Physical Exam     Constitutional: appears acutely ill , lethargic/alert/oriented x3, , cooperative  Eyes: PERRL, EOMI, clear sclera  ENMT: mucous membranes moist, no apparent injury, no lesions seen  Head/Neck: Neck supple, no apparent injury, thyroid without mass or tenderness  Respiratory/Thorax: Patent airways,  normal breath sounds Cardiovascular: Regular, rate and rhythm, no murmurs, 2+ equal pulses of the extremities, normal S 1and S 2  Gastrointestinal: Nondistended, soft, non-tender,  +BS x 4 quadrants  Genitourinary: voiding freely without CVA tenderness or suprabupic tenderness   Musculoskeletal: ROM intact, no joint swelling,   Extremities: normal extremities,  no contusions or wounds seen   Skin: warm, dry, intact  Neurological: speech garbled, lethargic but easily arousable, moves all extremities, follows some commands  Psychiatric: unable to assess       Last Recorded Vitals  /51   Pulse 60   Temp 36.2 °C (97.2 °F)   Resp 16   Wt 73.5 kg (162 lb)   SpO2 100%     Relevant Results  Scheduled medications  Scheduled Medications[2]  Continuous medications  Continuous Medications[3]  PRN medications  PRN Medications[4]    Results for orders placed or performed during the hospital encounter of 05/08/25 (from the past 24 hours)   ECG 12 lead   Result Value Ref Range    Ventricular Rate 65 BPM    Atrial Rate 65 BPM    MN Interval 196 ms    QRS Duration 116 ms    QT Interval 458 ms    QTC Calculation(Bazett) 476 ms    P Axis 55 degrees    R Axis -22 degrees    T Axis 92 degrees    QRS Count 11 beats    Q Onset 215 ms    P Onset 117 ms    P Offset 180 ms    T Offset 444 ms    QTC Fredericia 470 ms   CBC and Auto Differential   Result Value Ref Range    WBC 9.7 4.4 - 11.3 x10*3/uL    nRBC 0.0 0.0 - 0.0 /100 WBCs    RBC 4.91 4.00 - 5.20 x10*6/uL     Hemoglobin 15.3 12.0 - 16.0 g/dL    Hematocrit 44.7 36.0 - 46.0 %    MCV 91 80 - 100 fL    MCH 31.2 26.0 - 34.0 pg    MCHC 34.2 32.0 - 36.0 g/dL    RDW 12.3 11.5 - 14.5 %    Platelets 210 150 - 450 x10*3/uL    Neutrophils % 77.5 40.0 - 80.0 %    Immature Granulocytes %, Automated 0.3 0.0 - 0.9 %    Lymphocytes % 16.4 13.0 - 44.0 %    Monocytes % 3.6 2.0 - 10.0 %    Eosinophils % 1.4 0.0 - 6.0 %    Basophils % 0.8 0.0 - 2.0 %    Neutrophils Absolute 7.53 1.20 - 7.70 x10*3/uL    Immature Granulocytes Absolute, Automated 0.03 0.00 - 0.70 x10*3/uL    Lymphocytes Absolute 1.60 1.20 - 4.80 x10*3/uL    Monocytes Absolute 0.35 0.10 - 1.00 x10*3/uL    Eosinophils Absolute 0.14 0.00 - 0.70 x10*3/uL    Basophils Absolute 0.08 0.00 - 0.10 x10*3/uL   Comprehensive metabolic panel   Result Value Ref Range    Glucose 360 (H) 74 - 99 mg/dL    Sodium 133 (L) 136 - 145 mmol/L    Potassium 4.3 3.5 - 5.3 mmol/L    Chloride 98 98 - 107 mmol/L    Bicarbonate 26 21 - 32 mmol/L    Anion Gap 13 10 - 20 mmol/L    Urea Nitrogen 11 6 - 23 mg/dL    Creatinine 0.66 0.50 - 1.05 mg/dL    eGFR >90 >60 mL/min/1.73m*2    Calcium 9.1 8.6 - 10.3 mg/dL    Albumin 4.0 3.4 - 5.0 g/dL    Alkaline Phosphatase 89 33 - 110 U/L    Total Protein 6.8 6.4 - 8.2 g/dL    AST 9 9 - 39 U/L    Bilirubin, Total 0.4 0.0 - 1.2 mg/dL    ALT 9 7 - 45 U/L   Lactate   Result Value Ref Range    Lactate 1.3 0.4 - 2.0 mmol/L   Urinalysis with Reflex Culture and Microscopic   Result Value Ref Range    Color, Urine Colorless (N) Light-Yellow, Yellow, Dark-Yellow    Appearance, Urine Clear Clear    Specific Gravity, Urine 1.015 1.005 - 1.035    pH, Urine 6.5 5.0, 5.5, 6.0, 6.5, 7.0, 7.5, 8.0    Protein, Urine NEGATIVE NEGATIVE, 10 (TRACE), 20 (TRACE) mg/dL    Glucose, Urine OVER (4+) (A) Normal mg/dL    Blood, Urine NEGATIVE NEGATIVE mg/dL    Ketones, Urine TRACE (A) NEGATIVE mg/dL    Bilirubin, Urine NEGATIVE NEGATIVE mg/dL    Urobilinogen, Urine Normal Normal mg/dL    Nitrite,  Urine NEGATIVE NEGATIVE    Leukocyte Esterase, Urine NEGATIVE NEGATIVE   Drug Screen, Urine   Result Value Ref Range    Amphetamine Screen, Urine Presumptive Negative Presumptive Negative    Barbiturate Screen, Urine Presumptive Negative Presumptive Negative    Benzodiazepines Screen, Urine Presumptive Negative Presumptive Negative    Cannabinoid Screen, Urine Presumptive Positive (A) Presumptive Negative    Cocaine Metabolite Screen, Urine Presumptive Negative Presumptive Negative    Fentanyl Screen, Urine Presumptive Negative Presumptive Negative    Opiate Screen, Urine Presumptive Negative Presumptive Negative    Oxycodone Screen, Urine Presumptive Negative Presumptive Negative    PCP Screen, Urine Presumptive Negative Presumptive Negative    Methadone Screen, Urine Presumptive Negative Presumptive Negative   Blood Gas Arterial Full Panel   Result Value Ref Range    POCT pH, Arterial 7.46 (H) 7.38 - 7.42 pH    POCT pCO2, Arterial 40 38 - 42 mm Hg    POCT pO2, Arterial 109 (H) 85 - 95 mm Hg    POCT SO2, Arterial 99 94 - 100 %    POCT Oxy Hemoglobin, Arterial 95.1 94.0 - 98.0 %    POCT Hematocrit Calculated, Arterial 44.0 36.0 - 46.0 %    POCT Sodium, Arterial 130 (L) 136 - 145 mmol/L    POCT Potassium, Arterial 4.2 3.5 - 5.3 mmol/L    POCT Chloride, Arterial 100 98 - 107 mmol/L    POCT Ionized Calcium, Arterial 1.20 1.10 - 1.33 mmol/L    POCT Glucose, Arterial 320 (H) 74 - 99 mg/dL    POCT Lactate, Arterial 0.9 0.4 - 2.0 mmol/L    POCT Base Excess, Arterial 4.2 (H) -2.0 - 3.0 mmol/L    POCT HCO3 Calculated, Arterial 28.4 (H) 22.0 - 26.0 mmol/L    POCT Hemoglobin, Arterial 14.5 12.0 - 16.0 g/dL    POCT Anion Gap, Arterial 6 (L) 10 - 25 mmo/L    Patient Temperature      FiO2 28 %    Site of Arterial Puncture Radial Right     Kenneth's Test Positive     Site of Arterial Puncture RRAD     Kenneth's Test YES      *Note: Due to a large number of results and/or encounters for the requested time period, some results have not  been displayed. A complete set of results can be found in Results Review.        Assessment/Plan   Assessment & Plan    # Stroke Like Symptoms   # Suspect Acute Ischemic Stroke   #Subacute to chronic L thalamic stroke     Admit to Hospital Medicine   Stroke Protocol  Continue ASA and Brillinta   MRI not compatible with Pacemaker  CTH in AM   ECHO with bubble study   Lipid panel, A1C  Blood pressure goals: avoid hypotension SBP <100 that could worsen cerebral perfusion. Ischemic stroke- early permissive hypertension SBP < 220 mmHg with cautious inpatient lowering..  NPO until nurse bedside swallow assessment.   PT/OT   RN bedside swallow eval   Telemetry     # Diabetes Mellitus Type 2  Continue home medications  Diet Cardiac/Diabetic when able   Continue Sliding Scale SSI AC/HS   A1C pending   Encourage healthy lifestyle changes    # HTN/ HLD / CAD/ hx of CABG  Continue home medications when reconciled  Telemetry     # COPD  not in exacerbation   Continue bronchodilators   Encourage ambulation   Wean oxygen to keep sats above 92%   Pulmonary Toileting      DVTp: ASA and Brillinta  FULL Code  Diet: RN swallow assessment pending  Disposition: TBD PT/OT 24-48 hours    Time spent  56  minutes obtaining labs, imaging, recommendations, interview, assessment, examination, medication review/ordering, and EMR review.    Plan of care was discussed extensively could not be discussed with patient. Attempt to call daughter multiple times, no able to reach or leave vc.     Of note, this documentation is completed using the Dragon Dictation system (voice recognition software). There may be spelling and/or grammatical errors that were not corrected prior to final submission.           Kenyatta Garzon, APRN-CNP         [1]   Family History  Problem Relation Name Age of Onset    Diabetes Mother Cher nazario     Cervical cancer Mother Cher nazario     Heart disease Father Rafael nazario     Hypertension Father Rafael nazario    [2]  aspirin, 324 mg, oral, Once  [3]    [4]

## 2025-05-08 NOTE — ED PROCEDURE NOTE
Procedure  Critical Care    Performed by: Jovany SÁNCHEZ MD  Authorized by: Jovany SÁNCHEZ MD    Critical care provider statement:     Critical care time (minutes):  37    Critical care time was exclusive of:  Separately billable procedures and treating other patients    Critical care was necessary to treat or prevent imminent or life-threatening deterioration of the following conditions:  CNS failure or compromise    Critical care was time spent personally by me on the following activities:  Blood draw for specimens, development of treatment plan with patient or surrogate, discussions with consultants, discussions with primary provider, evaluation of patient's response to treatment, examination of patient, obtaining history from patient or surrogate, ordering and performing treatments and interventions, ordering and review of radiographic studies, ordering and review of laboratory studies, pulse oximetry, re-evaluation of patient's condition and review of old charts    Care discussed with: admitting provider                 Jovany SÁNCHEZ MD  05/08/25 [id number]

## 2025-05-08 NOTE — CONSULTS
"Inpatient consult to Neuro TeleStroke  Consult performed by: Talia Kay MD  Consult ordered by: Suad Galvan, EMMA-CNP      Virtual Visit start time: 1057 am, interrupted due to simultaneous calls    History Of Present Illness:  Historian: Family and ED Provider   Tana Bahena \"Axel" is a 49 y.o. female presenting with confusion, slurred speech. H/O anoxic brain injury but is able to walk and function independently. Was last known normal last night at 1130 pm. This morning, daughter saw her sleeping on couch but dint think much before leaving for work. At around 7 am, she had a fall, was noted to be sleepy, confused, slurring her words.   Last known well: 1130pm  Had stroke symptoms resolved at time of presentation: No   Current antiplatelet/anticoagulant use: Ticagrelor    Prior Functional Status (Modified [place] Scale):  1 The patient has no significant disability; able to carry out all pre-stroke activities.    Stroke Risk Factors:  Hypertension and Diabetes Mellitus    Last Recorded Vitals:  Blood pressure 141/83, pulse 66, temperature 36.2 °C (97.2 °F), resp. rate 15, height 1.702 m (5' 7\"), weight 73.5 kg (162 lb), SpO2 95%.     NIHSS:   NIH Stroke Scale:     1A. Level of Consciousness:  Arouses to minor stimulation (+1)    1B. Ask Month and Age:  1 question right (+1)    1C. Blink Eyes & Squeeze Hands:  Performs both tasks (0)    2. Best Gaze:  Normal (0)    3. Visual:  No visual loss (0)    4. Facial Palsy:  Normal symmetry (0)    5A. Motor - Left Arm:  No drift (0)    5B. Motor - Right Arm:  Drift (+1)    6A. Motor - Left Leg:  No drift (0)    6B. Motor - Right Leg:  Some effort against gravity (+2)    7. Limb Ataxia:  No ataxia (0)    8. Sensory Loss:  Normal (no sensory loss) (0)    9. Best Language:  Mild-moderate aphasia (+1)    10. Dysarthia:  Severe dysarthria (+2)    11. Extinction and Inattention:  No abnormality (0)    NIH Stroke Scale:  8      Sleepy, wakes up to minimal " "stimulation, dysarthric speech. On showing the language picture - cookie jar card - she said \"well, she is making doughnuts\".  R arm and leg drift down to bed although pt denied any focal weakness    Relevant Results:  LABS:  Glucose   Date Value Ref Range Status   05/08/2025 360 (H) 74 - 99 mg/dL Final      Results for orders placed or performed during the hospital encounter of 05/08/25 (from the past 24 hours)   ECG 12 lead   Result Value Ref Range    Ventricular Rate 65 BPM    Atrial Rate 65 BPM    IL Interval 196 ms    QRS Duration 116 ms    QT Interval 458 ms    QTC Calculation(Bazett) 476 ms    P Axis 55 degrees    R Axis -22 degrees    T Axis 92 degrees    QRS Count 11 beats    Q Onset 215 ms    P Onset 117 ms    P Offset 180 ms    T Offset 444 ms    QTC Fredericia 470 ms   CBC and Auto Differential   Result Value Ref Range    WBC 9.7 4.4 - 11.3 x10*3/uL    nRBC 0.0 0.0 - 0.0 /100 WBCs    RBC 4.91 4.00 - 5.20 x10*6/uL    Hemoglobin 15.3 12.0 - 16.0 g/dL    Hematocrit 44.7 36.0 - 46.0 %    MCV 91 80 - 100 fL    MCH 31.2 26.0 - 34.0 pg    MCHC 34.2 32.0 - 36.0 g/dL    RDW 12.3 11.5 - 14.5 %    Platelets 210 150 - 450 x10*3/uL    Neutrophils % 77.5 40.0 - 80.0 %    Immature Granulocytes %, Automated 0.3 0.0 - 0.9 %    Lymphocytes % 16.4 13.0 - 44.0 %    Monocytes % 3.6 2.0 - 10.0 %    Eosinophils % 1.4 0.0 - 6.0 %    Basophils % 0.8 0.0 - 2.0 %    Neutrophils Absolute 7.53 1.20 - 7.70 x10*3/uL    Immature Granulocytes Absolute, Automated 0.03 0.00 - 0.70 x10*3/uL    Lymphocytes Absolute 1.60 1.20 - 4.80 x10*3/uL    Monocytes Absolute 0.35 0.10 - 1.00 x10*3/uL    Eosinophils Absolute 0.14 0.00 - 0.70 x10*3/uL    Basophils Absolute 0.08 0.00 - 0.10 x10*3/uL   Comprehensive metabolic panel   Result Value Ref Range    Glucose 360 (H) 74 - 99 mg/dL    Sodium 133 (L) 136 - 145 mmol/L    Potassium 4.3 3.5 - 5.3 mmol/L    Chloride 98 98 - 107 mmol/L    Bicarbonate 26 21 - 32 mmol/L    Anion Gap 13 10 - 20 mmol/L    Urea " Nitrogen 11 6 - 23 mg/dL    Creatinine 0.66 0.50 - 1.05 mg/dL    eGFR >90 >60 mL/min/1.73m*2    Calcium 9.1 8.6 - 10.3 mg/dL    Albumin 4.0 3.4 - 5.0 g/dL    Alkaline Phosphatase 89 33 - 110 U/L    Total Protein 6.8 6.4 - 8.2 g/dL    AST 9 9 - 39 U/L    Bilirubin, Total 0.4 0.0 - 1.2 mg/dL    ALT 9 7 - 45 U/L   Urinalysis with Reflex Culture and Microscopic   Result Value Ref Range    Color, Urine Colorless (N) Light-Yellow, Yellow, Dark-Yellow    Appearance, Urine Clear Clear    Specific Gravity, Urine 1.015 1.005 - 1.035    pH, Urine 6.5 5.0, 5.5, 6.0, 6.5, 7.0, 7.5, 8.0    Protein, Urine NEGATIVE NEGATIVE, 10 (TRACE), 20 (TRACE) mg/dL    Glucose, Urine OVER (4+) (A) Normal mg/dL    Blood, Urine NEGATIVE NEGATIVE mg/dL    Ketones, Urine TRACE (A) NEGATIVE mg/dL    Bilirubin, Urine NEGATIVE NEGATIVE mg/dL    Urobilinogen, Urine Normal Normal mg/dL    Nitrite, Urine NEGATIVE NEGATIVE    Leukocyte Esterase, Urine NEGATIVE NEGATIVE   Drug Screen, Urine   Result Value Ref Range    Amphetamine Screen, Urine Presumptive Negative Presumptive Negative    Barbiturate Screen, Urine Presumptive Negative Presumptive Negative    Benzodiazepines Screen, Urine Presumptive Negative Presumptive Negative    Cannabinoid Screen, Urine Presumptive Positive (A) Presumptive Negative    Cocaine Metabolite Screen, Urine Presumptive Negative Presumptive Negative    Fentanyl Screen, Urine Presumptive Negative Presumptive Negative    Opiate Screen, Urine Presumptive Negative Presumptive Negative    Oxycodone Screen, Urine Presumptive Negative Presumptive Negative    PCP Screen, Urine Presumptive Negative Presumptive Negative    Methadone Screen, Urine Presumptive Negative Presumptive Negative     *Note: Due to a large number of results and/or encounters for the requested time period, some results have not been displayed. A complete set of results can be found in Results Review.        CT Head Imaging:  [facility] imaging personally reviewed, showed  no acute ischemic / hemorrhagic changes . Subacute to chronic L thalamic stroke    CTA Head and Neck Imaging:  CTA head and neck imaging personally reviewed, no large vessel occlusion or severe stenosis seen      Diagnosis:  Suspect acute ischemic stroke    Assessment/Plan:  49 y o with h/o anoxic brain injury, ?epilepsy presenting with sleepiness, dysarthria and new R sided weakness. R/O acute ischemic stroke    IV Thrombolysis IV Thrombolysis Checklist        IV Thrombolysis Given: No; Thrombolysis contraindication reason: Time from Last Known Well (or stroke onset) is >4.5 hours           Patient is a candidate for thrombectomy:  yes/no: No; contraindication reason: No evidence of proximal occlusion    Additional Recommendations:  MRI Brain w/o contrast stroke protocol or repeat CTH 24 hours after initial CTH if unable to get MRI.  TTE w/ bubble study.  Lipid panel, A1c.  Load with 325mg ASA now, then start 81mg daily starting tomorrow for 21 days. Continue brillinta  Lipid Goals: education on healthy diet and statin therapy to maintain or achieve goal LDL-cholesterol < 70mg. Atorvastatin 80mg..  Blood pressure goals: avoid hypotension SBP <100 that could worsen cerebral perfusion. Ischemic stroke- early permissive hypertension SBP < 220 mmHg with cautious inpatient lowering..  Glucose Goals: early treatment of hyperglycemia to goal glucose 140-180 mg/dl with long-term goal A1c < 7%.  NPO until nurse bedside swallow assessment.  Consider focused stroke order set.  Smoking Cessation and Education.  Assessment for Rehabilitation needs.  Patient and family education on signs and symptoms of stroke, calling 911, healthy strategies for stroke prevention.      Disposition:  Patient will remain at referring facility for further evaluation and management.    Virtual or Telephone Consent    An interactive audio and video telecommunication system which permits real time communications between the patient (at the  originating site) and provider (at the distant site) was utilized to provide this telehealth service.   Verbal consent was requested and obtained from Tana Bahena on this date, 05/08/25 for a telehealth visit.      Telestroke is covered in shift work. If there are further Neurological questions or concerns please contact your regional neurologist on call during daytime hours or contact the transfer center with an ADT20 order.    Talia Kay MD

## 2025-05-09 ENCOUNTER — APPOINTMENT (OUTPATIENT)
Dept: NEUROLOGY | Facility: HOSPITAL | Age: 50
DRG: 064 | End: 2025-05-09
Payer: COMMERCIAL

## 2025-05-09 ENCOUNTER — APPOINTMENT (OUTPATIENT)
Dept: RADIOLOGY | Facility: HOSPITAL | Age: 50
DRG: 064 | End: 2025-05-09
Payer: COMMERCIAL

## 2025-05-09 ENCOUNTER — APPOINTMENT (OUTPATIENT)
Dept: CARDIOLOGY | Facility: HOSPITAL | Age: 50
DRG: 064 | End: 2025-05-09
Payer: COMMERCIAL

## 2025-05-09 LAB
ATRIAL RATE: 65 BPM
BODY SURFACE AREA: 1.86 M2
EST. AVERAGE GLUCOSE BLD GHB EST-MCNC: 214 MG/DL
GLUCOSE BLD MANUAL STRIP-MCNC: 218 MG/DL (ref 74–99)
GLUCOSE BLD MANUAL STRIP-MCNC: 237 MG/DL (ref 74–99)
GLUCOSE BLD MANUAL STRIP-MCNC: 252 MG/DL (ref 74–99)
GLUCOSE BLD MANUAL STRIP-MCNC: 260 MG/DL (ref 74–99)
HBA1C MFR BLD: 9.1 % (ref ?–5.7)
P AXIS: 55 DEGREES
P OFFSET: 180 MS
P ONSET: 117 MS
PR INTERVAL: 196 MS
Q ONSET: 215 MS
QRS COUNT: 11 BEATS
QRS DURATION: 116 MS
QT INTERVAL: 458 MS
QTC CALCULATION(BAZETT): 476 MS
QTC FREDERICIA: 470 MS
R AXIS: -22 DEGREES
T AXIS: 92 DEGREES
T OFFSET: 444 MS
TSH SERPL-ACNC: 1.86 MIU/L (ref 0.44–3.98)
VENTRICULAR RATE: 65 BPM

## 2025-05-09 PROCEDURE — 82947 ASSAY GLUCOSE BLOOD QUANT: CPT

## 2025-05-09 PROCEDURE — 2500000002 HC RX 250 W HCPCS SELF ADMINISTERED DRUGS (ALT 637 FOR MEDICARE OP, ALT 636 FOR OP/ED): Performed by: STUDENT IN AN ORGANIZED HEALTH CARE EDUCATION/TRAINING PROGRAM

## 2025-05-09 PROCEDURE — 97165 OT EVAL LOW COMPLEX 30 MIN: CPT | Mod: GO

## 2025-05-09 PROCEDURE — 95819 EEG AWAKE AND ASLEEP: CPT

## 2025-05-09 PROCEDURE — 2500000004 HC RX 250 GENERAL PHARMACY W/ HCPCS (ALT 636 FOR OP/ED): Performed by: REGISTERED NURSE

## 2025-05-09 PROCEDURE — 2500000001 HC RX 250 WO HCPCS SELF ADMINISTERED DRUGS (ALT 637 FOR MEDICARE OP): Performed by: REGISTERED NURSE

## 2025-05-09 PROCEDURE — 2500000001 HC RX 250 WO HCPCS SELF ADMINISTERED DRUGS (ALT 637 FOR MEDICARE OP): Performed by: STUDENT IN AN ORGANIZED HEALTH CARE EDUCATION/TRAINING PROGRAM

## 2025-05-09 PROCEDURE — 95819 EEG AWAKE AND ASLEEP: CPT | Performed by: PSYCHIATRY & NEUROLOGY

## 2025-05-09 PROCEDURE — 93321 DOPPLER ECHO F-UP/LMTD STD: CPT

## 2025-05-09 PROCEDURE — 93880 EXTRACRANIAL BILAT STUDY: CPT | Performed by: RADIOLOGY

## 2025-05-09 PROCEDURE — 99232 SBSQ HOSP IP/OBS MODERATE 35: CPT | Performed by: STUDENT IN AN ORGANIZED HEALTH CARE EDUCATION/TRAINING PROGRAM

## 2025-05-09 PROCEDURE — 1200000002 HC GENERAL ROOM WITH TELEMETRY DAILY

## 2025-05-09 PROCEDURE — 70450 CT HEAD/BRAIN W/O DYE: CPT

## 2025-05-09 PROCEDURE — 99222 1ST HOSP IP/OBS MODERATE 55: CPT | Performed by: PSYCHIATRY & NEUROLOGY

## 2025-05-09 PROCEDURE — 70450 CT HEAD/BRAIN W/O DYE: CPT | Performed by: RADIOLOGY

## 2025-05-09 PROCEDURE — 93880 EXTRACRANIAL BILAT STUDY: CPT

## 2025-05-09 PROCEDURE — 97162 PT EVAL MOD COMPLEX 30 MIN: CPT | Mod: GP | Performed by: PHYSICAL THERAPIST

## 2025-05-09 PROCEDURE — 92610 EVALUATE SWALLOWING FUNCTION: CPT | Mod: GN

## 2025-05-09 RX ORDER — DEXTROSE 50 % IN WATER (D50W) INTRAVENOUS SYRINGE
12.5
Status: DISCONTINUED | OUTPATIENT
Start: 2025-05-09 | End: 2025-05-10 | Stop reason: HOSPADM

## 2025-05-09 RX ORDER — GABAPENTIN 100 MG/1
100 CAPSULE ORAL 3 TIMES DAILY
Status: DISCONTINUED | OUTPATIENT
Start: 2025-05-09 | End: 2025-05-10 | Stop reason: HOSPADM

## 2025-05-09 RX ORDER — INSULIN GLARGINE 100 [IU]/ML
35 INJECTION, SOLUTION SUBCUTANEOUS NIGHTLY
Status: DISCONTINUED | OUTPATIENT
Start: 2025-05-09 | End: 2025-05-10 | Stop reason: HOSPADM

## 2025-05-09 RX ORDER — DEXTROSE 50 % IN WATER (D50W) INTRAVENOUS SYRINGE
25
Status: DISCONTINUED | OUTPATIENT
Start: 2025-05-09 | End: 2025-05-10 | Stop reason: HOSPADM

## 2025-05-09 RX ORDER — TICAGRELOR 90 MG/1
90 TABLET, FILM COATED ORAL 2 TIMES DAILY
Status: DISCONTINUED | OUTPATIENT
Start: 2025-05-09 | End: 2025-05-10 | Stop reason: HOSPADM

## 2025-05-09 RX ORDER — ISOSORBIDE MONONITRATE 60 MG/1
60 TABLET, EXTENDED RELEASE ORAL
Status: DISCONTINUED | OUTPATIENT
Start: 2025-05-10 | End: 2025-05-10 | Stop reason: HOSPADM

## 2025-05-09 RX ORDER — TRIAMTERENE AND HYDROCHLOROTHIAZIDE 37.5; 25 MG/1; MG/1
1 TABLET ORAL DAILY
Status: DISCONTINUED | OUTPATIENT
Start: 2025-05-09 | End: 2025-05-10 | Stop reason: HOSPADM

## 2025-05-09 RX ORDER — CARBAMAZEPINE 200 MG/1
200 CAPSULE, EXTENDED RELEASE ORAL 2 TIMES DAILY
Status: DISCONTINUED | OUTPATIENT
Start: 2025-05-09 | End: 2025-05-10 | Stop reason: HOSPADM

## 2025-05-09 RX ORDER — FLUTICASONE FUROATE AND VILANTEROL 200; 25 UG/1; UG/1
1 POWDER RESPIRATORY (INHALATION)
Status: DISCONTINUED | OUTPATIENT
Start: 2025-05-09 | End: 2025-05-10 | Stop reason: HOSPADM

## 2025-05-09 RX ORDER — CARVEDILOL 6.25 MG/1
6.25 TABLET ORAL
Status: DISCONTINUED | OUTPATIENT
Start: 2025-05-09 | End: 2025-05-10 | Stop reason: HOSPADM

## 2025-05-09 RX ADMIN — EMPAGLIFLOZIN 25 MG: 25 TABLET, FILM COATED ORAL at 10:23

## 2025-05-09 RX ADMIN — INSULIN LISPRO 2 UNITS: 100 INJECTION, SOLUTION INTRAVENOUS; SUBCUTANEOUS at 10:59

## 2025-05-09 RX ADMIN — CARBAMAZEPINE 200 MG: 200 CAPSULE, EXTENDED RELEASE ORAL at 10:23

## 2025-05-09 RX ADMIN — CARVEDILOL 6.25 MG: 6.25 TABLET, FILM COATED ORAL at 16:52

## 2025-05-09 RX ADMIN — INSULIN GLARGINE 35 UNITS: 100 INJECTION, SOLUTION SUBCUTANEOUS at 20:11

## 2025-05-09 RX ADMIN — GABAPENTIN 100 MG: 100 CAPSULE ORAL at 15:27

## 2025-05-09 RX ADMIN — TRIAMTERENE AND HYDROCHLOROTHIAZIDE 1 TABLET: 37.5; 25 TABLET ORAL at 10:23

## 2025-05-09 RX ADMIN — CARBAMAZEPINE 200 MG: 200 CAPSULE, EXTENDED RELEASE ORAL at 20:11

## 2025-05-09 RX ADMIN — ASPIRIN 81 MG: 81 TABLET, COATED ORAL at 08:00

## 2025-05-09 RX ADMIN — GABAPENTIN 100 MG: 100 CAPSULE ORAL at 20:11

## 2025-05-09 RX ADMIN — ATORVASTATIN CALCIUM 80 MG: 80 TABLET, FILM COATED ORAL at 20:11

## 2025-05-09 RX ADMIN — INSULIN LISPRO 3 UNITS: 100 INJECTION, SOLUTION INTRAVENOUS; SUBCUTANEOUS at 20:11

## 2025-05-09 RX ADMIN — FLUTICASONE FUROATE AND VILANTEROL TRIFENATATE 1 PUFF: 200; 25 POWDER RESPIRATORY (INHALATION) at 15:28

## 2025-05-09 RX ADMIN — INSULIN LISPRO 2 UNITS: 100 INJECTION, SOLUTION INTRAVENOUS; SUBCUTANEOUS at 08:01

## 2025-05-09 RX ADMIN — PERFLUTREN 1 ML OF DILUTION: 6.52 INJECTION, SUSPENSION INTRAVENOUS at 09:37

## 2025-05-09 RX ADMIN — GABAPENTIN 100 MG: 100 CAPSULE ORAL at 10:23

## 2025-05-09 RX ADMIN — TICAGRELOR 90 MG: 90 TABLET ORAL at 20:11

## 2025-05-09 RX ADMIN — TICAGRELOR 90 MG: 90 TABLET ORAL at 15:28

## 2025-05-09 RX ADMIN — INSULIN LISPRO 3 UNITS: 100 INJECTION, SOLUTION INTRAVENOUS; SUBCUTANEOUS at 16:51

## 2025-05-09 ASSESSMENT — PAIN SCALES - GENERAL
PAINLEVEL_OUTOF10: 0 - NO PAIN
PAINLEVEL_OUTOF10: 3
PAINLEVEL_OUTOF10: 3
PAINLEVEL_OUTOF10: 0 - NO PAIN
PAINLEVEL_OUTOF10: 0 - NO PAIN

## 2025-05-09 ASSESSMENT — COGNITIVE AND FUNCTIONAL STATUS - GENERAL
TOILETING: A LITTLE
WALKING IN HOSPITAL ROOM: A LOT
MOBILITY SCORE: 18
HELP NEEDED FOR BATHING: A LOT
DRESSING REGULAR LOWER BODY CLOTHING: A LOT
DRESSING REGULAR LOWER BODY CLOTHING: A LITTLE
CLIMB 3 TO 5 STEPS WITH RAILING: A LOT
MOVING TO AND FROM BED TO CHAIR: A LOT
TURNING FROM BACK TO SIDE WHILE IN FLAT BAD: A LITTLE
STANDING UP FROM CHAIR USING ARMS: A LITTLE
DAILY ACTIVITIY SCORE: 13
CLIMB 3 TO 5 STEPS WITH RAILING: A LITTLE
STANDING UP FROM CHAIR USING ARMS: A LITTLE
MOBILITY SCORE: 21
STANDING UP FROM CHAIR USING ARMS: A LOT
WALKING IN HOSPITAL ROOM: A LOT
DAILY ACTIVITIY SCORE: 24
WALKING IN HOSPITAL ROOM: A LITTLE
TOILETING: A LOT
MOVING FROM LYING ON BACK TO SITTING ON SIDE OF FLAT BED WITH BEDRAILS: A LITTLE
DRESSING REGULAR UPPER BODY CLOTHING: A LITTLE
DRESSING REGULAR UPPER BODY CLOTHING: A LOT
EATING MEALS: A LITTLE
DAILY ACTIVITIY SCORE: 20
MOBILITY SCORE: 14
CLIMB 3 TO 5 STEPS WITH RAILING: A LOT
HELP NEEDED FOR BATHING: A LITTLE
PERSONAL GROOMING: A LOT
MOVING TO AND FROM BED TO CHAIR: A LITTLE

## 2025-05-09 ASSESSMENT — ACTIVITIES OF DAILY LIVING (ADL): BATHING_ASSISTANCE: MODERATE

## 2025-05-09 ASSESSMENT — PAIN - FUNCTIONAL ASSESSMENT
PAIN_FUNCTIONAL_ASSESSMENT: 0-10

## 2025-05-09 NOTE — PROGRESS NOTES
"Occupational Therapy    Evaluation    Patient Name: Tana Bahena \"Perlita\"  MRN: [id number]  Department: [facility]  Room: [address] [place] [address]  Today's Date: 5/9/2025  Time Calculation  Start Time: 1145  Stop Time: 1158  Time Calculation (min): 13 min    Assessment  IP OT Assessment  OT Assessment: Continue OT to address coordination, proprioception/sensation, balance, cognition, safety for ADL, IADL and functional mobility.  Prognosis: Good  Barriers to Discharge Home: Cognition needs, Physical needs  Cognition Needs: 24hr supervision for safety awareness needed  Physical Needs: 24hr mobility assistance needed  Evaluation/Treatment Tolerance: Patient tolerated treatment well  End of Session Communication: Bedside nurse, Care Coordinator  End of Session Patient Position:  (Transport cart with transporter, leaving for test)  Plan:  Treatment Interventions: ADL retraining, Visual perceptual retraining, Functional transfer training, Endurance training, Cognitive reorientation, Patient/family training, Equipment evaluation/education, Neuromuscular reeducation, Fine motor coordination activities, Compensatory technique education  OT Frequency: 3 times per week  OT Discharge Recommendations: High intensity level of continued care  Equipment Recommended upon Discharge: Wheeled walker  OT - OK to Discharge: Yes    General:  General  Reason for Referral: Stroke  Referred By: GENO Garzon CNP  Past Medical History Relevant to Rehab: Includes: TBI, overdose, suicide attempt, mental delay, early onset Alzheimer's dementia, CABG, smoker, PPM/AICD, smoker, COPD, CAD, CHF, MI, HTN, HLD, DM, bipolar, depression, anxiety  Family/Caregiver Present: No  Co-Treatment: PT  Co-Treatment Reason: safety  Prior to Session Communication: Bedside nurse  Patient Position Received: Bed, 3 rail up, Alarm on  General Comment: To ED 5/8 with general weakness, fall, confusion, slurred speech; Admit with acute encephalopathy, CHI, r/o CVA; NIH 8 in ED, " "+cannabinoids. Initial CT brain (-) acute; suggestive of normal pressure hydrocephalus; repeat CT: suspected interval development of a subacute or chronic L thalamic infarction (unable to do MRI d/t PPM/AICD). CT C-spine (-) acute.  Precautions:  Hearing/Visual Limitations: low vision (significant visual deficits, likely chronic), pt. states, \"I've been losing my vision for a long time\"  Medical Precautions: Fall precautions  Precautions Comment: leans/drifts R    Pain:  Pain Assessment  Pain Assessment: 0-10  0-10 (Numeric) Pain Score: 3  Pain Location: Abdomen (vague description by pt.)    Objective   Cognition:  Overall Cognitive Status:  (Impaired; Impaired at baseline  intermittent confusion; questionable historian; difficult to  determine what is pt's baseline and what is acute)  Orientation Level: Disoriented to time  Following Commands:  (follows one step commands 75-90% with increased time and cues)  Problem Solving: Assistance required to identify errors made  Memory: Exceptions to WFL  Problem Solving: Exceptions to WFL  Safety/Judgement: Exceptions to WFL  Insight: Moderate  Impulsive: Mildly      Home Living:  Home Living Comments: Pt. is a questionable historian. Per pt, lives with son and dtr (both work during the day and pt. is home alone) in 2 story home with no RUPINDER. Full bath on 1st floor with Tub shower, no seat or GBs. Bed and 1/2 bath on 2nd floor with HR. Owns std walker.   Prior Function:  Prior Function Comments: Pt. is a questionable historian. Per pt.: Indep. with amb. without AD. Pt. states that she bathes herself. States that she often dresses herself, but sometimes her daughter helps.  Toilets and grooms on own.  Pt. reports that she cooks, cleans and does laundry on  her own. Pt. reports ~3-5 recent falls. Does not drive. Children drive her.     ADL:  Eating Assistance: Minimal (Spillage noted, pt. with food down chin and gown when therapists entered.  Pt. held a piece of lettuce in her " "hand for ~ 5 mins while talking to therapists prior to placing it on her tray.)  Grooming Assistance: Moderate  Bathing Assistance: Moderate  UE Dressing Assistance: Moderate  LE Dressing Assistance: Moderate  Toileting Assistance with Device: Moderate  Functional Assistance:  (R lean in standing, RUE and RLE coordination/sensory/proprioceptive deficits, visual deficits, cognitive deficits)     Bed Mobility/Transfers: Bed Mobility  Bed Mobility:  (CGA sup to sit.  Lays very close to EOB on R per RN (found pt. this way when therapists entered as well).  RN states that this has been an ongoing issue this admission, and that the R side bed rails always need to be up.)    Transfers  Transfer:  (Mod assist sit to stand at EOB and mod assist stand to sit at edge of transport cart.  Assist with balance.)    Functional Mobility:  Functional Mobility  Functional Mobility Performed:  (Mod assist for ambulation ~ 15' bed to transport cart with assist for balance due to R drift/lean. Gait belt.)  Sitting Balance:  Static Sitting Balance  Static Sitting-Comment/Number of Minutes: good  Dynamic Sitting Balance  Dynamic Sitting-Comments: good (-)  Standing Balance:  Static Standing Balance  Static Standing-Comment/Number of Minutes: fair (-)  Dynamic Standing Balance  Dynamic Standing-Comments: poor (+)/poor     Vision: Vision - Basic Assessment  Current Vision:  (Strabismus noted)  Patient Visual Report:  (\"I've been losing my vision for a long time. I can't really see.  I can't read pen.\")   and Vision - Complex Assessment  Vision Comments: unable to fully assess, pt. needed to leave for a test  Sensation:  Proprioception:  (suspect possible mild proprioceptive/sensory deficits in RUE and RLE)  Strength:  Strength Comments: Bilat. UEs 4/5 grossly  Perception:     Coordination:  Movements are Fluid and Coordinated: No  Upper Body Coordination: RUE mildly impaired  Lower Body Coordination: RLE mildly impaired  Finger to Target: " Impaired  Coordination Comment: leans/drifts to R with ambulation   Hand Function:  Hand Function  Coordination:  (impaired opposition bilat. hands, impaired finger to target (dysmetria) difficult to determine if related to RUE and LUE deficits vs. possibly visual deficits. Also, unsure of chronic deficits from TBI vs. new deficits. Pt. is a poor  historian.)  Extremities: RUE   RUE :  (AROM WFL) and LUE   LUE:  (AROM WFL)    Outcome Measures: [facility] Daily Activity  Putting on and taking off regular lower body clothing: A lot  Bathing (including washing, rinsing, drying): A lot  Putting on and taking off regular upper body clothing: A lot  Toileting, which includes using toilet, bedpan or urinal: A lot  Taking care of personal grooming such as brushing teeth: A lot  Eating Meals: A little  Daily Activity - Total Score: 13      Education Documentation  Body Mechanics, taught by Kellie Padilla OT at 5/9/2025  1:56 PM.  Learner: Patient  Readiness: Acceptance  Method: Explanation  Response: Needs Reinforcement    Precautions, taught by Kellie Padilla OT at 5/9/2025  1:56 PM.  Learner: Patient  Readiness: Acceptance  Method: Explanation  Response: Needs Reinforcement    Education Comments  No comments found.      Goals:   Encounter Problems       Encounter Problems (Active)       OT Goals       SBA sit/stand, bed/chair/commode transfers with LRAD.        Start:  05/09/25    Expected End:  05/23/25            SBA ADL/IADL functional mobility with LRAD.        Start:  05/09/25    Expected End:  05/23/25            Maintain RUE and RLE safety throughout ADL and functional mobility tasks without cues.        Start:  05/09/25    Expected End:  05/23/25            Good (-) dynamic standing balance for ADL with UE support.        Start:  05/09/25    Expected End:  05/23/25            Mod I feeding and grooming.        Start:  05/09/25    Expected End:  05/23/25               OT Goals       SBA UB bathing and  dressing.        Start:  05/09/25    Expected End:  05/23/25            CGA toileting.        Start:  05/09/25    Expected End:  05/23/25            CGA LB bathing and dressing.        Start:  05/09/25    Expected End:  05/23/25
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
## 2025-05-09 NOTE — PROGRESS NOTES
Per rody pt. Will need rehab., pt. Requesting [facility] acute rehab., referral sent.  Per noel, admissions they are able to accept.  Pt. With [facility] [employer] ins., facility to obtain precert.

## 2025-05-09 NOTE — PROGRESS NOTES
"Physical Therapy    Physical Therapy Evaluation    Patient Name: Tana Bahena \"Axel"  MRN: [id number]  Today's Date: 5/9/2025   Time Calculation  Start Time: 1144  Stop Time: 1154  Time Calculation (min): 10 min  1014/1014-A    Assessment/Plan   PT Assessment  PT Assessment Results: Decreased strength, Impaired balance, Decreased mobility, Decreased coordination, Decreased cognition, Impaired judgement, Decreased safety awareness, Impaired vision, Obesity  Rehab Prognosis: Good  Barriers to Discharge Home: Caregiver assistance, Cognition needs, Physical needs  Caregiver Assistance: Caregiver assistance needed per identified barriers - however, level of patient's required assistance exceeds assistance available at home  Cognition Needs: Insight of patient limited regarding functional ability/needs, Cognition-related high falls risk, 24hr supervision for safety awareness needed  Physical Needs: Stair navigation to access bed limited by function/safety, Ambulating household distances limited by function/safety, 24hr mobility assistance needed, High falls risk due to function or environment  End of Session Communication: Bedside nurse  Assessment Comment: Pt. requires up to modAx1 for OOB mobility. Pt. would benefit from continued PT to increase mobility and indep.  End of Session Patient Position:  (On tranport cart with both rails up and transporter present)  IP OR SWING BED PT PLAN  Inpatient or Swing Bed: Inpatient  PT Plan  Treatment/Interventions: Bed mobility, Transfer training, Gait training, Balance training, Neuromuscular re-education, Strengthening, Therapeutic exercise, Therapeutic activity, Home exercise program  PT Plan: Ongoing PT  PT Frequency: 4 times per week  PT Discharge Recommendations: High intensity level of continued care  Equipment Recommended upon Discharge:  (possible need for a ww)  PT Recommended Transfer Status: Assist x1  PT - OK to Discharge: Yes (to next level of care, when medically " stable)    Subjective     Current Problem:  1. Stroke-like symptom        2. Fall, initial encounter  Transthoracic Echo Complete    Transthoracic Echo Complete      3. Acute encephalopathy  Transthoracic Echo Complete    Transthoracic Echo Complete      4. Closed head injury, initial encounter  Transthoracic Echo Complete    Transthoracic Echo Complete      5. Cerebrovascular accident (CVA) due to thrombosis of left middle cerebral artery (Multi)  Vascular US Carotid Artery Duplex Bilateral    Vascular US Carotid Artery Duplex Bilateral        Problem List[1]    General Visit Information:  General  Reason for Referral: Stroke  Referred By: Duran BARTHOLOMEW  Past Medical History Relevant to Rehab: TBI, overdose, suicide attempt, mental delay, early onset Alzheimer's dementia, CABG, smoker, PPM/AICD, smoker, COPD, CAD, CHF, MI, HTN, HLD, DM, depression, anxiety, bipolar  Family/Caregiver Present: No  Co-Treatment: OT  Co-Treatment Reason: to maximize safety  Prior to Session Communication: Bedside nurse  Patient Position Received: Bed, 3 rail up, Alarm on  General Comment: To ED 5/8 with general weakness, fall, confusion, slurred speech; Admit with acute encephalopathy, CHI, r/o CVA; NIH 8 in ED, +cannabinoids. Initial CT brain (-) acute; suggestive of normal pressure hydrocephalus; repeat CT- suspected interval development of a subacute or chronic L thalamic infarction (unable to do MRI d/t PPM/AICD). CT C-spine (-) acute    Home Living:  Home Living  Home Living Comments: Pt. is a questionable historian. Per pt, lives with son and dtr (both work during the day and pt. is home alone) in 2 story home with no RUPINDER. Full bath on 1st floor with Tub shower, no seat or GBs. Bed and 1/2 bath on 2nd floor with HR. Owns std walker.    Prior Level of Function:  Prior Function Per Pt/Caregiver Report  Prior Function Comments: Pt. is a questionable historian. Per pt.: Indep. with amb. without AD. Pt. reports ~3-5 recent falls. Does not  drive.    Precautions:  Precautions  Hearing/Visual Limitations: low vision (significant visual deficits; appears chronic- to be further assessed)  Medical Precautions: Fall precautions         Objective     Pain:  Pain Assessment  Pain Assessment: 0-10  0-10 (Numeric) Pain Score: 3  Pain Location: Abdomen    Cognition:  Cognition  Overall Cognitive Status: Impaired, Impaired at baseline (intermittent confusion; questionable historian; difficult to  determine what is pt's baseline and what is acute)  Orientation Level: Disoriented to time  Following Commands:  (follows one step commands 75-90% with increased time and cues)  Problem Solving: Assistance required to identify errors made  Insight: Moderate  Impulsive:  (Nsg reports that pt. has been impulsive)    General Assessments:  General Observation  General Observation: Transport arrived during PT eval to take patient to test. Patient assisted to transport cart.      Sensation  Proprioception:  (suspect mild proprioceptive deficits in RUE and RLE)  Strength  Strength Comments: BLE 4/5     Coordination  Movements are Fluid and Coordinated: No  Coordination Comment: ataxic gait; rigid RLE, leaning/drifting to R             Functional Assessments:     Bed Mobility  Bed Mobility:  (supine to sit with CGAx1; pt. tends to lie very close to edge of bed on right; RN states that this has been an ongoing issue in the hospital)  Transfers  Transfer:  (sit to/from sit with gait belt and modAx1 to maintain balance and safety during transfers; bed height slightly elevated)  Ambulation/Gait Training  Ambulation/Gait Training Performed:  (Amb. 18 ft with gait belt and modAx1; ataxic gait, rigid RLE, leans/drifts to right. Pt. may benefit from using an AD d/t balance, coordination and visual deficits)          Extremity/Trunk Assessments:  RUE   RUE :  (AROM WFL)  LUE   LUE:  (AROM WFL)  RLE   RLE : Within Functional Limits  LLE   LLE : Within Functional Limits    Outcome  Measures:     [facility] Basic Mobility  Turning from your back to your side while in a flat bed without using bedrails: A little  Moving from lying on your back to sitting on the side of a flat bed without using bedrails: A little  Moving to and from bed to chair (including a wheelchair): A lot  Standing up from a chair using your arms (e.g. wheelchair or bedside chair): A lot  To walk in hospital room: A lot  Climbing 3-5 steps with railing: A lot  Basic Mobility - Total Score: 14                                                             Goals:  Encounter Problems       Encounter Problems (Active)       Impaired mobility        Perform all bed mobility with supervision        Start:  05/09/25    Expected End:  05/23/25            Perform all transfers with SBA and LRAD        Start:  05/09/25    Expected End:  05/23/25            Patient will ambulate >/= 100 ft with LRAD and CGAx1        Start:  05/09/25    Expected End:  05/23/25            Patient will perform BLE HEP in standing with UE support and CGAx1 to improve strength, balance and coordination        Start:  05/09/25    Expected End:  05/23/25                       Education Documentation  Mobility Training, taught by Nava Keita PT at 5/9/2025  1:17 PM.  Learner: Patient  Readiness: Acceptance  Method: Explanation  Response: Needs Reinforcement  Comment: cues for safety during mobility    Education Comments  No comments found.              [1]   Patient Active Problem List  Diagnosis    Angina, class IV (CMS-HCC)    Asthma    Chronic pain of both shoulders    Abnormal mammogram    Bipolar affective disorder, currently manic, moderate (Multi)    Coronary artery disease involving native coronary artery of native heart without angina pectoris    Other chest pain    Panlobular emphysema (Multi)    Moderate episode of recurrent major depressive disorder    Type 2 diabetes mellitus without complication, with long-term current use of insulin    Dyspnea     Decreased range of motion of shoulder    Primary hypertension    Mixed hyperlipidemia    TMJ (dislocation of temporomandibular joint), sequela    Edema    Neuropathy, arm    Organic brain syndrome    Overweight with body mass index (BMI) of 29 to 29.9 in adult    Change in voice    Other headache syndrome    Hx of CABG    ICD (implantable cardioverter-defibrillator) in place    Microalbuminuria    Non compliance with medical treatment    Tracheal stenosis    Chronic bilateral low back pain without sciatica    Primary insomnia    Pacemaker malfunction    Chronic obstructive pulmonary disease, unspecified    Ataxia    Tracheostomy status (Multi)    Other hydrocephalus    Chronic systolic (congestive) heart failure    Type 1 diabetes mellitus with hyperglycemia (Multi)    Chest pain    Decreased mobility    Unsteadiness on feet    Weakness of both lower extremities    Stroke-like symptom

## 2025-05-09 NOTE — PROGRESS NOTES
05/09/25 1441   Discharge Planning   Living Arrangements Children;Spouse/significant other   Support Systems Spouse/significant other   Assistance Needed Usually independent, has h/o TBI, however is ambulatory and independent of ADL's   Type of Residence Private residence   Number of Stairs to Enter Residence 5   Number of Stairs Within Residence 0   Do you have animals or pets at home? No   Who is requesting discharge planning? Provider   Home or Post Acute Services Post acute facilities (Rehab/SNF/etc)   Type of Post Acute Facility Services Rehab   Expected Discharge Disposition Rehab   Does the patient need discharge transport arranged? Yes   RoundTrip coordination needed? Yes   Has discharge transport been arranged? No   Patient Choice   Provider Choice list and CMS website (https://medicare.gov/care-compare#search) for post-acute Quality and Resource Measure Data were provided and reviewed with: Patient   Patient / Family choosing to utilize agency / facility established prior to hospitalization No   Stroke Family Assessment   Stroke Family Assessment Needed No   Intensity of Service   Intensity of Service 0-30 min     Spoke with patient briefly, she was sleepy, and whispering, so difficult to hear her at times. Patient did say she needed to go to inpatient rehab, she was in agreement with  acute rehab after giving her choice. Referral made,  acute rehab has accepted, they will start precert. Per Perlita, liaison for  acute rehab, they are willing to take patient prior to authorization. Patient can discharge once medically cleared. CT team will continue to follow. Patient lives with spouse and her adult children, plan is to return home post rehab. Per PCP, CT scan was suspicious for acute small lacunae infarct, may discharge tomorrow if patient remains asymptomatic.

## 2025-05-09 NOTE — PROGRESS NOTES
"Inpatient Speech-Language Pathology Clinical Swallow Evaluation       Patient Name: Tana Bahena \"Perlita\"  MRN: [id number]  : 1975  Patient Room: [address] [place] [address]  Today's Date: 25  Time Calculation  Start Time: 0950  Stop Time: 1015  Time Calculation (min): 25 min       Recommendations:  Solid Diet Recommendations : Regular (IDDSI Level 7)   Liquid Diet Recommendations: Thin (IDDSI Level 0)   Compensatory Swallowing Strategies: Alternate solids and liquids, Swallow 2 times per bite/sip, Small bites/sips, Eat/feed slowly, Other (Comment) (Use straws with thin liquids.)     Medication Administration Recommendations: Whole, With Liquid    Medical Staff Made Aware: Yes    Functional Oral Intake Scale: Level 7        total oral diet with no restrictions    Assessment:  Consistencies Trialed: Consistencies Trialed: Thin (IDDSI Level 0) - Straw, Thin (IDDSI Level 0) - Cup, Pureed/extremely thick (IDDSI Level 4), Regular (IDDSI Level 7)       Oral Mechanism Exam   Trigeminal Nerve (V)   Jaw open/close: Adequate     Bite:  Adequate    Sensation of face:  Reduced    Sensation of oral cavity:  Adequate    Sensation of tongue: Adequate    Facial Nerve (VII)    Symmetry at rest: Adequate    Eyebrow raise: Reduced    Lip pucker: Reduced    Lip retraction/smile: Reduced      Cheek puffed: Adequate    Taste: Adequate    Vagus (X)  Volitional cough: Adequate    Hypoglossal (XII)       Lingual protrusion: Adequate    Tongue ROM/laterization, elevation: Adequate    Tongue strength: Adequate      Dentition: Natural dentition   Respiratory Status: Oxygen via nasal cannula     Diagnostic impressions:      Oral status: Patient's oral cavity clean and clear prior to clinical swallow evaluation.  She has natural teeth that are adequate for mastication.    Oral Phase: Patient's oral phase of the swallow characterized by minimal oral delay with pudding trials with good oral clearance.  Patient had mild oral delay with minimal oral " residual with sha cracker trial.  Patient had severe leakage out of the right side of her mouth when she took cup sips of thin liquids.  She was able to tolerate straw sips of thin liquids without spillage.    Pharyngeal Phase: Patient's pharyngeal phase of the swallow characterized by no evidence of signs/symptoms of aspiration with food or liquid trials during clinical swallow evaluation.  Nursing also reported that patient tolerating her med Pass with water without difficulty.    DIAGNOSTIC IMPRESSIONS:  Patient presents with a mild oral dysphagia with thin liquids via cup secondary to spillage out of her mouth.  She was able to use a straw to drink thin liquids without spillage.  No overt signs symptoms aspiration seen during clinical swallow evaluation.  Patient's diet has been upgraded to regular with thin liquids prior to speech therapist coming into patient's room.  Mending to continue with regular diet and thin liquids.  Speech therapy not indicated at this time.  Discharge speech therapy orders.    Plan:  SLP Plan: No skilled SLP         Discussed POC: Patient, Nursing   Discussed Risks/Benefits: Yes   Patient/Caregiver Agreeable: Yes   Prognosis: Good  Barriers to improvement: none at this time   SLP Discharge Recommendations: unable to determine at this time, refer to subsequent notes    Subjective   Patient was resting soundly and required verbal and tactile cueing to awaken.  Once awake patient was awake alert and oriented.  She reports that she does not have difficulty with swallowing.  No slurred speech noted.  Patient Positioning: Upright in Bed    Pain:  Ratin-10   Pt report: 0  Action taken: none needed    General Visit Information:  Admission history and past medical history from H&P at admission:  49 y.o. female PMHx, anxiety, asthma, CHF, COPD, TBI on Brillinta presents with fall and altered mental status, confusion slurring her words.  Last known well 11:30 PM per EMR.  Family reports  she heard a thud and went upstairs and found the patient on the ground.  They report she fell asleep on the couch and not sure what happened after that.  She was found to have slight bruising to her forehead and right forearm.  Family reports patient had a similar episode last month.       Living Environment: Home           Reason for Referral: Dysphagia      Current Diet : N.p.o.   Prior to Session Communication: Bedside nurse     Treatment Completed with evaluation:   No    SLP Inpatient Education:  Learner patient   Barriers to Learning None   Method Verbal, Demonstration   Education - Topic ST provided patient education regarding role of ST, purpose of session, clinical impressions, goals of care use of compensatory swallowing strategies i.e. use a straw with thin liquids to decrease spillage out of mouth.   Outcome    2= meets goals/outcomes     This documentation was completed using the Dragon Dictation system. There may be spelling and/or grammatical errors that were not corrected prior to final submission.

## 2025-05-09 NOTE — PROGRESS NOTES
"Tana Bahena \"Axel" is a 49 y.o. female on day 1 of admission presenting with Stroke-like symptom.    Subjective   Patient seen and examined.  She denies any focal weakness, numbness, trouble swallowing.  She was awake and alert and answers questions appropriately.       Objective     Physical Exam  Constitutional:       Appearance: Normal appearance.   HENT:      Head: Normocephalic.   Eyes:      Extraocular Movements: Extraocular movements intact.   Neurological:      Mental Status: She is alert.      Comments: Moving all 4 limbs without any issues, patient is strabismus but she says that it is chronic         Last Recorded Vitals  Blood pressure 176/89, pulse 80, temperature 35.1 °C (95.2 °F), temperature source Temporal, resp. rate 18, height 1.702 m (5' 7\"), weight 73.5 kg (162 lb), SpO2 94%.  Intake/Output last 3 Shifts:  No intake/output data recorded.    Relevant Results                              Assessment & Plan    49-year-old female with past medical history of traumatic brain injury, hypertension hyperlipidemia coronary artery disease status post CABG, COPD, seizures, diabetes type 2 who was admitted with altered mental status, strokelike symptoms and was found to have subacute to chronic left thalamic stroke, repeat CT scan shows  occipital stroke as well    She is on aspirin, will resume Brilinta  Clinically she is getting better  Metabolic encephalopathy has resolved, denies any focal weakness  Neurology is following  Carotid duplex did not show any critical stenosis CT angio head and neck did not show any acute occlusion  MRI not compatible with pacemaker  Blood pressure medications have been resumed  Will resume Lantus, insulin sliding scale  Speech therapy ordered  Continue PT OT  Plan to discharge to acute rehab once ready  COPD stable  DVT prophylaxis  Follow-up on echocardiogram findings            Cas Noriega MD    "

## 2025-05-10 VITALS
DIASTOLIC BLOOD PRESSURE: 72 MMHG | HEIGHT: 67 IN | OXYGEN SATURATION: 96 % | SYSTOLIC BLOOD PRESSURE: 102 MMHG | HEART RATE: 98 BPM | WEIGHT: 162 LBS | RESPIRATION RATE: 18 BRPM | BODY MASS INDEX: 25.43 KG/M2 | TEMPERATURE: 98.1 F

## 2025-05-10 LAB
GLUCOSE BLD MANUAL STRIP-MCNC: 149 MG/DL (ref 74–99)
GLUCOSE BLD MANUAL STRIP-MCNC: 274 MG/DL (ref 74–99)
GLUCOSE BLD MANUAL STRIP-MCNC: 294 MG/DL (ref 74–99)

## 2025-05-10 PROCEDURE — 2500000002 HC RX 250 W HCPCS SELF ADMINISTERED DRUGS (ALT 637 FOR MEDICARE OP, ALT 636 FOR OP/ED): Performed by: STUDENT IN AN ORGANIZED HEALTH CARE EDUCATION/TRAINING PROGRAM

## 2025-05-10 PROCEDURE — 99239 HOSP IP/OBS DSCHRG MGMT >30: CPT | Performed by: STUDENT IN AN ORGANIZED HEALTH CARE EDUCATION/TRAINING PROGRAM

## 2025-05-10 PROCEDURE — 2500000001 HC RX 250 WO HCPCS SELF ADMINISTERED DRUGS (ALT 637 FOR MEDICARE OP): Performed by: REGISTERED NURSE

## 2025-05-10 PROCEDURE — 2500000004 HC RX 250 GENERAL PHARMACY W/ HCPCS (ALT 636 FOR OP/ED): Performed by: REGISTERED NURSE

## 2025-05-10 PROCEDURE — 82947 ASSAY GLUCOSE BLOOD QUANT: CPT

## 2025-05-10 PROCEDURE — 2500000001 HC RX 250 WO HCPCS SELF ADMINISTERED DRUGS (ALT 637 FOR MEDICARE OP): Performed by: STUDENT IN AN ORGANIZED HEALTH CARE EDUCATION/TRAINING PROGRAM

## 2025-05-10 PROCEDURE — 97530 THERAPEUTIC ACTIVITIES: CPT | Mod: GP,CQ

## 2025-05-10 PROCEDURE — 99233 SBSQ HOSP IP/OBS HIGH 50: CPT | Performed by: PSYCHIATRY & NEUROLOGY

## 2025-05-10 PROCEDURE — 97116 GAIT TRAINING THERAPY: CPT | Mod: GP,CQ

## 2025-05-10 RX ADMIN — GABAPENTIN 100 MG: 100 CAPSULE ORAL at 08:00

## 2025-05-10 RX ADMIN — CARBAMAZEPINE 200 MG: 200 CAPSULE, EXTENDED RELEASE ORAL at 08:00

## 2025-05-10 RX ADMIN — ASPIRIN 81 MG: 81 TABLET, COATED ORAL at 08:00

## 2025-05-10 RX ADMIN — TICAGRELOR 90 MG: 90 TABLET ORAL at 08:00

## 2025-05-10 RX ADMIN — FLUTICASONE FUROATE AND VILANTEROL TRIFENATATE 1 PUFF: 200; 25 POWDER RESPIRATORY (INHALATION) at 06:30

## 2025-05-10 RX ADMIN — INSULIN LISPRO 3 UNITS: 100 INJECTION, SOLUTION INTRAVENOUS; SUBCUTANEOUS at 11:21

## 2025-05-10 RX ADMIN — ISOSORBIDE MONONITRATE 60 MG: 60 TABLET, EXTENDED RELEASE ORAL at 06:29

## 2025-05-10 RX ADMIN — TRIAMTERENE AND HYDROCHLOROTHIAZIDE 1 TABLET: 37.5; 25 TABLET ORAL at 08:00

## 2025-05-10 RX ADMIN — POLYETHYLENE GLYCOL 3350 17 G: 17 POWDER, FOR SOLUTION ORAL at 08:00

## 2025-05-10 RX ADMIN — CARVEDILOL 6.25 MG: 6.25 TABLET, FILM COATED ORAL at 08:00

## 2025-05-10 RX ADMIN — EMPAGLIFLOZIN 25 MG: 25 TABLET, FILM COATED ORAL at 08:00

## 2025-05-10 ASSESSMENT — PAIN SCALES - GENERAL
PAINLEVEL_OUTOF10: 0 - NO PAIN
PAINLEVEL_OUTOF10: 0 - NO PAIN

## 2025-05-10 ASSESSMENT — COGNITIVE AND FUNCTIONAL STATUS - GENERAL
CLIMB 3 TO 5 STEPS WITH RAILING: TOTAL
STANDING UP FROM CHAIR USING ARMS: A LITTLE
DRESSING REGULAR UPPER BODY CLOTHING: A LITTLE
MOVING FROM LYING ON BACK TO SITTING ON SIDE OF FLAT BED WITH BEDRAILS: A LITTLE
TURNING FROM BACK TO SIDE WHILE IN FLAT BAD: A LITTLE
WALKING IN HOSPITAL ROOM: A LITTLE
MOVING TO AND FROM BED TO CHAIR: A LITTLE
DRESSING REGULAR LOWER BODY CLOTHING: A LITTLE
MOBILITY SCORE: 16
HELP NEEDED FOR BATHING: A LITTLE
STANDING UP FROM CHAIR USING ARMS: A LITTLE
MOBILITY SCORE: 18
WALKING IN HOSPITAL ROOM: A LOT
DAILY ACTIVITIY SCORE: 20
MOVING TO AND FROM BED TO CHAIR: A LITTLE
TOILETING: A LITTLE
CLIMB 3 TO 5 STEPS WITH RAILING: A LOT

## 2025-05-10 ASSESSMENT — PAIN - FUNCTIONAL ASSESSMENT: PAIN_FUNCTIONAL_ASSESSMENT: 0-10

## 2025-05-10 NOTE — PROGRESS NOTES
05/10/25 1237   Discharge Planning   Home or Post Acute Services Post acute facilities (Rehab/SNF/etc)   Type of Post Acute Facility Services Rehab   Expected Discharge Disposition Rehab  ([facility])   Does the patient need discharge transport arranged? Yes   RoundTrip coordination needed? Yes   Has discharge transport been arranged? Yes   What day is the transport expected? 05/10/25   Patient Choice   Provider Choice list and CMS website (https://medicare.gov/care-compare#search) for post-acute Quality and Resource Measure Data were provided and reviewed with: Patient   Patient / Family choosing to utilize agency / facility established prior to hospitalization No   Stroke Family Assessment   Stroke Family Assessment Needed No  (discharging to AR)   Intensity of Service   Intensity of Service 0-30 min     Medically cleared for dc today. Transport confirmed in Roundtrip for 4pm  by elyssa Berger completed and signed. Pt updated. TCC attempted to call pt's dtr to update but there was no answer. Left vm with dc info and return contact number. Bedside RN given report number. Facility aware.

## 2025-05-10 NOTE — ASSESSMENT & PLAN NOTE
Acute left thalamic and occipital infarct.  Traumatic brain injury with cognitive delay and residual motor deficit  Partial complex seizure with secondary generalization.  Dementia which is multifactorial.    Plan.  Continue with current treatment.  Patient is unable to have an MRI because of the implant continue with patient's home medication and okay to transfer to rehab when medically stable    Patient can follow-up with Dr. Zepeda who is her regular neurologist as an outpatient    I did review the CT angio head and neck and the CAT scan of the head which showed an early infarction.    Due to technical limitations of voice recognition and human error, this note may not accurately reflect the care of the patient.

## 2025-05-10 NOTE — PROGRESS NOTES
"Tana Bahena \"Axel" is a 49 y.o. female on day 2 of admission presenting with Stroke-like symptom.    Subjective   Patient is feeling a lot better.  No new weakness or numbness.  It is difficult to see the patient's old weakness from the traumatic brain injury versus recent event since I do not have any baseline    No seizure or seizure-like activity    CAT scan did show an acute left thalamic and occipital infarct without any mass effect    Please refer to the initial H&P ER records and all progress note for details I did review the CAT scans of the head           Last Recorded Vitals  Blood pressure 102/72, pulse 98, temperature 36.7 °C (98.1 °F), temperature source Temporal, resp. rate 18, height 1.702 m (5' 7\"), weight 73.5 kg (162 lb), SpO2 96%.    Physical Exam/Neurological Exam    Relevant Results  Recent Results (from the past 24 hours)   POCT GLUCOSE    Collection Time: 05/09/25  4:11 PM   Result Value Ref Range    POCT Glucose 260 (H) 74 - 99 mg/dL   POCT GLUCOSE    Collection Time: 05/09/25  7:46 PM   Result Value Ref Range    POCT Glucose 252 (H) 74 - 99 mg/dL   POCT GLUCOSE    Collection Time: 05/10/25  6:23 AM   Result Value Ref Range    POCT Glucose 149 (H) 74 - 99 mg/dL   POCT GLUCOSE    Collection Time: 05/10/25 11:05 AM   Result Value Ref Range    POCT Glucose 294 (H) 74 - 99 mg/dL      NIH Stroke Scale  1A. Level of Consciousness: Alert, Keenly Responsive  1B. Ask Month and Age: Both Questions Right  1C. Blink Eyes & Squeeze Hands: Performs Both Tasks  2. Best Gaze: Normal  3. Visual: No Visual Loss  4. Facial Palsy: Partial Paralysis  5A. Motor - Left Arm: No Drift  5B. Motor - Right Arm: No Drift  6A. Motor - Left Leg: No Drift  6B. Motor - Right Leg: No Drift  7. Limb Ataxia: Absent  8. Sensory Loss: Normal  9. Best Language: No Aphasia  10. Dysarthria: Mild-to-Moderate Dysarthria  11. Extinction and Inattention: No Abnormality  NIH Stroke Scale: 3           White Coma Scale  Best Eye " Response: Spontaneous  Best Verbal Response: Confused  Best Motor Response: Follows commands  [place] Coma Scale Score: 14                CT head wo IV contrast  Result Date: 5/9/2025  Interpreted By:  Karel Cobb and Hooper Grayson STUDY: [id number] CT HEAD WO IV CONTRAST   INDICATION: Signs/Symptoms:stroke like symptoms, follow up   COMPARISON: CT angiography of the head obtained May 8, 2025. CT of the head obtained May 8, 2025.   ACCESSION NUMBER(S): [id number]   ORDERING CLINICIAN: SEBASTIEN VALVERDE   TECHNIQUE: Noncontrast helical CT of the head was performed. Multiplanar reformations in bone and soft tissue algorithm were provided.   FINDINGS: Decreased attenuation of slightly increased conspicuity of the anteromedial left thalamus (axial series 203, image 14) versus the comparison examination.   Decreased attenuation/loss of gray-white differentiation observed of the left paramedian occipital lobe at the level of the ventricular trigones (axial series 203, image 14). This was not well demonstrated on the comparison examination.   No evidence of acute intracranial hemorrhage.   No intracranial mass or mass effect.   No midline shift or herniation.   The ventricles appear symmetrically enlarged. There is prominence of the sylvian cisterns. Coronal imaging suggest decreased callosal angle. There is sulcal effacement at the frontoparietal vertex.   Normal CT appearances of the paranasal sinuses. Orbits and globes are normal in CT appearance. No mastoid effusion. No acute or aggressive appearing calvarial lesion.       1. Acute versus early subacute evolving nonhemorrhagic ischemic infarct of the left anteromedial thalamus, which is more conspicuous when compared to the previous examination. 2. Acute versus early subacute evolving nonhemorrhagic infarct of the left paramedian occipital lobe at the level of the ventricular trigones, not well demonstrated on comparison imaging. 3. No CT evidence of acute  intracranial hemorrhage or mass effect. 4. Ventricular dilation suggestive of possible normal pressure hydrocephalus or nonspecific cause of diffuse volume loss.   I personally reviewed the images/study and I agree with the findings as stated.   MACRO: Critical Finding:  See findings. Notification was initiated on 5/9/2025 at 1:59 pm by  Juan Rodriguez.  (**-OCF-**)   Signed by: Karel Cobb 5/9/2025 2:04 PM Dictation workstation:   [id number]    ECG 12 lead  Result Date: 5/9/2025  Normal sinus rhythm Possible Left atrial enlargement Anterior infarct (cited on or before 04-FEB-2025) Abnormal ECG When compared with ECG of 27-FEB-2025 06:37, No significant change was found See ED provider note for full interpretation and clinical correlation Confirmed by Юлия Antonio ([id number]) on 5/9/2025 1:41:55 PM    Vascular US Carotid Artery Duplex Bilateral  Result Date: 5/9/2025  Interpreted By:  Ferdinand Franklin, STUDY: VASC US CAROTID ARTERY DUPLEX BILATERAL;  5/9/2025 12:18 pm   INDICATION: Signs/Symptoms:cva.   COMPARISON: 06/08/2022.   ACCESSION NUMBER(S): [id number]   ORDERING CLINICIAN: GILDA VALDIVIA   TECHNIQUE: Vascular ultrasound of the extracranial carotid system was performed bilaterally.  Gray scale, color Doppler and spectral Doppler waveform analysis was performed.   FINDINGS: RIGHT: On the right  irregular atherosclerotic plaque is demonstrated. Right ICA peak velocities not significantly elevated. The peak systolic velocities are as follows:   RIGHT SIDE PEAK SYSTOLIC VELOCITY TABLE: CCA 65 cm/sec. ICA 40 cm/sec. ECA 93 cm/sec.   The ratio of the peak systolic velocity of the right ICA/CCA is 0.6.   RIGHT VERTEBRAL ARTERY: The right vertebral artery demonstrates proximal normal anterograde flow   LEFT: On the left  irregular atherosclerotic plaque is demonstrated. Left ICA peak velocities not significantly elevated. The peak systolic velocities are as follows:   LEFT SIDE PEAK SYSTOLIC VELOCITY  TABLE: CCA 55 cm/sec. ICA 56 cm/sec. ECA 89 cm/sec.   The ratio of the peak systolic velocity of the left ICA/CCA is 1.   LEFT VERTEBRAL ARTERY: Left vertebral artery appears patent with antegrade flow. Left vertebral artery forearm is mildly dampened.       Estimated ICA stenosis is less than 50% bilaterally.   MACRO: None   Signed by: Ferdinand Franklin 5/9/2025 12:32 PM Dictation workstation:   [id number]    CT brain attack angio head and neck W and WO IV contrast  Addendum Date: 5/8/2025  Interpreted By:  Partha Ibanez, ADDENDUM: Partha Ibanez discussed the significance and urgency of this CT angiogram by secure chat with  MIRELA PIZARRO on 5/8/2025 at 11:15 am. (**-RCF-**) Findings:  See findings.     Signed by: Partha Ibanez 5/8/2025 11:15 AM   -------- ORIGINAL REPORT -------- Dictation workstation:   [id number]    Result Date: 5/8/2025  Interpreted By:  Partha Ibanez, STUDY: CT BRAIN ATTACK ANGIO HEAD AND NECK W AND WO IV CONTRAST;  5/8/2025 10:39 am   INDICATION: Signs/Symptoms:Stroke symptoms.   COMPARISON: June 2022   ACCESSION NUMBER(S): [id number]   ORDERING CLINICIAN: MIRELA PIZARRO   TECHNIQUE: Noncontrast enhanced CT was performed from the skullbase to the vertex.   FINDINGS: There is mild/moderate prominence of the cortical sulci and sylvian fissures unchanged from the previous exam. There has been interval progression of ventricular dilatation involving the lateral and 3rd ventricles compared to the previous exam. The 4th ventricle is unchanged in size. Findings are consistent with obstructive or normal pressure hydrocephalus.   There has been interval development of a 5 mm x 15 mm focal lucency in the left thalamus consistent with subacute/chronic lacunar infarction.   There is no evidence of hemorrhage or infarct   There is no evidence of intracranial mass or extra-axial collection. The skull base, paranasal sinuses and orbital structures are normal. The calvarium is normal.        Progressive dilatation of the lateral and 3rd ventricles compared to the previous exam   Suspected interval development of a subacute or chronic left thalamic infarction.   No evidence of cortical infarction or hemorrhage.   MACRO: none   Signed by: Partha Ibanez 5/8/2025 10:47 AM Dictation workstation:   [id number]    CT head wo IV contrast  Addendum Date: 5/8/2025  Interpreted By:  Partha Ibanez, ADDENDUM: CT angiography   Following contrast injection, CT of the neck and head were performed and multiplanar reconstructions as well as 3D reconstructions were made   Chest: The aortic arch and origin of the great vessels are normal   Right carotid: The common carotid artery is normal. The bifurcation is normal. The cervical, petrous and cavernous portions are normal   Left carotid: The common carotid artery is normal. There are minor atherosclerotic calcifications at the bifurcation without measurable luminal narrowing. The cervical, petrous and cavernous portions exhibit athero sclerotic calcification without measurable luminal narrowing.   Vertebral arteries: The vertebral arteries are diminutive but symmetrical. There origin is are normal. No evidence of compression or filling defect in the cervical portions. Marked athero sclerotic calcification at the distal right vertebral artery proximal to the vertebrobasilar junction. The basilar artery is diminutive but otherwise normal.   Intracranial circulation: There are athero sclerotic calcifications in the cavernous and supraclinoid portions of both internal carotid arteries. The intracranial supraclinoid carotid arteries are poorly opacify as are the A1 and M1 segments of both the anterior and middle cerebral arteries. The basilar artery is poorly opacify as are the posterior cerebral arteries bilaterally. There is no evidence of intracranial branch occlusion, aneurysm or vascular malformation.   Impression:   CTA of the carotid and vertebral vessels  within the neck is normal   Poorly opacify intracranial arterial structures without evidence of branch occlusion, aneurysm or vascular malformation   Normal contrast opacification of the dural venous sinuses.   Signed by: Partha Ibanez 5/8/2025 10:52 AM   -------- ORIGINAL REPORT -------- Dictation workstation:   [id number]    Result Date: 5/8/2025  Interpreted By:  Miguel Ángel Lopez, STUDY: CT HEAD WO IV CONTRAST;  5/8/2025 8:21 am   INDICATION: Signs/Symptoms:fall/HIA, on brilinta.     COMPARISON: 06/09/2022   ACCESSION NUMBER(S): [id number]   ORDERING CLINICIAN: MIRELA PIZARRO   TECHNIQUE: Unenhanced images were obtained through the brain.   FINDINGS: There is atrophy resulting in prominence of the ventricles and sulci. There are areas of decreased attenuation within the white matter which are nonspecific but are commonly associated with small vessel ischemic disease. Degree of ventricular dilatation appears disproportionate when compared to the sulcal prominence. In addition, the size of the ventricles has increased when compared to the previous study. The findings are nonspecific but suggest underlying normal pressure hydrocephalus. There is no mass effect or midline shift. No acute intracranial hemorrhage is identified. No extra-axial fluid collections are seen. No intraparenchymal mass lesions are identified.  Bone windows demonstrate no evidence of an acute calvarial fracture.       No evidence of an acute intracranial process. CT findings which may indicate normal pressure hydrocephalus.   MACRO: None.   Signed by: Miguel Ángel Lopez 5/8/2025 8:29 AM Dictation workstation:   [id number]    CT cervical spine wo IV contrast  Result Date: 5/8/2025  Interpreted By:  Miguel Ángel Lopez, STUDY: CT CERVICAL SPINE WO IV CONTRAST;  5/8/2025 8:21 am   INDICATION: Signs/Symptoms:fall/HIA, on brilinta.     COMPARISON: 02/15/2022   ACCESSION NUMBER(S): [id number]   ORDERING CLINICIAN: MIRELA PIZARRO   TECHNIQUE: Unenhanced axial  images were obtained through the cervical spine. The axial data was utilized to reconstruct images in sagittal and coronal planes.   FINDINGS: No acute fracture is identified. No subluxation is seen. There are degenerative changes which include disc space narrowing, endplate spurring, minimal uncovertebral spurring, small posterior disc osteophyte complexes, and mild facet arthrosis/hypertrophy. Facet joints demonstrate a normal alignment. Prevertebral soft tissues are within normal limits. No lytic or blastic lesion is noted.       No evidence of an acute fracture or subluxation.   MACRO: None.   Signed by: Miguel Ángel Lopez 5/8/2025 8:30 AM Dictation workstation:   [id number]       No EEG results found for the past 14 days                 Assessment/Plan     Assessment & Plan  Stroke-like symptom  Acute left thalamic and occipital infarct.  Traumatic brain injury with cognitive delay and residual motor deficit  Partial complex seizure with secondary generalization.  Dementia which is multifactorial.    Plan.  Continue with current treatment.  Patient is unable to have an MRI because of the implant continue with patient's home medication and okay to transfer to rehab when medically stable    Patient can follow-up with Dr. Zepeda who is her regular neurologist as an outpatient    I did review the CT angio head and neck and the CAT scan of the head which showed an early infarction.    Due to technical limitations of voice recognition and human error, this note may not accurately reflect the care of the patient.           Due to technical limitations of voice recognition and human error, this note may not accurately reflect the care of the patient.     Vishal Russ MD

## 2025-05-10 NOTE — CARE PLAN
The patient's goals for the shift include  get 8 hours of rest    The clinical goals for the shift include safety      Problem: Safety - Adult  Goal: Free from fall injury  Outcome: Progressing  Flowsheets (Taken 5/9/2025 2058)  Free from fall injury:   Instruct family/caregiver on patient safety   Based on caregiver fall risk screen, instruct family/caregiver to ask for assistance with transferring infant if caregiver noted to have fall risk factors     Problem: Pain - Adult  Goal: Verbalizes/displays adequate comfort level or baseline comfort level  Outcome: Progressing  Flowsheets (Taken 5/9/2025 2058)  Verbalizes/displays adequate comfort level or baseline comfort level: Encourage patient to monitor pain and request assistance     Problem: Fall/Injury  Goal: Not fall by end of shift  Outcome: Progressing

## 2025-05-10 NOTE — DISCHARGE SUMMARY
Discharge Diagnosis  Acute stroke      Issues Requiring Follow-Up  Plan for discharge to an acute rehab for better recovery  Outpatient follow-up with primary care and neurology office for history of stroke    Discharge Meds     Medication List      CONTINUE taking these medications     alcohol swabs; Commonly known as: Alcohol Prep Pads; USE TO CLEANSE SKIN   PRIOR TO INJECTION FOUR TIMES DAILY AS DIRECTED   aspirin 81 mg EC tablet   atorvastatin 40 mg tablet; Commonly known as: Lipitor   carBAMazepine 200 mg 12 hr capsule; Commonly known as: Carbatrol   carvedilol 6.25 mg tablet; Commonly known as: Coreg; Take 1 tablet (6.25   mg) by mouth 2 times daily (morning and late afternoon).   donepezil 5 mg tablet; Commonly known as: Aricept   eszopiclone 2 mg tablet; Commonly known as: Lunesta   fluticasone propion-salmeteroL 250-50 mcg/dose diskus inhaler; Commonly   known as: Advair Diskus; Inhale 1 puff 2 times a day. Rinse mouth with   water after use to reduce aftertaste and incidence of candidiasis. Do not   swallow.   * FreeStyle Jayy 3 Plus Sensor device; Generic drug: blood-glucose   sensor; Use as directed to test blood sugar daily. Replace sensor in arm   every 15 days.   * FreeStyle Jayy 3 Sensor device; Generic drug: blood-glucose sensor;   tid   FreeStyle Jayy 3 Ruby misc; Generic drug:   blood-glucose,,cont; USE AS INSTRUCTED FOR CONTINUOUS BLOOD   GLUCOSE MONITORING   gabapentin 100 mg capsule; Commonly known as: Neurontin; Take 1 capsule   (100 mg) by mouth 3 times a day.   HumaLOG KwikPen Insulin 100 unit/mL pen; Generic drug: insulin lispro   hydrOXYzine HCL 25 mg tablet; Commonly known as: Atarax; Take 1 tablet   (25 mg) by mouth once daily at bedtime.   isosorbide mononitrate ER 60 mg 24 hr tablet; Commonly known as: Imdur;   Take 1 tablet (60 mg) by mouth once daily in the morning. Take before   meals.   Jardiance 25 mg tablet; Generic drug: empagliflozin; Take 1 tablet (25   mg) by  "mouth once daily.   lancets misc; Use as directed when checking blood sugar   * Lantus Solostar U-100 Insulin 100 unit/mL (3 mL) pen; Generic drug:   insulin glargine; Inject 50 Units under the skin once daily at bedtime.   Take as directed per insulin instructions.   * insulin glargine 100 unit/mL (3 mL) pen; Commonly known as: Lantus;   Inject 53 Units under the skin once daily at bedtime. Take as directed per   insulin instructions.   memantine 5 mg tablet; Commonly known as: Namenda   metFORMIN  mg 24 hr tablet; Commonly known as: Glucophage-XR; Take   2 tablets (1,000 mg) by mouth 2 times a day.   metoclopramide 10 mg tablet; Commonly known as: Reglan; Take 1 tablet   (10 mg) by mouth 4 times a day as needed (nausea).   nitroglycerin 0.4 mg SL tablet; Commonly known as: Nitrostat   NovoLOG Flexpen U-100 Insulin 100 unit/mL (3 mL) pen; Generic drug:   insulin aspart; INJECT 6-8 UNITS UNDER THE SKIN THREE TIMES DAILY BEFORE   MEALS. PER SLIDING SCALE, MAX 30 UNITS PER DAY   OneTouch Verio test strips; Generic drug: blood sugar diagnostic; Test   glucose 4 times daily   pen needle, diabetic 32 gauge x 5/32\" needle; Commonly known as: BD Breana   2nd Gen Pen Needle; USE TO ADMINISTER INSULIN 4 TIMES A DAY   ranolazine 500 mg 12 hr tablet; Commonly known as: Ranexa   rOPINIRole 0.25 mg tablet; Commonly known as: Requip; Take 1 tablet   (0.25 mg) by mouth 3 times a day.   semaglutide 1 mg/dose (4 mg/3 mL) pen injector; Commonly known as:   OZEMPIC; Inject 1 mg under the skin 1 (one) time per week.   ticagrelor 90 mg tablet; Commonly known as: Brilinta; Take 1 tablet (90   mg) by mouth 2 times a day.   traZODone 100 mg tablet; Commonly known as: Desyrel; Take 3 tablets (300   mg) by mouth as needed at bedtime for sleep.   triamterene-hydrochlorothiazid 37.5-25 mg tablet; Commonly known as:   Maxzide-25   * True Metrix Glucose Meter misc; Generic drug: blood-glucose meter   * OneTouch Verio Flex Start meter; " Generic drug: Blood glucose   monitoring; Use as needed for glucose testing  * This list has 6 medication(s) that are the same as other medications   prescribed for you. Read the directions carefully, and ask your doctor or   other care provider to review them with you.       Test Results Pending At Discharge  Pending Labs       No current pending labs.            Hospital Course  A 49-year-old female with a PMH of traumatic brain injury, hypertension hyperlipidemia coronary artery disease status post CABG, COPD, seizures, diabetes type 2 who was admitted with altered mental status, strokelike symptoms and was found to have subacute to chronic left thalamic stroke, repeat CT scan showed occipital stroke as well     She was placed on ASA, Brilinta, telemetry monitor, Lipitor, fall/aspiration precaution.   Metabolic encephalopathy resolved.  Neurology consulted and recommended PT/OT eval and medical mgmt.   Carotid duplex did not show any critical stenosis, CT angio head and neck did not show any acute occlusion.  MRI not compatible with pacemaker.  Blood pressure medications were resumed.  She was placed on ISS, hypoglycemia protocol, POCT glucose, DM diet.  DVT prophylaxis was also provided.   Echo was done with pending results. Pt can follow up as outpatient for the results with PCP and neurology.      Patient was also evaluated by physical therapy who recommended acute rehab placement.    Currently patient is hemodynamically stable and ready for discharge to an acute rehab for better recovery.  She will be continued on aspirin, Brilinta, Lipitor, rest of her home medications.  She will follow as outpatient with her primary care and neurology office for further recommendation and medication adjustment.  She can follow-up for the results of echocardiogram with her primary care or neurology office.  Son at bedside and all the questions were answered.      Pertinent Physical Exam At Time of Discharge  Physical  Exam    General: Well-developed obese female, in no acute distress  HEENT: Normocephalic, no JVD, no lymphadenopathy, neck supple, asymmetry of the eyes noted, strabismus    Lungs: Clear, no wheezing, no crackles  Cardiac: Normal S1-S2, no murmur, no gallop  Abdomen: Soft, nontender, no distention, positive bowel sound  Extremities: No deformity, no edema, pulses intact, ROM intact  Neurological: Alert awake oriented x3, sensation intact, clear speech      Outpatient Follow-Up  Future Appointments   Date Time Provider Department Center   5/20/2025  1:40 PM Jackeline Potts PharmD [id number]       Time spent 35 minutes  Julio C Donald MD   English

## 2025-05-10 NOTE — CARE PLAN
The patient's goals for the shift include maintaining comfort throughout the shift.    The clinical goals for the shift include maintaining safety throughout the shift.    Over the shift, the patient progressed toward stated goals.

## 2025-05-10 NOTE — PROGRESS NOTES
"Physical Therapy    Physical Therapy Treatment    Patient Name: Tana Bahena \"Axel"  MRN: [id number]  Today's Date: 5/10/2025  Time Calculation  Start Time: 1006  Stop Time: 1029  Time Calculation (min): 23 min     1007/1007-A    Assessment/Plan   End of Session Communication: Bedside nurse  Assessment Comment: Patient presents with fair effort throughout todays session. Demonstrates fair carry over of cues, cognitive deficits limiting. Demonstrates improved stability and endurance with increasing gait distances. Assist required for coordination and navigation of device. Call light and all needs in reach.  End of Session Patient Position: Up in chair, Alarm on        General Visit Information:   PT  Visit  PT Received On: 05/10/25  General  Prior to Session Communication: Bedside nurse  Patient Position Received: Bed, 3 rail up, Alarm on  General Comment: Pt agreeable to therapy this date.  Subjective     Precautions:  Precautions  Hearing/Visual Limitations: low vision (significant visual deficits, likely chronic)  Medical Precautions: Fall precautions  Precautions Comment: Pleasantly confused, alarm, R lateral lean/veer       Objective     Pain:  Pain Assessment  Pain Assessment: 0-10  0-10 (Numeric) Pain Score: 0 - No pain    Cognition:  Cognition  Overall Cognitive Status: Impaired  Arousal/Alertness: Generalized responses  Orientation Level: Disoriented to time, Disoriented to situation (states year is 1997, month is 2025)  Following Commands: Follows multistep commands with repetition  Safety Judgment: Decreased awareness of need for assistance  Problem Solving: Assistance required to implement solutions  Insight: Moderate  Impulsive: Mildly  Processing Speed: Delayed      Treatments:           Bed Mobility  Bed Mobility: Yes  Bed Mobility 1  Bed Mobility 1: Supine to sitting  Level of Assistance 1: Contact guard  Bed Mobility Comments 1: Pt performed supine<>EOB CGA with use of bedrails to lift trunk. Pt " demonstrates ability to walk BLE off edge of bed.  Ambulation/Gait Training  Ambulation/Gait Training Performed: Yes  Ambulation/Gait Training 1  Surface 1: Level tile  Device 1: Rolling walker (FWW)  Gait Support Devices: Gait belt  Assistance 1: Minimum assistance, Moderate verbal cues, Moderate tactile cues  Quality of Gait 1: Wide base of support, Decreased step length, Diminished heel strike, Ataxic  Comments/Distance (ft) 1: Pt ambulated 40' with FWW, Marifer required for guiding AD as well as trunk support due to ataxia present, mild lateral sway and instability present. Pt demonstrates step through gait, diminished heel strike, with short stride length. Fair foot clearance. Eversion of BLEs R>L, RLE continuously bumping into back leg of walker despite frequent cues to stay within center/NOAH of device.  Transfers  Transfer: Yes  Transfer 1  Transfer From 1: Stand to  Transfer to 1: Chair with arms  Technique 1: Stand pivot  Transfer Device 1: Walker, Gait belt (FWW)  Transfer Level of Assistance 1: Contact guard, Moderate verbal cues  Trials/Comments 1: Pt performed stand pivot from FWW<>chair CGA. Pt requires VC for sequencing, fair eccentric control to sitting position. Fair AD management throughout.  Transfers 2  Transfer From 2: Bed to  Transfer to 2: Stand  Technique 2: Stand to sit, Sit to stand  Transfer Device 2: Walker, Gait belt (FWW)  Transfer Level of Assistance 2: Contact guard  Trials/Comments 2: Pt performed STS from EOB<>FWW CGA, VC required for sequencing.          Outcome Measures:     [facility] Basic Mobility  Turning from your back to your side while in a flat bed without using bedrails: A little  Moving from lying on your back to sitting on the side of a flat bed without using bedrails: A little  Moving to and from bed to chair (including a wheelchair): A little  Standing up from a chair using your arms (e.g. wheelchair or bedside chair): A little  To walk in hospital room: A little  Climbing  3-5 steps with railing: Total  Basic Mobility - Total Score: 16                                      Education Documentation  Mobility Training, taught by Kellie Hansen PTA at 5/10/2025 12:51 PM.  Learner: Patient  Readiness: Acceptance  Method: Explanation, Demonstration  Response: Verbalizes Understanding, Needs Reinforcement    Education Comments  No comments found.           EDUCATION:     Encounter Problems       Encounter Problems (Active)       Impaired mobility        Perform all bed mobility with supervision  (Progressing)       Start:  05/09/25    Expected End:  05/23/25            Perform all transfers with SBA and LRAD  (Progressing)       Start:  05/09/25    Expected End:  05/23/25            Patient will ambulate >/= 100 ft with LRAD and CGAx1  (Progressing)       Start:  05/09/25    Expected End:  05/23/25            Patient will perform BLE HEP in standing with UE support and CGAx1 to improve strength, balance and coordination  (Not Progressing)       Start:  05/09/25    Expected End:  05/23/25               Pain - Adult

## 2025-05-12 DIAGNOSIS — F51.01 PRIMARY INSOMNIA: ICD-10-CM

## 2025-05-12 LAB
EJECTION FRACTION APICAL 4 CHAMBER: 18.8
EJECTION FRACTION: 20 %
LEFT VENTRICLE INTERNAL DIMENSION DIASTOLE: 5.39 CM (ref 3.5–6)
LV EJECTION FRACTION BIPLANE: 22 %
MITRAL VALVE E/A RATIO: 2.12
RIGHT VENTRICLE PEAK SYSTOLIC PRESSURE: 29.4 MMHG

## 2025-05-12 RX ORDER — TRAZODONE HYDROCHLORIDE 100 MG/1
300 TABLET ORAL NIGHTLY PRN
Qty: 90 TABLET | Refills: 1 | Status: SHIPPED | OUTPATIENT
Start: 2025-05-12 | End: 2025-07-11

## 2025-05-12 NOTE — TELEPHONE ENCOUNTER
Rx Refill Request Telephone Encounter    Name:  Tana Bahena  :  [id number]  Medication Name:    traZODone (Desyrel) 100 mg tablet   Specific Pharmacy location:      [facility] [address] [place] [address] & [address] [address]  Phone: [phone]  Fax: [phone]     Date of last appointment:  2025  Date of next appointment:  n/a  Best number to reach patient:      [phone]

## 2025-05-20 ENCOUNTER — APPOINTMENT (OUTPATIENT)
Dept: PHARMACY | Facility: HOSPITAL | Age: 50
End: 2025-05-20
Payer: COMMERCIAL

## 2025-05-21 ENCOUNTER — DOCUMENTATION (OUTPATIENT)
Dept: HOME HEALTH SERVICES | Facility: HOME HEALTH | Age: 50
End: 2025-05-21
Payer: COMMERCIAL

## 2025-05-21 ENCOUNTER — TRANSCRIBE ORDERS (OUTPATIENT)
Dept: HOME HEALTH SERVICES | Facility: HOME HEALTH | Age: 50
End: 2025-05-21
Payer: COMMERCIAL

## 2025-05-21 ENCOUNTER — HOME HEALTH ADMISSION (OUTPATIENT)
Dept: HOME HEALTH SERVICES | Facility: HOME HEALTH | Age: 50
End: 2025-05-21
Payer: COMMERCIAL

## 2025-05-21 DIAGNOSIS — I63.9 CEREBROVASCULAR ACCIDENT (CVA), UNSPECIFIED MECHANISM (MULTI): Primary | ICD-10-CM

## 2025-05-21 NOTE — HH CARE COORDINATION
Home Care received a Referral for Nursing, Physical Therapy, Occupational Therapy, Speech Language Pathology, and Medical Social Work. We have processed the referral for a Start of Care on 5/22.     If you have any questions or concerns, please feel free to contact us at [phone]. Follow the prompts, enter your five digit zip code, and you will be directed to your care team on WE[facility] 1.

## 2025-05-22 ENCOUNTER — HOME CARE VISIT (OUTPATIENT)
Dept: HOME HEALTH SERVICES | Facility: HOME HEALTH | Age: 50
End: 2025-05-22
Payer: COMMERCIAL

## 2025-05-22 VITALS — HEART RATE: 75 BPM | TEMPERATURE: 98 F | OXYGEN SATURATION: 97 %

## 2025-05-22 PROCEDURE — G0299 HHS/HOSPICE OF RN EA 15 MIN: HCPCS

## 2025-05-22 ASSESSMENT — ENCOUNTER SYMPTOMS
CONSTIPATION: 1
CHANGE IN APPETITE: UNCHANGED
DESCRIPTION OF MEMORY LOSS: SHORT TERM
APPETITE LEVEL: FAIR
HYPERTENSION: 1
LAST BOWEL MOVEMENT: 67343
MUSCLE WEAKNESS: 1
PERSON REPORTING PAIN: PATIENT
DENIES PAIN: 1
DOUBLE VISION: 1
DESCRIPTION OF MEMORY LOSS: IMMEDIATE
STOOL FREQUENCY: LESS THAN DAILY

## 2025-05-22 ASSESSMENT — ACTIVITIES OF DAILY LIVING (ADL)
OASIS_M1830: 03
AMBULATION ASSISTANCE: ONE PERSON
CURRENT_FUNCTION: ONE PERSON
ENTERING_EXITING_HOME: MINIMUM ASSIST

## 2025-05-22 ASSESSMENT — LIFESTYLE VARIABLES: SMOKING_STATUS: 0

## 2025-05-23 ENCOUNTER — HOME CARE VISIT (OUTPATIENT)
Dept: HOME HEALTH SERVICES | Facility: HOME HEALTH | Age: 50
End: 2025-05-23
Payer: COMMERCIAL

## 2025-05-23 VITALS
SYSTOLIC BLOOD PRESSURE: 130 MMHG | RESPIRATION RATE: 14 BRPM | TEMPERATURE: 97.2 F | DIASTOLIC BLOOD PRESSURE: 78 MMHG | OXYGEN SATURATION: 97 % | HEART RATE: 66 BPM

## 2025-05-23 PROCEDURE — G0151 HHCP-SERV OF PT,EA 15 MIN: HCPCS

## 2025-05-23 SDOH — HEALTH STABILITY: PHYSICAL HEALTH: EXERCISE TYPE: LE EXERCISES

## 2025-05-23 SDOH — HEALTH STABILITY: PHYSICAL HEALTH: PHYSICAL EXERCISE: 15

## 2025-05-23 SDOH — HEALTH STABILITY: PHYSICAL HEALTH: EXERCISE ACTIVITIES SETS: 1

## 2025-05-23 SDOH — HEALTH STABILITY: PHYSICAL HEALTH: PHYSICAL EXERCISE: SEATED

## 2025-05-23 SDOH — HEALTH STABILITY: PHYSICAL HEALTH: EXERCISE ACTIVITY: LAQ

## 2025-05-23 SDOH — HEALTH STABILITY: PHYSICAL HEALTH: EXERCISE ACTIVITY: ANKLE DF/PF

## 2025-05-23 SDOH — HEALTH STABILITY: PHYSICAL HEALTH: EXERCISE ACTIVITY: MARCHING

## 2025-05-23 ASSESSMENT — GAIT ASSESSMENTS
INITIATION OF GAIT IMMEDIATELY AFTER GO: 1 - NO HESITANCY
PATH: 0 - MARKED DEVIATION
TRUNK SCORE: 0
WALKING STANCE: 1 - HEELS ALMOST TOUCHING WHILE WALKING
BALANCE AND GAIT SCORE: 15
TRUNK: 0 - MARKED SWAY OR USES WALKING AID
STEP CONTINUITY: 1 - STEPS APPEAR CONTINUOUS
STEP SYMMETRY: 1 - RIGHT AND LEFT STEP LENGTH APPEAR EQUAL
GAIT SCORE: 8
PATH SCORE: 0

## 2025-05-23 ASSESSMENT — ENCOUNTER SYMPTOMS
DENIES PAIN: 1
MUSCLE WEAKNESS: 1
OCCASIONAL FEELINGS OF UNSTEADINESS: 1
PERSON REPORTING PAIN: PATIENT

## 2025-05-23 ASSESSMENT — BALANCE ASSESSMENTS
TURNING 360 DEGREES STEPS: 0 - DISCONTINUOUS STEPS
EYES CLOSED AT MAXIMUM POSITION NUDGED: 0 - UNSTEADY
ATTEMPTS TO ARISE: 1 - ABLE, REQUIRES MORE THAN ONE ATTEMPT
STANDING BALANCE: 1 - STEADY BUT WIDE STANCE AND USES CANE OR OTHER SUPPORT
BALANCE SCORE: 7
IMMEDIATE STANDING BALANCE FIRST 5 SECONDS: 1 - STEADY BUT USES WALKER OR OTHER SUPPORT
SITTING BALANCE: 1 - STEADY, SAFE
SITTING DOWN: 1 - USES ARMS OR NOT SMOOTH MOTION
NUDGED: 0 - BEGINS TO FALL
NUDGED SCORE: 0
ARISING SCORE: 1
ARISES: 1 - ABLE, USES ARMS TO HELP

## 2025-05-23 ASSESSMENT — ACTIVITIES OF DAILY LIVING (ADL)
AMBULATION_DISTANCE/DURATION_TOLERATED: 50 FT
CURRENT_FUNCTION: STAND BY ASSIST
AMBULATION ASSISTANCE ON FLAT SURFACES: 1
AMBULATION ASSISTANCE: STAND BY ASSIST

## 2025-05-27 ENCOUNTER — HOME CARE VISIT (OUTPATIENT)
Dept: HOME HEALTH SERVICES | Facility: HOME HEALTH | Age: 50
End: 2025-05-27
Payer: COMMERCIAL

## 2025-05-27 PROCEDURE — G0152 HHCP-SERV OF OT,EA 15 MIN: HCPCS

## 2025-05-27 SDOH — ECONOMIC STABILITY: HOUSING INSECURITY
HOME SAFETY: LIVES IN 2 STORY HOME WITH ADULT CHILDREN. 1ST FLOOR BEDROOM AND FULL BATHROOM. HAS BASEMENT WITH W/D IN BASEMENT. DOES NOT GO DOWN THERE.

## 2025-05-27 ASSESSMENT — ENCOUNTER SYMPTOMS
LOWEST PAIN SEVERITY IN PAST 24 HOURS: 5/10
PERSON REPORTING PAIN: PATIENT
PAIN: 1
PAIN LOCATION: BACK
PAIN SEVERITY GOAL: 0/10
PAIN LOCATION - PAIN QUALITY: ACHES
PAIN LOCATION - PAIN FREQUENCY: CONSTANT
PAIN LOCATION - PAIN SEVERITY: 5/10
PERSON REPORTING PAIN: PATIENT
DENIES PAIN: 1
PAIN LOCATION - PAIN DURATION: CHRONIC
HIGHEST PAIN SEVERITY IN PAST 24 HOURS: 5/10

## 2025-05-27 ASSESSMENT — ACTIVITIES OF DAILY LIVING (ADL)
GROOMING_CURRENT_FUNCTION: MODERATE ASSIST
DRESSING_LB_CURRENT_FUNCTION: MODERATE ASSIST
AMBULATION ASSISTANCE: MINIMUM ASSIST
AMBULATION ASSISTANCE: 1
FEEDING: SUPERVISION
GROOMING ASSESSED: 1
FEEDING ASSESSED: 1
HOUSEKEEPING ASSESSED: 1
BATHING_CURRENT_FUNCTION: MODERATE ASSIST
LAUNDRY: DEPENDENT
PREPARING MEALS: DEPENDENT
FEEDING EQUIPMENT USED: SETUP
LIGHT HOUSEKEEPING: DEPENDENT
TOILETING: 1
TOILETING: MINIMUM ASSIST
BATHING ASSESSED: 1
LAUNDRY ASSESSED: 1
DRESSING_UB_CURRENT_FUNCTION: MODERATE ASSIST

## 2025-05-28 ENCOUNTER — HOME CARE VISIT (OUTPATIENT)
Dept: HOME HEALTH SERVICES | Facility: HOME HEALTH | Age: 50
End: 2025-05-28
Payer: COMMERCIAL

## 2025-05-28 ENCOUNTER — HOSPITAL ENCOUNTER (OUTPATIENT)
Dept: CARDIOLOGY | Age: 50
Discharge: HOME | End: 2025-05-28
Payer: COMMERCIAL

## 2025-05-28 DIAGNOSIS — Z95.810 ICD (IMPLANTABLE CARDIOVERTER-DEFIBRILLATOR) IN PLACE: Primary | ICD-10-CM

## 2025-05-28 PROCEDURE — 93297 REM INTERROG DEV EVAL ICPMS: CPT

## 2025-05-28 PROCEDURE — 93296 REM INTERROG EVL PM/IDS: CPT

## 2025-05-28 PROCEDURE — G0155 HHCP-SVS OF CSW,EA 15 MIN: HCPCS

## 2025-05-28 SDOH — HEALTH STABILITY: PHYSICAL HEALTH: EXERCISE ACTIVITY: HRTR, MARCHING, LAQ

## 2025-05-28 SDOH — HEALTH STABILITY: PHYSICAL HEALTH: PHYSICAL EXERCISE: 10

## 2025-05-28 SDOH — HEALTH STABILITY: PHYSICAL HEALTH: PHYSICAL EXERCISE: 15

## 2025-05-28 SDOH — HEALTH STABILITY: PHYSICAL HEALTH: EXERCISE TYPE: STANDING AND SEATED LE STRENGTHENING

## 2025-05-28 SDOH — ECONOMIC STABILITY: HOUSING INSECURITY: HOME SAFETY: PLETED TOILETING

## 2025-05-28 SDOH — SOCIAL STABILITY: SOCIAL NETWORK: HELP FROM FAMILY/FRIENDS: SON, DAUGHTER, AND DAUGHTER IN LAW

## 2025-05-28 SDOH — HEALTH STABILITY: PHYSICAL HEALTH: EXERCISE ACTIVITY: HRTR, MARCHING, HIPE ABD, HAM CURLS, EXT, SQUATS

## 2025-05-28 SDOH — ECONOMIC STABILITY: HOUSING INSECURITY
HOME SAFETY: PATIENT HAD SLIGHT LOSS OF BALANCE WHILE IN BATHROOM DURING VISIT, ADVISED TO MAINTAIN OPEN DOOR AND SUPERVISION, WHILE PATIENT HAS TOILETING AS HER JUDGMENT DOES NOT ENSURE SHE MAKES PROPER DECISION TO REMAIN SEATED AND REQUEST HELP WHEN SHE HAS COM

## 2025-05-28 ASSESSMENT — ACTIVITIES OF DAILY LIVING (ADL)
SHOPPING_REQUIRES_ASSISTANCE: 1
AMBULATION ASSISTANCE ON FLAT SURFACES: 1
BATHING_REQUIRES_ASSISTANCE: 1
AMBULATION_REQUIRES_ASSISTANCE: 1
TOILETING_REQUIRES_ASSISTANCE: 1
LAUNDRY_REQUIRES_ASSISTANCE: 1

## 2025-05-28 ASSESSMENT — ENCOUNTER SYMPTOMS
SUBJECTIVE PAIN PROGRESSION: UNCHANGED
AGITATION: 1

## 2025-05-29 ENCOUNTER — HOME CARE VISIT (OUTPATIENT)
Dept: HOME HEALTH SERVICES | Facility: HOME HEALTH | Age: 50
End: 2025-05-29
Payer: COMMERCIAL

## 2025-05-29 VITALS
RESPIRATION RATE: 16 BRPM | DIASTOLIC BLOOD PRESSURE: 74 MMHG | OXYGEN SATURATION: 97 % | SYSTOLIC BLOOD PRESSURE: 109 MMHG | HEART RATE: 74 BPM | TEMPERATURE: 97.2 F

## 2025-05-29 PROCEDURE — G0157 HHC PT ASSISTANT EA 15: HCPCS | Mod: CQ

## 2025-05-29 PROCEDURE — G0299 HHS/HOSPICE OF RN EA 15 MIN: HCPCS

## 2025-05-29 PROCEDURE — G0158 HHC OT ASSISTANT EA 15: HCPCS | Mod: CO

## 2025-05-29 ASSESSMENT — ENCOUNTER SYMPTOMS
LIMITED RANGE OF MOTION: 1
LAST BOWEL MOVEMENT: 67352
PERSON REPORTING PAIN: PATIENT
MUSCLE WEAKNESS: 1
DENIES PAIN: 1
CHANGE IN APPETITE: UNCHANGED
CONSTIPATION: 1
STOOL FREQUENCY: LESS THAN DAILY
APPETITE LEVEL: FAIR
PERSON REPORTING PAIN: PATIENT
DENIES PAIN: 1

## 2025-05-29 ASSESSMENT — ACTIVITIES OF DAILY LIVING (ADL)
AMBULATION ASSISTANCE: STAND BY ASSIST
CURRENT_FUNCTION: STAND BY ASSIST

## 2025-05-30 ENCOUNTER — HOME CARE VISIT (OUTPATIENT)
Dept: HOME HEALTH SERVICES | Facility: HOME HEALTH | Age: 50
End: 2025-05-30
Payer: COMMERCIAL

## 2025-05-30 VITALS — OXYGEN SATURATION: 99 % | HEART RATE: 78 BPM

## 2025-05-30 SDOH — HEALTH STABILITY: PHYSICAL HEALTH: EXERCISE ACTIVITY: HRTR, MARCHING, HIP ABD, EXT, HAM CURLS, SHALLOW SQUATS

## 2025-05-30 SDOH — HEALTH STABILITY: PHYSICAL HEALTH: EXERCISE ACTIVITY: HRTR, MARCHING, LAQ

## 2025-05-30 SDOH — HEALTH STABILITY: PHYSICAL HEALTH: PHYSICAL EXERCISE: 15

## 2025-05-30 SDOH — HEALTH STABILITY: PHYSICAL HEALTH: PHYSICAL EXERCISE: STANDING

## 2025-05-30 SDOH — HEALTH STABILITY: PHYSICAL HEALTH: PHYSICAL EXERCISE: 10

## 2025-05-30 SDOH — HEALTH STABILITY: PHYSICAL HEALTH: EXERCISE TYPE: SEATED AND STANDING THER EX

## 2025-05-30 SDOH — HEALTH STABILITY: PHYSICAL HEALTH: PHYSICAL EXERCISE: SEATED

## 2025-05-30 ASSESSMENT — ENCOUNTER SYMPTOMS
DENIES PAIN: 1
PERSON REPORTING PAIN: PATIENT

## 2025-05-30 ASSESSMENT — ACTIVITIES OF DAILY LIVING (ADL)
AMBULATION_DISTANCE/DURATION_TOLERATED: 60FT X2
AMBULATION ASSISTANCE ON FLAT SURFACES: 1

## 2025-06-02 ENCOUNTER — HOME CARE VISIT (OUTPATIENT)
Dept: HOME HEALTH SERVICES | Facility: HOME HEALTH | Age: 50
End: 2025-06-02
Payer: COMMERCIAL

## 2025-06-02 VITALS — OXYGEN SATURATION: 99 % | HEART RATE: 71 BPM

## 2025-06-02 PROCEDURE — G0158 HHC OT ASSISTANT EA 15: HCPCS | Mod: CO

## 2025-06-02 ASSESSMENT — ENCOUNTER SYMPTOMS
PERSON REPORTING PAIN: PATIENT
DENIES PAIN: 1

## 2025-06-04 ENCOUNTER — HOME CARE VISIT (OUTPATIENT)
Dept: HOME HEALTH SERVICES | Facility: HOME HEALTH | Age: 50
End: 2025-06-04
Payer: COMMERCIAL

## 2025-06-04 PROCEDURE — G0157 HHC PT ASSISTANT EA 15: HCPCS | Mod: CQ

## 2025-06-04 ASSESSMENT — ENCOUNTER SYMPTOMS
PERSON REPORTING PAIN: PATIENT
DENIES PAIN: 1

## 2025-06-05 ENCOUNTER — HOME CARE VISIT (OUTPATIENT)
Dept: HOME HEALTH SERVICES | Facility: HOME HEALTH | Age: 50
End: 2025-06-05
Payer: COMMERCIAL

## 2025-06-05 VITALS — TEMPERATURE: 98.5 F | HEART RATE: 72 BPM | OXYGEN SATURATION: 99 %

## 2025-06-05 PROCEDURE — G0153 HHCP-SVS OF S/L PATH,EA 15MN: HCPCS

## 2025-06-05 SDOH — HEALTH STABILITY: PHYSICAL HEALTH: EXERCISE ACTIVITY: HRTR, MARCHING, HIP ABD, EXT, HAM CURLS, SHALLOW SQUATS

## 2025-06-05 SDOH — HEALTH STABILITY: PHYSICAL HEALTH: PHYSICAL EXERCISE: STANDING

## 2025-06-05 SDOH — HEALTH STABILITY: PHYSICAL HEALTH: EXERCISE COMMENTS: SON/DIL PRESENT FOR VISIT, INQUIRED ABOUT LB HEP, REINFORCED EXS AND FREQUENCY WITH HANDOUTS

## 2025-06-05 SDOH — HEALTH STABILITY: PHYSICAL HEALTH: PHYSICAL EXERCISE: 15

## 2025-06-05 SDOH — HEALTH STABILITY: PHYSICAL HEALTH: EXERCISE ACTIVITIES SETS: 1

## 2025-06-05 SDOH — HEALTH STABILITY: PHYSICAL HEALTH: EXERCISE TYPE: STANDING LE STRENGTHENING

## 2025-06-05 ASSESSMENT — ENCOUNTER SYMPTOMS
DENIES PAIN: 1
PERSON REPORTING PAIN: PATIENT

## 2025-06-05 ASSESSMENT — ACTIVITIES OF DAILY LIVING (ADL): AMBULATION_DISTANCE/DURATION_TOLERATED: 50 FT X3

## 2025-06-06 ENCOUNTER — HOME CARE VISIT (OUTPATIENT)
Dept: HOME HEALTH SERVICES | Facility: HOME HEALTH | Age: 50
End: 2025-06-06
Payer: COMMERCIAL

## 2025-06-06 VITALS — OXYGEN SATURATION: 98 % | HEART RATE: 65 BPM | TEMPERATURE: 97.1 F

## 2025-06-06 PROCEDURE — G0157 HHC PT ASSISTANT EA 15: HCPCS | Mod: CQ

## 2025-06-06 PROCEDURE — G0158 HHC OT ASSISTANT EA 15: HCPCS | Mod: CO

## 2025-06-06 SDOH — HEALTH STABILITY: PHYSICAL HEALTH: EXERCISE TYPE: VERBAL REVIEW OF HEP WITH SON/DIL AND PT

## 2025-06-06 ASSESSMENT — ENCOUNTER SYMPTOMS
ANGER WITHIN DEFINED LIMITS: 1
DENIES PAIN: 1
AGGRESSION WITHIN DEFINED LIMITS: 1
PERSON REPORTING PAIN: PATIENT
DENIES PAIN: 1

## 2025-06-06 ASSESSMENT — ACTIVITIES OF DAILY LIVING (ADL)
AMBULATION ASSISTANCE ON UNEVEN SURFACES: 1
AMBULATION ASSISTANCE ON FLAT SURFACES: 1
AMBULATION_DISTANCE/DURATION_TOLERATED: 50 FT X3

## 2025-06-10 ENCOUNTER — HOME CARE VISIT (OUTPATIENT)
Dept: HOME HEALTH SERVICES | Facility: HOME HEALTH | Age: 50
End: 2025-06-10
Payer: COMMERCIAL

## 2025-06-10 PROCEDURE — G0158 HHC OT ASSISTANT EA 15: HCPCS | Mod: CO

## 2025-06-10 PROCEDURE — G0157 HHC PT ASSISTANT EA 15: HCPCS | Mod: CQ

## 2025-06-10 ASSESSMENT — ENCOUNTER SYMPTOMS
PAIN LOCATION - PAIN SEVERITY: 6/10
PAIN LOCATION: BACK
PAIN: 1
PERSON REPORTING PAIN: PATIENT

## 2025-06-11 VITALS — TEMPERATURE: 97.4 F

## 2025-06-11 ASSESSMENT — ENCOUNTER SYMPTOMS
SUBJECTIVE PAIN PROGRESSION: WAXING AND WANING
HIGHEST PAIN SEVERITY IN PAST 24 HOURS: 6/10

## 2025-06-12 ENCOUNTER — HOME CARE VISIT (OUTPATIENT)
Dept: HOME HEALTH SERVICES | Facility: HOME HEALTH | Age: 50
End: 2025-06-12
Payer: COMMERCIAL

## 2025-06-12 VITALS — TEMPERATURE: 98.7 F | HEART RATE: 66 BPM | OXYGEN SATURATION: 98 %

## 2025-06-12 PROCEDURE — G0157 HHC PT ASSISTANT EA 15: HCPCS | Mod: CQ

## 2025-06-12 PROCEDURE — G0153 HHCP-SVS OF S/L PATH,EA 15MN: HCPCS

## 2025-06-12 SDOH — HEALTH STABILITY: PHYSICAL HEALTH: EXERCISE TYPE: STANDING BAL ACTIVITY

## 2025-06-12 SDOH — HEALTH STABILITY: PHYSICAL HEALTH: EXERCISE TYPE: STANDING STATIC AND DYNAMIC BALANCE ACTIVITY

## 2025-06-12 ASSESSMENT — ENCOUNTER SYMPTOMS
PERSON REPORTING PAIN: PATIENT
PERSON REPORTING PAIN: PATIENT
DENIES PAIN: 1
DENIES PAIN: 1
ANGER WITHIN DEFINED LIMITS: 1
AGGRESSION WITHIN DEFINED LIMITS: 1
PERSON REPORTING PAIN: PATIENT
DENIES PAIN: 1

## 2025-06-12 ASSESSMENT — ACTIVITIES OF DAILY LIVING (ADL)
AMBULATION_DISTANCE/DURATION_TOLERATED: 50 FT
AMBULATION ASSISTANCE ON FLAT SURFACES: 1
AMBULATION ASSISTANCE ON FLAT SURFACES: 1
AMBULATION_DISTANCE/DURATION_TOLERATED: 75 FT X2

## 2025-06-13 ENCOUNTER — HOME CARE VISIT (OUTPATIENT)
Dept: HOME HEALTH SERVICES | Facility: HOME HEALTH | Age: 50
End: 2025-06-13

## 2025-06-16 ENCOUNTER — APPOINTMENT (OUTPATIENT)
Dept: RADIOLOGY | Facility: HOSPITAL | Age: 50
End: 2025-06-16
Payer: COMMERCIAL

## 2025-06-16 ENCOUNTER — HOME CARE VISIT (OUTPATIENT)
Dept: HOME HEALTH SERVICES | Facility: HOME HEALTH | Age: 50
End: 2025-06-16
Payer: COMMERCIAL

## 2025-06-16 ENCOUNTER — HOSPITAL ENCOUNTER (EMERGENCY)
Facility: HOSPITAL | Age: 50
Discharge: HOME | End: 2025-06-16
Attending: STUDENT IN AN ORGANIZED HEALTH CARE EDUCATION/TRAINING PROGRAM
Payer: COMMERCIAL

## 2025-06-16 VITALS
HEIGHT: 67 IN | BODY MASS INDEX: 25.43 KG/M2 | RESPIRATION RATE: 16 BRPM | OXYGEN SATURATION: 94 % | DIASTOLIC BLOOD PRESSURE: 83 MMHG | HEART RATE: 78 BPM | WEIGHT: 162 LBS | TEMPERATURE: 97.7 F | SYSTOLIC BLOOD PRESSURE: 108 MMHG

## 2025-06-16 VITALS — HEART RATE: 72 BPM | TEMPERATURE: 98.4 F | OXYGEN SATURATION: 98 %
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
## 2025-06-16 DIAGNOSIS — S09.90XA CLOSED HEAD INJURY, INITIAL ENCOUNTER: ICD-10-CM

## 2025-06-16 DIAGNOSIS — T14.8XXA BRUISE: ICD-10-CM

## 2025-06-16 PROCEDURE — 72131 CT LUMBAR SPINE W/O DYE: CPT

## 2025-06-16 PROCEDURE — 72125 CT NECK SPINE W/O DYE: CPT

## 2025-06-16 PROCEDURE — 99285 EMERGENCY DEPT VISIT HI MDM: CPT | Mod: 25

## 2025-06-16 PROCEDURE — 70486 CT MAXILLOFACIAL W/O DYE: CPT | Performed by: STUDENT IN AN ORGANIZED HEALTH CARE EDUCATION/TRAINING PROGRAM

## 2025-06-16 PROCEDURE — 76377 3D RENDER W/INTRP POSTPROCES: CPT

## 2025-06-16 PROCEDURE — 72125 CT NECK SPINE W/O DYE: CPT | Performed by: STUDENT IN AN ORGANIZED HEALTH CARE EDUCATION/TRAINING PROGRAM

## 2025-06-16 PROCEDURE — 70486 CT MAXILLOFACIAL W/O DYE: CPT

## 2025-06-16 PROCEDURE — 70450 CT HEAD/BRAIN W/O DYE: CPT

## 2025-06-16 PROCEDURE — 72131 CT LUMBAR SPINE W/O DYE: CPT | Performed by: STUDENT IN AN ORGANIZED HEALTH CARE EDUCATION/TRAINING PROGRAM

## 2025-06-16 PROCEDURE — 99284 EMERGENCY DEPT VISIT MOD MDM: CPT | Mod: 25 | Performed by: STUDENT IN AN ORGANIZED HEALTH CARE EDUCATION/TRAINING PROGRAM

## 2025-06-16 PROCEDURE — 72128 CT CHEST SPINE W/O DYE: CPT | Performed by: STUDENT IN AN ORGANIZED HEALTH CARE EDUCATION/TRAINING PROGRAM

## 2025-06-16 PROCEDURE — 72128 CT CHEST SPINE W/O DYE: CPT

## 2025-06-16 PROCEDURE — 70450 CT HEAD/BRAIN W/O DYE: CPT | Performed by: STUDENT IN AN ORGANIZED HEALTH CARE EDUCATION/TRAINING PROGRAM

## 2025-06-16 PROCEDURE — 76377 3D RENDER W/INTRP POSTPROCES: CPT | Performed by: STUDENT IN AN ORGANIZED HEALTH CARE EDUCATION/TRAINING PROGRAM

## 2025-06-16 PROCEDURE — G0158 HHC OT ASSISTANT EA 15: HCPCS | Mod: CO

## 2025-06-16 ASSESSMENT — ENCOUNTER SYMPTOMS
SUBJECTIVE PAIN PROGRESSION: UNCHANGED
PAIN: 1
PAIN LOCATION: HEAD
PERSON REPORTING PAIN: PATIENT
LOWEST PAIN SEVERITY IN PAST 24 HOURS: 5/10
HIGHEST PAIN SEVERITY IN PAST 24 HOURS: 5/10

## 2025-06-16 ASSESSMENT — LIFESTYLE VARIABLES
HAVE YOU EVER FELT YOU SHOULD CUT DOWN ON YOUR DRINKING: NO
HAVE PEOPLE ANNOYED YOU BY CRITICIZING YOUR DRINKING: NO
EVER FELT BAD OR GUILTY ABOUT YOUR DRINKING: NO
EVER HAD A DRINK FIRST THING IN THE MORNING TO STEADY YOUR NERVES TO GET RID OF A HANGOVER: NO
TOTAL SCORE: 0

## 2025-06-16 ASSESSMENT — PAIN DESCRIPTION - FREQUENCY: FREQUENCY: CONSTANT/CONTINUOUS

## 2025-06-16 ASSESSMENT — PAIN SCALES - GENERAL
PAINLEVEL_OUTOF10: 0 - NO PAIN
PAINLEVEL_OUTOF10: 6

## 2025-06-16 ASSESSMENT — PAIN - FUNCTIONAL ASSESSMENT: PAIN_FUNCTIONAL_ASSESSMENT: 0-10

## 2025-06-16 ASSESSMENT — PAIN DESCRIPTION - LOCATION: LOCATION: HEAD

## 2025-06-16 ASSESSMENT — PAIN DESCRIPTION - PAIN TYPE: TYPE: ACUTE PAIN

## 2025-06-16 ASSESSMENT — PAIN DESCRIPTION - ONSET: ONSET: SUDDEN

## 2025-06-16 ASSESSMENT — PAIN DESCRIPTION - PROGRESSION: CLINICAL_PROGRESSION: NOT CHANGED

## 2025-06-16 ASSESSMENT — PAIN DESCRIPTION - DESCRIPTORS: DESCRIPTORS: ACHING

## 2025-06-16 NOTE — ED PROVIDER NOTES
HPI   Chief Complaint   Patient presents with    Fall     Tripped over rug and hit head.       Patient is a 50-year-old female who presents to the emergency department for complaints of a fall.  Patient states she got tangled up in her blankets at home causing her to fall and hit her head.  She denies any loss of consciousness.  Patient does endorse being on Brilinta.  Patient has a history of CAD, asthma, bipolar disorder, COPD, diabetes, hypertension, hyperlipidemia, CHF, CABG, ICD placement, depression, anxiety.  Patient is endorsing pain throughout her neck and upper lower back.  No other pain or complaints noted.      History provided by:  Patient          Patient History   Medical History[1]  Surgical History[2]  Family History[3]  Social History[4]    Physical Exam   ED Triage Vitals   Temp Pulse Resp BP   -- -- -- --      SpO2 Temp src Heart Rate Source Patient Position   -- -- -- --      BP Location FiO2 (%)     -- --       Physical Exam  Vitals and nursing note reviewed.   Constitutional:       General: She is not in acute distress.     Appearance: Normal appearance. She is not ill-appearing, toxic-appearing or diaphoretic.   HENT:      Head: Normocephalic and atraumatic.      Nose: Nose normal.      Mouth/Throat:      Mouth: Mucous membranes are moist.      Pharynx: No oropharyngeal exudate or posterior oropharyngeal erythema.   Eyes:      General: No scleral icterus.     Extraocular Movements: Extraocular movements intact.      Pupils: Pupils are equal, round, and reactive to light.   Cardiovascular:      Rate and Rhythm: Normal rate and regular rhythm.      Pulses: Normal pulses.      Heart sounds: Normal heart sounds. No murmur heard.     No friction rub. No gallop.   Pulmonary:      Effort: Pulmonary effort is normal. No respiratory distress.      Breath sounds: Normal breath sounds. No stridor. No wheezing, rhonchi or rales.   Chest:      Chest wall: No tenderness.   Abdominal:      General: Abdomen  is flat. There is no distension.      Palpations: Abdomen is soft. There is no mass.      Tenderness: There is no abdominal tenderness. There is no guarding.      Hernia: No hernia is present.   Musculoskeletal:         General: Tenderness present. No swelling, deformity or signs of injury. Normal range of motion.      Cervical back: Tenderness present. No rigidity.      Comments: Diffuse tenderness to the C/T/L-spine.  No step-offs crepitus or deformities noted.   Skin:     General: Skin is warm and dry.      Capillary Refill: Capillary refill takes less than 2 seconds.      Coloration: Skin is not jaundiced or pale.      Findings: No bruising, erythema, lesion or rash.      Comments: Bruising noted to the right eyebrow and right cheek.   Neurological:      General: No focal deficit present.      Mental Status: She is alert and oriented to person, place, and time. Mental status is at baseline.   Psychiatric:         Mood and Affect: Mood normal.         Behavior: Behavior normal.           ED Course & MDM   Diagnoses as of 06/16/25 1917   Fall, initial encounter   Closed head injury, initial encounter   Bruise                 No data recorded     [place] Coma Scale Score: 15 (06/16/25 1844 : Edgar Mcqueen RN)                           Medical Decision Making  Patient is a 50-year-old female presents the emergency department complaints of fall on blood thinners.  HIA was activated.  Patient has some bruising to her right eyebrow and right cheek and diffuse tenderness throughout her C/T and L-spine.  CAT scans ordered for further evaluation.    CT head negative for acute intracranial bleed.  CT C/T/L-spine negative for fractures or subluxations.  CT face negative for traumatic injuries.  Patient was advised of her imaging findings.  Patient was given return precautions.  Patient verbalized understanding of all instructions given to her.  Patient was appreciative care and agreeable to discharge.    Labs Reviewed -  No data to display  CT thoracic spine wo IV contrast   Final Result   No evidence of fracture or traumatic malalignment. No osseous canal   stenosis. Atherosclerosis. Trace bilateral pleural effusions..        MACRO:   None        Signed by: Damion Pearce 6/16/2025 7:02 PM   Dictation workstation:   [id number]      CT lumbar spine wo IV contrast   Final Result   No evidence of fracture or traumatic malalignment. No osseous canal   stenosis. Atherosclerosis. Trace bilateral pleural effusions..        MACRO:   None        Signed by: Damion Pearce 6/16/2025 7:02 PM   Dictation workstation:   [id number]      CT head wo IV contrast   Final Result   No acute cortical infarct or acute intracranial hemorrhage.   Redemonstration of infarct within the left anterior thalamus and   medial left occipital lobe. Ventriculomegaly out of proportion to   cerebral volume loss similar to prior, which may represent central   cerebral volume loss or normal pressure hydrocephalus.        No evidence of facial bone or cervical spine fracture.             Signed by: Damion Pearce 6/16/2025 6:57 PM   Dictation workstation:   [id number]      CT maxillofacial bones wo IV contrast   Final Result   No acute cortical infarct or acute intracranial hemorrhage.   Redemonstration of infarct within the left anterior thalamus and   medial left occipital lobe. Ventriculomegaly out of proportion to   cerebral volume loss similar to prior, which may represent central   cerebral volume loss or normal pressure hydrocephalus.        No evidence of facial bone or cervical spine fracture.             Signed by: Damion Pearce 6/16/2025 6:57 PM   Dictation workstation:   [id number]      CT cervical spine wo IV contrast   Final Result   No acute cortical infarct or acute intracranial hemorrhage.   Redemonstration of infarct within the left anterior thalamus and   medial left occipital lobe. Ventriculomegaly out of proportion to   cerebral volume loss  similar to prior, which may represent central   cerebral volume loss or normal pressure hydrocephalus.        No evidence of facial bone or cervical spine fracture.             Signed by: Damion Pearce 2025 6:57 PM   Dictation workstation:   [id number]      CT 3D reconstruction   Final Result   No acute cortical infarct or acute intracranial hemorrhage.   Redemonstration of infarct within the left anterior thalamus and   medial left occipital lobe. Ventriculomegaly out of proportion to   cerebral volume loss similar to prior, which may represent central   cerebral volume loss or normal pressure hydrocephalus.        No evidence of facial bone or cervical spine fracture.             Signed by: Damion Pearce 2025 6:57 PM   Dictation workstation:   [id number]      \    Procedure  Procedures       [1]   Past Medical History:  Diagnosis Date    Anxiety     Asthma     CHF (congestive heart failure)     COPD (chronic obstructive pulmonary disease) (Multi)     Coronary artery disease     Depression     Diabetes mellitus (Multi)     Early onset Alzheimer's dementia (Multi)     Heart disease     Hyperlipidemia     Hypertension     Hypoxic ischemic coma     Myocardial infarction (Multi)     Other specified abnormal findings of blood chemistry     Low vitamin D level    Stroke (Multi)    [2]   Past Surgical History:  Procedure Laterality Date    CARDIAC CATHETERIZATION      CARDIAC CATHETERIZATION N/A 2025    Procedure: Left Heart Cath, With LV;  Surgeon: Isma Sykes DO;  Location: [facility];  Service: Cardiovascular;  Laterality: N/A;    CARDIAC DEFIBRILLATOR PLACEMENT      CARDIAC SURGERY       SECTION, LOW TRANSVERSE      CORONARY ARTERY BYPASS GRAFT  [id number]    OTHER SURGICAL HISTORY  2020    Cardiac catheterization    OTHER SURGICAL HISTORY  2022    Heart surgery    TUBAL LIGATION     [3]   Family History  Problem Relation Name Age of Onset    Diabetes Mother Cher  aravind     Cervical cancer Mother Chershalini anneins     Heart disease Father Rafael nazario     Hypertension Father Rafael nazario    [4]   Social History  Tobacco Use    Smoking status: Every Day     Current packs/day: 1.50     Average packs/day: 1.5 packs/day for 30.9 years (46.3 ttl pk-yrs)     Types: Cigarettes     Start date: 8/2/1994     Passive exposure: Never    Smokeless tobacco: Never   Vaping Use    Vaping status: Never Used   Substance Use Topics    Alcohol use: Never    Drug use: Yes     Types: Marijuana        Lorenzo Fernandez DO  06/16/25 1917

## 2025-06-16 NOTE — DISCHARGE INSTRUCTIONS
Please follow up with your Primary Care Provider (PCP) within the next 2-3 days regarding your ED visit. These documents have a lot of useful information! Please read them, so you know what to expect, what you can do for yourself at home, and also to know concerning signs warrant a return to the Emergency Department for additional evaluation.  You are welcome back any time. Thank you for entrusting your care to us, I hope we made your visit as pleasant as possible. Wishing you well!    Dr. Fernandez

## 2025-06-17 ENCOUNTER — HOME CARE VISIT (OUTPATIENT)
Dept: HOME HEALTH SERVICES | Facility: HOME HEALTH | Age: 50
End: 2025-06-17
Payer: COMMERCIAL

## 2025-06-18 ENCOUNTER — TELEPHONE (OUTPATIENT)
Dept: PRIMARY CARE | Facility: CLINIC | Age: 50
End: 2025-06-18
Payer: COMMERCIAL

## 2025-06-18 ENCOUNTER — HOME CARE VISIT (OUTPATIENT)
Dept: HOME HEALTH SERVICES | Facility: HOME HEALTH | Age: 50
End: 2025-06-18
Payer: COMMERCIAL

## 2025-06-18 VITALS
SYSTOLIC BLOOD PRESSURE: 108 MMHG | RESPIRATION RATE: 14 BRPM | HEART RATE: 72 BPM | TEMPERATURE: 97.4 F | OXYGEN SATURATION: 98 % | DIASTOLIC BLOOD PRESSURE: 60 MMHG

## 2025-06-18 PROCEDURE — G0151 HHCP-SERV OF PT,EA 15 MIN: HCPCS

## 2025-06-18 SDOH — HEALTH STABILITY: PHYSICAL HEALTH: PHYSICAL EXERCISE: 15

## 2025-06-18 SDOH — HEALTH STABILITY: PHYSICAL HEALTH: EXERCISE ACTIVITY: HIP EXTENSION

## 2025-06-18 SDOH — HEALTH STABILITY: PHYSICAL HEALTH: PHYSICAL EXERCISE: STANDING

## 2025-06-18 SDOH — HEALTH STABILITY: PHYSICAL HEALTH: EXERCISE ACTIVITIES SETS: 1

## 2025-06-18 SDOH — HEALTH STABILITY: PHYSICAL HEALTH: EXERCISE TYPE: LE EXERCISES

## 2025-06-18 SDOH — HEALTH STABILITY: PHYSICAL HEALTH: EXERCISE ACTIVITY: MARCHING

## 2025-06-18 SDOH — HEALTH STABILITY: PHYSICAL HEALTH: EXERCISE ACTIVITY: HIP ABDUCTION

## 2025-06-18 SDOH — HEALTH STABILITY: PHYSICAL HEALTH: EXERCISE ACTIVITY: HS CURLS

## 2025-06-18 SDOH — HEALTH STABILITY: PHYSICAL HEALTH: EXERCISE ACTIVITY: MINI SQUATS

## 2025-06-18 SDOH — HEALTH STABILITY: PHYSICAL HEALTH: EXERCISE ACTIVITY: HEEL RAISES/TOE RAISES

## 2025-06-18 ASSESSMENT — GAIT ASSESSMENTS
TRUNK SCORE: 1
GAIT SCORE: 10
STEP SYMMETRY: 1 - RIGHT AND LEFT STEP LENGTH APPEAR EQUAL
TRUNK: 1 - NO SWAY BUT FLEXION OF KNEES OR BACK OR SPREADS ARMS WHILE WALKING
STEP CONTINUITY: 1 - STEPS APPEAR CONTINUOUS
INITIATION OF GAIT IMMEDIATELY AFTER GO: 1 - NO HESITANCY
PATH: 1 - MILD/MODERATE DEVIATION OR USES WALKING AID
BALANCE AND GAIT SCORE: 23
WALKING STANCE: 1 - HEELS ALMOST TOUCHING WHILE WALKING
PATH SCORE: 1

## 2025-06-18 ASSESSMENT — BALANCE ASSESSMENTS
ATTEMPTS TO ARISE: 2 - ABLE TO RISE, ONE ATTEMPT
ARISES: 1 - ABLE, USES ARMS TO HELP
NUDGED: 2 - STEADY
BALANCE SCORE: 13
SITTING DOWN: 1 - USES ARMS OR NOT SMOOTH MOTION
ARISING SCORE: 1
EYES CLOSED AT MAXIMUM POSITION NUDGED: 1 - STEADY
TURNING 360 DEGREES STEPS: 1 - CONTINUOUS STEPS
NUDGED SCORE: 2
STANDING BALANCE: 1 - STEADY BUT WIDE STANCE AND USES CANE OR OTHER SUPPORT
IMMEDIATE STANDING BALANCE FIRST 5 SECONDS: 2 - STEADY WITHOUT WALKER OR OTHER SUPPORT
SITTING BALANCE: 1 - STEADY, SAFE

## 2025-06-18 ASSESSMENT — ENCOUNTER SYMPTOMS
PERSON REPORTING PAIN: PATIENT
DENIES PAIN: 1
OCCASIONAL FEELINGS OF UNSTEADINESS: 1

## 2025-06-18 ASSESSMENT — ACTIVITIES OF DAILY LIVING (ADL)
AMBULATION ASSISTANCE ON FLAT SURFACES: 1
AMBULATION_DISTANCE/DURATION_TOLERATED: 150 FT

## 2025-06-18 NOTE — CASE COMMUNICATION
1 of 2 Physical Therapy visits completed week of 6/15/25 unable to reach patient/family to schedule.  PT to reassess 6/18/25 for likely DC, recommending transition to OP PT when OT/ST complete also.

## 2025-06-18 NOTE — TELEPHONE ENCOUNTER
Patient wants note stating she can not work, does not have  much movement in her legs since stroke,  permanently. Patient trying to get SSI, Please advise? [phone]

## 2025-06-19 ENCOUNTER — HOME CARE VISIT (OUTPATIENT)
Dept: HOME HEALTH SERVICES | Facility: HOME HEALTH | Age: 50
End: 2025-06-19
Payer: COMMERCIAL

## 2025-06-19 VITALS — HEART RATE: 66 BPM | OXYGEN SATURATION: 99 % | TEMPERATURE: 98.3 F

## 2025-06-19 PROCEDURE — G0158 HHC OT ASSISTANT EA 15: HCPCS | Mod: CO

## 2025-06-19 PROCEDURE — G0153 HHCP-SVS OF S/L PATH,EA 15MN: HCPCS

## 2025-06-19 ASSESSMENT — ENCOUNTER SYMPTOMS
PERSON REPORTING PAIN: PATIENT
ANGER WITHIN DEFINED LIMITS: 1
AGGRESSION WITHIN DEFINED LIMITS: 1
PAIN: 1

## 2025-06-20 VITALS — OXYGEN SATURATION: 98 % | HEART RATE: 71 BPM

## 2025-06-20 ASSESSMENT — ENCOUNTER SYMPTOMS
DENIES PAIN: 1
PERSON REPORTING PAIN: PATIENT

## 2025-06-25 ENCOUNTER — HOME CARE VISIT (OUTPATIENT)
Dept: HOME HEALTH SERVICES | Facility: HOME HEALTH | Age: 50
End: 2025-06-25
Payer: COMMERCIAL

## 2025-06-25 PROCEDURE — G0152 HHCP-SERV OF OT,EA 15 MIN: HCPCS

## 2025-06-26 ASSESSMENT — ACTIVITIES OF DAILY LIVING (ADL)
GROOMING ASSESSED: 1
BATHING_CURRENT_FUNCTION: SUPERVISION
TOILETING: SUPERVISION
DRESSING_LB_CURRENT_FUNCTION: SUPERVISION
LAUNDRY: DEPENDENT
GROOMING_CURRENT_FUNCTION: SUPERVISION
AMBULATION ASSISTANCE: SUPERVISION
LIGHT HOUSEKEEPING: DEPENDENT
FEEDING ASSESSED: 1
GROOMING EQUIPMENT USED: SETUP
AMBULATION ASSISTANCE: 1
PREPARING MEALS: DEPENDENT
FEEDING: INDEPENDENT
DRESSING_UB_CURRENT_FUNCTION: SUPERVISION
HOUSEKEEPING ASSESSED: 1
TOILETING: 1
LAUNDRY ASSESSED: 1
BATHING ASSESSED: 1

## 2025-06-26 ASSESSMENT — ENCOUNTER SYMPTOMS
PERSON REPORTING PAIN: PATIENT
DENIES PAIN: 1

## 2025-06-30 SDOH — ECONOMIC STABILITY: HOUSING INSECURITY
HOME SAFETY: PATIENT LIVES IN 2 STORY HOME WITH HER 2 ADULT CHILDREN AND SON'T BOYFRIEND. 1ST FLOOR BEDROOM AND FULL BATHROOM.

## 2025-07-01 ENCOUNTER — APPOINTMENT (OUTPATIENT)
Dept: RADIOLOGY | Facility: HOSPITAL | Age: 50
End: 2025-07-01
Payer: COMMERCIAL

## 2025-07-01 ENCOUNTER — APPOINTMENT (OUTPATIENT)
Dept: PRIMARY CARE | Facility: CLINIC | Age: 50
End: 2025-07-01
Payer: COMMERCIAL

## 2025-07-01 ENCOUNTER — HOSPITAL ENCOUNTER (EMERGENCY)
Facility: HOSPITAL | Age: 50
Discharge: HOME | End: 2025-07-02
Attending: STUDENT IN AN ORGANIZED HEALTH CARE EDUCATION/TRAINING PROGRAM
Payer: COMMERCIAL

## 2025-07-01 DIAGNOSIS — R79.89 ELEVATED LACTIC ACID LEVEL: ICD-10-CM

## 2025-07-01 DIAGNOSIS — K59.00 CONSTIPATION, UNSPECIFIED CONSTIPATION TYPE: ICD-10-CM

## 2025-07-01 DIAGNOSIS — S00.83XA CONTUSION OF FACE, INITIAL ENCOUNTER: ICD-10-CM
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
## 2025-07-01 DIAGNOSIS — S09.90XA INJURY OF HEAD, INITIAL ENCOUNTER: ICD-10-CM

## 2025-07-01 LAB
ALBUMIN SERPL BCP-MCNC: 4.3 G/DL (ref 3.4–5)
ALP SERPL-CCNC: 86 U/L (ref 33–110)
ALT SERPL W P-5'-P-CCNC: 9 U/L (ref 7–45)
ANION GAP SERPL CALC-SCNC: 16 MMOL/L (ref 10–20)
AST SERPL W P-5'-P-CCNC: 7 U/L (ref 9–39)
BASOPHILS # BLD AUTO: 0.08 X10*3/UL (ref 0–0.1)
BASOPHILS NFR BLD AUTO: 0.9 %
BILIRUB SERPL-MCNC: 0.3 MG/DL (ref 0–1.2)
BUN SERPL-MCNC: 11 MG/DL (ref 6–23)
CALCIUM SERPL-MCNC: 9 MG/DL (ref 8.6–10.3)
CHLORIDE SERPL-SCNC: 97 MMOL/L (ref 98–107)
CO2 SERPL-SCNC: 23 MMOL/L (ref 21–32)
CREAT SERPL-MCNC: 0.68 MG/DL (ref 0.5–1.05)
EGFRCR SERPLBLD CKD-EPI 2021: >90 ML/MIN/1.73M*2
EOSINOPHIL # BLD AUTO: 0.23 X10*3/UL (ref 0–0.7)
EOSINOPHIL NFR BLD AUTO: 2.7 %
ERYTHROCYTE [DISTWIDTH] IN BLOOD BY AUTOMATED COUNT: 12.8 % (ref 11.5–14.5)
ETHANOL SERPL-MCNC: <10 MG/DL
GLUCOSE SERPL-MCNC: 279 MG/DL (ref 74–99)
HCT VFR BLD AUTO: 45.7 % (ref 36–46)
HGB BLD-MCNC: 15.9 G/DL (ref 12–16)
IMM GRANULOCYTES # BLD AUTO: 0.03 X10*3/UL (ref 0–0.7)
IMM GRANULOCYTES NFR BLD AUTO: 0.4 % (ref 0–0.9)
INR PPP: 1 (ref 0.9–1.1)
LACTATE SERPL-SCNC: 4.1 MMOL/L (ref 0.4–2)
LYMPHOCYTES # BLD AUTO: 3.1 X10*3/UL (ref 1.2–4.8)
LYMPHOCYTES NFR BLD AUTO: 36.2 %
MCH RBC QN AUTO: 31.3 PG (ref 26–34)
MCHC RBC AUTO-ENTMCNC: 34.8 G/DL (ref 32–36)
MCV RBC AUTO: 90 FL (ref 80–100)
MONOCYTES # BLD AUTO: 0.44 X10*3/UL (ref 0.1–1)
MONOCYTES NFR BLD AUTO: 5.1 %
NEUTROPHILS # BLD AUTO: 4.69 X10*3/UL (ref 1.2–7.7)
NEUTROPHILS NFR BLD AUTO: 54.7 %
NRBC BLD-RTO: 0 /100 WBCS (ref 0–0)
PLATELET # BLD AUTO: 278 X10*3/UL (ref 150–450)
POTASSIUM SERPL-SCNC: 3.7 MMOL/L (ref 3.5–5.3)
PROT SERPL-MCNC: 7.4 G/DL (ref 6.4–8.2)
PROTHROMBIN TIME: 11.2 SECONDS (ref 9.8–12.4)
RBC # BLD AUTO: 5.08 X10*6/UL (ref 4–5.2)
SODIUM SERPL-SCNC: 132 MMOL/L (ref 136–145)
WBC # BLD AUTO: 8.6 X10*3/UL (ref 4.4–11.3)

## 2025-07-01 PROCEDURE — 82077 ASSAY SPEC XCP UR&BREATH IA: CPT | Performed by: NURSE PRACTITIONER

## 2025-07-01 PROCEDURE — 36415 COLL VENOUS BLD VENIPUNCTURE: CPT | Performed by: NURSE PRACTITIONER

## 2025-07-01 PROCEDURE — 80053 COMPREHEN METABOLIC PANEL: CPT | Performed by: NURSE PRACTITIONER

## 2025-07-01 PROCEDURE — 70486 CT MAXILLOFACIAL W/O DYE: CPT

## 2025-07-01 PROCEDURE — 85025 COMPLETE CBC W/AUTO DIFF WBC: CPT | Performed by: NURSE PRACTITIONER

## 2025-07-01 PROCEDURE — 99284 EMERGENCY DEPT VISIT MOD MDM: CPT | Mod: 25 | Performed by: STUDENT IN AN ORGANIZED HEALTH CARE EDUCATION/TRAINING PROGRAM

## 2025-07-01 PROCEDURE — 99285 EMERGENCY DEPT VISIT HI MDM: CPT | Mod: 25

## 2025-07-01 PROCEDURE — 70450 CT HEAD/BRAIN W/O DYE: CPT

## 2025-07-01 PROCEDURE — 83605 ASSAY OF LACTIC ACID: CPT | Performed by: NURSE PRACTITIONER

## 2025-07-01 PROCEDURE — 70450 CT HEAD/BRAIN W/O DYE: CPT | Performed by: STUDENT IN AN ORGANIZED HEALTH CARE EDUCATION/TRAINING PROGRAM

## 2025-07-01 PROCEDURE — 72125 CT NECK SPINE W/O DYE: CPT

## 2025-07-01 PROCEDURE — 86901 BLOOD TYPING SEROLOGIC RH(D): CPT | Performed by: NURSE PRACTITIONER

## 2025-07-01 PROCEDURE — 86900 BLOOD TYPING SEROLOGIC ABO: CPT | Performed by: NURSE PRACTITIONER

## 2025-07-01 PROCEDURE — 70486 CT MAXILLOFACIAL W/O DYE: CPT | Performed by: STUDENT IN AN ORGANIZED HEALTH CARE EDUCATION/TRAINING PROGRAM

## 2025-07-01 PROCEDURE — 76377 3D RENDER W/INTRP POSTPROCES: CPT

## 2025-07-01 PROCEDURE — 72125 CT NECK SPINE W/O DYE: CPT | Performed by: STUDENT IN AN ORGANIZED HEALTH CARE EDUCATION/TRAINING PROGRAM

## 2025-07-01 PROCEDURE — 76377 3D RENDER W/INTRP POSTPROCES: CPT | Performed by: STUDENT IN AN ORGANIZED HEALTH CARE EDUCATION/TRAINING PROGRAM

## 2025-07-01 PROCEDURE — 85610 PROTHROMBIN TIME: CPT | Performed by: NURSE PRACTITIONER

## 2025-07-01 ASSESSMENT — PAIN SCALES - GENERAL: PAINLEVEL_OUTOF10: 9

## 2025-07-01 ASSESSMENT — LIFESTYLE VARIABLES
EVER FELT BAD OR GUILTY ABOUT YOUR DRINKING: NO
TOTAL SCORE: 0
HAVE PEOPLE ANNOYED YOU BY CRITICIZING YOUR DRINKING: NO
EVER HAD A DRINK FIRST THING IN THE MORNING TO STEADY YOUR NERVES TO GET RID OF A HANGOVER: NO
HAVE YOU EVER FELT YOU SHOULD CUT DOWN ON YOUR DRINKING: NO

## 2025-07-01 ASSESSMENT — PAIN - FUNCTIONAL ASSESSMENT: PAIN_FUNCTIONAL_ASSESSMENT: 0-10

## 2025-07-02 ENCOUNTER — APPOINTMENT (OUTPATIENT)
Dept: RADIOLOGY | Facility: HOSPITAL | Age: 50
End: 2025-07-02
Payer: COMMERCIAL

## 2025-07-02 VITALS
TEMPERATURE: 97 F | DIASTOLIC BLOOD PRESSURE: 85 MMHG | WEIGHT: 182 LBS | RESPIRATION RATE: 16 BRPM | SYSTOLIC BLOOD PRESSURE: 135 MMHG | OXYGEN SATURATION: 98 % | BODY MASS INDEX: 29.25 KG/M2 | HEART RATE: 91 BPM | HEIGHT: 66 IN

## 2025-07-02 LAB
ABO GROUP (TYPE) IN BLOOD: NORMAL
ANTIBODY SCREEN: NORMAL
LACTATE SERPL-SCNC: 3.2 MMOL/L (ref 0.4–2)
LACTATE SERPL-SCNC: 3.8 MMOL/L (ref 0.4–2)
RH FACTOR (ANTIGEN D): NORMAL

## 2025-07-02 PROCEDURE — 71250 CT THORAX DX C-: CPT | Performed by: RADIOLOGY

## 2025-07-02 PROCEDURE — 2500000004 HC RX 250 GENERAL PHARMACY W/ HCPCS (ALT 636 FOR OP/ED): Performed by: NURSE PRACTITIONER

## 2025-07-02 PROCEDURE — 96376 TX/PRO/DX INJ SAME DRUG ADON: CPT

## 2025-07-02 PROCEDURE — 96374 THER/PROPH/DIAG INJ IV PUSH: CPT

## 2025-07-02 PROCEDURE — 72131 CT LUMBAR SPINE W/O DYE: CPT | Performed by: RADIOLOGY

## 2025-07-02 PROCEDURE — 72128 CT CHEST SPINE W/O DYE: CPT | Performed by: RADIOLOGY

## 2025-07-02 PROCEDURE — 83605 ASSAY OF LACTIC ACID: CPT | Performed by: NURSE PRACTITIONER

## 2025-07-02 PROCEDURE — 96361 HYDRATE IV INFUSION ADD-ON: CPT

## 2025-07-02 PROCEDURE — 36415 COLL VENOUS BLD VENIPUNCTURE: CPT | Performed by: NURSE PRACTITIONER

## 2025-07-02 PROCEDURE — 74176 CT ABD & PELVIS W/O CONTRAST: CPT | Performed by: RADIOLOGY

## 2025-07-02 PROCEDURE — 71250 CT THORAX DX C-: CPT

## 2025-07-02 RX ORDER — POLYETHYLENE GLYCOL 3350 17 G/17G
17 POWDER, FOR SOLUTION ORAL DAILY
Qty: 289 G | Refills: 0 | Status: SHIPPED | OUTPATIENT
Start: 2025-07-02 | End: 2025-07-07

## 2025-07-02 RX ORDER — FENTANYL CITRATE 50 UG/ML
50 INJECTION, SOLUTION INTRAMUSCULAR; INTRAVENOUS ONCE
Status: COMPLETED | OUTPATIENT
Start: 2025-07-02 | End: 2025-07-02

## 2025-07-02 RX ADMIN — FENTANYL CITRATE 50 MCG: 50 INJECTION INTRAMUSCULAR; INTRAVENOUS at 02:44

## 2025-07-02 RX ADMIN — SODIUM CHLORIDE 1000 ML: 0.9 INJECTION, SOLUTION INTRAVENOUS at 00:39

## 2025-07-02 RX ADMIN — FENTANYL CITRATE 50 MCG: 50 INJECTION INTRAMUSCULAR; INTRAVENOUS at 00:37

## 2025-07-02 NOTE — ED PROVIDER NOTES
HPI   Chief Complaint   Patient presents with    HIA     Fell from standing. Hit head, no loc, on brilanta.  C/o back and face pain       50-year-old female presents emergency department, presents with family who witnessed her fall, states since she had stroke she has had some balance issues.  She is on Brilinta.  States she did strike her face, complaining of face and mid back pain, but ultimately no loss of consciousness.    Patient states she was feeling fine, feels that she lost her balance       PMH of traumatic brain injury, hypertension hyperlipidemia coronary artery disease status post CABG, COPD, seizures, diabetes type 2,  left thalamic stroke, occipital stroke     Family feels that she is at her baseline mental status and her baseline deficits with her stroke history              Patient History   Medical History[1]  Surgical History[2]  Family History[3]  Social History[4]    Physical Exam   ED Triage Vitals   Temp Pulse Resp BP   -- -- -- --      SpO2 Temp src Heart Rate Source Patient Position   -- -- -- --      BP Location FiO2 (%)     -- --       Physical Exam  Constitutional: Vitals noted, no distress. Afebrile.   EENT: TMs clear. Posterior oropharynx unremarkable. R monocular defect   Cardiovascular: Regular, rate, rhythm, no murmur.   Pulmonary: Lungs clear bilaterally with good aeration. No adventitious breath sounds.   Gastrointestinal: Soft, nonsurgical. Nontender. No peritoneal signs. Normoactive bowel sounds.   Back: Some tenderness midline lower thoracic, upper lumbar, although neurovascularly intact distally  Musculoskeletal: No peripheral edema. Negative Homans bilaterally, no cords.   Skin: No rash.   Neuro: Mild right-sided weakness appreciated, upper and lower-existing from previous CVA's      ED Course & MDM   Diagnoses as of 07/02/25 0428   Fall, initial encounter   Contusion of face, initial encounter   Injury of head, initial encounter   Constipation, unspecified constipation type    Elevated lactic acid level          Labs Reviewed   COMPREHENSIVE METABOLIC PANEL - Abnormal       Result Value    Glucose 279 (*)     Sodium 132 (*)     Potassium 3.7      Chloride 97 (*)     Bicarbonate 23      Anion Gap 16      Urea Nitrogen 11      Creatinine 0.68      eGFR >90      Calcium 9.0      Albumin 4.3      Alkaline Phosphatase 86      Total Protein 7.4      AST 7 (*)     Bilirubin, Total 0.3      ALT 9     LACTATE - Abnormal    Lactate 4.1 (*)     Narrative:     Venipuncture immediately after or during the administration of Metamizole may lead to falsely low results. Testing should be performed immediately prior to Metamizole dosing.   LACTATE - Abnormal    Lactate 3.8 (*)     Narrative:     Venipuncture immediately after or during the administration of Metamizole may lead to falsely low results. Testing should be performed immediately prior to Metamizole dosing.   LACTATE - Abnormal    Lactate 3.2 (*)     Narrative:     Venipuncture immediately after or during the administration of Metamizole may lead to falsely low results. Testing should be performed immediately prior to Metamizole dosing.   ALCOHOL - Normal    Alcohol <10     PROTIME-INR - Normal    Protime 11.2      INR 1.0     CBC WITH AUTO DIFFERENTIAL    WBC 8.6      nRBC 0.0      RBC 5.08      Hemoglobin 15.9      Hematocrit 45.7      MCV 90      MCH 31.3      MCHC 34.8      RDW 12.8      Platelets 278      Neutrophils % 54.7      Immature Granulocytes %, Automated 0.4      Lymphocytes % 36.2      Monocytes % 5.1      Eosinophils % 2.7      Basophils % 0.9      Neutrophils Absolute 4.69      Immature Granulocytes Absolute, Automated 0.03      Lymphocytes Absolute 3.10      Monocytes Absolute 0.44      Eosinophils Absolute 0.23      Basophils Absolute 0.08     TYPE AND SCREEN    ABO TYPE A      Rh TYPE POS      ANTIBODY SCREEN NEG     LACTATE        CT lumbar spine retrospective reconstruction protocol   Final Result   CT  CHEST/ABDOMEN/PELVIS:   No acute traumatic injury.   Bronchial thickening. Median sternotomy vascular calcification        Constipation. Diverticulosis. Cholelithiasis             CT THORACIC AND LUMBAR SPINE:   No acute fracture or traumatic malalignment.        Signed by: Junior De La Garza 7/2/2025 12:52 AM   Dictation workstation:   [id number]      CT thoracic spine retrospective reconstruction protocol   Final Result   CT CHEST/ABDOMEN/PELVIS:   No acute traumatic injury.   Bronchial thickening. Median sternotomy vascular calcification        Constipation. Diverticulosis. Cholelithiasis             CT THORACIC AND LUMBAR SPINE:   No acute fracture or traumatic malalignment.        Signed by: Junior De La Garza 7/2/2025 12:52 AM   Dictation workstation:   [id number]      CT chest abdomen pelvis wo IV contrast   Final Result   CT CHEST/ABDOMEN/PELVIS:   No acute traumatic injury.   Bronchial thickening. Median sternotomy vascular calcification        Constipation. Diverticulosis. Cholelithiasis             CT THORACIC AND LUMBAR SPINE:   No acute fracture or traumatic malalignment.        Signed by: Junior De La Garza 7/2/2025 12:52 AM   Dictation workstation:   [id number]      CT maxillofacial bones wo IV contrast   Final Result   Head CT:   1. No acute intracranial abnormality.   2. Similar ventriculomegaly which may reflect central volume loss or   normal pressure hydrocephalus.   3. Similar left medial occipital lobe and left anterior thalamic   encephalomalacia        Maxillofacial CT: No acute fractures.        MACRO:   None        Signed by: Ashu West 7/1/2025 11:58 PM   Dictation workstation:   [id number]      CT cervical spine wo IV contrast   Final Result   1. No acute fractures or traumatic subluxation.   2. Additional chronic findings as discussed above.        MACRO:   None        Signed by: Ashu West 7/2/2025 12:05 AM   Dictation workstation:   [id number]      CT 3D reconstruction   Final Result    Head CT:   1. No acute intracranial abnormality.   2. Similar ventriculomegaly which may reflect central volume loss or   normal pressure hydrocephalus.   3. Similar left medial occipital lobe and left anterior thalamic   encephalomalacia        Maxillofacial CT: No acute fractures.        MACRO:   None        Signed by: Ashu West 7/1/2025 11:58 PM   Dictation workstation:   [id number]      CT head W O contrast trauma protocol   Final Result   Head CT:   1. No acute intracranial abnormality.   2. Similar ventriculomegaly which may reflect central volume loss or   normal pressure hydrocephalus.   3. Similar left medial occipital lobe and left anterior thalamic   encephalomalacia        Maxillofacial CT: No acute fractures.        MACRO:   None        Signed by: Ashu West 7/1/2025 11:58 PM   Dictation workstation:   [id number]               No data recorded                         Medical Decision Making    Patient presents after a fall, she has balance issues since her strokes.  She is on Brilinta so HIA was called.  She complains of pain in the mid back area, swelling and CT imaging of the spine, chest, abdomen and pelvis.    She was very difficult IV stick so initially CT scans were ordered with IV contrast but switched to without.    CBC with a white count 8.6, hemoglobin 15.9 and platelets of 278.  INR 1.0.  Lactate level was elevated to 4.1.  Metabolic panel with glucose of 279, sodium 132, otherwise electrolytes unremarkable, creatinine 0.68.    Repeat lactate level down trended to 3.8, did give some IV fluids, although liter was ordered, IV was able to be placed in the hand but the patient pulled the IV out on accident so after about half of the IV fluids repeat lactate level was drawn, downtrending to 3.2.  Feel comfortable it is downtrending, no infectious source identified, do not feel that she is septic.    Ultimately trauma pan scan showed no acute traumatic injuries.  Radiologist does  comment on constipation.  As well as head CT of evidence of previous strokes, no acute findings    I will discharge home with MiraLAX for her constipation, monitored by family and follow-up within 3 days with primary care.  Discussed return with any worsening symptoms or additional concerns.        Procedure  Procedures       [1]   Past Medical History:  Diagnosis Date    Anxiety     Asthma     CHF (congestive heart failure)     COPD (chronic obstructive pulmonary disease) (Multi)     Coronary artery disease     Depression     Diabetes mellitus (Multi)     Early onset Alzheimer's dementia (Multi)     Heart disease     Hyperlipidemia     Hypertension     Hypoxic ischemic coma     Myocardial infarction (Multi)     Other specified abnormal findings of blood chemistry     Low vitamin D level    Stroke (Multi)    [2]   Past Surgical History:  Procedure Laterality Date    CARDIAC CATHETERIZATION      CARDIAC CATHETERIZATION N/A 2025    Procedure: Left Heart Cath, With LV;  Surgeon: Isma Sykes DO;  Location: [facility];  Service: Cardiovascular;  Laterality: N/A;    CARDIAC DEFIBRILLATOR PLACEMENT      CARDIAC SURGERY       SECTION, LOW TRANSVERSE      CORONARY ARTERY BYPASS GRAFT  [id number]    OTHER SURGICAL HISTORY  2020    Cardiac catheterization    OTHER SURGICAL HISTORY  2022    Heart surgery    TUBAL LIGATION     [3]   Family History  Problem Relation Name Age of Onset    Diabetes Mother Cher nazario     Cervical cancer Mother Cher nazario     Heart disease Father Rafael nazario     Hypertension Father Rafael nazario    [4]   Social History  Tobacco Use    Smoking status: Every Day     Current packs/day: 1.50     Average packs/day: 1.5 packs/day for 30.9 years (46.4 ttl pk-yrs)     Types: Cigarettes     Start date: 1994     Passive exposure: Never    Smokeless tobacco: Never   Vaping Use    Vaping status: Never Used   Substance Use Topics    Alcohol use: Never    Drug use: Yes      Types: Yousif Galvan, EMMA-CNP  07/02/25 0432

## 2025-07-03 ENCOUNTER — HOME CARE VISIT (OUTPATIENT)
Dept: HOME HEALTH SERVICES | Facility: HOME HEALTH | Age: 50
End: 2025-07-03
Payer: COMMERCIAL

## 2025-07-03 VITALS — TEMPERATURE: 98.9 F | HEART RATE: 66 BPM | OXYGEN SATURATION: 99 %

## 2025-07-03 PROCEDURE — G0153 HHCP-SVS OF S/L PATH,EA 15MN: HCPCS

## 2025-07-03 ASSESSMENT — ENCOUNTER SYMPTOMS
PERSON REPORTING PAIN: PATIENT
PAIN LOCATION: HEAD
PAIN: 1
PAIN LOCATION - PAIN SEVERITY: 6/10

## 2025-07-07 ENCOUNTER — APPOINTMENT (OUTPATIENT)
Dept: PRIMARY CARE | Facility: CLINIC | Age: 50
End: 2025-07-07
Payer: COMMERCIAL

## 2025-07-07 VITALS
HEIGHT: 66 IN | HEART RATE: 74 BPM | WEIGHT: 178.2 LBS | SYSTOLIC BLOOD PRESSURE: 132 MMHG | BODY MASS INDEX: 28.64 KG/M2 | DIASTOLIC BLOOD PRESSURE: 72 MMHG | OXYGEN SATURATION: 97 %

## 2025-07-07 DIAGNOSIS — J43.1 PANLOBULAR EMPHYSEMA (MULTI): Primary | ICD-10-CM

## 2025-07-07 DIAGNOSIS — E10.65 TYPE 1 DIABETES MELLITUS WITH HYPERGLYCEMIA (MULTI): ICD-10-CM

## 2025-07-07 DIAGNOSIS — E11.9 TYPE 2 DIABETES MELLITUS WITHOUT COMPLICATION, WITH LONG-TERM CURRENT USE OF INSULIN: ICD-10-CM

## 2025-07-07 DIAGNOSIS — I10 PRIMARY HYPERTENSION: ICD-10-CM

## 2025-07-07 DIAGNOSIS — M54.50 CHRONIC BILATERAL LOW BACK PAIN WITHOUT SCIATICA: ICD-10-CM

## 2025-07-07 DIAGNOSIS — R27.0 ATAXIA: ICD-10-CM

## 2025-07-07 DIAGNOSIS — F33.1 MODERATE EPISODE OF RECURRENT MAJOR DEPRESSIVE DISORDER: ICD-10-CM

## 2025-07-07 DIAGNOSIS — I25.10 CORONARY ARTERY DISEASE INVOLVING NATIVE CORONARY ARTERY OF NATIVE HEART WITHOUT ANGINA PECTORIS: ICD-10-CM

## 2025-07-07 DIAGNOSIS — G89.29 CHRONIC BILATERAL LOW BACK PAIN WITHOUT SCIATICA: ICD-10-CM

## 2025-07-07 DIAGNOSIS — Z79.4 TYPE 2 DIABETES MELLITUS WITHOUT COMPLICATION, WITH LONG-TERM CURRENT USE OF INSULIN: ICD-10-CM

## 2025-07-07 PROCEDURE — 3075F SYST BP GE 130 - 139MM HG: CPT | Performed by: FAMILY MEDICINE

## 2025-07-07 PROCEDURE — 3008F BODY MASS INDEX DOCD: CPT | Performed by: FAMILY MEDICINE

## 2025-07-07 PROCEDURE — 3078F DIAST BP <80 MM HG: CPT | Performed by: FAMILY MEDICINE

## 2025-07-07 PROCEDURE — 3046F HEMOGLOBIN A1C LEVEL >9.0%: CPT | Performed by: FAMILY MEDICINE

## 2025-07-07 PROCEDURE — 99214 OFFICE O/P EST MOD 30 MIN: CPT | Performed by: FAMILY MEDICINE

## 2025-07-07 PROCEDURE — 3049F LDL-C 100-129 MG/DL: CPT | Performed by: FAMILY MEDICINE

## 2025-07-07 RX ORDER — CYCLOBENZAPRINE HCL 10 MG
10 TABLET ORAL 3 TIMES DAILY PRN
Qty: 60 TABLET | Refills: 2 | Status: SHIPPED | OUTPATIENT
Start: 2025-07-07 | End: 2026-03-04

## 2025-07-07 ASSESSMENT — ENCOUNTER SYMPTOMS
BACK PAIN: 0
ADENOPATHY: 0
ABDOMINAL PAIN: 0
SORE THROAT: 0
HEADACHES: 0
VOMITING: 0
HEMATURIA: 0
SHORTNESS OF BREATH: 0
CHEST TIGHTNESS: 0
NUMBNESS: 0
NECK PAIN: 0
NERVOUS/ANXIOUS: 0
COUGH: 0
CONSTIPATION: 0
FATIGUE: 0
NAUSEA: 0
DIZZINESS: 0
WEAKNESS: 0
DIARRHEA: 0
MYALGIAS: 0
DYSPHORIC MOOD: 0
ACTIVITY CHANGE: 0
BRUISES/BLEEDS EASILY: 0
DIFFICULTY URINATING: 0
ARTHRALGIAS: 0
EYE DISCHARGE: 0
BLOOD IN STOOL: 0

## 2025-07-07 NOTE — PROGRESS NOTES
"Subjective   Patient ID: Tana Bahena \"Axel" is a 50 y.o. female who presents for Fall.  HPI    Keeps having falls  Needs physical therapy  Mostly pain and weakness from the back down  Keep specialist follow-up  Would benefit from continued physical therapy    Anxiety ongoing  Lots of stress at home  Status post behavioral health coordination  No suicidal ideation no psychotic or manic symptoms  History of bipolar and depression as well    Hypertension well-controlled  Tolerates medicine  No chest pain or shortness of breath    CHF and coronary disease stable keep cardiac follow-up  No new issues    Diabetes with good control watch diet follow blood work  Stay current with diabetic health maintenance  Watch good nutrition    Low back pain ongoing  Medicine helpful  See specialists  No progression or worsening    COPD ongoing continue current medicine  Keep pulmonary follow-up as well    Review of Systems   Constitutional:  Negative for activity change and fatigue.   HENT:  Negative for congestion and sore throat.    Eyes:  Negative for discharge.   Respiratory:  Negative for cough, chest tightness and shortness of breath.    Cardiovascular:  Negative for chest pain and leg swelling.   Gastrointestinal:  Negative for abdominal pain, blood in stool, constipation, diarrhea, nausea and vomiting.   Endocrine: Negative for cold intolerance and heat intolerance.   Genitourinary:  Negative for difficulty urinating and hematuria.   Musculoskeletal:  Negative for arthralgias, back pain, gait problem, myalgias and neck pain.   Allergic/Immunologic: Negative for environmental allergies.   Neurological:  Negative for dizziness, syncope, weakness, numbness and headaches.   Hematological:  Negative for adenopathy. Does not bruise/bleed easily.   Psychiatric/Behavioral:  Negative for dysphoric mood. The patient is not nervous/anxious.    All other systems reviewed and are negative.      Objective   /72 (BP Location: Left " "arm, BP Cuff Size: Large adult)   Pulse 74   Ht 1.676 m (5' 6\")   Wt 80.8 kg (178 lb 3.2 oz)   SpO2 97%   BMI 28.76 kg/m²    Physical Exam  Vitals and nursing note reviewed.   Constitutional:       General: She is not in acute distress.     Appearance: Normal appearance. She is obese.   HENT:      Head: Normocephalic and atraumatic.      Right Ear: Tympanic membrane, ear canal and external ear normal.      Left Ear: Tympanic membrane, ear canal and external ear normal.      Nose: Nose normal.      Mouth/Throat:      Mouth: Mucous membranes are moist.      Pharynx: Oropharynx is clear. No oropharyngeal exudate or posterior oropharyngeal erythema.   Eyes:      Extraocular Movements: Extraocular movements intact.      Conjunctiva/sclera: Conjunctivae normal.      Pupils: Pupils are equal, round, and reactive to light.   Cardiovascular:      Rate and Rhythm: Normal rate and regular rhythm.      Pulses: Normal pulses.      Heart sounds: Normal heart sounds. No murmur heard.  Pulmonary:      Effort: Pulmonary effort is normal. No respiratory distress.      Breath sounds: Normal breath sounds. No wheezing or rales.   Abdominal:      General: Abdomen is flat. Bowel sounds are normal. There is no distension.      Palpations: Abdomen is soft. There is no mass.      Tenderness: There is no abdominal tenderness.   Musculoskeletal:         General: No swelling or deformity. Normal range of motion.      Cervical back: Normal range of motion and neck supple.      Right lower leg: No edema.      Left lower leg: No edema.   Lymphadenopathy:      Cervical: No cervical adenopathy.   Skin:     General: Skin is warm and dry.      Capillary Refill: Capillary refill takes less than 2 seconds.      Findings: No lesion or rash.   Neurological:      General: No focal deficit present.      Mental Status: She is alert and oriented to person, place, and time.      Cranial Nerves: No cranial nerve deficit.      Motor: No weakness. "   Psychiatric:         Mood and Affect: Mood normal.         Behavior: Behavior normal.         Thought Content: Thought content normal.         Judgment: Judgment normal.         Assessment/Plan   Problem List Items Addressed This Visit       Coronary artery disease involving native coronary artery of native heart without angina pectoris    Panlobular emphysema (Multi) - Primary    Moderate episode of recurrent major depressive disorder    Type 2 diabetes mellitus without complication, with long-term current use of insulin    Primary hypertension    Chronic bilateral low back pain without sciatica    Relevant Medications    cyclobenzaprine (Flexeril) 10 mg tablet    Other Relevant Orders    Follow Up In [facility] - PCP    Ataxia    Relevant Orders    Referral to Physical Therapy    Type 1 diabetes mellitus with hyperglycemia (Multi)       Patient education provided.  Stay current with age appropriate health maintenance as instructed.  Appointment here or ER with new or worsening symptoms'  Keep appropriate follow-up visit.  Stay current with proper immunizations   Discussed with patient and daughter  Meds as above  Refer for physical therapy  Stressed importance of follow-up visit 1 month

## 2025-07-10 ENCOUNTER — HOME CARE VISIT (OUTPATIENT)
Dept: HOME HEALTH SERVICES | Facility: HOME HEALTH | Age: 50
End: 2025-07-10
Payer: COMMERCIAL

## 2025-07-10 VITALS — TEMPERATURE: 98.9 F | OXYGEN SATURATION: 98 % | HEART RATE: 76 BPM

## 2025-07-10 PROCEDURE — G0153 HHCP-SVS OF S/L PATH,EA 15MN: HCPCS

## 2025-07-10 ASSESSMENT — ENCOUNTER SYMPTOMS
PERSON REPORTING PAIN: PATIENT
OCCASIONAL FEELINGS OF UNSTEADINESS: 1
ANGER WITHIN DEFINED LIMITS: 1
AGGRESSION WITHIN DEFINED LIMITS: 1
DEPRESSION: 0
DENIES PAIN: 1
LOSS OF SENSATION IN FEET: 0

## 2025-07-10 ASSESSMENT — ACTIVITIES OF DAILY LIVING (ADL)
HOME_HEALTH_OASIS: 01
OASIS_M1830: 02

## 2025-07-19 DIAGNOSIS — G47.01 INSOMNIA DUE TO MEDICAL CONDITION: ICD-10-CM

## 2025-07-21 RX ORDER — ESZOPICLONE 2 MG/1
TABLET, FILM COATED ORAL
Qty: 30 TABLET | Refills: 0 | Status: SHIPPED | OUTPATIENT
Start: 2025-07-21 | End: 2025-08-20

## 2025-07-21 NOTE — TELEPHONE ENCOUNTER
Requesting medication refill. Please approve or deny this request.    Rx requested:  Requested Prescriptions     Pending Prescriptions Disp Refills    eszopiclone (LUNESTA) 2 MG TABS [Pharmacy Med Name: ESZOPICLONE 2MG TABLETS] 30 tablet      Sig: TAKE 1 TABLET BY MOUTH EVERY NIGHT. MAX DAILY AMOUNT: 2 MG         Last Office Visit:   12/18/2024      Next Visit Date:  Future Appointments   Date Time Provider Department Center   10/3/2025  1:00 PM [facility]               Last refill 5/8/25. Please approve or deny.

## 2025-07-22 ENCOUNTER — DOCUMENTATION (OUTPATIENT)
Dept: PHYSICAL THERAPY | Facility: CLINIC | Age: 50
End: 2025-07-22
Payer: COMMERCIAL

## 2025-07-22 NOTE — PROGRESS NOTES
"Physical Therapy    Discharge Summary    Name: Tana Bahena \"Axel"  MRN: [id number]  : 1975  Date: 25    Discharge Summary: PT    Discharge Information: Date of discharge 25, Date of last visit 25, Date of evaluation 24, Number of attended visits 5, Referred by Dr. Jacobo Thakkar, and Referred for Decreased mobility    Therapy Summary: PT emphasized improving strength, balance, and overall mobility    Discharge Status: D/t the nature of the absent discharge, patient's current status is unknown at this time     Rehab Discharge Reason: Failed to schedule and/or keep follow-up appointment(s)  "

## 2025-08-04 DIAGNOSIS — I25.10 CORONARY ARTERY DISEASE INVOLVING NATIVE CORONARY ARTERY OF NATIVE HEART WITHOUT ANGINA PECTORIS: ICD-10-CM

## 2025-08-04 RX ORDER — TICAGRELOR 90 MG/1
90 TABLET, FILM COATED ORAL 2 TIMES DAILY
Qty: 180 TABLET | Refills: 1 | Status: SHIPPED | OUTPATIENT
Start: 2025-08-04

## 2025-08-04 RX ORDER — ISOSORBIDE MONONITRATE 60 MG/1
60 TABLET, EXTENDED RELEASE ORAL DAILY
Qty: 90 TABLET | Refills: 0 | Status: SHIPPED | OUTPATIENT
Start: 2025-08-04

## 2025-08-05 DIAGNOSIS — F51.01 PRIMARY INSOMNIA: ICD-10-CM

## 2025-08-05 RX ORDER — HYDROXYZINE HYDROCHLORIDE 25 MG/1
25 TABLET, FILM COATED ORAL NIGHTLY
Qty: 30 TABLET | Refills: 2 | Status: SHIPPED | OUTPATIENT
Start: 2025-08-05

## 2025-08-05 NOTE — TELEPHONE ENCOUNTER
Rx Refill Request     Name: Tana FELIBERTO Bahena  :  1975   Medication Name:  hydroxyzine  Specific Pharmacy location:  [facility]   Date of last appointment:  2025   Date of next appointment:  2025   Best number to reach patient:  [phone]

## 2025-08-11 ENCOUNTER — APPOINTMENT (OUTPATIENT)
Dept: PRIMARY CARE | Facility: CLINIC | Age: 50
End: 2025-08-11
Payer: COMMERCIAL

## 2025-08-18 DIAGNOSIS — E11.9 TYPE 2 DIABETES MELLITUS WITHOUT COMPLICATION, WITH LONG-TERM CURRENT USE OF INSULIN: ICD-10-CM

## 2025-08-18 DIAGNOSIS — Z79.4 TYPE 2 DIABETES MELLITUS WITHOUT COMPLICATION, WITH LONG-TERM CURRENT USE OF INSULIN: ICD-10-CM

## 2025-08-18 RX ORDER — LANCETS 30 GAUGE
EACH MISCELLANEOUS
Qty: 1 EACH | Refills: 0 | Status: SHIPPED | OUTPATIENT
Start: 2025-08-18 | End: 2025-08-20 | Stop reason: WASHOUT

## 2025-08-18 RX ORDER — LANCETS
EACH MISCELLANEOUS
Qty: 100 EACH | Refills: 3 | Status: SHIPPED | OUTPATIENT
Start: 2025-08-18

## 2025-08-18 RX ORDER — BLOOD-GLUCOSE METER
EACH MISCELLANEOUS
Qty: 400 STRIP | Refills: 3 | Status: SHIPPED | OUTPATIENT
Start: 2025-08-18 | End: 2025-08-20 | Stop reason: WASHOUT

## 2025-08-19 RX ORDER — CALCIUM CITRATE/VITAMIN D3 200MG-6.25
TABLET ORAL
Qty: 100 EACH | Refills: 3 | Status: SHIPPED | OUTPATIENT
Start: 2025-08-19

## 2025-08-19 RX ORDER — DEXTROSE 4 G
TABLET,CHEWABLE ORAL
Qty: 1 EACH | Refills: 0 | Status: SHIPPED | OUTPATIENT
Start: 2025-08-19

## 2025-08-20 ENCOUNTER — TELEPHONE (OUTPATIENT)
Dept: PRIMARY CARE | Facility: CLINIC | Age: 50
End: 2025-08-20
Payer: COMMERCIAL

## 2025-08-23 ENCOUNTER — HOSPITAL ENCOUNTER (OUTPATIENT)
Facility: HOSPITAL | Age: 50
Setting detail: OBSERVATION
Discharge: HOME | End: 2025-08-24
Admitting: FAMILY MEDICINE
Payer: COMMERCIAL

## 2025-08-23 ENCOUNTER — APPOINTMENT (OUTPATIENT)
Dept: CARDIOLOGY | Facility: HOSPITAL | Age: 50
End: 2025-08-23
Payer: COMMERCIAL

## 2025-08-23 ENCOUNTER — APPOINTMENT (OUTPATIENT)
Dept: RADIOLOGY | Facility: HOSPITAL | Age: 50
End: 2025-08-23
Payer: COMMERCIAL

## 2025-08-23 DIAGNOSIS — R07.9 CHEST PAIN, UNSPECIFIED TYPE: Primary | ICD-10-CM

## 2025-08-23 LAB
ALBUMIN SERPL BCP-MCNC: 4.2 G/DL (ref 3.4–5)
ALP SERPL-CCNC: 68 U/L (ref 33–110)
ALT SERPL W P-5'-P-CCNC: 10 U/L (ref 7–45)
ANION GAP SERPL CALC-SCNC: 12 MMOL/L (ref 10–20)
APPEARANCE UR: CLEAR
AST SERPL W P-5'-P-CCNC: 10 U/L (ref 9–39)
ATRIAL RATE: 78 BPM
B-HCG SERPL-ACNC: 2 MIU/ML
BASOPHILS # BLD AUTO: 0.05 X10*3/UL (ref 0–0.1)
BASOPHILS NFR BLD AUTO: 1 %
BILIRUB SERPL-MCNC: 0.5 MG/DL (ref 0–1.2)
BILIRUB UR STRIP.AUTO-MCNC: NEGATIVE MG/DL
BNP SERPL-MCNC: 157 PG/ML (ref 0–99)
BUN SERPL-MCNC: 8 MG/DL (ref 6–23)
CALCIUM SERPL-MCNC: 9 MG/DL (ref 8.6–10.3)
CARDIAC TROPONIN I PNL SERPL HS: 4 NG/L (ref 0–13)
CARDIAC TROPONIN I PNL SERPL HS: 5 NG/L (ref 0–13)
CHLORIDE SERPL-SCNC: 98 MMOL/L (ref 98–107)
CO2 SERPL-SCNC: 27 MMOL/L (ref 21–32)
COLOR UR: YELLOW
CREAT SERPL-MCNC: 0.79 MG/DL (ref 0.5–1.05)
EGFRCR SERPLBLD CKD-EPI 2021: >90 ML/MIN/1.73M*2
EOSINOPHIL # BLD AUTO: 0.11 X10*3/UL (ref 0–0.7)
EOSINOPHIL NFR BLD AUTO: 2.2 %
ERYTHROCYTE [DISTWIDTH] IN BLOOD BY AUTOMATED COUNT: 11.9 % (ref 11.5–14.5)
GLUCOSE BLD MANUAL STRIP-MCNC: 232 MG/DL (ref 74–99)
GLUCOSE SERPL-MCNC: 291 MG/DL (ref 74–99)
GLUCOSE UR STRIP.AUTO-MCNC: ABNORMAL MG/DL
HCT VFR BLD AUTO: 41.4 % (ref 36–46)
HGB BLD-MCNC: 14.5 G/DL (ref 12–16)
HOLD SPECIMEN: NORMAL
IMM GRANULOCYTES # BLD AUTO: 0.01 X10*3/UL (ref 0–0.7)
IMM GRANULOCYTES NFR BLD AUTO: 0.2 % (ref 0–0.9)
KETONES UR STRIP.AUTO-MCNC: NEGATIVE MG/DL
LEUKOCYTE ESTERASE UR QL STRIP.AUTO: NEGATIVE
LYMPHOCYTES # BLD AUTO: 1.88 X10*3/UL (ref 1.2–4.8)
LYMPHOCYTES NFR BLD AUTO: 36.9 %
MAGNESIUM SERPL-MCNC: 1.66 MG/DL (ref 1.6–2.4)
MCH RBC QN AUTO: 32.4 PG (ref 26–34)
MCHC RBC AUTO-ENTMCNC: 35 G/DL (ref 32–36)
MCV RBC AUTO: 92 FL (ref 80–100)
MONOCYTES # BLD AUTO: 0.25 X10*3/UL (ref 0.1–1)
MONOCYTES NFR BLD AUTO: 4.9 %
NEUTROPHILS # BLD AUTO: 2.79 X10*3/UL (ref 1.2–7.7)
NEUTROPHILS NFR BLD AUTO: 54.8 %
NITRITE UR QL STRIP.AUTO: NEGATIVE
NRBC BLD-RTO: 0 /100 WBCS (ref 0–0)
P AXIS: 39 DEGREES
P OFFSET: 190 MS
P ONSET: 131 MS
PH UR STRIP.AUTO: 5.5 [PH]
PHOSPHATE SERPL-MCNC: 3.3 MG/DL (ref 2.5–4.9)
PLATELET # BLD AUTO: 230 X10*3/UL (ref 150–450)
POTASSIUM SERPL-SCNC: 4.1 MMOL/L (ref 3.5–5.3)
PR INTERVAL: 184 MS
PROT SERPL-MCNC: 6.9 G/DL (ref 6.4–8.2)
PROT UR STRIP.AUTO-MCNC: NEGATIVE MG/DL
Q ONSET: 223 MS
QRS COUNT: 13 BEATS
QRS DURATION: 118 MS
QT INTERVAL: 432 MS
QTC CALCULATION(BAZETT): 492 MS
QTC FREDERICIA: 471 MS
R AXIS: -36 DEGREES
RBC # BLD AUTO: 4.48 X10*6/UL (ref 4–5.2)
RBC # UR STRIP.AUTO: NEGATIVE MG/DL
SODIUM SERPL-SCNC: 133 MMOL/L (ref 136–145)
SP GR UR STRIP.AUTO: 1.02
T AXIS: 86 DEGREES
T OFFSET: 439 MS
TSH SERPL-ACNC: 2.31 MIU/L (ref 0.44–3.98)
UROBILINOGEN UR STRIP.AUTO-MCNC: NORMAL MG/DL
VENTRICULAR RATE: 78 BPM
WBC # BLD AUTO: 5.1 X10*3/UL (ref 4.4–11.3)

## 2025-08-23 PROCEDURE — 81003 URINALYSIS AUTO W/O SCOPE: CPT

## 2025-08-23 PROCEDURE — 82947 ASSAY GLUCOSE BLOOD QUANT: CPT

## 2025-08-23 PROCEDURE — G0378 HOSPITAL OBSERVATION PER HR: HCPCS

## 2025-08-23 PROCEDURE — 99222 1ST HOSP IP/OBS MODERATE 55: CPT | Performed by: NURSE PRACTITIONER

## 2025-08-23 PROCEDURE — 84443 ASSAY THYROID STIM HORMONE: CPT

## 2025-08-23 PROCEDURE — 2500000001 HC RX 250 WO HCPCS SELF ADMINISTERED DRUGS (ALT 637 FOR MEDICARE OP)

## 2025-08-23 PROCEDURE — 36415 COLL VENOUS BLD VENIPUNCTURE: CPT

## 2025-08-23 PROCEDURE — 99285 EMERGENCY DEPT VISIT HI MDM: CPT | Mod: 25

## 2025-08-23 PROCEDURE — 83880 ASSAY OF NATRIURETIC PEPTIDE: CPT

## 2025-08-23 PROCEDURE — 84484 ASSAY OF TROPONIN QUANT: CPT

## 2025-08-23 PROCEDURE — 85025 COMPLETE CBC W/AUTO DIFF WBC: CPT

## 2025-08-23 PROCEDURE — 93005 ELECTROCARDIOGRAM TRACING: CPT

## 2025-08-23 PROCEDURE — 84702 CHORIONIC GONADOTROPIN TEST: CPT

## 2025-08-23 PROCEDURE — 71046 X-RAY EXAM CHEST 2 VIEWS: CPT

## 2025-08-23 PROCEDURE — 83735 ASSAY OF MAGNESIUM: CPT

## 2025-08-23 PROCEDURE — 80053 COMPREHEN METABOLIC PANEL: CPT

## 2025-08-23 PROCEDURE — 71046 X-RAY EXAM CHEST 2 VIEWS: CPT | Performed by: RADIOLOGY

## 2025-08-23 PROCEDURE — 84100 ASSAY OF PHOSPHORUS: CPT

## 2025-08-23 RX ORDER — ATORVASTATIN CALCIUM 20 MG/1
40 TABLET, FILM COATED ORAL NIGHTLY
Status: DISCONTINUED | OUTPATIENT
Start: 2025-08-24 | End: 2025-08-24 | Stop reason: HOSPADM

## 2025-08-23 RX ORDER — CARVEDILOL 6.25 MG/1
6.25 TABLET ORAL
Status: DISCONTINUED | OUTPATIENT
Start: 2025-08-24 | End: 2025-08-24 | Stop reason: HOSPADM

## 2025-08-23 RX ORDER — GABAPENTIN 100 MG/1
100 CAPSULE ORAL 3 TIMES DAILY
Status: DISCONTINUED | OUTPATIENT
Start: 2025-08-24 | End: 2025-08-24 | Stop reason: HOSPADM

## 2025-08-23 RX ORDER — TRIAMTERENE AND HYDROCHLOROTHIAZIDE 37.5; 25 MG/1; MG/1
1 TABLET ORAL DAILY
Status: DISCONTINUED | OUTPATIENT
Start: 2025-08-24 | End: 2025-08-24 | Stop reason: HOSPADM

## 2025-08-23 RX ORDER — RANOLAZINE 500 MG/1
500 TABLET, EXTENDED RELEASE ORAL 2 TIMES DAILY
Status: DISCONTINUED | OUTPATIENT
Start: 2025-08-24 | End: 2025-08-24 | Stop reason: HOSPADM

## 2025-08-23 RX ORDER — ISOSORBIDE MONONITRATE 60 MG/1
60 TABLET, EXTENDED RELEASE ORAL
Status: DISCONTINUED | OUTPATIENT
Start: 2025-08-24 | End: 2025-08-24 | Stop reason: HOSPADM

## 2025-08-23 RX ORDER — TALC
6 POWDER (GRAM) TOPICAL NIGHTLY
COMMUNITY

## 2025-08-23 RX ORDER — SEMAGLUTIDE 1 MG/0.2ML
1 SYRINGE (ML) SUBCUTANEOUS
COMMUNITY

## 2025-08-23 RX ORDER — NAPROXEN SODIUM 220 MG/1
324 TABLET, FILM COATED ORAL ONCE
Status: COMPLETED | OUTPATIENT
Start: 2025-08-23 | End: 2025-08-23

## 2025-08-23 RX ORDER — FLUTICASONE FUROATE AND VILANTEROL 200; 25 UG/1; UG/1
1 POWDER RESPIRATORY (INHALATION)
Status: DISCONTINUED | OUTPATIENT
Start: 2025-08-24 | End: 2025-08-24 | Stop reason: HOSPADM

## 2025-08-23 RX ORDER — ASPIRIN 81 MG/1
81 TABLET ORAL DAILY
Status: DISCONTINUED | OUTPATIENT
Start: 2025-08-24 | End: 2025-08-24 | Stop reason: HOSPADM

## 2025-08-23 RX ORDER — ROPINIROLE 0.25 MG/1
0.25 TABLET, FILM COATED ORAL 3 TIMES DAILY
Status: DISCONTINUED | OUTPATIENT
Start: 2025-08-24 | End: 2025-08-24 | Stop reason: HOSPADM

## 2025-08-23 RX ORDER — TRAZODONE HYDROCHLORIDE 150 MG/1
300 TABLET ORAL NIGHTLY PRN
Status: DISCONTINUED | OUTPATIENT
Start: 2025-08-23 | End: 2025-08-24 | Stop reason: HOSPADM

## 2025-08-23 RX ORDER — TICAGRELOR 90 MG/1
90 TABLET, FILM COATED ORAL 2 TIMES DAILY
Status: DISCONTINUED | OUTPATIENT
Start: 2025-08-24 | End: 2025-08-24 | Stop reason: HOSPADM

## 2025-08-23 RX ADMIN — ASPIRIN 324 MG: 81 TABLET, CHEWABLE ORAL at 18:11

## 2025-08-23 SDOH — SOCIAL STABILITY: SOCIAL INSECURITY: HAS ANYONE EVER THREATENED TO HURT YOUR FAMILY OR YOUR PETS?: NO

## 2025-08-23 SDOH — ECONOMIC STABILITY: FOOD INSECURITY: WITHIN THE PAST 12 MONTHS, THE FOOD YOU BOUGHT JUST DIDN'T LAST AND YOU DIDN'T HAVE MONEY TO GET MORE.: NEVER TRUE

## 2025-08-23 SDOH — SOCIAL STABILITY: SOCIAL INSECURITY: WITHIN THE LAST YEAR, HAVE YOU BEEN HUMILIATED OR EMOTIONALLY ABUSED IN OTHER WAYS BY YOUR PARTNER OR EX-PARTNER?: NO

## 2025-08-23 SDOH — ECONOMIC STABILITY: HOUSING INSECURITY: IN THE PAST 12 MONTHS, HOW MANY TIMES HAVE YOU MOVED WHERE YOU WERE LIVING?: 0

## 2025-08-23 SDOH — ECONOMIC STABILITY: INCOME INSECURITY: IN THE PAST 12 MONTHS HAS THE ELECTRIC, GAS, OIL, OR WATER COMPANY THREATENED TO SHUT OFF SERVICES IN YOUR HOME?: NO

## 2025-08-23 SDOH — SOCIAL STABILITY: SOCIAL INSECURITY: ABUSE: ADULT

## 2025-08-23 SDOH — SOCIAL STABILITY: SOCIAL INSECURITY: WITHIN THE LAST YEAR, HAVE YOU BEEN AFRAID OF YOUR PARTNER OR EX-PARTNER?: NO

## 2025-08-23 SDOH — ECONOMIC STABILITY: FOOD INSECURITY: WITHIN THE PAST 12 MONTHS, YOU WORRIED THAT YOUR FOOD WOULD RUN OUT BEFORE YOU GOT THE MONEY TO BUY MORE.: NEVER TRUE

## 2025-08-23 SDOH — ECONOMIC STABILITY: HOUSING INSECURITY: AT ANY TIME IN THE PAST 12 MONTHS, WERE YOU HOMELESS OR LIVING IN A SHELTER (INCLUDING NOW)?: NO

## 2025-08-23 SDOH — SOCIAL STABILITY: SOCIAL INSECURITY: WERE YOU ABLE TO COMPLETE ALL THE BEHAVIORAL HEALTH SCREENINGS?: YES

## 2025-08-23 SDOH — SOCIAL STABILITY: SOCIAL INSECURITY: ARE YOU OR HAVE YOU BEEN THREATENED OR ABUSED PHYSICALLY, EMOTIONALLY, OR SEXUALLY BY ANYONE?: NO

## 2025-08-23 SDOH — SOCIAL STABILITY: SOCIAL INSECURITY: ARE THERE ANY APPARENT SIGNS OF INJURIES/BEHAVIORS THAT COULD BE RELATED TO ABUSE/NEGLECT?: NO

## 2025-08-23 SDOH — ECONOMIC STABILITY: FOOD INSECURITY: HOW HARD IS IT FOR YOU TO PAY FOR THE VERY BASICS LIKE FOOD, HOUSING, MEDICAL CARE, AND HEATING?: NOT VERY HARD

## 2025-08-23 SDOH — ECONOMIC STABILITY: HOUSING INSECURITY: IN THE LAST 12 MONTHS, WAS THERE A TIME WHEN YOU WERE NOT ABLE TO PAY THE MORTGAGE OR RENT ON TIME?: NO

## 2025-08-23 SDOH — SOCIAL STABILITY: SOCIAL INSECURITY: HAVE YOU HAD ANY THOUGHTS OF HARMING ANYONE ELSE?: NO

## 2025-08-23 SDOH — ECONOMIC STABILITY: TRANSPORTATION INSECURITY: IN THE PAST 12 MONTHS, HAS LACK OF TRANSPORTATION KEPT YOU FROM MEDICAL APPOINTMENTS OR FROM GETTING MEDICATIONS?: NO

## 2025-08-23 SDOH — SOCIAL STABILITY: SOCIAL INSECURITY: HAVE YOU HAD THOUGHTS OF HARMING ANYONE ELSE?: NO

## 2025-08-23 SDOH — SOCIAL STABILITY: SOCIAL INSECURITY: DO YOU FEEL UNSAFE GOING BACK TO THE PLACE WHERE YOU ARE LIVING?: NO

## 2025-08-23 SDOH — SOCIAL STABILITY: SOCIAL INSECURITY: DOES ANYONE TRY TO KEEP YOU FROM HAVING/CONTACTING OTHER FRIENDS OR DOING THINGS OUTSIDE YOUR HOME?: NO

## 2025-08-23 SDOH — SOCIAL STABILITY: SOCIAL INSECURITY: DO YOU FEEL ANYONE HAS EXPLOITED OR TAKEN ADVANTAGE OF YOU FINANCIALLY OR OF YOUR PERSONAL PROPERTY?: NO

## 2025-08-23 ASSESSMENT — ACTIVITIES OF DAILY LIVING (ADL)
ADEQUATE_TO_COMPLETE_ADL: YES
ASSISTIVE_DEVICE: WALKER
JUDGMENT_ADEQUATE_SAFELY_COMPLETE_DAILY_ACTIVITIES: YES
BATHING: NEEDS ASSISTANCE
PATIENT'S MEMORY ADEQUATE TO SAFELY COMPLETE DAILY ACTIVITIES?: YES
LACK_OF_TRANSPORTATION: NO
HEARING - RIGHT EAR: FUNCTIONAL
GROOMING: INDEPENDENT
FEEDING YOURSELF: INDEPENDENT
HEARING - LEFT EAR: FUNCTIONAL
LACK_OF_TRANSPORTATION: NO
WALKS IN HOME: NEEDS ASSISTANCE
TOILETING: INDEPENDENT
DRESSING YOURSELF: NEEDS ASSISTANCE

## 2025-08-23 ASSESSMENT — COGNITIVE AND FUNCTIONAL STATUS - GENERAL
HELP NEEDED FOR BATHING: A LITTLE
CLIMB 3 TO 5 STEPS WITH RAILING: A LOT
STANDING UP FROM CHAIR USING ARMS: A LITTLE
WALKING IN HOSPITAL ROOM: A LITTLE
MOVING TO AND FROM BED TO CHAIR: A LITTLE
MOBILITY SCORE: 18
TURNING FROM BACK TO SIDE WHILE IN FLAT BAD: A LITTLE
PATIENT BASELINE BEDBOUND: NO
DRESSING REGULAR LOWER BODY CLOTHING: A LITTLE
DAILY ACTIVITIY SCORE: 22

## 2025-08-23 ASSESSMENT — ENCOUNTER SYMPTOMS
ABDOMINAL PAIN: 0
HEMATURIA: 0
CHILLS: 0
PALPITATIONS: 0
DYSURIA: 0
CONSTIPATION: 0
FLANK PAIN: 0
SHORTNESS OF BREATH: 0
FEVER: 0
FREQUENCY: 0
NAUSEA: 1
DIARRHEA: 0
VOMITING: 0

## 2025-08-23 ASSESSMENT — LIFESTYLE VARIABLES
SKIP TO QUESTIONS 9-10: 1
AUDIT-C TOTAL SCORE: 0
HOW OFTEN DO YOU HAVE 6 OR MORE DRINKS ON ONE OCCASION: NEVER
HAVE PEOPLE ANNOYED YOU BY CRITICIZING YOUR DRINKING: NO
AUDIT-C TOTAL SCORE: 0
HOW MANY STANDARD DRINKS CONTAINING ALCOHOL DO YOU HAVE ON A TYPICAL DAY: PATIENT DOES NOT DRINK
HAVE YOU EVER FELT YOU SHOULD CUT DOWN ON YOUR DRINKING: NO
EVER HAD A DRINK FIRST THING IN THE MORNING TO STEADY YOUR NERVES TO GET RID OF A HANGOVER: NO
HOW OFTEN DO YOU HAVE A DRINK CONTAINING ALCOHOL: NEVER
TOTAL SCORE: 0
EVER FELT BAD OR GUILTY ABOUT YOUR DRINKING: NO

## 2025-08-23 ASSESSMENT — PATIENT HEALTH QUESTIONNAIRE - PHQ9
2. FEELING DOWN, DEPRESSED OR HOPELESS: NOT AT ALL
SUM OF ALL RESPONSES TO PHQ9 QUESTIONS 1 & 2: 0
1. LITTLE INTEREST OR PLEASURE IN DOING THINGS: NOT AT ALL

## 2025-08-23 ASSESSMENT — PAIN DESCRIPTION - PAIN TYPE: TYPE: ACUTE PAIN

## 2025-08-23 ASSESSMENT — PAIN SCALES - GENERAL
PAINLEVEL_OUTOF10: 3
PAINLEVEL_OUTOF10: 4

## 2025-08-23 ASSESSMENT — PAIN - FUNCTIONAL ASSESSMENT: PAIN_FUNCTIONAL_ASSESSMENT: 0-10

## 2025-08-23 ASSESSMENT — PAIN DESCRIPTION - LOCATION: LOCATION: CHEST

## 2025-08-24 ENCOUNTER — APPOINTMENT (OUTPATIENT)
Dept: CARDIOLOGY | Facility: HOSPITAL | Age: 50
End: 2025-08-24
Payer: COMMERCIAL

## 2025-08-24 VITALS
OXYGEN SATURATION: 97 % | BODY MASS INDEX: 28.03 KG/M2 | HEIGHT: 66 IN | HEART RATE: 89 BPM | WEIGHT: 174.38 LBS | DIASTOLIC BLOOD PRESSURE: 62 MMHG | SYSTOLIC BLOOD PRESSURE: 97 MMHG | RESPIRATION RATE: 18 BRPM | TEMPERATURE: 97.2 F

## 2025-08-24 LAB
ANION GAP SERPL CALC-SCNC: 15 MMOL/L (ref 10–20)
ATRIAL RATE: 90 BPM
BUN SERPL-MCNC: 7 MG/DL (ref 6–23)
CALCIUM SERPL-MCNC: 8 MG/DL (ref 8.6–10.3)
CHLORIDE SERPL-SCNC: 99 MMOL/L (ref 98–107)
CO2 SERPL-SCNC: 24 MMOL/L (ref 21–32)
CREAT SERPL-MCNC: 0.76 MG/DL (ref 0.5–1.05)
EGFRCR SERPLBLD CKD-EPI 2021: >90 ML/MIN/1.73M*2
ERYTHROCYTE [DISTWIDTH] IN BLOOD BY AUTOMATED COUNT: 12.1 % (ref 11.5–14.5)
GLUCOSE BLD MANUAL STRIP-MCNC: 250 MG/DL (ref 74–99)
GLUCOSE BLD MANUAL STRIP-MCNC: 289 MG/DL (ref 74–99)
GLUCOSE SERPL-MCNC: 347 MG/DL (ref 74–99)
HCT VFR BLD AUTO: 39.6 % (ref 36–46)
HGB BLD-MCNC: 14 G/DL (ref 12–16)
HOLD SPECIMEN: NORMAL
MCH RBC QN AUTO: 32.1 PG (ref 26–34)
MCHC RBC AUTO-ENTMCNC: 35.4 G/DL (ref 32–36)
MCV RBC AUTO: 91 FL (ref 80–100)
NRBC BLD-RTO: 0 /100 WBCS (ref 0–0)
P AXIS: 45 DEGREES
P OFFSET: 178 MS
P ONSET: 122 MS
PLATELET # BLD AUTO: 227 X10*3/UL (ref 150–450)
POTASSIUM SERPL-SCNC: 4.7 MMOL/L (ref 3.5–5.3)
PR INTERVAL: 180 MS
Q ONSET: 212 MS
QRS COUNT: 15 BEATS
QRS DURATION: 118 MS
QT INTERVAL: 430 MS
QTC CALCULATION(BAZETT): 526 MS
QTC FREDERICIA: 492 MS
R AXIS: -28 DEGREES
RBC # BLD AUTO: 4.36 X10*6/UL (ref 4–5.2)
SODIUM SERPL-SCNC: 133 MMOL/L (ref 136–145)
T AXIS: 75 DEGREES
T OFFSET: 427 MS
VENTRICULAR RATE: 90 BPM
WBC # BLD AUTO: 5.1 X10*3/UL (ref 4.4–11.3)

## 2025-08-24 PROCEDURE — 93010 ELECTROCARDIOGRAM REPORT: CPT | Performed by: INTERNAL MEDICINE

## 2025-08-24 PROCEDURE — 99239 HOSP IP/OBS DSCHRG MGMT >30: CPT

## 2025-08-24 PROCEDURE — 2500000001 HC RX 250 WO HCPCS SELF ADMINISTERED DRUGS (ALT 637 FOR MEDICARE OP): Performed by: NURSE PRACTITIONER

## 2025-08-24 PROCEDURE — 80048 BASIC METABOLIC PNL TOTAL CA: CPT

## 2025-08-24 PROCEDURE — 93005 ELECTROCARDIOGRAM TRACING: CPT

## 2025-08-24 PROCEDURE — 82947 ASSAY GLUCOSE BLOOD QUANT: CPT

## 2025-08-24 PROCEDURE — 2500000002 HC RX 250 W HCPCS SELF ADMINISTERED DRUGS (ALT 637 FOR MEDICARE OP, ALT 636 FOR OP/ED): Performed by: NURSE PRACTITIONER

## 2025-08-24 PROCEDURE — G0378 HOSPITAL OBSERVATION PER HR: HCPCS

## 2025-08-24 PROCEDURE — 36415 COLL VENOUS BLD VENIPUNCTURE: CPT

## 2025-08-24 PROCEDURE — 96372 THER/PROPH/DIAG INJ SC/IM: CPT | Performed by: NURSE PRACTITIONER

## 2025-08-24 PROCEDURE — 2500000004 HC RX 250 GENERAL PHARMACY W/ HCPCS (ALT 636 FOR OP/ED): Performed by: NURSE PRACTITIONER

## 2025-08-24 PROCEDURE — 85027 COMPLETE CBC AUTOMATED: CPT

## 2025-08-24 RX ORDER — INSULIN GLARGINE 100 [IU]/ML
50 INJECTION, SOLUTION SUBCUTANEOUS NIGHTLY
Status: DISCONTINUED | OUTPATIENT
Start: 2025-08-24 | End: 2025-08-24 | Stop reason: HOSPADM

## 2025-08-24 RX ORDER — DEXTROSE 50 % IN WATER (D50W) INTRAVENOUS SYRINGE
25
Status: DISCONTINUED | OUTPATIENT
Start: 2025-08-24 | End: 2025-08-24 | Stop reason: HOSPADM

## 2025-08-24 RX ORDER — ACETAMINOPHEN 160 MG/5ML
650 SOLUTION ORAL EVERY 4 HOURS PRN
Status: DISCONTINUED | OUTPATIENT
Start: 2025-08-24 | End: 2025-08-24 | Stop reason: HOSPADM

## 2025-08-24 RX ORDER — ACETAMINOPHEN 650 MG/1
650 SUPPOSITORY RECTAL EVERY 4 HOURS PRN
Status: DISCONTINUED | OUTPATIENT
Start: 2025-08-24 | End: 2025-08-24 | Stop reason: HOSPADM

## 2025-08-24 RX ORDER — ACETAMINOPHEN 325 MG/1
650 TABLET ORAL EVERY 4 HOURS PRN
Status: DISCONTINUED | OUTPATIENT
Start: 2025-08-24 | End: 2025-08-24 | Stop reason: HOSPADM

## 2025-08-24 RX ORDER — ACETAMINOPHEN 500 MG
10 TABLET ORAL NIGHTLY PRN
Status: DISCONTINUED | OUTPATIENT
Start: 2025-08-24 | End: 2025-08-24 | Stop reason: HOSPADM

## 2025-08-24 RX ORDER — DEXTROSE 50 % IN WATER (D50W) INTRAVENOUS SYRINGE
12.5
Status: DISCONTINUED | OUTPATIENT
Start: 2025-08-24 | End: 2025-08-24 | Stop reason: HOSPADM

## 2025-08-24 RX ORDER — INSULIN LISPRO 100 [IU]/ML
0-5 INJECTION, SOLUTION INTRAVENOUS; SUBCUTANEOUS
Status: DISCONTINUED | OUTPATIENT
Start: 2025-08-24 | End: 2025-08-24 | Stop reason: HOSPADM

## 2025-08-24 RX ORDER — HEPARIN SODIUM 5000 [USP'U]/ML
5000 INJECTION, SOLUTION INTRAVENOUS; SUBCUTANEOUS EVERY 8 HOURS
Status: DISCONTINUED | OUTPATIENT
Start: 2025-08-24 | End: 2025-08-24 | Stop reason: HOSPADM

## 2025-08-24 RX ORDER — POLYETHYLENE GLYCOL 3350 17 G/17G
17 POWDER, FOR SOLUTION ORAL DAILY PRN
Status: DISCONTINUED | OUTPATIENT
Start: 2025-08-24 | End: 2025-08-24 | Stop reason: HOSPADM

## 2025-08-24 RX ADMIN — GABAPENTIN 100 MG: 100 CAPSULE ORAL at 15:07

## 2025-08-24 RX ADMIN — INSULIN LISPRO 3 UNITS: 100 INJECTION, SOLUTION INTRAVENOUS; SUBCUTANEOUS at 11:28

## 2025-08-24 RX ADMIN — INSULIN LISPRO 2 UNITS: 100 INJECTION, SOLUTION INTRAVENOUS; SUBCUTANEOUS at 06:37

## 2025-08-24 RX ADMIN — CARVEDILOL 6.25 MG: 6.25 TABLET, FILM COATED ORAL at 08:25

## 2025-08-24 RX ADMIN — ROPINIROLE 0.25 MG: 0.25 TABLET, FILM COATED ORAL at 15:07

## 2025-08-24 RX ADMIN — RANOLAZINE 500 MG: 500 TABLET, FILM COATED, EXTENDED RELEASE ORAL at 00:02

## 2025-08-24 RX ADMIN — TRAZODONE HYDROCHLORIDE 300 MG: 150 TABLET ORAL at 00:02

## 2025-08-24 RX ADMIN — HEPARIN SODIUM 5000 UNITS: 5000 INJECTION, SOLUTION INTRAVENOUS; SUBCUTANEOUS at 06:38

## 2025-08-24 RX ADMIN — RANOLAZINE 500 MG: 500 TABLET, FILM COATED, EXTENDED RELEASE ORAL at 08:25

## 2025-08-24 RX ADMIN — FLUTICASONE FUROATE AND VILANTEROL TRIFENATATE 1 PUFF: 200; 25 POWDER RESPIRATORY (INHALATION) at 06:27

## 2025-08-24 RX ADMIN — TICAGRELOR 90 MG: 90 TABLET ORAL at 08:25

## 2025-08-24 RX ADMIN — HEPARIN SODIUM 5000 UNITS: 5000 INJECTION, SOLUTION INTRAVENOUS; SUBCUTANEOUS at 12:33

## 2025-08-24 RX ADMIN — EMPAGLIFLOZIN 25 MG: 25 TABLET, FILM COATED ORAL at 08:25

## 2025-08-24 RX ADMIN — ASPIRIN 81 MG: 81 TABLET, COATED ORAL at 08:25

## 2025-08-24 RX ADMIN — TRIAMTERENE AND HYDROCHLOROTHIAZIDE 1 TABLET: 37.5; 25 TABLET ORAL at 08:25

## 2025-08-24 RX ADMIN — ATORVASTATIN CALCIUM 40 MG: 20 TABLET, FILM COATED ORAL at 00:02

## 2025-08-24 RX ADMIN — TICAGRELOR 90 MG: 90 TABLET ORAL at 00:02

## 2025-08-24 RX ADMIN — GABAPENTIN 100 MG: 100 CAPSULE ORAL at 08:25

## 2025-08-24 RX ADMIN — ROPINIROLE 0.25 MG: 0.25 TABLET, FILM COATED ORAL at 08:25

## 2025-08-24 RX ADMIN — ISOSORBIDE MONONITRATE 60 MG: 60 TABLET, EXTENDED RELEASE ORAL at 06:27

## 2025-08-24 ASSESSMENT — COGNITIVE AND FUNCTIONAL STATUS - GENERAL
TOILETING: A LITTLE
WALKING IN HOSPITAL ROOM: A LITTLE
MOVING TO AND FROM BED TO CHAIR: A LITTLE
CLIMB 3 TO 5 STEPS WITH RAILING: A LOT
HELP NEEDED FOR BATHING: A LITTLE
DRESSING REGULAR LOWER BODY CLOTHING: A LITTLE
DRESSING REGULAR UPPER BODY CLOTHING: A LITTLE
DAILY ACTIVITIY SCORE: 20
STANDING UP FROM CHAIR USING ARMS: A LITTLE
MOBILITY SCORE: 19

## 2025-08-24 ASSESSMENT — PAIN SCALES - GENERAL: PAINLEVEL_OUTOF10: 0 - NO PAIN

## 2025-08-24 ASSESSMENT — PAIN - FUNCTIONAL ASSESSMENT: PAIN_FUNCTIONAL_ASSESSMENT: 0-10

## 2025-08-25 ENCOUNTER — PATIENT OUTREACH (OUTPATIENT)
Dept: PRIMARY CARE | Facility: CLINIC | Age: 50
End: 2025-08-25
Payer: COMMERCIAL

## 2025-08-25 LAB
HOLD SPECIMEN: NORMAL

## 2025-08-27 LAB
ATRIAL RATE: 78 BPM
P AXIS: 39 DEGREES
P OFFSET: 190 MS
P ONSET: 131 MS
PR INTERVAL: 184 MS
Q ONSET: 223 MS
QRS COUNT: 13 BEATS
QRS DURATION: 118 MS
QT INTERVAL: 432 MS
QTC CALCULATION(BAZETT): 492 MS
QTC FREDERICIA: 471 MS
R AXIS: -36 DEGREES
T AXIS: 86 DEGREES
T OFFSET: 439 MS
VENTRICULAR RATE: 78 BPM

## 2025-09-03 ENCOUNTER — HOSPITAL ENCOUNTER (OUTPATIENT)
Dept: CARDIOLOGY | Age: 50
Discharge: HOME | End: 2025-09-03
Payer: COMMERCIAL

## 2025-09-03 PROCEDURE — 93297 REM INTERROG DEV EVAL ICPMS: CPT

## 2025-09-03 PROCEDURE — 93296 REM INTERROG EVL PM/IDS: CPT

## 2025-09-09 ENCOUNTER — APPOINTMENT (OUTPATIENT)
Dept: PRIMARY CARE | Facility: CLINIC | Age: 50
End: 2025-09-09
Payer: COMMERCIAL

## (undated) DEVICE — CATHETER, DIAGNOSTIC, 5FR,  PIG-145, 110CM, 6SH ANGLED

## (undated) DEVICE — SHEATH, PINNACLE, W/.038 GW 10CM, 5FR INTRODUCER, 2.5 CM DIALATOR

## (undated) DEVICE — SYRINGE, ANGIOGRAPHIC, MULTIDOSE

## (undated) DEVICE — ACCESS KIT, S-MAK MINI, 4FR 10CM 0.018IN 40CM, NT/PT, ECHO ENHANCE NEEDLE

## (undated) DEVICE — CLOSURE SYSTEM, VASCULAR, VASCADE, 5 F

## (undated) DEVICE — CATHETER, DIAGNOSTIC, JUDKINS, LEFT, 5 FR-JL 4.0

## (undated) DEVICE — CATHETER, DIAGNOSTIC, 5FR, IM

## (undated) DEVICE — CATHETER, DIAGNOSTIC, AMPLATZ, 5 FR, AR MOD

## (undated) DEVICE — GUIDE WIRE, 035/300CM, HI-TORGUE, SUPRA CORE

## (undated) DEVICE — Device